# Patient Record
Sex: MALE | Race: WHITE | Employment: FULL TIME | URBAN - METROPOLITAN AREA
[De-identification: names, ages, dates, MRNs, and addresses within clinical notes are randomized per-mention and may not be internally consistent; named-entity substitution may affect disease eponyms.]

---

## 2021-04-12 LAB
AVERAGE GLUCOSE: NORMAL
HBA1C MFR BLD: 7.4 %

## 2021-11-16 LAB
ALBUMIN SERPL-MCNC: NORMAL G/DL
ALP BLD-CCNC: NORMAL U/L
ALT SERPL-CCNC: NORMAL U/L
ANION GAP SERPL CALCULATED.3IONS-SCNC: NORMAL MMOL/L
AST SERPL-CCNC: NORMAL U/L
BASOPHILS ABSOLUTE: NORMAL
BASOPHILS RELATIVE PERCENT: NORMAL
BILIRUB SERPL-MCNC: NORMAL MG/DL
BUN BLDV-MCNC: NORMAL MG/DL
CALCIUM SERPL-MCNC: NORMAL MG/DL
CHLORIDE BLD-SCNC: NORMAL MMOL/L
CHOLESTEROL, TOTAL: NORMAL
CHOLESTEROL/HDL RATIO: NORMAL
CO2: NORMAL
CREAT SERPL-MCNC: NORMAL MG/DL
CREATININE, URINE: NORMAL
EOSINOPHILS ABSOLUTE: NORMAL
EOSINOPHILS RELATIVE PERCENT: NORMAL
GFR CALCULATED: NORMAL
GLUCOSE BLD-MCNC: NORMAL MG/DL
HCT VFR BLD CALC: NORMAL %
HDLC SERPL-MCNC: NORMAL MG/DL
HEMOGLOBIN: NORMAL
LDL CHOLESTEROL CALCULATED: NORMAL
LYMPHOCYTES ABSOLUTE: NORMAL
LYMPHOCYTES RELATIVE PERCENT: NORMAL
MCH RBC QN AUTO: NORMAL PG
MCHC RBC AUTO-ENTMCNC: NORMAL G/DL
MCV RBC AUTO: NORMAL FL
MICROALBUMIN/CREAT 24H UR: NORMAL MG/G{CREAT}
MICROALBUMIN/CREAT UR-RTO: NORMAL
MONOCYTES ABSOLUTE: NORMAL
MONOCYTES RELATIVE PERCENT: NORMAL
NEUTROPHILS ABSOLUTE: NORMAL
NEUTROPHILS RELATIVE PERCENT: NORMAL
NONHDLC SERPL-MCNC: NORMAL MG/DL
PLATELET # BLD: NORMAL 10*3/UL
PMV BLD AUTO: NORMAL FL
POTASSIUM SERPL-SCNC: NORMAL MMOL/L
PROSTATE SPECIFIC ANTIGEN: NORMAL
RBC # BLD: NORMAL 10*6/UL
SODIUM BLD-SCNC: NORMAL MMOL/L
TOTAL PROTEIN: NORMAL
TRIGL SERPL-MCNC: NORMAL MG/DL
VLDLC SERPL CALC-MCNC: NORMAL MG/DL
WBC # BLD: NORMAL 10*3/UL

## 2022-02-09 ENCOUNTER — TELEPHONE (OUTPATIENT)
Dept: SURGERY | Age: 72
End: 2022-02-09

## 2022-02-09 ENCOUNTER — OFFICE VISIT (OUTPATIENT)
Dept: SURGERY | Age: 72
End: 2022-02-09
Payer: COMMERCIAL

## 2022-02-09 VITALS
HEIGHT: 73 IN | TEMPERATURE: 98.5 F | SYSTOLIC BLOOD PRESSURE: 149 MMHG | DIASTOLIC BLOOD PRESSURE: 77 MMHG | WEIGHT: 232 LBS | HEART RATE: 54 BPM | BODY MASS INDEX: 30.75 KG/M2

## 2022-02-09 DIAGNOSIS — K40.90 RIGHT INGUINAL HERNIA: Primary | ICD-10-CM

## 2022-02-09 DIAGNOSIS — R10.31 RIGHT GROIN PAIN: ICD-10-CM

## 2022-02-09 PROCEDURE — 99204 OFFICE O/P NEW MOD 45 MIN: CPT | Performed by: SURGERY

## 2022-02-09 RX ORDER — SODIUM CHLORIDE 9 MG/ML
25 INJECTION, SOLUTION INTRAVENOUS PRN
Status: CANCELLED | OUTPATIENT
Start: 2022-02-09

## 2022-02-09 RX ORDER — LISINOPRIL 20 MG/1
20 TABLET ORAL DAILY
COMMUNITY
Start: 2022-02-03 | End: 2022-03-17 | Stop reason: DRUGHIGH

## 2022-02-09 RX ORDER — SODIUM CHLORIDE 9 MG/ML
INJECTION, SOLUTION INTRAVENOUS CONTINUOUS
Status: CANCELLED | OUTPATIENT
Start: 2022-02-09

## 2022-02-09 RX ORDER — METOPROLOL TARTRATE 50 MG/1
TABLET, FILM COATED ORAL
Status: ON HOLD | COMMUNITY
Start: 2022-01-04 | End: 2022-02-26 | Stop reason: HOSPADM

## 2022-02-09 RX ORDER — TAMSULOSIN HYDROCHLORIDE 0.4 MG/1
0.4 CAPSULE ORAL DAILY
COMMUNITY
End: 2022-06-27 | Stop reason: SDUPTHER

## 2022-02-09 RX ORDER — CLOPIDOGREL BISULFATE 75 MG/1
TABLET ORAL
COMMUNITY
End: 2022-03-30 | Stop reason: SDUPTHER

## 2022-02-09 RX ORDER — NITROGLYCERIN 0.4 MG/1
TABLET SUBLINGUAL
COMMUNITY
Start: 2022-01-27

## 2022-02-09 RX ORDER — INSULIN GLARGINE 100 [IU]/ML
INJECTION, SOLUTION SUBCUTANEOUS
COMMUNITY
Start: 2022-01-22

## 2022-02-09 RX ORDER — PRAVASTATIN SODIUM 40 MG
40 TABLET ORAL DAILY
Status: ON HOLD | COMMUNITY
Start: 2022-02-03 | End: 2022-03-02 | Stop reason: HOSPADM

## 2022-02-09 RX ORDER — GLIMEPIRIDE 4 MG/1
TABLET ORAL
Status: ON HOLD | COMMUNITY
Start: 2022-02-03 | End: 2022-02-25

## 2022-02-09 RX ORDER — TADALAFIL 10 MG/1
TABLET ORAL PRN
Status: ON HOLD | COMMUNITY
Start: 2022-01-27 | End: 2022-03-02 | Stop reason: HOSPADM

## 2022-02-09 RX ORDER — ALLOPURINOL 100 MG/1
TABLET ORAL
Status: ON HOLD | COMMUNITY
End: 2022-03-02 | Stop reason: HOSPADM

## 2022-02-09 RX ORDER — SODIUM CHLORIDE 0.9 % (FLUSH) 0.9 %
10 SYRINGE (ML) INJECTION PRN
Status: CANCELLED | OUTPATIENT
Start: 2022-02-09

## 2022-02-09 RX ORDER — SODIUM CHLORIDE 0.9 % (FLUSH) 0.9 %
10 SYRINGE (ML) INJECTION EVERY 12 HOURS SCHEDULED
Status: CANCELLED | OUTPATIENT
Start: 2022-02-09

## 2022-02-09 NOTE — TELEPHONE ENCOUNTER
Per Dr. Kath Morfin, patient is scheduled for Laparoscopic robotic right inguinal hernia repair with mesh possible bilateral at [address] [place] [address] on 22. Surgery scheduled via iqueue, surgeon's calendar updated. Dr. Kath Morfin to enter orders. Follow up appointment scheduled. Patient has received COVID 19 vaccine. Cardiac clearance requested from Dr. Lei Jamison from [facility].  Medical clearance requested from Dr. Brittnee Jackson. Patient to hold plavix 5 days prior to surgery. Electronically signed by Gloria Tamayo RN on 2022 at 3:04 PM    Medical clearance received from Dr. Brittnee Jackson. Awaiting Cardiac Clearance. Patient requested to move up surgery date once clearance received. Electronically signed by Gloria Tamayo RN on 2/15/2022 at 10:11 AM            Prior Authorization Form:      DEMOGRAPHICS:                     Patient Name:  Isaac Qiu  Patient :  1950            Insurance:  Payor: Oliver Meehan / Plan: Elyse Montes / Product Type: *No Product type* /   Insurance ID Number:    Payor/Plan Subscr  Sex Relation Sub. Ins. ID Effective Group Num   1.  [address] R 1950 Male Self [id number] 22 [id number]                                   [id number]         DIAGNOSIS & PROCEDURE:                       Procedure/Operation: Laparoscopic robotic right inguinal hernia repair with mesh possible bilateral           CPT Code: 39963    Diagnosis:  Right inguinal hernia    ICD10 Code: K40.90    Location:  [address] [place] [address]    Surgeon:  Tiffanie Peralta INFORMATION:                          Date: 22    Time: TBD              Anesthesia:  General                                                       Status:  Outpatient        Special Comments:         Electronically signed by Gloria Tamayo RN on 2022 at 3:04 PM

## 2022-02-09 NOTE — PROGRESS NOTES
General Surgery History and Physical  [facility]    Patient's Name/Date of Birth: Dora Cavanaugh / 1950    Date: February 9, 2022     Surgeon: Lei Mathur M.D.    PCP: Altagracia Geren MD     Chief Complaint: right Inguinal bulge    HPI:   Dora Cavanaugh is a 66 y.o. male who presents for evaluation of right inguinal hernia, groin pain. Timing is constant, radiation to right groin, alleviated by rest and started weeks ago and severity is 7/10. States pain has been worsening, no symptoms of bowel obstruction, nausea, vomiting, fever, chills, abdominal pain. States it protrudes with activity, it is reducible. Hx of repair on left 20yrs ago. Patient Active Problem List   Diagnosis    Right inguinal hernia       History reviewed. No pertinent past medical history. History reviewed. No pertinent surgical history. Allergies   Allergen Reactions    Dapagliflozin      Other reaction(s): Myalgias (Muscle Pain)       The patient has a family history that is negative for severe cardiovascular or respiratory issues, negative for reaction to anesthesia. Time spent reviewing past medical, surgical, social and family history, vitals, nursing assessment and images. No changes from above documented history.     Social History     Socioeconomic History    Marital status: Unknown     Spouse name: Not on file    Number of children: Not on file    Years of education: Not on file    Highest education level: Not on file   Occupational History    Not on file   Tobacco Use    Smoking status: Former Smoker    Smokeless tobacco: Never Used   Substance and Sexual Activity    Alcohol use: Not on file    Drug use: Not on file    Sexual activity: Not on file   Other Topics Concern    Not on file   Social History Narrative    Not on file     Social Determinants of Health     Financial Resource Strain:     Difficulty of Paying Living Expenses: Not on file   Food Insecurity:     Worried About Running Out of Food in the Last Year: Not on file    Ran Out of Food in the Last Year: Not on file   Transportation Needs:     Lack of Transportation (Medical): Not on file    Lack of Transportation (Non-Medical): Not on file   Physical Activity:     Days of Exercise per Week: Not on file    Minutes of Exercise per Session: Not on file   Stress:     Feeling of Stress : Not on file   Social Connections:     Frequency of Communication with Friends and Family: Not on file    Frequency of Social Gatherings with Friends and Family: Not on file    Attends Sabianist Services: Not on file    Active Member of [address] [place] [address] [employer] or Organizations: Not on file    Attends Club or Organization Meetings: Not on file    Marital Status: Not on file   Intimate Partner Violence:     Fear of Current or Ex-Partner: Not on file    Emotionally Abused: Not on file    Physically Abused: Not on file    Sexually Abused: Not on file   Housing Stability:     Unable to Pay for Housing in the Last Year: Not on file    Number of [place] in the Last Year: Not on file    Unstable Housing in the Last Year: Not on file         A complete 10 system review was performed and are otherwise negative unless mentioned in the above HPI. Specific negatives are listed below but may not include all those reviewed.     General ROS: negative obtundation, AMS  ENT ROS: negative rhinorrhea, epistaxis  Allergy and Immunology ROS: negative itchy/watery eyes or nasal congestion  Hematological and Lymphatic ROS: negative spontaneous bleeding or bruising  Endocrine ROS: negative  lethargy, mood swings, palpitations or polydipsia/polyuria  Respiratory ROS: negative sputum changes, stridor, tachypnea or wheezing  Cardiovascular ROS: negative for - loss of consciousness, murmur or orthopnea  Gastrointestinal ROS: negative for - hematochezia or hematemesis  Genito-Urinary ROS: negative for -  genital discharge or hematuria  Musculoskeletal ROS: negative for - focal weakness, gangrene  Psych/Neuro ROS: negative for - visual or auditory hallucinations, suicidal ideation    Physical exam:   BP (!) 149/77 (Site: Right Upper Arm, Position: Sitting, Cuff Size: Medium Adult)   Pulse 54   Temp 98.5 °F (36.9 °C)   Ht 6' 1\" (1.854 m)   Wt 232 lb (105.2 kg)   BMI 30.61 kg/m²   General appearance:  NAD, appears stated age  Head: NCAT, PERRLA, EOMI, red conjunctiva  Neck: supple, no masses, trachea midline  Lungs: Equal chest rise bilateral, no retractions, no wheezing  Heart: Reg rate  Abdomen: soft, obese, nontender  Skin; warm and dry, no cyanosis  Gu: no cva tenderness  Extremities: atraumatic, no focal motor deficits, no open wounds  Psych: No tremor, visual hallucinations        Radiology: I reviewed relevant abdominal imaging from this admission and that available in the EMR       Assessment:  Chandra Junior is a 66 y.o. male with right inguinal poss femoral hernia, nonrecurrent  Patient Active Problem List   Diagnosis    Right inguinal hernia         Plan:  OR for laparoscopic robot assisted right inguinal hernia repair with mesh possible bilateral  Discussed the risk, benefits and alternatives of surgery including wound infections, bleeding, scar, recurrent hernia formation, mesh infection and migration, chronic groin pain, nerve injury, testicle injury, repeat procedures and the risks of general anesthetic including MI, CVA, sudden death or reactions to anesthetic medications. The patient understands the risks and alternatives and the possibility of converting to an open procedure. All questions were answered to the patient's satisfaction and they freely signed the consent.      Medical and cardiac clearance  Hold plavix 5d prior to surgery        Jose Balderas MD  9:50 AM  2/9/2022

## 2022-02-14 ENCOUNTER — APPOINTMENT (OUTPATIENT)
Dept: CT IMAGING | Age: 72
End: 2022-02-14
Payer: COMMERCIAL

## 2022-02-14 ENCOUNTER — HOSPITAL ENCOUNTER (EMERGENCY)
Age: 72
Discharge: HOME | End: 2022-02-14
Attending: EMERGENCY MEDICINE
Payer: COMMERCIAL

## 2022-02-14 VITALS
OXYGEN SATURATION: 96 % | SYSTOLIC BLOOD PRESSURE: 143 MMHG | HEART RATE: 57 BPM | RESPIRATION RATE: 18 BRPM | WEIGHT: 232 LBS | HEIGHT: 73 IN | DIASTOLIC BLOOD PRESSURE: 81 MMHG | TEMPERATURE: 97.9 F | BODY MASS INDEX: 30.75 KG/M2

## 2022-02-14 DIAGNOSIS — R10.31 RIGHT LOWER QUADRANT ABDOMINAL PAIN: Primary | ICD-10-CM

## 2022-02-14 DIAGNOSIS — K40.21 BILATERAL RECURRENT INGUINAL HERNIA WITHOUT OBSTRUCTION OR GANGRENE: ICD-10-CM

## 2022-02-14 LAB
ALBUMIN SERPL-MCNC: 4.1 G/DL (ref 3.5–5.2)
ALP BLD-CCNC: 118 U/L (ref 40–129)
ALT SERPL-CCNC: 20 U/L (ref 0–40)
ANION GAP SERPL CALCULATED.3IONS-SCNC: 10 MMOL/L (ref 7–16)
AST SERPL-CCNC: 21 U/L (ref 0–39)
BASOPHILS ABSOLUTE: 0.1 E9/L (ref 0–0.2)
BASOPHILS RELATIVE PERCENT: 0.9 % (ref 0–2)
BILIRUB SERPL-MCNC: 0.2 MG/DL (ref 0–1.2)
BUN BLDV-MCNC: 21 MG/DL (ref 6–23)
CALCIUM SERPL-MCNC: 9 MG/DL (ref 8.6–10.2)
CHLORIDE BLD-SCNC: 104 MMOL/L (ref 98–107)
CO2: 25 MMOL/L (ref 22–29)
CREAT SERPL-MCNC: 0.9 MG/DL (ref 0.7–1.2)
EOSINOPHILS ABSOLUTE: 0.28 E9/L (ref 0.05–0.5)
EOSINOPHILS RELATIVE PERCENT: 2.6 % (ref 0–6)
GFR AFRICAN AMERICAN: >60
GFR NON-AFRICAN AMERICAN: >60 ML/MIN/1.73
GLUCOSE BLD-MCNC: 192 MG/DL (ref 74–99)
HCT VFR BLD CALC: 49 % (ref 37–54)
HEMOGLOBIN: 15.8 G/DL (ref 12.5–16.5)
IMMATURE GRANULOCYTES #: 0.07 E9/L
IMMATURE GRANULOCYTES %: 0.7 % (ref 0–5)
LYMPHOCYTES ABSOLUTE: 2.39 E9/L (ref 1.5–4)
LYMPHOCYTES RELATIVE PERCENT: 22.4 % (ref 20–42)
MCH RBC QN AUTO: 28 PG (ref 26–35)
MCHC RBC AUTO-ENTMCNC: 32.2 % (ref 32–34.5)
MCV RBC AUTO: 86.9 FL (ref 80–99.9)
MONOCYTES ABSOLUTE: 1.05 E9/L (ref 0.1–0.95)
MONOCYTES RELATIVE PERCENT: 9.8 % (ref 2–12)
NEUTROPHILS ABSOLUTE: 6.79 E9/L (ref 1.8–7.3)
NEUTROPHILS RELATIVE PERCENT: 63.6 % (ref 43–80)
PDW BLD-RTO: 13.4 FL (ref 11.5–15)
PLATELET # BLD: 341 E9/L (ref 130–450)
PMV BLD AUTO: 9.9 FL (ref 7–12)
POTASSIUM REFLEX MAGNESIUM: 4.4 MMOL/L (ref 3.5–5)
RBC # BLD: 5.64 E12/L (ref 3.8–5.8)
SODIUM BLD-SCNC: 139 MMOL/L (ref 132–146)
TOTAL PROTEIN: 6.8 G/DL (ref 6.4–8.3)
WBC # BLD: 10.7 E9/L (ref 4.5–11.5)

## 2022-02-14 PROCEDURE — 6360000004 HC RX CONTRAST MEDICATION: Performed by: RADIOLOGY

## 2022-02-14 PROCEDURE — 99282 EMERGENCY DEPT VISIT SF MDM: CPT

## 2022-02-14 PROCEDURE — 74177 CT ABD & PELVIS W/CONTRAST: CPT

## 2022-02-14 PROCEDURE — 85025 COMPLETE CBC W/AUTO DIFF WBC: CPT

## 2022-02-14 PROCEDURE — 80053 COMPREHEN METABOLIC PANEL: CPT

## 2022-02-14 RX ORDER — ONDANSETRON 2 MG/ML
4 INJECTION INTRAMUSCULAR; INTRAVENOUS ONCE
Status: DISCONTINUED | OUTPATIENT
Start: 2022-02-14 | End: 2022-02-14 | Stop reason: HOSPADM

## 2022-02-14 RX ORDER — SODIUM CHLORIDE 0.9 % (FLUSH) 0.9 %
SYRINGE (ML) INJECTION
Status: DISCONTINUED
Start: 2022-02-14 | End: 2022-02-14 | Stop reason: HOSPADM

## 2022-02-14 RX ORDER — MORPHINE SULFATE 4 MG/ML
4 INJECTION, SOLUTION INTRAMUSCULAR; INTRAVENOUS ONCE
Status: DISCONTINUED | OUTPATIENT
Start: 2022-02-14 | End: 2022-02-14 | Stop reason: HOSPADM

## 2022-02-14 RX ADMIN — IOPAMIDOL 75 ML: 755 INJECTION, SOLUTION INTRAVENOUS at 10:27

## 2022-02-14 ASSESSMENT — ENCOUNTER SYMPTOMS
EYE DISCHARGE: 0
COUGH: 0
DIARRHEA: 0
SORE THROAT: 0
BACK PAIN: 0
SHORTNESS OF BREATH: 0
VOMITING: 0
SINUS PRESSURE: 0
NAUSEA: 0
ABDOMINAL PAIN: 1
WHEEZING: 0
EYE PAIN: 0

## 2022-02-14 ASSESSMENT — PAIN SCALES - GENERAL: PAINLEVEL_OUTOF10: 10

## 2022-02-14 ASSESSMENT — PAIN DESCRIPTION - LOCATION: LOCATION: ABDOMEN

## 2022-02-14 ASSESSMENT — PAIN DESCRIPTION - PAIN TYPE: TYPE: ACUTE PAIN

## 2022-02-14 ASSESSMENT — PAIN DESCRIPTION - ORIENTATION: ORIENTATION: LEFT;LOWER

## 2022-02-14 NOTE — ED NOTES
Pt declined pain and nausea medicine at this time-ambulated to the bathroom to void without assistance     Betsy Zaldivar RN  02/14/22 [id number]

## 2022-02-14 NOTE — ED PROVIDER NOTES
67-year-old male, follows with Dr. Keily Jo for a right-sided hernia that he has elective repair scheduled for in 2 weeks. States he was wiping his bottom today after a bowel movement, was turning and wiping and had a sudden pop in that area, is having intense 8 out of 10 pain, sudden onset, worsens with palpation, nothing makes it better, is constant. He believes it is related to his hernia. He denies any nausea, vomiting, constipation, chest pain, chest tightness, shortness of breath. Review of Systems   Constitutional: Negative for chills and fever. HENT: Negative for ear pain, sinus pressure and sore throat. Eyes: Negative for pain and discharge. Respiratory: Negative for cough, shortness of breath and wheezing. Cardiovascular: Negative for chest pain. Gastrointestinal: Positive for abdominal pain (Right lower quadrant region). Negative for diarrhea, nausea and vomiting. Genitourinary: Negative for dysuria and frequency. Musculoskeletal: Negative for arthralgias and back pain. Skin: Negative for rash and wound. Neurological: Negative for weakness and headaches. Hematological: Negative for adenopathy. All other systems reviewed and are negative. Physical Exam  Vitals and nursing note reviewed. Constitutional:       Appearance: He is well-developed. HENT:      Head: Normocephalic and atraumatic. Eyes:      Extraocular Movements: Extraocular movements intact. Conjunctiva/sclera: Conjunctivae normal.      Pupils: Pupils are equal, round, and reactive to light. Cardiovascular:      Rate and Rhythm: Normal rate and regular rhythm. Heart sounds: Normal heart sounds. No murmur heard. Pulmonary:      Effort: Pulmonary effort is normal. No respiratory distress. Breath sounds: Normal breath sounds. No wheezing or rales. Abdominal:      General: Bowel sounds are normal.      Palpations: Abdomen is soft. Tenderness: There is abdominal tenderness. There is no guarding or rebound. Comments: Right lower quadrant tenderness, there is no appreciable skin changes, no visible hernia   Musculoskeletal:         General: No tenderness or deformity. Cervical back: Normal range of motion and neck supple. Skin:     General: Skin is warm and dry. Neurological:      Mental Status: He is alert and oriented to person, place, and time. Cranial Nerves: No cranial nerve deficit. Coordination: Coordination normal.          Procedures     MDM     ED Course as of 02/14/22 1106   Mon Feb 14, 2022   8849 ATTENDING PROVIDER ATTESTATION:     I have personally performed and/or participated in the history, exam, medical decision making, and procedures and agree with all pertinent clinical information unless otherwise noted. I have also reviewed and agree with the past medical, family and social history unless otherwise noted. I have discussed this patient in detail with the resident, and provided the instruction and education regarding patient complaining of painful right lower inguinal hernia, it is known, he sees Dr. Dwight Vides. States it is only present if he is upright or moving. States right now laying flat it has reduced itself and is nontender but is here because it is getting worsening with the pain over the last day or so. No fevers, sweats or chills and no nausea vomiting or diarrhea. Right now he has no pain and states it is not protruding. My findings/plan: Patient laying flat in the bed in no distress. Soft slightly protuberant abdomen with no distention. No palpable reproducible hernias to the lower pelvic area or inguinal regions. The abdomen itself is nontender. No jaundice or icterus. No CVA tenderness. Heart rate regular.   Lungs are clear and equal.       [NC]   1105 Patient presented emergency department for right-sided inguinal pain and discomfort, states when he was going to the bathroom today he got up and his hernia came out, had a large amount of pain afterwards. Reduced by the time I did seen him and he was laying down in the emergency department. CT scan showed bilateral hernias with possible early congestion of the left side, no overlying skin changes and no obstructive signs. Spoke to Dr. Raya Lares, says patient may be able to get surgery if he has not been taking his Plavix, patient took his Plavix today, patient would have to hold Plavix in order to receive hernia repair surgery, he will follow-up outpatient with his surgeon, hernias are reducible, he has no obstructive signs, no overlying skin changes, instructed to involve his general surgeon. [JG]      ED Course User Index  [JG] Dianna Romberg, MD  [NC] Bigg Fitch DO      Patient presented emergency department for right-sided inguinal pain and discomfort, states when he was going to the bathroom today he got up and his hernia came out, had a large amount of pain afterwards. Reduced by the time I did seen him and he was laying down in the emergency department. CT scan showed bilateral hernias with possible early congestion of the left side, no overlying skin changes and no obstructive signs. Spoke to Dr. Raya Lares, says patient may be able to get surgery if he has not been taking his Plavix, patient took his Plavix today, patient would have to hold Plavix in order to receive hernia repair surgery, he will follow-up outpatient with his surgeon, hernias are reducible, he has no obstructive signs, no overlying skin changes, instructed to involve his general surgeon. ED Course as of 02/14/22 1107   Mon Feb 14, 2022   0986 ATTENDING PROVIDER ATTESTATION:     I have personally performed and/or participated in the history, exam, medical decision making, and procedures and agree with all pertinent clinical information unless otherwise noted. I have also reviewed and agree with the past medical, family and social history unless otherwise noted.     I have discussed this patient in detail with the resident, and provided the instruction and education regarding patient complaining of painful right lower inguinal hernia, it is known, he sees Dr. Hernandez Durand. States it is only present if he is upright or moving. States right now laying flat it has reduced itself and is nontender but is here because it is getting worsening with the pain over the last day or so. No fevers, sweats or chills and no nausea vomiting or diarrhea. Right now he has no pain and states it is not protruding. My findings/plan: Patient laying flat in the bed in no distress. Soft slightly protuberant abdomen with no distention. No palpable reproducible hernias to the lower pelvic area or inguinal regions. The abdomen itself is nontender. No jaundice or icterus. No CVA tenderness. Heart rate regular. Lungs are clear and equal.       [NC]   1105 Patient presented emergency department for right-sided inguinal pain and discomfort, states when he was going to the bathroom today he got up and his hernia came out, had a large amount of pain afterwards. Reduced by the time I did seen him and he was laying down in the emergency department. CT scan showed bilateral hernias with possible early congestion of the left side, no overlying skin changes and no obstructive signs. Spoke to Dr. July Espinoza, says patient may be able to get surgery if he has not been taking his Plavix, patient took his Plavix today, patient would have to hold Plavix in order to receive hernia repair surgery, he will follow-up outpatient with his surgeon, hernias are reducible, he has no obstructive signs, no overlying skin changes, instructed to involve his general surgeon. [JG]      ED Course User Index  [JG] Anusha Fitzgerald MD  [NC] Loida Faust, DO       --------------------------------------------- PAST HISTORY ---------------------------------------------  Past Medical History:  has no past medical history on file.     Past Surgical History:  has no past surgical history on file. Social History:  reports that he has quit smoking. He has never used smokeless tobacco.    Family History: family history is not on file. The patients home medications have been reviewed.     Allergies: Dapagliflozin    -------------------------------------------------- RESULTS -------------------------------------------------  Labs:  Results for orders placed or performed during the hospital encounter of 02/14/22   CBC Auto Differential   Result Value Ref Range    WBC 10.7 4.5 - 11.5 E9/L    RBC 5.64 3.80 - 5.80 E12/L    Hemoglobin 15.8 12.5 - 16.5 g/dL    Hematocrit 49.0 37.0 - 54.0 %    MCV 86.9 80.0 - 99.9 fL    MCH 28.0 26.0 - 35.0 pg    MCHC 32.2 32.0 - 34.5 %    RDW 13.4 11.5 - 15.0 fL    Platelets 309 915 - 922 E9/L    MPV 9.9 7.0 - 12.0 fL    Neutrophils % 63.6 43.0 - 80.0 %    Immature Granulocytes % 0.7 0.0 - 5.0 %    Lymphocytes % 22.4 20.0 - 42.0 %    Monocytes % 9.8 2.0 - 12.0 %    Eosinophils % 2.6 0.0 - 6.0 %    Basophils % 0.9 0.0 - 2.0 %    Neutrophils Absolute 6.79 1.80 - 7.30 E9/L    Immature Granulocytes # 0.07 E9/L    Lymphocytes Absolute 2.39 1.50 - 4.00 E9/L    Monocytes Absolute 1.05 (H) 0.10 - 0.95 E9/L    Eosinophils Absolute 0.28 0.05 - 0.50 E9/L    Basophils Absolute 0.10 0.00 - 0.20 E9/L   Comprehensive Metabolic Panel w/ Reflex to MG   Result Value Ref Range    Sodium 139 132 - 146 mmol/L    Potassium reflex Magnesium 4.4 3.5 - 5.0 mmol/L    Chloride 104 98 - 107 mmol/L    CO2 25 22 - 29 mmol/L    Anion Gap 10 7 - 16 mmol/L    Glucose 192 (H) 74 - 99 mg/dL    BUN 21 6 - 23 mg/dL    CREATININE 0.9 0.7 - 1.2 mg/dL    GFR Non-African American >60 >=60 mL/min/1.73    GFR African American >60     Calcium 9.0 8.6 - 10.2 mg/dL    Total Protein 6.8 6.4 - 8.3 g/dL    Albumin 4.1 3.5 - 5.2 g/dL    Total Bilirubin 0.2 0.0 - 1.2 mg/dL    Alkaline Phosphatase 118 40 - 129 U/L    ALT 20 0 - 40 U/L    AST 21 0 - 39 U/L       Radiology:  CT ABDOMEN PELVIS W IV CONTRAST Additional Contrast? None   Preliminary Result   Left inguinal hernia with some stranding seen at the origin of the hernia in   which mild vascular congestion cannot be excluded. There is no contents of   bowel with only fat present. There is also a small umbilical hernia   containing fat only. Remainder of the abdomen and pelvis reveals diverticulosis with no evidence   of diverticulitis. There is atelectatic changes and pleural thickening   identified at the left lung base.             ------------------------- NURSING NOTES AND VITALS REVIEWED ---------------------------  Date / Time Roomed:  2/14/2022  8:27 AM  ED Bed Assignment:  14/14    The nursing notes within the ED encounter and vital signs as below have been reviewed. BP (!) 184/83   Pulse 56   Temp 97.9 °F (36.6 °C) (Oral)   Resp 20   Ht 6' 1\" (1.854 m)   Wt 232 lb (105.2 kg)   SpO2 96%   BMI 30.61 kg/m²   Oxygen Saturation Interpretation: Normal      ------------------------------------------ PROGRESS NOTES ------------------------------------------  11:07 AM EST  I have spoken with the patient and discussed todays results, in addition to providing specific details for the plan of care and counseling regarding the diagnosis and prognosis. Their questions are answered at this time and they are agreeable with the plan. I discussed at length with them reasons for immediate return here for re evaluation. They will followup with their primary care physician by calling their office tomorrow. --------------------------------- ADDITIONAL PROVIDER NOTES ---------------------------------  At this time the patient is without objective evidence of an acute process requiring hospitalization or inpatient management. They have remained hemodynamically stable throughout their entire ED visit and are stable for discharge with outpatient follow-up.      The plan has been discussed in detail and they are aware of the specific conditions for emergent return, as well as the importance of follow-up. New Prescriptions    No medications on file       Diagnosis:  1. Right lower quadrant abdominal pain    2. Bilateral recurrent inguinal hernia without obstruction or gangrene        Disposition:  Patient's disposition: Discharge to home  Patient's condition is stable.            Jennifer Wood MD  Resident  02/14/22 1562

## 2022-02-24 ENCOUNTER — APPOINTMENT (OUTPATIENT)
Dept: GENERAL RADIOLOGY | Age: 72
DRG: 282 | End: 2022-02-24
Payer: COMMERCIAL

## 2022-02-24 ENCOUNTER — HOSPITAL ENCOUNTER (INPATIENT)
Age: 72
LOS: 2 days | Discharge: HOME | DRG: 282 | End: 2022-02-26
Attending: STUDENT IN AN ORGANIZED HEALTH CARE EDUCATION/TRAINING PROGRAM | Admitting: FAMILY MEDICINE
Payer: COMMERCIAL

## 2022-02-24 ENCOUNTER — APPOINTMENT (OUTPATIENT)
Dept: CT IMAGING | Age: 72
DRG: 282 | End: 2022-02-24
Payer: COMMERCIAL

## 2022-02-24 DIAGNOSIS — I48.91 ATRIAL FIBRILLATION WITH RVR (HCC): ICD-10-CM

## 2022-02-24 DIAGNOSIS — I21.3 ST ELEVATION MYOCARDIAL INFARCTION (STEMI), UNSPECIFIED ARTERY (HCC): Primary | ICD-10-CM

## 2022-02-24 LAB
ALBUMIN SERPL-MCNC: 4.2 G/DL (ref 3.5–5.2)
ALP BLD-CCNC: 113 U/L (ref 40–129)
ALT SERPL-CCNC: 16 U/L (ref 0–40)
ANION GAP SERPL CALCULATED.3IONS-SCNC: 11 MMOL/L (ref 7–16)
ANISOCYTOSIS: ABNORMAL
AST SERPL-CCNC: 12 U/L (ref 0–39)
BASOPHILS ABSOLUTE: 0.14 E9/L (ref 0–0.2)
BASOPHILS RELATIVE PERCENT: 0.9 % (ref 0–2)
BILIRUB SERPL-MCNC: 0.2 MG/DL (ref 0–1.2)
BUN BLDV-MCNC: 20 MG/DL (ref 6–23)
CALCIUM SERPL-MCNC: 8.4 MG/DL (ref 8.6–10.2)
CHLORIDE BLD-SCNC: 105 MMOL/L (ref 98–107)
CO2: 24 MMOL/L (ref 22–29)
CREAT SERPL-MCNC: 0.9 MG/DL (ref 0.7–1.2)
EOSINOPHILS ABSOLUTE: 0.14 E9/L (ref 0.05–0.5)
EOSINOPHILS RELATIVE PERCENT: 0.9 % (ref 0–6)
GFR AFRICAN AMERICAN: >60
GFR NON-AFRICAN AMERICAN: >60 ML/MIN/1.73
GLUCOSE BLD-MCNC: 151 MG/DL (ref 74–99)
HCT VFR BLD CALC: 48.7 % (ref 37–54)
HEMOGLOBIN: 16 G/DL (ref 12.5–16.5)
INR BLD: 1
LYMPHOCYTES ABSOLUTE: 2.77 E9/L (ref 1.5–4)
LYMPHOCYTES RELATIVE PERCENT: 18.3 % (ref 20–42)
MCH RBC QN AUTO: 27.5 PG (ref 26–35)
MCHC RBC AUTO-ENTMCNC: 32.9 % (ref 32–34.5)
MCV RBC AUTO: 83.8 FL (ref 80–99.9)
METAMYELOCYTES RELATIVE PERCENT: 2.6 % (ref 0–1)
MONOCYTES ABSOLUTE: 1.08 E9/L (ref 0.1–0.95)
MONOCYTES RELATIVE PERCENT: 6.9 % (ref 2–12)
MYELOCYTE PERCENT: 1.7 % (ref 0–0)
NEUTROPHILS ABSOLUTE: 11.24 E9/L (ref 1.8–7.3)
NEUTROPHILS RELATIVE PERCENT: 68.7 % (ref 43–80)
PDW BLD-RTO: 13.4 FL (ref 11.5–15)
PLATELET # BLD: 338 E9/L (ref 130–450)
PMV BLD AUTO: 10 FL (ref 7–12)
POTASSIUM SERPL-SCNC: 4 MMOL/L (ref 3.5–5)
PROTHROMBIN TIME: 11.4 SEC (ref 9.3–12.4)
RBC # BLD: 5.81 E12/L (ref 3.8–5.8)
SARS-COV-2, NAAT: NOT DETECTED
SODIUM BLD-SCNC: 140 MMOL/L (ref 132–146)
TOTAL PROTEIN: 7.2 G/DL (ref 6.4–8.3)
TROPONIN, HIGH SENSITIVITY: 19 NG/L (ref 0–11)
WBC # BLD: 15.4 E9/L (ref 4.5–11.5)

## 2022-02-24 PROCEDURE — 96376 TX/PRO/DX INJ SAME DRUG ADON: CPT

## 2022-02-24 PROCEDURE — 85025 COMPLETE CBC W/AUTO DIFF WBC: CPT

## 2022-02-24 PROCEDURE — 96361 HYDRATE IV INFUSION ADD-ON: CPT

## 2022-02-24 PROCEDURE — 2500000003 HC RX 250 WO HCPCS

## 2022-02-24 PROCEDURE — 80053 COMPREHEN METABOLIC PANEL: CPT

## 2022-02-24 PROCEDURE — 2140000000 HC CCU INTERMEDIATE R&B

## 2022-02-24 PROCEDURE — 71275 CT ANGIOGRAPHY CHEST: CPT

## 2022-02-24 PROCEDURE — 6360000004 HC RX CONTRAST MEDICATION: Performed by: RADIOLOGY

## 2022-02-24 PROCEDURE — 6360000002 HC RX W HCPCS

## 2022-02-24 PROCEDURE — 84484 ASSAY OF TROPONIN QUANT: CPT

## 2022-02-24 PROCEDURE — 96374 THER/PROPH/DIAG INJ IV PUSH: CPT

## 2022-02-24 PROCEDURE — 6360000002 HC RX W HCPCS: Performed by: STUDENT IN AN ORGANIZED HEALTH CARE EDUCATION/TRAINING PROGRAM

## 2022-02-24 PROCEDURE — 93005 ELECTROCARDIOGRAM TRACING: CPT | Performed by: EMERGENCY MEDICINE

## 2022-02-24 PROCEDURE — 83880 ASSAY OF NATRIURETIC PEPTIDE: CPT

## 2022-02-24 PROCEDURE — 74174 CTA ABD&PLVS W/CONTRAST: CPT

## 2022-02-24 PROCEDURE — 2500000003 HC RX 250 WO HCPCS: Performed by: STUDENT IN AN ORGANIZED HEALTH CARE EDUCATION/TRAINING PROGRAM

## 2022-02-24 PROCEDURE — 85610 PROTHROMBIN TIME: CPT

## 2022-02-24 PROCEDURE — 71045 X-RAY EXAM CHEST 1 VIEW: CPT

## 2022-02-24 PROCEDURE — 99285 EMERGENCY DEPT VISIT HI MDM: CPT

## 2022-02-24 PROCEDURE — 2580000003 HC RX 258: Performed by: STUDENT IN AN ORGANIZED HEALTH CARE EDUCATION/TRAINING PROGRAM

## 2022-02-24 PROCEDURE — 85378 FIBRIN DEGRADE SEMIQUANT: CPT

## 2022-02-24 PROCEDURE — 87635 SARS-COV-2 COVID-19 AMP PRB: CPT

## 2022-02-24 RX ORDER — HEPARIN SODIUM 10000 [USP'U]/ML
5000 INJECTION, SOLUTION INTRAVENOUS; SUBCUTANEOUS ONCE
Status: DISCONTINUED | OUTPATIENT
Start: 2022-02-24 | End: 2022-02-24

## 2022-02-24 RX ORDER — HEPARIN SODIUM 10000 [USP'U]/100ML
5-30 INJECTION, SOLUTION INTRAVENOUS CONTINUOUS
Status: DISCONTINUED | OUTPATIENT
Start: 2022-02-24 | End: 2022-02-25 | Stop reason: ALTCHOICE

## 2022-02-24 RX ORDER — 0.9 % SODIUM CHLORIDE 0.9 %
1000 INTRAVENOUS SOLUTION INTRAVENOUS ONCE
Status: COMPLETED | OUTPATIENT
Start: 2022-02-24 | End: 2022-02-24

## 2022-02-24 RX ORDER — METOPROLOL TARTRATE 5 MG/5ML
5 INJECTION INTRAVENOUS EVERY 5 MIN PRN
Status: DISCONTINUED | OUTPATIENT
Start: 2022-02-24 | End: 2022-02-24

## 2022-02-24 RX ORDER — HEPARIN SODIUM 1000 [USP'U]/ML
2000 INJECTION, SOLUTION INTRAVENOUS; SUBCUTANEOUS PRN
Status: DISCONTINUED | OUTPATIENT
Start: 2022-02-24 | End: 2022-02-25 | Stop reason: ALTCHOICE

## 2022-02-24 RX ORDER — METOPROLOL TARTRATE 5 MG/5ML
5 INJECTION INTRAVENOUS ONCE
Status: DISCONTINUED | OUTPATIENT
Start: 2022-02-24 | End: 2022-02-24

## 2022-02-24 RX ORDER — METOPROLOL TARTRATE 5 MG/5ML
5 INJECTION INTRAVENOUS ONCE
Status: COMPLETED | OUTPATIENT
Start: 2022-02-24 | End: 2022-02-24

## 2022-02-24 RX ORDER — METOPROLOL TARTRATE 5 MG/5ML
5 INJECTION INTRAVENOUS EVERY 5 MIN PRN
Status: DISCONTINUED | OUTPATIENT
Start: 2022-02-24 | End: 2022-02-26 | Stop reason: HOSPADM

## 2022-02-24 RX ORDER — SODIUM CHLORIDE 9 MG/ML
INJECTION, SOLUTION INTRAVENOUS CONTINUOUS
Status: DISCONTINUED | OUTPATIENT
Start: 2022-02-25 | End: 2022-02-25 | Stop reason: SDUPTHER

## 2022-02-24 RX ORDER — HEPARIN SODIUM 1000 [USP'U]/ML
4000 INJECTION, SOLUTION INTRAVENOUS; SUBCUTANEOUS PRN
Status: DISCONTINUED | OUTPATIENT
Start: 2022-02-24 | End: 2022-02-25 | Stop reason: ALTCHOICE

## 2022-02-24 RX ORDER — FENTANYL CITRATE 50 UG/ML
50 INJECTION, SOLUTION INTRAMUSCULAR; INTRAVENOUS ONCE
Status: COMPLETED | OUTPATIENT
Start: 2022-02-24 | End: 2022-02-24

## 2022-02-24 RX ORDER — HEPARIN SODIUM 1000 [USP'U]/ML
4000 INJECTION, SOLUTION INTRAVENOUS; SUBCUTANEOUS ONCE
Status: COMPLETED | OUTPATIENT
Start: 2022-02-24 | End: 2022-02-24

## 2022-02-24 RX ADMIN — SODIUM CHLORIDE 1000 ML: 9 INJECTION, SOLUTION INTRAVENOUS at 22:10

## 2022-02-24 RX ADMIN — SODIUM CHLORIDE 1000 ML: 9 INJECTION, SOLUTION INTRAVENOUS at 22:45

## 2022-02-24 RX ADMIN — METOPROLOL TARTRATE 5 MG: 5 INJECTION INTRAVENOUS at 23:43

## 2022-02-24 RX ADMIN — HEPARIN SODIUM 4000 UNITS: 1000 INJECTION INTRAVENOUS; SUBCUTANEOUS at 23:59

## 2022-02-24 RX ADMIN — IOPAMIDOL 90 ML: 755 INJECTION, SOLUTION INTRAVENOUS at 23:01

## 2022-02-24 RX ADMIN — METOPROLOL TARTRATE 5 MG: 5 INJECTION, SOLUTION INTRAVENOUS at 23:08

## 2022-02-24 RX ADMIN — FENTANYL CITRATE 50 MCG: 0.05 INJECTION, SOLUTION INTRAMUSCULAR; INTRAVENOUS at 23:15

## 2022-02-24 ASSESSMENT — PAIN SCALES - GENERAL: PAINLEVEL_OUTOF10: 10

## 2022-02-24 NOTE — Clinical Note
Discharge Plan[de-identified] Other/[facility])   Telemetry/Cardiac Monitoring Required?: Yes

## 2022-02-24 NOTE — TELEPHONE ENCOUNTER
Follow up call made to patients cardiologist Dr. Harini Redmond, in regards to requested cardiac clearance. Patient to have hearth catheterization done prior to surgery. Attempted to reach patient to discuss cancelling surgery due to need for heart cath. VM left with call back information. Lap robot right inguinal hernia repair scheduled for 2/28/21 with Dr. Kate Carrasco cancelled. Surgery cancelled via telephone with surgery scheduling. Surgeons calendar updated.  Will reschedule surgery when cleared by cardiologist.   Electronically signed by Nikki Turpin RN on 2/24/2022 at 10:39 AM

## 2022-02-25 PROBLEM — I25.10 CAD IN NATIVE ARTERY: Status: ACTIVE | Noted: 2022-02-25

## 2022-02-25 LAB
ABO/RH: NORMAL
ALBUMIN SERPL-MCNC: 3.5 G/DL (ref 3.5–5.2)
ALP BLD-CCNC: 94 U/L (ref 40–129)
ALT SERPL-CCNC: 17 U/L (ref 0–40)
ANION GAP SERPL CALCULATED.3IONS-SCNC: 13 MMOL/L (ref 7–16)
ANTIBODY SCREEN: NORMAL
APTT: 27.4 SEC (ref 24.5–35.1)
APTT: 29 SEC (ref 24.5–35.1)
APTT: 29.3 SEC (ref 24.5–35.1)
AST SERPL-CCNC: 41 U/L (ref 0–39)
BILIRUB SERPL-MCNC: 0.3 MG/DL (ref 0–1.2)
BUN BLDV-MCNC: 17 MG/DL (ref 6–23)
CALCIUM SERPL-MCNC: 7.9 MG/DL (ref 8.6–10.2)
CHLORIDE BLD-SCNC: 107 MMOL/L (ref 98–107)
CHOLESTEROL, TOTAL: 136 MG/DL (ref 0–199)
CO2: 20 MMOL/L (ref 22–29)
CREAT SERPL-MCNC: 0.8 MG/DL (ref 0.7–1.2)
D DIMER: <200 NG/ML DDU
EKG ATRIAL RATE: 178 BPM
EKG ATRIAL RATE: 65 BPM
EKG ATRIAL RATE: 71 BPM
EKG P AXIS: 21 DEGREES
EKG P AXIS: 61 DEGREES
EKG P-R INTERVAL: 178 MS
EKG P-R INTERVAL: 196 MS
EKG Q-T INTERVAL: 284 MS
EKG Q-T INTERVAL: 418 MS
EKG Q-T INTERVAL: 444 MS
EKG QRS DURATION: 100 MS
EKG QRS DURATION: 106 MS
EKG QRS DURATION: 86 MS
EKG QTC CALCULATION (BAZETT): 434 MS
EKG QTC CALCULATION (BAZETT): 460 MS
EKG QTC CALCULATION (BAZETT): 482 MS
EKG R AXIS: 111 DEGREES
EKG R AXIS: 22 DEGREES
EKG R AXIS: 35 DEGREES
EKG T AXIS: 112 DEGREES
EKG T AXIS: 131 DEGREES
EKG T AXIS: 84 DEGREES
EKG VENTRICULAR RATE: 158 BPM
EKG VENTRICULAR RATE: 65 BPM
EKG VENTRICULAR RATE: 71 BPM
GFR AFRICAN AMERICAN: >60
GFR NON-AFRICAN AMERICAN: >60 ML/MIN/1.73
GLUCOSE BLD-MCNC: 203 MG/DL (ref 74–99)
HCT VFR BLD CALC: 45.2 % (ref 37–54)
HDLC SERPL-MCNC: 37 MG/DL
HEMOGLOBIN: 14.6 G/DL (ref 12.5–16.5)
LDL CHOLESTEROL CALCULATED: 59 MG/DL (ref 0–99)
MAGNESIUM: 2.2 MG/DL (ref 1.6–2.6)
MCH RBC QN AUTO: 27.9 PG (ref 26–35)
MCHC RBC AUTO-ENTMCNC: 32.3 % (ref 32–34.5)
MCV RBC AUTO: 86.4 FL (ref 80–99.9)
PDW BLD-RTO: 13.4 FL (ref 11.5–15)
PLATELET # BLD: 278 E9/L (ref 130–450)
PMV BLD AUTO: 10.6 FL (ref 7–12)
POC ACT LR: 168 SECONDS
POC ACT LR: 292 SECONDS
POTASSIUM REFLEX MAGNESIUM: 4 MMOL/L (ref 3.5–5)
PRO-BNP: 94 PG/ML (ref 0–125)
RBC # BLD: 5.23 E12/L (ref 3.8–5.8)
SODIUM BLD-SCNC: 140 MMOL/L (ref 132–146)
TOTAL PROTEIN: 6.1 G/DL (ref 6.4–8.3)
TRIGL SERPL-MCNC: 199 MG/DL (ref 0–149)
TROPONIN, HIGH SENSITIVITY: 130 NG/L (ref 0–11)
TROPONIN, HIGH SENSITIVITY: 494 NG/L (ref 0–11)
TROPONIN, HIGH SENSITIVITY: 57 NG/L (ref 0–11)
TSH SERPL DL<=0.05 MIU/L-ACNC: 1.44 UIU/ML (ref 0.27–4.2)
VLDLC SERPL CALC-MCNC: 40 MG/DL
WBC # BLD: 13 E9/L (ref 4.5–11.5)

## 2022-02-25 PROCEDURE — 6360000002 HC RX W HCPCS: Performed by: PHYSICIAN ASSISTANT

## 2022-02-25 PROCEDURE — APPSS30 APP SPLIT SHARED TIME 16-30 MINUTES: Performed by: PHYSICIAN ASSISTANT

## 2022-02-25 PROCEDURE — 93459 L HRT ART/GRFT ANGIO: CPT

## 2022-02-25 PROCEDURE — 86900 BLOOD TYPING SEROLOGIC ABO: CPT

## 2022-02-25 PROCEDURE — 93458 L HRT ARTERY/VENTRICLE ANGIO: CPT | Performed by: INTERNAL MEDICINE

## 2022-02-25 PROCEDURE — 86901 BLOOD TYPING SEROLOGIC RH(D): CPT

## 2022-02-25 PROCEDURE — 2580000003 HC RX 258: Performed by: INTERNAL MEDICINE

## 2022-02-25 PROCEDURE — 99152 MOD SED SAME PHYS/QHP 5/>YRS: CPT | Performed by: INTERNAL MEDICINE

## 2022-02-25 PROCEDURE — 84484 ASSAY OF TROPONIN QUANT: CPT

## 2022-02-25 PROCEDURE — 6360000002 HC RX W HCPCS

## 2022-02-25 PROCEDURE — B2151ZZ FLUOROSCOPY OF LEFT HEART USING LOW OSMOLAR CONTRAST: ICD-10-PCS | Performed by: INTERNAL MEDICINE

## 2022-02-25 PROCEDURE — 93005 ELECTROCARDIOGRAM TRACING: CPT | Performed by: INTERNAL MEDICINE

## 2022-02-25 PROCEDURE — B2181ZZ FLUOROSCOPY OF LEFT INTERNAL MAMMARY BYPASS GRAFT USING LOW OSMOLAR CONTRAST: ICD-10-PCS | Performed by: INTERNAL MEDICINE

## 2022-02-25 PROCEDURE — 36415 COLL VENOUS BLD VENIPUNCTURE: CPT

## 2022-02-25 PROCEDURE — 80061 LIPID PANEL: CPT

## 2022-02-25 PROCEDURE — B3101ZZ FLUOROSCOPY OF THORACIC AORTA USING LOW OSMOLAR CONTRAST: ICD-10-PCS | Performed by: INTERNAL MEDICINE

## 2022-02-25 PROCEDURE — 2709999900 HC NON-CHARGEABLE SUPPLY

## 2022-02-25 PROCEDURE — 93010 ELECTROCARDIOGRAM REPORT: CPT | Performed by: INTERNAL MEDICINE

## 2022-02-25 PROCEDURE — C1769 GUIDE WIRE: HCPCS

## 2022-02-25 PROCEDURE — 2140000000 HC CCU INTERMEDIATE R&B

## 2022-02-25 PROCEDURE — 86850 RBC ANTIBODY SCREEN: CPT

## 2022-02-25 PROCEDURE — 85730 THROMBOPLASTIN TIME PARTIAL: CPT

## 2022-02-25 PROCEDURE — 84443 ASSAY THYROID STIM HORMONE: CPT

## 2022-02-25 PROCEDURE — 4A023N7 MEASUREMENT OF CARDIAC SAMPLING AND PRESSURE, LEFT HEART, PERCUTANEOUS APPROACH: ICD-10-PCS | Performed by: INTERNAL MEDICINE

## 2022-02-25 PROCEDURE — 93005 ELECTROCARDIOGRAM TRACING: CPT | Performed by: FAMILY MEDICINE

## 2022-02-25 PROCEDURE — 6370000000 HC RX 637 (ALT 250 FOR IP): Performed by: FAMILY MEDICINE

## 2022-02-25 PROCEDURE — 83735 ASSAY OF MAGNESIUM: CPT

## 2022-02-25 PROCEDURE — 6370000000 HC RX 637 (ALT 250 FOR IP): Performed by: INTERNAL MEDICINE

## 2022-02-25 PROCEDURE — 80053 COMPREHEN METABOLIC PANEL: CPT

## 2022-02-25 PROCEDURE — C1894 INTRO/SHEATH, NON-LASER: HCPCS

## 2022-02-25 PROCEDURE — 85027 COMPLETE CBC AUTOMATED: CPT

## 2022-02-25 PROCEDURE — 99291 CRITICAL CARE FIRST HOUR: CPT | Performed by: INTERNAL MEDICINE

## 2022-02-25 PROCEDURE — B2111ZZ FLUOROSCOPY OF MULTIPLE CORONARY ARTERIES USING LOW OSMOLAR CONTRAST: ICD-10-PCS | Performed by: INTERNAL MEDICINE

## 2022-02-25 PROCEDURE — 85347 COAGULATION TIME ACTIVATED: CPT

## 2022-02-25 RX ORDER — SODIUM CHLORIDE 0.9 % (FLUSH) 0.9 %
5-40 SYRINGE (ML) INJECTION EVERY 12 HOURS SCHEDULED
Status: DISCONTINUED | OUTPATIENT
Start: 2022-02-25 | End: 2022-02-26 | Stop reason: HOSPADM

## 2022-02-25 RX ORDER — POLYETHYLENE GLYCOL 3350 17 G/17G
17 POWDER, FOR SOLUTION ORAL DAILY PRN
Status: DISCONTINUED | OUTPATIENT
Start: 2022-02-25 | End: 2022-02-26 | Stop reason: HOSPADM

## 2022-02-25 RX ORDER — SODIUM CHLORIDE 0.9 % (FLUSH) 0.9 %
5-40 SYRINGE (ML) INJECTION PRN
Status: DISCONTINUED | OUTPATIENT
Start: 2022-02-25 | End: 2022-02-26 | Stop reason: HOSPADM

## 2022-02-25 RX ORDER — AMIODARONE HYDROCHLORIDE 200 MG/1
200 TABLET ORAL 2 TIMES DAILY
Status: DISCONTINUED | OUTPATIENT
Start: 2022-02-25 | End: 2022-02-26 | Stop reason: HOSPADM

## 2022-02-25 RX ORDER — ASPIRIN 81 MG/1
81 TABLET, CHEWABLE ORAL DAILY
Status: DISCONTINUED | OUTPATIENT
Start: 2022-02-25 | End: 2022-02-26

## 2022-02-25 RX ORDER — METOPROLOL TARTRATE 50 MG/1
50 TABLET, FILM COATED ORAL DAILY
Status: DISCONTINUED | OUTPATIENT
Start: 2022-02-25 | End: 2022-02-25 | Stop reason: SDUPTHER

## 2022-02-25 RX ORDER — ACETAMINOPHEN 650 MG/1
650 SUPPOSITORY RECTAL EVERY 6 HOURS PRN
Status: DISCONTINUED | OUTPATIENT
Start: 2022-02-25 | End: 2022-02-26 | Stop reason: HOSPADM

## 2022-02-25 RX ORDER — ONDANSETRON 2 MG/ML
4 INJECTION INTRAMUSCULAR; INTRAVENOUS EVERY 6 HOURS PRN
Status: DISCONTINUED | OUTPATIENT
Start: 2022-02-25 | End: 2022-02-25 | Stop reason: SDUPTHER

## 2022-02-25 RX ORDER — ACETAMINOPHEN 325 MG/1
650 TABLET ORAL EVERY 4 HOURS PRN
Status: DISCONTINUED | OUTPATIENT
Start: 2022-02-25 | End: 2022-02-25 | Stop reason: SDUPTHER

## 2022-02-25 RX ORDER — CLOPIDOGREL BISULFATE 75 MG/1
75 TABLET ORAL DAILY
Status: DISCONTINUED | OUTPATIENT
Start: 2022-02-25 | End: 2022-02-26 | Stop reason: HOSPADM

## 2022-02-25 RX ORDER — ACETAMINOPHEN 325 MG/1
650 TABLET ORAL EVERY 6 HOURS PRN
Status: DISCONTINUED | OUTPATIENT
Start: 2022-02-25 | End: 2022-02-26 | Stop reason: HOSPADM

## 2022-02-25 RX ORDER — LISINOPRIL 20 MG/1
20 TABLET ORAL DAILY
Status: DISCONTINUED | OUTPATIENT
Start: 2022-02-25 | End: 2022-02-26 | Stop reason: HOSPADM

## 2022-02-25 RX ORDER — SODIUM CHLORIDE 0.9 % (FLUSH) 0.9 %
5-40 SYRINGE (ML) INJECTION PRN
Status: DISCONTINUED | OUTPATIENT
Start: 2022-02-25 | End: 2022-02-25 | Stop reason: SDUPTHER

## 2022-02-25 RX ORDER — GLIMEPIRIDE 4 MG/1
4 TABLET ORAL NIGHTLY
COMMUNITY
End: 2022-10-10 | Stop reason: SDUPTHER

## 2022-02-25 RX ORDER — SODIUM CHLORIDE 9 MG/ML
INJECTION, SOLUTION INTRAVENOUS CONTINUOUS
Status: DISCONTINUED | OUTPATIENT
Start: 2022-02-25 | End: 2022-02-26

## 2022-02-25 RX ORDER — SODIUM CHLORIDE 9 MG/ML
25 INJECTION, SOLUTION INTRAVENOUS PRN
Status: DISCONTINUED | OUTPATIENT
Start: 2022-02-25 | End: 2022-02-26 | Stop reason: HOSPADM

## 2022-02-25 RX ORDER — SODIUM CHLORIDE 0.9 % (FLUSH) 0.9 %
5-40 SYRINGE (ML) INJECTION EVERY 12 HOURS SCHEDULED
Status: DISCONTINUED | OUTPATIENT
Start: 2022-02-25 | End: 2022-02-25 | Stop reason: SDUPTHER

## 2022-02-25 RX ORDER — SODIUM CHLORIDE 9 MG/ML
25 INJECTION, SOLUTION INTRAVENOUS PRN
Status: DISCONTINUED | OUTPATIENT
Start: 2022-02-25 | End: 2022-02-25 | Stop reason: SDUPTHER

## 2022-02-25 RX ORDER — ONDANSETRON 4 MG/1
4 TABLET, ORALLY DISINTEGRATING ORAL EVERY 8 HOURS PRN
Status: DISCONTINUED | OUTPATIENT
Start: 2022-02-25 | End: 2022-02-26 | Stop reason: HOSPADM

## 2022-02-25 RX ORDER — SODIUM CHLORIDE 9 MG/ML
INJECTION, SOLUTION INTRAVENOUS CONTINUOUS
Status: DISCONTINUED | OUTPATIENT
Start: 2022-02-25 | End: 2022-02-25 | Stop reason: SDUPTHER

## 2022-02-25 RX ORDER — TAMSULOSIN HYDROCHLORIDE 0.4 MG/1
0.4 CAPSULE ORAL DAILY
Status: DISCONTINUED | OUTPATIENT
Start: 2022-02-25 | End: 2022-02-26 | Stop reason: HOSPADM

## 2022-02-25 RX ORDER — PRAVASTATIN SODIUM 20 MG
40 TABLET ORAL NIGHTLY
Status: DISCONTINUED | OUTPATIENT
Start: 2022-02-25 | End: 2022-02-26 | Stop reason: HOSPADM

## 2022-02-25 RX ORDER — ONDANSETRON 2 MG/ML
4 INJECTION INTRAMUSCULAR; INTRAVENOUS EVERY 6 HOURS PRN
Status: DISCONTINUED | OUTPATIENT
Start: 2022-02-25 | End: 2022-02-26 | Stop reason: HOSPADM

## 2022-02-25 RX ORDER — METOPROLOL SUCCINATE 25 MG/1
25 TABLET, EXTENDED RELEASE ORAL DAILY
Status: DISCONTINUED | OUTPATIENT
Start: 2022-02-25 | End: 2022-02-26 | Stop reason: HOSPADM

## 2022-02-25 RX ADMIN — ASPIRIN 81 MG 81 MG: 81 TABLET ORAL at 10:45

## 2022-02-25 RX ADMIN — LISINOPRIL 20 MG: 20 TABLET ORAL at 10:46

## 2022-02-25 RX ADMIN — ENOXAPARIN SODIUM 105 MG: 150 INJECTION SUBCUTANEOUS at 22:12

## 2022-02-25 RX ADMIN — METOPROLOL SUCCINATE 25 MG: 25 TABLET, EXTENDED RELEASE ORAL at 10:46

## 2022-02-25 RX ADMIN — Medication 10 ML: at 09:00

## 2022-02-25 RX ADMIN — AMIODARONE HYDROCHLORIDE 200 MG: 200 TABLET ORAL at 05:54

## 2022-02-25 RX ADMIN — AMIODARONE HYDROCHLORIDE 200 MG: 200 TABLET ORAL at 22:11

## 2022-02-25 RX ADMIN — CLOPIDOGREL BISULFATE 75 MG: 75 TABLET ORAL at 10:46

## 2022-02-25 RX ADMIN — TAMSULOSIN HYDROCHLORIDE 0.4 MG: 0.4 CAPSULE ORAL at 22:11

## 2022-02-25 RX ADMIN — PRAVASTATIN SODIUM 40 MG: 20 TABLET ORAL at 22:11

## 2022-02-25 RX ADMIN — Medication 10 ML: at 22:12

## 2022-02-25 RX ADMIN — SODIUM CHLORIDE: 9 INJECTION, SOLUTION INTRAVENOUS at 02:12

## 2022-02-25 ASSESSMENT — PAIN SCALES - GENERAL
PAINLEVEL_OUTOF10: 0

## 2022-02-25 NOTE — CONSULTS
Inpatient Cardiology Consultation      Reason for Consult:  Acute inferior STEMI    Consulting Physician: Clay Clay MD    Requesting Physician:  Sophia Ibanez MD    Date of Consultation: 2/25/2022    HISTORY OF PRESENT ILLNESS:   65 yo  male with sudden onset CP ~ 1 hour prior to presenting to the ED. ECG showed Afib with RVR with ST elevations in III and AVR and ST depressions in I, II, AVL and V4 to V6. The cath lab was occupied with another case. Patient was supposed to be transferred to [facility] but could not be life flighted because of the weather conditions and there were no ambulances available for transfer for 4 hours. Did receive aspirin and Brilinta on route to the ED. Was given IV heparin 4000 unit bolus in the ED. Also was given IV lopressor in attempt to slow the heart rate. Was still having CP when I saw him in the ED. Was on Plavix which he stopped 6 days ago in anticipation of right inguinal hernia surgery      Past Medical History:    1. CAD with h/o MI and CABG in 2013 at [facility]. [facility] in [address] [place] [address] Road, specifics unknown. ( his cardiologist is Dr. Haily Cuevas ). 2. PAF. Reports one prior episode \" was dehydrated at the time \"  3. Insulin requiring DM  4. HTN  5. HLD  6. Obesity  7. Left inguinal hernia    Past Surgical History:    No past surgical history on file. Medications Prior to admit:  Prior to Admission medications    Medication Sig Start Date End Date Taking?  Authorizing Provider   allopurinol (ZYLOPRIM) 100 MG tablet allopurinol 100 mg tablet   take 1 tablet by mouth once daily    Historical Provider, MD   clopidogrel (PLAVIX) 75 MG tablet clopidogrel 75 mg tablet   take 1 tablet by mouth once daily as directed    Historical Provider, MD   glimepiride (AMARYL) 4 MG tablet  2/3/22   Historical Provider, MD   lisinopril (PRINIVIL;ZESTRIL) 20 MG tablet  2/3/22   Historical Provider, MD   LANTUS SOLOSTAR 100 UNIT/ML injection pen inject 60 units subcutaneously once daily 1/22/22   Historical Provider, MD   metoprolol tartrate (LOPRESSOR) 50 MG tablet take 1 tablet by mouth once daily as directed 1/4/22   Historical Provider, MD   nitroGLYCERIN (NITROSTAT) 0.4 MG SL tablet place 1 tablet under the tongue if needed 1/27/22   Historical Provider, MD   pravastatin (PRAVACHOL) 40 MG tablet  2/3/22   Historical Provider, MD   tadalafil (CIALIS) 10 MG tablet  1/27/22   Historical Provider, MD   tamsulosin (FLOMAX) 0.4 MG capsule tamsulosin 0.4 mg capsule   TAKE 1 CAPSULE 30 MINUTES AFTER EVENING MEAL    Historical Provider, MD       Current Medications:    Current Facility-Administered Medications: metoprolol (LOPRESSOR) injection 5 mg, 5 mg, IntraVENous, Q5 Min PRN  heparin (porcine) injection 4,000 Units, 4,000 Units, IntraVENous, PRN  heparin (porcine) injection 2,000 Units, 2,000 Units, IntraVENous, PRN  heparin 25,000 units in dextrose 5% 250 mL (premix) infusion, 5-30 Units/kg/hr, IntraVENous, Continuous  0.9 % sodium chloride infusion, , IntraVENous, Continuous    Allergies:  Dapagliflozin    Social History:    Social History     Socioeconomic History    Marital status:      Spouse name: Not on file    Number of children: Not on file    Years of education: Not on file    Highest education level: Not on file   Occupational History    Not on file   Tobacco Use    Smoking status: Former Smoker    Smokeless tobacco: Never Used   Substance and Sexual Activity    Alcohol use: Not on file    Drug use: Not on file    Sexual activity: Not on file   Other Topics Concern    Not on file   Social History Narrative    Not on file     Social Determinants of Health     Financial Resource Strain:     Difficulty of Paying Living Expenses: Not on file   Food Insecurity:     Worried About Running Out of Food in the Last Year: Not on file    Simran of Food in the Last Year: Not on file   Transportation Needs:     Lack of Transportation (Medical):  Not on file    Lack of Transportation (Non-Medical): Not on file   Physical Activity:     Days of Exercise per Week: Not on file    Minutes of Exercise per Session: Not on file   Stress:     Feeling of Stress : Not on file   Social Connections:     Frequency of Communication with Friends and Family: Not on file    Frequency of Social Gatherings with Friends and Family: Not on file    Attends Tenriism Services: Not on file    Active Member of [address] [place] [address] or Organizations: Not on file    Attends Club or Organization Meetings: Not on file    Marital Status: Not on file   Intimate Partner Violence:     Fear of Current or Ex-Partner: Not on file    Emotionally Abused: Not on file    Physically Abused: Not on file    Sexually Abused: Not on file   Housing Stability:     Unable to Pay for Housing in the Last Year: Not on file    Number of [place] in the Last Year: Not on file    Unstable Housing in the Last Year: Not on file       Family History:   No family history on file. REVIEW OF SYSTEMS:     · Constitutional: Denies fatigue, fevers, chills or night sweats  · Eyes: Denies visual changes or drainage  · ENT: Denies headaches or hearing loss. No mouth sores or sore throat. No epistaxis   · Cardiovascular: Denies chest pain, pressure or palpitations. No lower extremity swelling. · Respiratory: Denies ABRAHAM, cough, orthopnea or PND. No hemoptysis   · Gastrointestinal: Denies hematemesis or anorexia. No hematochezia or melena    · Genitourinary: Denies urgency, dysuria or hematuria. · Musculoskeletal: Denies gait disturbance, weakness or joint complaints  · Integumentary: Denies rash, hives or pruritis   · Neurological: Denies dizziness, headaches or seizures. No numbness or tingling  · Psychiatric: Denies anxiety or depression. · Endocrine: Denies temperature intolerance. No recent weight change. .  · Hematologic/Lymphatic: Denies abnormal bruising or bleeding.  No swollen lymph nodes    PHYSICAL EXAM:   BP (!) 123/92 Labs     02/24/22 2200   PROTIME 11.4   INR 1.0     APTT:No results for input(s): APTT in the last 72 hours. CARDIAC ENZYMES:  Recent Labs     02/24/22 2200   TROPHS 19*     FASTING LIPID PANEL:No results found for: CHOL, HDL, LDLDIRECT, LDLCALC, TRIG  LIVER PROFILE:  Recent Labs     02/24/22 2200   AST 12   ALT 16   LABALBU 4.2         ASSESSMENT:  1. Afib with RVR. Reported one prior episode of Afib  2. Acute coronary syndrome, ? Demand ischemia  3. CAD with h/o MI and CABG, specific unknown  4. Insulin requiring DM  5. HTN  5. HLD  6. Obesity   7. Left inguinal hernia    PLAN:  1. Cath +/- PCI. Risks, benefits and alternative therapies to the procedure explained. He understood and consented to proceed. Wife and son at bedside  2. Consider cardioversion ( in cath lab )  3.  Further recommendations to follow    Electronically signed by Sandra Vargas MD on 2/25/2022 at 12:09 AM

## 2022-02-25 NOTE — OP NOTE
Operative Note      Patient: Nicky Segovia  YOB: 1950  MRN: [id number]    Date of Procedure: 2/25/22           Indication:  1. Acute coronary syndrome / possible STEMI  2. AUC score 9  3. AUC indication :  2    Procedure: Left heart catheterization, coronary angiography, graft angiography, left ventriculography, aortic root angiography    Anesthesia: Versed, Fentanyl  Time sedation was administered: 00:17. I was present in the room when sedation was administered. Procedure end time: 00:48  Time spent with face to face monitoring of moderate sedation: 43 minutes    Cath performed by right femoral approach using 6F sheath. Findings:  Left main: 100% stenosis  LAD: 100 % stenosis. Patent LIMA  Circumflex: 95 %  stenosis  RCA: non dominant. 70 % stenosis. LV angio: 55%  ejection fraction     Hemodynamics:  LV: 88/64 mmHg. LVEDP 21  No gradient across AV. Ao: 89/64 ( 73 ) mmHg. IV Amiodarone 300 mg over 10 minutes administered during the procedure and the patient converted to sinus rhythm    Complication: None   Blood loss:10 cc  Contrast used: 95 cc    Post Op diagnosis:  CAD as described  Preserved EF    PLAN:  Medical therapy  ?  PCI to ostial LCx  See orders        Electronically signed by Merlene Johnson MD on 2/25/2022 at 1:36 AM

## 2022-02-25 NOTE — PROCEDURES
[address]                  [address] [facility]                            CARDIAC CATHETERIZATION    PATIENT NAME: Jose Isabel                     :        1950  MED REC NO:   [id number]                            ROOM:       [id number]  ACCOUNT NO:   [de-identified]                           ADMIT DATE: 2022  PROVIDER:     Amanda Avila MD    DATE OF PROCEDURE:  2022    PROCEDURES:  Left heart catheterization, selective coronary angiography,  graft angiography x1 (LIMA to LAD), aortic root angiography. The procedure was done through right radial approach using ultrasound  guidance. The patient received intravenous Versed and intravenous fentanyl for  sedation. INDICATION:  Acute coronary syndrome. Possible ST-elevation myocardial  infarction. PRESSURES:    Aorta:  89/64 with a mean of 73. Left ventricular systolic pressure 88, left ventricular end-diastolic  pressure 21. There was no significant gradient across the aortic valve. CORONARY ANGIOGRAPHY:    Left main:  The left main artery appeared nonexistent with separate  ostia to the LAD and the left circumflex. LCX:  The left circumflex had a 99% ostial stenosis. It was heavily  calcified in its proximal segment. There appeared to be around 60%  eccentric proximal left circumflex disease before any of the marginal  branches. The first marginal branch which took off at an acute angle  from the left circumflex had around 60-70% ostial stenosis. The second  marginal branch had an irregular middle segment with up to 90% stenosis. The third marginal branch which also took off at an acute angle from the  left circumflex had around 80% ostial narrowing. LAD:  The left anterior descending artery appeared heavily calcified and  was occluded at its origin.   RCA:  The right coronary artery is a nondominant vessel which had a  diffusely diseased segment from the proximal to the mid vessel with up  to 70% luminal narrowing. LIMA to LAD. The left internal mammary artery to the left anterior  descending artery is widely patent along its course including its distal  anastomosis. The distal anastomosis had luminal irregularities but  without any significant angiographic luminal narrowing. Multiple  diagonal branches appeared to fill upon the injection of the left  internal mammary artery which also filled the mid and proximal LAD to  the origin of the vessel and also filled retrogradely the left  circumflex. LEFT VENTRICULOGRAPHY:  The left ventricle is normal in size with  hypokinesis of the basal half of the inferior wall with preserved global  left ventricular systolic function with an estimated ejection fraction  of 50-55%. There was no mitral regurgitation. Aortic root angiography. Angiography of the aortic root does not show  any abnormalities. There was no aortic regurgitation. There was no  opacification of any grafts arising from the aortic root. The patient was bolused with 300 mg of IV amiodarone that was infused  over 10 minutes. He converted shortly thereafter to sinus rhythm. He  reported that he was chest pain free after that and for the rest of the  procedure. The right femoral arterial sheath was securely sutured to the skin at  the end of the procedure. The patient tolerated the procedure well and left the cardiac  catheterization laboratory in stable condition. CONCLUSIONS:  1. Coronary artery. A.  Left main practically nonexistent with separate ostia to the LAD and  the left circumflex. B.  LAD, occluded at its origin. C.  LCX, dominant vessel with 95% ostial narrowing and with high-grade  disease of three of its marginal branches as described above. D.  RCA, nondominant vessel with around 70-80% proximal/mid vessel  narrowing.   E.  PEARCE to LAD widely patent vessel including its distal anastomosis  with LAD showing no significant disease after the distal anastomosis  with LIMA filling the mid and proximal LAD, diagonal branches, and the  left circumflex retrogradely. 2.  Normal left ventricular size with hypokinesis of the basal half of  the inferior wall with more or less preserved global left ventricular  systolic function with an estimated ejection fraction of 50-55%. 3.  Elevated left ventricular end-diastolic pressure consistent with LV  diastolic dysfunction.         Abbie Israel MD    D: 02/25/2022 1:57:56       T: 02/25/2022 2:00:23     [email or web address]  Job#: [id number]     Doc#: [id number]    CC:

## 2022-02-25 NOTE — H&P
Hospitalist History & Physical      PCP: Layne Kim MD    Date of Service: Pt seen/examined on 2/24/2022    Chief Complaint:  had concerns including Chest Pain (Pt started having chest pain prior to coming to ED. Midsternal pain with radiation down both arms. Pt took 5 SL nitro at home. EMS gave patient 180 mg of brilinta, 324 mg Aspirin, and 100 mcg of fentanyl. ). History Of Present Illness:    Mr. Carolina Puckett, a 66y.o. year old male  who  has no past medical history on file. Patient presented to the emergency department with chest pain. This started prior to arrival.  He was transported by EMS who gave him 180 mg of Brilinta, 324 mg of aspirin and 100 mcg of fentanyl. Patient was also taken nitro at home. On arrival vital signs revealed patient to be tachycardic with a rate in the 140s as high as 150s. EKG shows STEMI with atrial fibrillation with RVR. Laboratory studies demonstrate glucose 151, troponin of 19, day BC 15.4. Chest x-ray shows cardiomegaly. Cardiology was consulted. Heparin infusion initiated. Patient to be taken emergently to the cardiac Cath Lab. Medicine consulted for admission. No past medical history on file. No past surgical history on file. Prior to Admission medications    Medication Sig Start Date End Date Taking?  Authorizing Provider   allopurinol (ZYLOPRIM) 100 MG tablet allopurinol 100 mg tablet   take 1 tablet by mouth once daily    Historical Provider, MD   clopidogrel (PLAVIX) 75 MG tablet clopidogrel 75 mg tablet   take 1 tablet by mouth once daily as directed    Historical Provider, MD   glimepiride (AMARYL) 4 MG tablet  2/3/22   Historical Provider, MD   lisinopril (PRINIVIL;ZESTRIL) 20 MG tablet  2/3/22   Historical Provider, MD   LANTUS SOLOSTAR 100 UNIT/ML injection pen inject 60 units subcutaneously once daily 1/22/22   Historical Provider, MD   metoprolol tartrate (LOPRESSOR) 50 MG tablet take 1 tablet by mouth once daily as directed 1/4/22   Historical Provider, MD   nitroGLYCERIN (NITROSTAT) 0.4 MG SL tablet place 1 tablet under the tongue if needed 1/27/22   Historical Provider, MD   pravastatin (PRAVACHOL) 40 MG tablet  2/3/22   Historical Provider, MD   tadalafil (CIALIS) 10 MG tablet  1/27/22   Historical Provider, MD   tamsulosin (FLOMAX) 0.4 MG capsule tamsulosin 0.4 mg capsule   TAKE 1 CAPSULE 30 MINUTES AFTER EVENING MEAL    Historical Provider, MD         Allergies:  Dapagliflozin    Social History:    TOBACCO:   reports that he has quit smoking. He has never used smokeless tobacco.  ETOH:   has no history on file for alcohol use. Family History:    Reviewed in detail and negative for DM, CAD, Cancer, CVA. Positive as follows\"  No family history on file. REVIEW OF SYSTEMS:   Pertinent positives as noted in the HPI. All other systems reviewed and negative. PHYSICAL EXAM:  /82   Pulse 140   Temp 97.9 °F (36.6 °C) (Oral)   Resp 16   Wt 232 lb (105.2 kg)   SpO2 98%   BMI 30.61 kg/m²   General appearance: No apparent distress, appears stated age and cooperative. HEENT: Normal cephalic, atraumatic without obvious deformity. Pupils equal, round, and reactive to light. Extra ocular muscles intact. Conjunctivae/corneas clear. Neck: Supple, with full range of motion. No jugular venous distention. Trachea midline. Respiratory: CTA  Cardiovascular: Tachycardic, irregularly irregular  Abdomen: S/NT/ND  Musculoskeletal: No clubbing, cyanosis, edema of bilateral lower extremities. Brisk capillary refill. Skin: Normal skin color. No rashes or lesions. Neurologic:  Neurovascularly intact without any focal sensory/motor deficits.  Cranial nerves: II-XII intact, grossly non-focal.  Psychiatric: Alert and oriented, thought content appropriate, normal insight    Reviewed EKG and CXR personally      CBC:   Recent Labs     02/24/22  2200   WBC 15.4*   RBC 5.81*   HGB 16.0   HCT 48.7   MCV 83.8   RDW 13.4    BMP:   Recent Labs     02/24/22 2200      K 4.0      CO2 24   BUN 20   CREATININE 0.9     LFT:  Recent Labs     02/24/22 2200   PROT 7.2   ALKPHOS 113   ALT 16   AST 12   BILITOT 0.2     CE:  No results for input(s): Wilma Medeiros in the last 72 hours. PT/INR:   Recent Labs     02/24/22 2200   INR 1.0     BNP: No results for input(s): BNP in the last 72 hours. ESR: No results found for: SEDRATE  CRP: No results found for: CRP  D Dimer: No results found for: DDIMER   Folate and B12: No results found for: [id number], No results found for: FOLATE  Lactic Acid: No results found for: LACTA  Thyroid Studies: No results found for: TSH, C9GMJCC, X6DLMEL, THYROIDAB    Oupatient labs:  Lab Results   Component Value Date    INR 1.0 02/24/2022       Urinalysis:  No results found for: NITRU, WBCUA, BACTERIA, RBCUA, BLOODU, SPECGRAV, GLUCOSEU    Imaging:  CT ABDOMEN PELVIS W IV CONTRAST Additional Contrast? None    Result Date: 2/14/2022  EXAMINATION: CT OF THE ABDOMEN AND PELVIS WITH CONTRAST 2/14/2022 10:25 am TECHNIQUE: CT of the abdomen and pelvis was performed with the administration of intravenous contrast. Multiplanar reformatted images are provided for review. Dose modulation, iterative reconstruction, and/or weight based adjustment of the mA/kV was utilized to reduce the radiation dose to as low as reasonably achievable. COMPARISON: None HISTORY: ORDERING SYSTEM PROVIDED HISTORY: rlq pain, hx hernia, r/o incarceration TECHNOLOGIST PROVIDED HISTORY: Additional Contrast?->None Reason for exam:->rlq pain, hx hernia, r/o incarceration Decision Support Exception - unselect if not a suspected or confirmed emergency medical condition->Emergency Medical Condition (MA) FINDINGS: Lower Chest: There are some atelectatic changes identified within the left lung base with pleural thickening present. Organs: The liver is homogeneous in appearance. No stones in the gallbladder.   No intrahepatic or extrahepatic biliary ductal dilatation. Pancreas is homogeneous. The spleen is unremarkable. Both adrenal glands reveal mild nodularity bilaterally and some enlargement suggesting possible hyperplasia. The left kidney reveals a renal cortical cyst as well as the right kidney. Symmetric enhancement identified of the renal parenchyma. No obstruction of the kidneys. GI/Bowel: Gastrointestinal tract reveals stool seen scattered throughout the colon. The small bowel is unremarkable in appearance. Diverticulosis with no evidence of diverticulitis. Stomach is unremarkable in appearance. Pelvis: Pelvic organs reveal incomplete distension of the bladder. Prostate is heterogeneously enlarged. Seminal vesicles are unremarkable in appearance. Peritoneum/Retroperitoneum: No abdominal retroperitoneal lymphadenopathy. No free fluid or free air. No abnormal mass or fluid collections identified. Bones/Soft Tissues: Bony structures reveal minimal degenerative changes seen within the spine. Small umbilical hernia containing fat only. Small left inguinal hernia identified containing fat only. There is some mild stranding identified at the origin of the hernia. Mild vascular congestion cannot be excluded. Left inguinal hernia with some stranding seen at the origin of the hernia in which mild vascular congestion cannot be excluded. There is no contents of bowel with only fat present. There is also a small umbilical hernia containing fat only. Remainder of the abdomen and pelvis reveals diverticulosis with no evidence of diverticulitis. There is atelectatic changes and pleural thickening identified at the left lung base. XR CHEST PORTABLE    Result Date: 2/24/2022  EXAMINATION: ONE XRAY VIEW OF THE CHEST 2/24/2022 10:15 pm COMPARISON: None.  HISTORY: ORDERING SYSTEM PROVIDED HISTORY: chest pain TECHNOLOGIST PROVIDED HISTORY: Reason for exam:->chest pain What reading provider will be dictating this exam?->CRC FINDINGS: The lungs are without acute focal process. There is no effusion or pneumothorax. Cardiomegaly. The osseous structures are without acute process. Sternotomy wires noted. No acute process. Cardiomegaly. CTA CHEST W CONTRAST    Result Date: 2/24/2022  EXAMINATION: CTA OF THE CHEST; CTA OF THE ABDOMEN AND PELVIS WITH CONTRAST 2/24/2022 10:46 pm: TECHNIQUE: CTA of the chest was performed after the administration of intravenous contrast.  Multiplanar reformatted images are provided for review. MIP images are provided for review. Dose modulation, iterative reconstruction, and/or weight based adjustment of the mA/kV was utilized to reduce the radiation dose to as low as reasonably achievable.; CTA of the abdomen and pelvis was performed with the administration of intravenous contrast. Multiplanar reformatted images are provided for review. MIP images are provided for review. Dose modulation, iterative reconstruction, and/or weight based adjustment of the mA/kV was utilized to reduce the radiation dose to as low as reasonably achievable. COMPARISON: CT abdomen and pelvis 02/14/2022 HISTORY: ORDERING SYSTEM PROVIDED HISTORY: chest pain TECHNOLOGIST PROVIDED HISTORY: Reason for exam:->chest pain Decision Support Exception - unselect if not a suspected or confirmed emergency medical condition->Emergency Medical Condition (MA) What reading provider will be dictating this exam?->CRC; ORDERING SYSTEM PROVIDED HISTORY: eval dissection TECHNOLOGIST PROVIDED HISTORY: Reason for exam:->eval dissection Decision Support Exception - unselect if not a suspected or confirmed emergency medical condition->Emergency Medical Condition (MA) What reading provider will be dictating this exam?->CRC FINDINGS: CTA CHEST: Thoracic aorta: No evidence of thoracic aortic aneurysm, intramural hematoma or dissection. No acute abnormality of the aorta. Mediastinum: Thyroid gland is mildly enlarged and heterogeneous, right lobe greater than left. No evidence of mediastinal lymphadenopathy. The heart size is normal.  Calcific coronary artery disease status post sternal splitting procedure for CABG. The heart and pericardium demonstrate no acute abnormality. Lungs/Pleura: There is stable pleural thickening in the inferior left hemithorax with adjacent peripheral airspace disease. There is associated left lung volume loss. Lungs are otherwise clear. No pneumothorax or pleural effusion. Soft Tissues/Bones: No acute bone or soft tissue abnormality. CTA ABDOMEN: Abdominal aorta/Branches: The abdominal aorta is normal with no evidence of aneurysm or dissection. There is no significant atherosclerotic disease. Abdominal aortic branch vessels are patent. Organs: There are no calcified gallstones. No pericholecystic fluid. The liver, spleen, pancreas, adrenal glands and kidneys are normal.  There is a stable 1 cm left adrenal gland nodule, likely a small adenoma. GI/Bowel: Scattered colon diverticula. No evidence of diverticulitis. No bowel obstruction. There is a stable oval-shaped thick walled lesion with central fat attenuation directly adjacent to the ascending colon (axial image 167), likely focus of fat necrosis or sequelae of epiploic appendagitis. Peritoneum/Retroperitoneum: There is no adenopathy, free air or free fluid. No mesenteric fat stranding. Bones/Soft Tissues: No acute bone finding. Thoracic and lumbar spondylosis. CTA PELVIS: Aorta/Iliacs: The iliac and femoral arteries are unremarkable. No aneurysm or dissection. No significant atherosclerotic disease. Other: Urinary bladder is unremarkable. Mild prostatomegaly. Bones/Soft Tissues: No acute bone finding. Unremarkable CTA the chest, abdomen and pelvis. No evidence of aortic dissection, intramural hematoma or aneurysm. There is no significant atherosclerotic disease. Left basilar pleuroparenchymal disease is unchanged from 02/14/2022 and likely chronic.   There is associated mild left lung volume loss. Calcific coronary artery disease status post CABG. Oval-shaped thick walled lesion with central fat attenuation adjacent to the ascending colon, unchanged and likely representing sequelae of epiploic appendagitis or fat necrosis. CTA ABDOMEN PELVIS W CONTRAST    Result Date: 2/24/2022  EXAMINATION: CTA OF THE CHEST; CTA OF THE ABDOMEN AND PELVIS WITH CONTRAST 2/24/2022 10:46 pm: TECHNIQUE: CTA of the chest was performed after the administration of intravenous contrast.  Multiplanar reformatted images are provided for review. MIP images are provided for review. Dose modulation, iterative reconstruction, and/or weight based adjustment of the mA/kV was utilized to reduce the radiation dose to as low as reasonably achievable.; CTA of the abdomen and pelvis was performed with the administration of intravenous contrast. Multiplanar reformatted images are provided for review. MIP images are provided for review. Dose modulation, iterative reconstruction, and/or weight based adjustment of the mA/kV was utilized to reduce the radiation dose to as low as reasonably achievable. COMPARISON: CT abdomen and pelvis 02/14/2022 HISTORY: ORDERING SYSTEM PROVIDED HISTORY: chest pain TECHNOLOGIST PROVIDED HISTORY: Reason for exam:->chest pain Decision Support Exception - unselect if not a suspected or confirmed emergency medical condition->Emergency Medical Condition (MA) What reading provider will be dictating this exam?->CRC; ORDERING SYSTEM PROVIDED HISTORY: eval dissection TECHNOLOGIST PROVIDED HISTORY: Reason for exam:->eval dissection Decision Support Exception - unselect if not a suspected or confirmed emergency medical condition->Emergency Medical Condition (MA) What reading provider will be dictating this exam?->CRC FINDINGS: CTA CHEST: Thoracic aorta: No evidence of thoracic aortic aneurysm, intramural hematoma or dissection. No acute abnormality of the aorta.  Mediastinum: Thyroid gland is mildly enlarged and heterogeneous, right lobe greater than left. No evidence of mediastinal lymphadenopathy. The heart size is normal.  Calcific coronary artery disease status post sternal splitting procedure for CABG. The heart and pericardium demonstrate no acute abnormality. Lungs/Pleura: There is stable pleural thickening in the inferior left hemithorax with adjacent peripheral airspace disease. There is associated left lung volume loss. Lungs are otherwise clear. No pneumothorax or pleural effusion. Soft Tissues/Bones: No acute bone or soft tissue abnormality. CTA ABDOMEN: Abdominal aorta/Branches: The abdominal aorta is normal with no evidence of aneurysm or dissection. There is no significant atherosclerotic disease. Abdominal aortic branch vessels are patent. Organs: There are no calcified gallstones. No pericholecystic fluid. The liver, spleen, pancreas, adrenal glands and kidneys are normal.  There is a stable 1 cm left adrenal gland nodule, likely a small adenoma. GI/Bowel: Scattered colon diverticula. No evidence of diverticulitis. No bowel obstruction. There is a stable oval-shaped thick walled lesion with central fat attenuation directly adjacent to the ascending colon (axial image 167), likely focus of fat necrosis or sequelae of epiploic appendagitis. Peritoneum/Retroperitoneum: There is no adenopathy, free air or free fluid. No mesenteric fat stranding. Bones/Soft Tissues: No acute bone finding. Thoracic and lumbar spondylosis. CTA PELVIS: Aorta/Iliacs: The iliac and femoral arteries are unremarkable. No aneurysm or dissection. No significant atherosclerotic disease. Other: Urinary bladder is unremarkable. Mild prostatomegaly. Bones/Soft Tissues: No acute bone finding. Unremarkable CTA the chest, abdomen and pelvis. No evidence of aortic dissection, intramural hematoma or aneurysm. There is no significant atherosclerotic disease.  Left basilar pleuroparenchymal disease is unchanged from 02/14/2022 and likely chronic. There is associated mild left lung volume loss. Calcific coronary artery disease status post CABG. Oval-shaped thick walled lesion with central fat attenuation adjacent to the ascending colon, unchanged and likely representing sequelae of epiploic appendagitis or fat necrosis. ASSESSMENT:  -STEMI  -Atrial fibrillation with RVR  -Chest pain  -Elevated troponin  -Diabetes type 2  -Hypertension  -Hyperlipidemia  -Coronary artery disease      PLAN:  -Admit to medicine  -Cardiology consulted  -Emergent cardiac catheterization  -Heparin infusion  -Cycle serial troponins  -Telemetry  -Aspirin, statin, Plavix  -Continue home medications        Diet: No diet orders on file  Code Status: No Order  Surrogate decision maker confirmed with patient:   Extended Emergency Contact Information  Primary Emergency Contact: Saul WALLS Phone: [phone]  Mobile Phone: [phone]  Relation: Spouse   needed? No    DVT Prophylaxis: []Lovenox []Heparin []PCD [] [address] []Encouraged ambulation  Disposition: []Med/Surg [] Intermediate [] ICU/CCU  Admit status: [] Observation [] Inpatient     +++++++++++++++++++++++++++++++++++++++++++++++++  Sabrina Adams, DO  +++++++++++++++++++++++++++++++++++++++++++++++++  NOTE: This report was transcribed using voice recognition software. Every effort was made to ensure accuracy; however, inadvertent computerized transcription errors may be present.

## 2022-02-25 NOTE — ED NOTES
Bed: 09  Expected date:   Expected time:   Means of arrival:   Comments:  ems     Forest Clement RN  02/24/22 1769

## 2022-02-25 NOTE — PROGRESS NOTES
Hospitalist Progress Note      PCP: Emigdio Ca MD    Date of Admission: 2/24/2022  Days in the hospital: 1    Hospital Course:   Patient is 68-year-old with history of CAD, Atrial fibrillation, diabetes mellitus, hypertension comes in the hospital with complaints of chest pain. Patient was noted to have A. fib with RVR with ST elevation in leads III and aVR. Consultation was placed to cardiology and patient underwent cardiac cath and patient was noted to have diffuse coronary artery disease. Medical management was recommended for now. Subjective  Patient seen and examined at bedside. Denies any headache, dizziness. Complaining of some chest soreness but overall is starting to feel better. Exam:    /73   Pulse 59   Temp 96.8 °F (36 °C) (Oral)   Resp 16   Wt 241 lb (109.3 kg)   SpO2 96%   BMI 31.80 kg/m²     HEENT: No pallor, no icterus. Respiratory:  CTA, good air entry. Cardiovascular: RRR, no murmur. Abdomen: Soft, non-tender, BS noted. Neurologic: awake, alert and following commands       Assessment/Plan:  Active Hospital Problems    Diagnosis Date Noted    CAD in native artery [I25.10] 02/25/2022    STEMI (ST elevation myocardial infarction) ([facility] [address] [I21.3] 02/24/2022     · Proximal atrial fibrillation  · Chest pain  · Hypertension  · Diabetes mellitus     Plan:  · S/p cardiac cath, continue with medical management for now. Can consider for PCI.   · Converted to sinus rhythm, continue with amiodarone  · Continue current antihypertensive  · Continue statin      Labs:   Recent Labs     02/24/22 2200 02/25/22  0530   WBC 15.4* 13.0*   HGB 16.0 14.6   HCT 48.7 45.2    278     Recent Labs     02/24/22 2200 02/25/22  0530    140   K 4.0 4.0    107   CO2 24 20*   BUN 20 17   CREATININE 0.9 0.8   CALCIUM 8.4* 7.9*     Recent Labs     02/24/22 2200 02/25/22  0530   AST 12 41*   ALT 16 17   BILITOT 0.2 0.3   ALKPHOS 113 94     Recent Labs     02/24/22 2200 INR 1.0     No results for input(s): Jordan Crocker in the last 72 hours. Medications:  Reviewed    Infusion Medications    sodium chloride      sodium chloride 100 mL/hr at 02/25/22 0212     Scheduled Medications    clopidogrel  75 mg Oral Daily    lisinopril  20 mg Oral Daily    pravastatin  40 mg Oral Nightly    tamsulosin  0.4 mg Oral Daily    aspirin  81 mg Oral Daily    sodium chloride flush  5-40 mL IntraVENous 2 times per day    metoprolol succinate  25 mg Oral Daily    [Held by provider] enoxaparin  1 mg/kg SubCUTAneous Q12H    amiodarone  200 mg Oral BID     PRN Meds: ondansetron **OR** ondansetron, acetaminophen **OR** acetaminophen, polyethylene glycol, sodium chloride flush, sodium chloride, metoprolol      Intake/Output Summary (Last 24 hours) at 2/25/2022 0911  Last data filed at 2/25/2022 0525  Gross per 24 hour   Intake 640 ml   Output 600 ml   Net 40 ml     · Body mass index is 31.8 kg/m². · Diet  · Diet NPO Exceptions are: Sips of Water with Meds    · Code Status  · Full Code     Electronically signed by Salina Boyle MD on 2/25/2022 at [address]   Please contact me through perfect serve    NOTE: This report was transcribed using voice recognition software. Every effort was made to ensure accuracy; however, inadvertent computerized transcription errors may be present.

## 2022-02-25 NOTE — ED PROVIDER NOTES
Department of Emergency Medicine   ED  Provider Note  Admit Date/RoomTime: 2/24/2022  9:55 PM  ED Room: 2080/1828-X          History of Present Illness:  2/25/22, Time: 12:17 PM EST  Chief Complaint   Patient presents with    Chest Pain     Pt started having chest pain prior to coming to ED. Midsternal pain with radiation down both arms. Pt took 5 SL nitro at home. EMS gave patient 180 mg of brilinta, 324 mg Aspirin, and 100 mcg of fentanyl. Chandra Junior is a 66 y.o. male presenting to the ED for chest pain. The patient states that an hour prior to arrival he developed chest pain. It was slow in onset and he cannot characterize it. He states that just feels like \"pain \". He did try taking nitroglycerin 5 times without improvement of his symptoms. Nothing seems to worsen it. Pain has been constant. He states that it radiates down both of his upper extremities. No numbness, tingling or weakness. No history of trauma. The patient was given 180 of Brilinta 324 milligrams aspirin and 100 mcg of fentanyl prior to arrival by EMS. Patient does endorse history of coronary artery disease and is status post CABG from 2013. He is on Plavix however has been holding this medication for the past 5 days as he is scheduled for hernia repair. He does feel short of breath and had associated diaphoresis. He has had a cough that is productive of white sputum. Denies any fevers. No abdominal pain, nausea or vomiting. No leg swelling. Of note, the patient states he had atrial fibrillation once in the past when he was dehydrated but is not on long-term anticoagulation.     Review of Systems:   Constitutional symptoms: Denies fever  Eyes: Denies eye pain  Ears, Nose, Mouth, Throat: Denies ear pain  Cardiovascular: Positive for chest pain  Respiratory: Positive for shortness of breath  Gastrointestinal: Denies blood per rectum  Genitourinary: Denies hematuria  Skin: Denies rashes  Neurological: Denies headache  Musculoskeletal: Positive for bilateral arm pain    --------------------------------------------- PAST HISTORY ---------------------------------------------  Past Medical History:  has a past medical history of Paroxysmal atrial fibrillation (HCC) and S/P CABG x 3. Past Surgical History:  has a past surgical history that includes Cardiac catheterization (02/25/2022). Social History:  reports that he has quit smoking. He has never used smokeless tobacco.    Family History: family history is not on file. . Unless otherwise noted, family history is non contributory    The patients home medications have been reviewed. Allergies: Dapagliflozin    I have reviewed the past medical history, past surgical history, social history, and family history    ---------------------------------------------------PHYSICAL EXAM--------------------------------------    General: Patient appears significantly uncomfortable. He is writhing around in bed. Conversational  Head: Normocephalic and atraumatic. Eyes: Sclera non-icteric and no conjunctival injection  ENT: Nasal and oral membranes appear significantly dry  Neck: Trachea midline. No JVD  Respiratory: Lungs clear to auscultation bilaterally. Patient speaking in full sentences. Patient is on 5 L nasal cannula  Cardiovascular: Tachycardic, irregularly irregular. No pedal edema. 2+ radial pulses. Patient does have midline sternotomy scar noted  Gastrointestinal:  Abdomen is soft and nondistended. Bowel sounds present. There is no tenderness. There is no guarding or rebound. Musculoskeletal: No deformity. No bony tenderness of the extremities. Symmetric movement.  strength symmetric. Able to flex and extend at the elbows and wrist.  Skin: Skin warm and dry. No rashes. Neurologic: No gross motor deficits. No aphasia.   Sensation intact to light touch of the upper extremities  Psychiatric: Normal affect.    -------------------------------------------------- RESULTS -------------------------------------------------  I have personally reviewed all laboratory and imaging results for this patient. Results are listed below.      LABS: (Lab results interpreted by me)  Results for orders placed or performed during the hospital encounter of 02/24/22   COVID-19, Rapid    Specimen: Nasopharyngeal Swab   Result Value Ref Range    SARS-CoV-2, NAAT Not Detected Not Detected   CBC with Auto Differential   Result Value Ref Range    WBC 15.4 (H) 4.5 - 11.5 E9/L    RBC 5.81 (H) 3.80 - 5.80 E12/L    Hemoglobin 16.0 12.5 - 16.5 g/dL    Hematocrit 48.7 37.0 - 54.0 %    MCV 83.8 80.0 - 99.9 fL    MCH 27.5 26.0 - 35.0 pg    MCHC 32.9 32.0 - 34.5 %    RDW 13.4 11.5 - 15.0 fL    Platelets 036 802 - 626 E9/L    MPV 10.0 7.0 - 12.0 fL    Neutrophils % 68.7 43.0 - 80.0 %    Lymphocytes % 18.3 (L) 20.0 - 42.0 %    Monocytes % 6.9 2.0 - 12.0 %    Eosinophils % 0.9 0.0 - 6.0 %    Basophils % 0.9 0.0 - 2.0 %    Neutrophils Absolute 11.24 (H) 1.80 - 7.30 E9/L    Lymphocytes Absolute 2.77 1.50 - 4.00 E9/L    Monocytes Absolute 1.08 (H) 0.10 - 0.95 E9/L    Eosinophils Absolute 0.14 0.05 - 0.50 E9/L    Basophils Absolute 0.14 0.00 - 0.20 E9/L    Metamyelocytes Relative 2.6 (H) 0.0 - 1.0 %    Myelocyte Percent 1.7 0 - 0 %    Anisocytosis 1+    Comprehensive Metabolic Panel   Result Value Ref Range    Sodium 140 132 - 146 mmol/L    Potassium 4.0 3.5 - 5.0 mmol/L    Chloride 105 98 - 107 mmol/L    CO2 24 22 - 29 mmol/L    Anion Gap 11 7 - 16 mmol/L    Glucose 151 (H) 74 - 99 mg/dL    BUN 20 6 - 23 mg/dL    CREATININE 0.9 0.7 - 1.2 mg/dL    GFR Non-African American >60 >=60 mL/min/1.73    GFR African American >60     Calcium 8.4 (L) 8.6 - 10.2 mg/dL    Total Protein 7.2 6.4 - 8.3 g/dL    Albumin 4.2 3.5 - 5.2 g/dL    Total Bilirubin 0.2 0.0 - 1.2 mg/dL    Alkaline Phosphatase 113 40 - 129 U/L    ALT 16 0 - 40 U/L    AST 12 0 - 39 U/L   Troponin Result Value Ref Range    Troponin, High Sensitivity 19 (H) 0 - 11 ng/L   Protime-INR   Result Value Ref Range    Protime 11.4 9.3 - 12.4 sec    INR 1.0    Brain Natriuretic Peptide   Result Value Ref Range    Pro-BNP 94 0 - 125 pg/mL   D-Dimer, Quantitative   Result Value Ref Range    D-Dimer, Quant <200 ng/mL DDU   Troponin   Result Value Ref Range    Troponin, High Sensitivity 57 (H) 0 - 11 ng/L   APTT   Result Value Ref Range    aPTT 29.0 24.5 - 35.1 sec   APTT   Result Value Ref Range    aPTT 29.3 24.5 - 35.1 sec   Troponin   Result Value Ref Range    Troponin, High Sensitivity 130 (H) 0 - 11 ng/L   Troponin   Result Value Ref Range    Troponin, High Sensitivity 494 (H) 0 - 11 ng/L   CBC   Result Value Ref Range    WBC 13.0 (H) 4.5 - 11.5 E9/L    RBC 5.23 3.80 - 5.80 E12/L    Hemoglobin 14.6 12.5 - 16.5 g/dL    Hematocrit 45.2 37.0 - 54.0 %    MCV 86.4 80.0 - 99.9 fL    MCH 27.9 26.0 - 35.0 pg    MCHC 32.3 32.0 - 34.5 %    RDW 13.4 11.5 - 15.0 fL    Platelets 361 002 - 456 E9/L    MPV 10.6 7.0 - 12.0 fL   Lipid panel - fasting   Result Value Ref Range    Cholesterol, Total 136 0 - 199 mg/dL    Triglycerides 199 (H) 0 - 149 mg/dL    HDL 37 >40 mg/dL    LDL Calculated 59 0 - 99 mg/dL    VLDL Cholesterol Calculated 40 mg/dL   Comprehensive Metabolic Panel w/ Reflex to MG   Result Value Ref Range    Sodium 140 132 - 146 mmol/L    Potassium reflex Magnesium 4.0 3.5 - 5.0 mmol/L    Chloride 107 98 - 107 mmol/L    CO2 20 (L) 22 - 29 mmol/L    Anion Gap 13 7 - 16 mmol/L    Glucose 203 (H) 74 - 99 mg/dL    BUN 17 6 - 23 mg/dL    CREATININE 0.8 0.7 - 1.2 mg/dL    GFR Non-African American >60 >=60 mL/min/1.73    GFR African American >60     Calcium 7.9 (L) 8.6 - 10.2 mg/dL    Total Protein 6.1 (L) 6.4 - 8.3 g/dL    Albumin 3.5 3.5 - 5.2 g/dL    Total Bilirubin 0.3 0.0 - 1.2 mg/dL    Alkaline Phosphatase 94 40 - 129 U/L    ALT 17 0 - 40 U/L    AST 41 (H) 0 - 39 U/L   Magnesium   Result Value Ref Range    Magnesium 2.2 1.6 - 2.6 mg/dL   POC ACT-Low Range   Result Value Ref Range    POC ACT  Not established seconds   POC ACT-Low Range   Result Value Ref Range    POC ACT  Not established seconds   EKG 12 Lead   Result Value Ref Range    Ventricular Rate 158 BPM    Atrial Rate 178 BPM    QRS Duration 86 ms    Q-T Interval 284 ms    QTc Calculation (Bazett) 460 ms    R Axis 111 degrees    T Axis 131 degrees   EKG 12 lead   Result Value Ref Range    Ventricular Rate 65 BPM    Atrial Rate 65 BPM    P-R Interval 178 ms    QRS Duration 106 ms    Q-T Interval 418 ms    QTc Calculation (Bazett) 434 ms    P Axis 61 degrees    R Axis 22 degrees    T Axis 112 degrees   EKG 12 lead   Result Value Ref Range    Ventricular Rate 71 BPM    Atrial Rate 71 BPM    P-R Interval 196 ms    QRS Duration 100 ms    Q-T Interval 444 ms    QTc Calculation (Bazett) 482 ms    P Axis 21 degrees    R Axis 35 degrees    T Axis 84 degrees   TYPE AND SCREEN   Result Value Ref Range    ABO/Rh B POS     Antibody Screen NEG    ,     RADIOLOGY:  Interpreted by Radiologist unless otherwise specified  CTA CHEST W CONTRAST   Final Result   Unremarkable CTA the chest, abdomen and pelvis. No evidence of aortic   dissection, intramural hematoma or aneurysm. There is no significant   atherosclerotic disease. Left basilar pleuroparenchymal disease is unchanged from 02/14/2022 and   likely chronic. There is associated mild left lung volume loss. Calcific coronary artery disease status post CABG. Oval-shaped thick walled lesion with central fat attenuation adjacent to the   ascending colon, unchanged and likely representing sequelae of epiploic   appendagitis or fat necrosis. CTA ABDOMEN PELVIS W CONTRAST   Final Result   Unremarkable CTA the chest, abdomen and pelvis. No evidence of aortic   dissection, intramural hematoma or aneurysm. There is no significant   atherosclerotic disease.       Left basilar pleuroparenchymal disease is unchanged from 02/14/2022 and   likely chronic. There is associated mild left lung volume loss. Calcific coronary artery disease status post CABG. Oval-shaped thick walled lesion with central fat attenuation adjacent to the   ascending colon, unchanged and likely representing sequelae of epiploic   appendagitis or fat necrosis. XR CHEST PORTABLE   Final Result   No acute process. Cardiomegaly. ------------------------- NURSING NOTES AND VITALS REVIEWED ---------------------------   The nursing notes within the ED encounter and vital signs as below have been reviewed by myself  BP (!) 119/59   Pulse 61   Temp 96.6 °F (35.9 °C) (Temporal)   Resp 16   Wt 241 lb (109.3 kg)   SpO2 95%   BMI 31.80 kg/m²     Oxygen Saturation Interpretation: Abnormal    The patients available past medical records and past encounters were reviewed.       ------------------------------ ED COURSE/MEDICAL DECISION MAKING----------------------  Medications   metoprolol (LOPRESSOR) injection 5 mg (5 mg IntraVENous Given 2/24/22 2343)   clopidogrel (PLAVIX) tablet 75 mg (75 mg Oral Given 2/25/22 1046)   lisinopril (PRINIVIL;ZESTRIL) tablet 20 mg (20 mg Oral Given 2/25/22 1046)   pravastatin (PRAVACHOL) tablet 40 mg (has no administration in time range)   tamsulosin (FLOMAX) capsule 0.4 mg (has no administration in time range)   ondansetron (ZOFRAN-ODT) disintegrating tablet 4 mg (has no administration in time range)     Or   ondansetron (ZOFRAN) injection 4 mg (has no administration in time range)   acetaminophen (TYLENOL) tablet 650 mg (has no administration in time range)     Or   acetaminophen (TYLENOL) suppository 650 mg (has no administration in time range)   polyethylene glycol (GLYCOLAX) packet 17 g (has no administration in time range)   aspirin chewable tablet 81 mg (81 mg Oral Given 2/25/22 1045)   sodium chloride flush 0.9 % injection 5-40 mL (10 mLs IntraVENous Given 2/25/22 0900)   sodium chloride flush 0.9 % injection 5-40 mL (has no administration in time range)   0.9 % sodium chloride infusion (has no administration in time range)   0.9 % sodium chloride infusion ( IntraVENous New Bag 2/25/22 0212)   metoprolol succinate (TOPROL XL) extended release tablet 25 mg (25 mg Oral Given 2/25/22 1046)   enoxaparin (LOVENOX) injection 105 mg ( SubCUTAneous Automatically Held 2/28/22 2100)   amiodarone (CORDARONE) tablet 200 mg ( Oral Canceled Entry 2/25/22 0900)   0.9 % sodium chloride bolus (0 mLs IntraVENous Stopped 2/24/22 2227)   0.9 % sodium chloride bolus (0 mLs IntraVENous Stopped 2/24/22 2333)   iopamidol (ISOVUE-370) 76 % injection 90 mL (90 mLs IntraVENous Given 2/24/22 2301)   metoprolol (LOPRESSOR) injection 5 mg (5 mg IntraVENous Given 2/24/22 2308)   fentaNYL (SUBLIMAZE) injection 50 mcg (50 mcg IntraVENous Given 2/24/22 2315)   heparin (porcine) injection 4,000 Units (4,000 Units IntraVENous Given 2/24/22 2359)       Medical Decision Making: This is a 66 y.o. male presenting to the ED for chest pain. On initial presentation, the patient's vital signs are interpreted as normotensive, tachycardic, afebrile and mildly hypoxic. Based on history and physical exam, we have a broad differential.  I am concerned for ACS, arrhythmia, less likely pneumonia. With the patient's radiation of his pain to his arms I also considered dissection as he does appear significantly uncomfortable. EKG obtained. A 12-lead EKG was performed and interpreted by myself. The rate is 168 with irregularly irregular and normal axis. Patient has ST elevations of aVR in lead III, V1 as well as aVF. ST depressions in lead I, aVL, V3, V4, V5 and V6. The QRS interval is 86, and QTC interval is 460. This is atrial fibrillation with rapid ventricular response and ST elevation. STEMI. There may be a component of rate dependent ischemia. Patient is significantly dry and started on 2 L of normal saline.   Rate control considered but hydration will first be initiated. Patient is on home metoprolol. This cannot be administered during acute MI however. We will hold off. CTA will be emergently obtained so the patient can be started on anticoagulation for possible STEMI. Cardiology and heart cath lab immediately contacted. However, there was a blood bank alert and emergency. Therefore, the Cath Lab was already and used and recently we did not hear back from cardiology. Because the patient receives his care at outside facility of [place] [address] we did attempt to contact them. Initially, Sailaja Rios in [facility] responded. This was an inappropriate call. We want the patient to stay in the [facility] area as he requires immediate care. Subsequently, we were able to contact [facility] and I spoke with cardiology, Dr. Edward Tabor who reviewed the EKG. Although the patient is in atrial fibrillation and there may be a component of demand ischemia he feels the patient is acutely having a STEMI and that he requires emergent catheterization in the lab. They have accepted the admission. He does recommend administering IV metoprolol to rate control the patient. 5 mg q. 5-minute dosing will be initiated. He also recommends heparin after CTA. Laboratory studies show electrolytes within normal limits. Troponin is 19. LFTs within normal limits. Leukocytosis but no anemia. Covid negative. Chest x-ray shows no acute process megaly. CTA shows no evidence of dissection, intramural hematoma or aneurysm. Arteriosclerotic disease. Pleural-parenchymal disease unchanged. Calcified coronary artery disease status post CABG. Oval thick-walled lesion in the central part attenuation of the ascending colon unchanged from prior. This is interpreted by radiology. Patient continues to have significant pain and is given fentanyl. He is given 4000 units heparin.     Subsequently, the patient was unable to be transferred as the weather is poor with pale and sweet. Helicopter cannot fly. Ground transportation will be 4 hours. Patient requires intervention prior to this. Our cardiologist, Dr. Melissa Tillman was again contacted. He will evaluate the patient as the case is just finished that was previously in the catheterization lab. The patient's heart rate is minimally improved in the 130s. Blood pressure stable. Cardiology feels he has evidence of STEMI persistently and will take him to the catheterization lab. The reason for delay of care as documented above. Patient's primary care physician is unassigned. Sound contacted. Dr. Raj Burgos agreeable. Upon my evaluation, this patient had a high probability of imminent or life-threatening deterioration due to atrial fibrillation with rapid ventricular response, STEMI, hypoxia, which required my direct attention, intervention, and personal management. I have personally provided 80 minutes of critical care time excluding time spent on separately billable procedures. Time includes review of laboratory data, radiology results, discussion with consultants, and monitoring for potential decompensation. Interventions were performed as documented above. This patient's ED course included: a personal history and physicial examination, re-evaluation prior to disposition, multiple bedside re-evaluations, IV medications, cardiac monitoring, continuous pulse oximetry and complex medical decision making and emergency management    This patient has been closely monitored during their ED course. Consultations:  Cardiology, medicine    The cardiac monitor revealed atrial fibrillation with rvr with a heart rate in the 160s as interpreted by me. The cardiac monitor was ordered secondary to the patient's chest pain and to monitor the patient for dysrhythmia. CPT 65744    Oxygen Saturation Interpretation: 95 % on 5L nasal cannula.   Abnormal    Counseling:   I have spoken with the patient,

## 2022-02-25 NOTE — CARE COORDINATION
2/25/22 Transition of care: patient is post heart cath with findings of CAD and medical management. He is alert. He resides with his wife. His pcp is Dr Glen Beck and his pharmacy is [facility] in [place]. His plan is to return home at discharge.  Electronically signed by Mina Villa RN CM on 2/25/2022 at 2:23 PM

## 2022-02-25 NOTE — ED NOTES
Radiology Procedure Waiver   Name: Korina Britton  : 1950  MRN: [id number]    Date:  22    Time: 10:39 PM EST    Benefits of immediately proceeding with Radiology exam(s) without pre-testing outweigh the risks or are not indicated as specified below and therefore the following is/are being waived:    [] Pregnancy test   [] Patients LMP on-time and regular.   [] Patient had Tubal Ligation or has other Contraception Device. [] Patient  is Menopausal or Premenarcheal.    [] Patient had Full or Partial Hysterectomy. [] Protocol for Iodine allergy    [] MRI Questionnaire     [x] BUN/Creatinine   [] Patient age w/no hx of renal dysfunction. [] Patient on Dialysis. [] Recent Normal Labs.   Electronically signed by Dania Cedillo MD on 22 at 10:39 PM EST             Dania Cedillo MD  [id number]

## 2022-02-25 NOTE — PROGRESS NOTES
Inpatient Cardiology Progress note     PATIENT IS BEING FOLLOWED FOR: Acute coronary syndrome, s/p heart cath 2/25/2022    Kim Campuzano located in  room [id number] is a 66 y.o. male      SUBJECTIVE: Complains of \" residual soreness\" midsternal area. Denies SOB, palpitation, lightheadedness. OBJECTIVE: No apparent distress     ROS:  Consist: Denies fevers, chills or night sweats  Heart: Denies chest pain, palpitations, lightheadedness, dizziness or syncope  Lungs: Denies SOB, cough, wheezing, orthopnea or PND  GI: Denies abdominal pain, vomiting or diarrhea    PHYSICAL EXAM:   /73   Pulse 59   Temp 96.8 °F (36 °C) (Oral)   Resp 16   Wt 241 lb (109.3 kg)   SpO2 96%   BMI 31.80 kg/m²    B/P Range last 24 hours: Systolic (96QRD), RNM:674 , Min:100 , QNY:098    Diastolic (15ZSK), MYQ:79, Min:52, Max:92    CONST: Well developed, well nourished who appears stated age. Awake, alert and cooperative. No apparent distress  HEENT:   Head- Normocephalic, atraumatic   Eyes- Conjunctivae pink, anicteric  Throat- Oral mucosa pink and moist  Neck-  No stridor, trachea midline, no jugular venous distention. No carotid bruit  CHEST: Chest symmetrical and non-tender to palpation. No accessory muscle use or intercostal retractions  RESPIRATORY:  Lung sounds - clear throughout fields   CARDIOVASCULAR:     Heart Inspection- shows no noted pulsations  Heart Ausculation- Regular rate and rhythm, no murmur. No s3, s4 or rub   PV: No lower extremity edema. No varicosities. Pedal pulses palpable, no clubbing or cyanosis   ABDOMEN: Soft, non-tender to light palpation. Bowel sounds present. MS: Good muscle strength and tone. No atrophy or abnormal movements. : Deferred  SKIN: Warm and dry no statis dermatitis or ulcers   NEURO / PSYCH: Oriented to person, place and time. Speech clear and appropriate. Follows all commands.  Pleasant affect       Intake/Output Summary (Last 24 hours) at 2/25/2022 5024  Last data filed at 02/25/2022    CREATININE 0.9 02/24/2022    CREATININE 0.9 02/14/2022     Lab Results   Component Value Date    PROBNP 94 02/24/2022     Stress test 11/12/2021 at [facility]  IMPRESSION:   1. Moderate-sized territory of mild-to-moderate severity ischemia involving the basal through beyond the mid inferolateral wall, a large component of which demonstrate significant normalization at rest consistent with a region where there is no prior infarction. There is, however, a small territory of prior incomplete infarction involving the more basal inferolateral wall which remains abnormal at rest.   2. Otherwise grossly normal perfusion preserved throughout the   remainder of the LV myocardium without evidence of additional   territory of ischemia or infarction. 3. Mild LV dilation. 4. Gated images demonstrate mild inferolateral hypokinesis in the   territory of prior basal incomplete infarction, with an LV EF   estimated at 57%. I personally reviewed the images/study and I agree with the findings   as stated. This study was interpreted at [facility]. Heart cath on 2/25/2022 by Dr. Nader Carl:     Left main:  The left main artery appeared nonexistent with separate  ostia to the LAD and the left circumflex.     LCX:  The left circumflex had a 99% ostial stenosis. It was heavily  calcified in its proximal segment. There appeared to be around 60%  eccentric proximal left circumflex disease before any of the marginal  branches. The first marginal branch which took off at an acute angle  from the left circumflex had around 60-70% ostial stenosis. The second  marginal branch had an irregular middle segment with up to 90% stenosis. The third marginal branch which also took off at an acute angle from the  left circumflex had around 80% ostial narrowing.   LAD:  The left anterior descending artery appeared heavily calcified and  was occluded at its origin. RCA:  The right coronary artery is a nondominant vessel which had a  diffusely diseased segment from the proximal to the mid vessel with up  to 70% luminal narrowing. LIMA to LAD. The left internal mammary artery to the left anterior  descending artery is widely patent along its course including its distal  anastomosis. The distal anastomosis had luminal irregularities but  without any significant angiographic luminal narrowing. Multiple  diagonal branches appeared to fill upon the injection of the left  internal mammary artery which also filled the mid and proximal LAD to  the origin of the vessel and also filled retrogradely the left  circumflex.     LEFT VENTRICULOGRAPHY:  The left ventricle is normal in size with  hypokinesis of the basal half of the inferior wall with preserved global  left ventricular systolic function with an estimated ejection fraction  of 50-55%. There was no mitral regurgitation.     Aortic root angiography. Angiography of the aortic root does not show  any abnormalities. There was no aortic regurgitation. There was no  opacification of any grafts arising from the aortic root.     The patient was bolused with 300 mg of IV amiodarone that was infused  over 10 minutes. He converted shortly thereafter to sinus rhythm. He  reported that he was chest pain free after that and for the rest of the  procedure.     The right femoral arterial sheath was securely sutured to the skin at  the end of the procedure.     The patient tolerated the procedure well and left the cardiac  catheterization laboratory in stable condition.     CONCLUSIONS:  1. Coronary artery. A.  Left main practically nonexistent with separate ostia to the LAD and  the left circumflex. B.  LAD, occluded at its origin. C.  LCX, dominant vessel with 95% ostial narrowing and with high-grade  disease of three of its marginal branches as described above.   D. RCA, nondominant vessel with around 70-80% proximal/mid vessel  narrowing. E.  PEARCE to LAD widely patent vessel including its distal anastomosis  with LAD showing no significant disease after the distal anastomosis  with LIMA filling the mid and proximal LAD, diagonal branches, and the left circumflex retrogradely.     2.  Normal left ventricular size with hypokinesis of the basal half of  the inferior wall with more or less preserved global left ventricular  systolic function with an estimated ejection fraction of 50-55%. 3.  Elevated left ventricular end-diastolic pressure consistent with LV  diastolic dysfunction. Telemetry: Sinus rhythm  12 lead EKG pending today        ASSESSMENT:     CAD, ACS, S/p diagnostic Heart Cath 2/25/2022, ? PCI to LCx  H/o CABG x3  in 2013 at [address] [place] [address] in  [facility] [employer] (per patient, details unknown except PEARCE to  LAD widely patent 2/ 25/22)  Angina with effort, about once a week which responds to 1  NTG sublingual  Preserved left ventricular systolic function   Paroxysmal atrial fibrillation, RVR on presentation to ED with  reversion to sinus rhythm on  amiodarone gtt. (h/o A. fib 3  years ago with severe chest pain in context of dehydration  and electrolyte dysbalance)  Prolonged QT  HTN  HLD  DM insulin requiring   Obesity  Left inguinal hernia    PLAN:  Continue telemetry  Continue: ASA, Plavix, BB, ACE inhibitor, statin, Lovenox . ECG  Evaluated for possibility of  interventional heart cath with Dr. Myron Choudhary as outpatient      Assessment and plan discussed with Dr. Miles Strickland MD    Assessment and Plan to follow as per Dr. Miles Strickland MD    Electronically signed by ODETTE Roman on 2/25/2022 at 7:36 AM    [facility] Cardiology progress note  Curry Reynoso     I have personally participated in a face-to-face and personally obtained history and performed physical exam on the date of service. I reviewed chart, vitals, labs and radiologic studies.  I also participated in medical decision making with ODETTE Fischer on the date of service All of the assessments and recommendations are from me and I agree with all of the pertinent clinical information, assessment and treatment plan. I have reviewed and edited the note above based on my findings during my history, exam, and decision making. Please see my additional contributions to the history, physical exam, assessment, and recommendations below. Patient is seen in follow-up for acute coronary syndrome    Subjective:     Mr. Blair Shoulders feels better today  Sitting up in bed no apparent distress    Review of systems:  Reviewed, as above    Medication side effects: none    Scheduled Meds: Reviewed, as above  Continuous Infusions: Reviewed, as above. PRN Meds: Reviewed, as above. No intake/output data recorded. No intake/output data recorded. Objective:      Physical Exam:   BP (!) 148/68   Pulse 69   Temp 97.9 °F (36.6 °C) (Temporal)   Resp 15   Ht 6' 1\" (1.854 m)   Wt 236 lb (107 kg)   SpO2 98%   BMI 31.14 kg/m²   CONSTITUTIONAL:  awake, alert, cooperative, no apparent distress, and appears stated age  HEAD:  normocepalic, without obvious abnormality, atraumatic  NECK:  Supple, symmetrical, trachea midline, no adenopathy, thyroid symmetric, not enlarged and no tenderness, skin normal  LUNGS:  No increased work of breathing, good air exchange, clear to auscultation bilaterally, no crackles or wheezing  CARDIOVASCULAR:  Normal apical impulse, regular rate and rhythm, normal S1 and S2, no S3 or S4, and no murmur noted, no edema, no JVD, no carotid bruit. ABDOMEN:  Soft, nontender, no masses, no hepatomegaly, no splenomegaly, BS+  MUSCULOSKELETAL:  No clubbing no cyanosis. there is no redness, warmth, or swelling of the joints  full range of motion noted  NEUROLOGIC:  Alert, awake,oriented x3  SKIN:  no bruising or bleeding, normal skin color, texture, turgor and no redness, warmth, or swelling      Cardiographics  I personally reviewed the telemetry monitor strips with the following interpretation:  Echocardiogram: Reviewed, as above. Imaging  Reviewed, as above. Reviewed, as above. Lab Review   Lab Results   Component Value Date     03/02/2022    K 4.1 03/02/2022     03/02/2022    CO2 25 03/02/2022    BUN 14 03/02/2022    CREATININE 0.9 03/02/2022    GLUCOSE 96 03/02/2022    CALCIUM 7.9 03/02/2022     Lab Results   Component Value Date    WBC 10.5 03/02/2022    HGB 13.9 03/02/2022    HCT 43.3 03/02/2022    MCV 85.6 03/02/2022     03/02/2022     Reviewed, as above. I have personally reviewed the laboratory, cardiac diagnostic and radiographic testing as outlined above:    Assessment:       Patient Active Problem List    Diagnosis Date Noted    Chest pain 02/28/2022    NSTEMI (non-ST elevated myocardial infarction) ([address] 02/28/2022    CAD in native artery 02/25/2022    Atrial fibrillation with rapid ventricular response ([address]     Acute coronary syndrome ([address]     Hx of CABG     STEMI (ST elevation myocardial infarction) ([facility] 02/24/2022    Right inguinal hernia 02/09/2022       Recommendations:     Outpatient PCI of left circumflex artery  Continue current treatment      Electronically signed by Zulma Worley MD on 3/7/2022 at 7:34 PM  NOTE: This report was transcribed using voice recognition software.  Every effort was made to ensure accuracy; however, inadvertent computerized transcription errors may be present

## 2022-02-26 VITALS
DIASTOLIC BLOOD PRESSURE: 68 MMHG | TEMPERATURE: 97.9 F | WEIGHT: 236 LBS | HEART RATE: 69 BPM | SYSTOLIC BLOOD PRESSURE: 148 MMHG | RESPIRATION RATE: 15 BRPM | OXYGEN SATURATION: 98 % | HEIGHT: 73 IN | BODY MASS INDEX: 31.28 KG/M2

## 2022-02-26 LAB
APTT: 33 SEC (ref 24.5–35.1)
APTT: 35.6 SEC (ref 24.5–35.1)

## 2022-02-26 PROCEDURE — 6370000000 HC RX 637 (ALT 250 FOR IP): Performed by: INTERNAL MEDICINE

## 2022-02-26 PROCEDURE — 2580000003 HC RX 258: Performed by: INTERNAL MEDICINE

## 2022-02-26 PROCEDURE — 99222 1ST HOSP IP/OBS MODERATE 55: CPT | Performed by: INTERNAL MEDICINE

## 2022-02-26 PROCEDURE — 6370000000 HC RX 637 (ALT 250 FOR IP): Performed by: FAMILY MEDICINE

## 2022-02-26 PROCEDURE — 85730 THROMBOPLASTIN TIME PARTIAL: CPT

## 2022-02-26 PROCEDURE — 36415 COLL VENOUS BLD VENIPUNCTURE: CPT

## 2022-02-26 RX ORDER — AMIODARONE HYDROCHLORIDE 200 MG/1
200 TABLET ORAL 2 TIMES DAILY
Qty: 60 TABLET | Refills: 0 | Status: SHIPPED | OUTPATIENT
Start: 2022-02-26 | End: 2022-03-30 | Stop reason: SDUPTHER

## 2022-02-26 RX ORDER — ISOSORBIDE MONONITRATE 30 MG/1
60 TABLET, EXTENDED RELEASE ORAL DAILY
Status: DISCONTINUED | OUTPATIENT
Start: 2022-02-26 | End: 2022-02-26 | Stop reason: HOSPADM

## 2022-02-26 RX ORDER — ISOSORBIDE MONONITRATE 60 MG/1
60 TABLET, EXTENDED RELEASE ORAL DAILY
Qty: 30 TABLET | Refills: 0 | Status: SHIPPED | OUTPATIENT
Start: 2022-02-27 | End: 2022-03-30 | Stop reason: SDUPTHER

## 2022-02-26 RX ORDER — METOPROLOL SUCCINATE 25 MG/1
25 TABLET, EXTENDED RELEASE ORAL DAILY
Qty: 30 TABLET | Refills: 3 | Status: SHIPPED | OUTPATIENT
Start: 2022-02-27 | End: 2022-09-29

## 2022-02-26 RX ADMIN — ISOSORBIDE MONONITRATE 60 MG: 30 TABLET, EXTENDED RELEASE ORAL at 10:30

## 2022-02-26 RX ADMIN — AMIODARONE HYDROCHLORIDE 200 MG: 200 TABLET ORAL at 09:30

## 2022-02-26 RX ADMIN — CLOPIDOGREL BISULFATE 75 MG: 75 TABLET ORAL at 10:30

## 2022-02-26 RX ADMIN — METOPROLOL SUCCINATE 25 MG: 25 TABLET, EXTENDED RELEASE ORAL at 10:45

## 2022-02-26 RX ADMIN — Medication 10 ML: at 10:35

## 2022-02-26 RX ADMIN — APIXABAN 5 MG: 5 TABLET, FILM COATED ORAL at 09:00

## 2022-02-26 RX ADMIN — LISINOPRIL 20 MG: 20 TABLET ORAL at 10:30

## 2022-02-26 ASSESSMENT — PAIN SCALES - GENERAL
PAINLEVEL_OUTOF10: 0

## 2022-02-26 NOTE — DISCHARGE SUMMARY
Hospitalist Discharge Summary    Patient ID: Lilibeth Phillips   Patient : 1950  Patient's PCP: Rasheed Davis MD    Admit Date: 2022   Admitting Physician: Sincere Campos DO    Discharge Date:  2022   Discharge Physician: Ayan Chiang MD   Discharge Condition: Stable  Discharge Disposition: [facility] course in brief:    Mr. Lilibeth Phillips, a 66y.o. year old male with hx of CAD s/p CABG x3, DM and HTN who presented to the emergency department with chest pain. This started prior to arrival.  He was transported by EMS who gave him 180 mg of Brilinta, 324 mg of aspirin and 100 mcg of fentanyl. Patient was also taken nitro at home. On arrival vital signs revealed patient to be tachycardic with a rate in the 140s as high as 150s. EKG shows STEMI with atrial fibrillation with RVR. Laboratory studies demonstrate glucose 151, troponin of 19, day BC 15.4. Chest x-ray shows cardiomegaly. Cardiology was consulted. Heparin infusion initiated. Patient was taken to the cath lab, found multiple coronary occlusions, no PCIs placed, cardiology started him on Amiodarone and Eliquis, he converted to sinus rhythm, per cardiology, ok to be discharged today home and to have staged PCI of his calcified ostial left circ by Dr. Annie Antoine as outpatient. [facility] 2022  Left main: 100% stenosis  LAD: 100 % stenosis. Patent LIMA  Circumflex: 95 %  stenosis  RCA: non dominant. 70 % stenosis.    LV angio: 55%  ejection fraction     Consults:   IP CONSULT TO CARDIOLOGY    Discharge Diagnoses:        Discharge Instructions / Follow up:    Future Appointments   Date Time Provider Alejandro Day   2022  4:00 PM [facility] CVL 01 SEYZ CATH [facility]       Continued appropriate risk factor modification of blood pressure, diabetes and serum lipids will remain essential to reducing risk of future atherosclerotic development    Activity: activity as tolerated    Significant labs:  CBC:   Recent Labs 02/24/22 2200 02/25/22  0530   WBC 15.4* 13.0*   RBC 5.81* 5.23   HGB 16.0 14.6   HCT 48.7 45.2   MCV 83.8 86.4   RDW 13.4 13.4    278     BMP:   Recent Labs     02/24/22 2200 02/25/22  0530 02/25/22  1129    140  --    K 4.0 4.0  --     107  --    CO2 24 20*  --    BUN 20 17  --    CREATININE 0.9 0.8  --    MG  --   --  2.2     LFT:  Recent Labs     02/24/22 2200 02/25/22  0530   PROT 7.2 6.1*   ALKPHOS 113 94   ALT 16 17   AST 12 41*   BILITOT 0.2 0.3     PT/INR:   Recent Labs     02/24/22 2200 02/25/22  0530 02/25/22  1722 02/25/22  2334 02/26/22  1139   INR 1.0  --   --   --   --    APTT  --    < > 27.4 33.0 35.6*    < > = values in this interval not displayed. BNP: No results for input(s): BNP in the last 72 hours. Hgb A1C: No results found for: LABA1C  Folate and B12: No results found for: [id number], No results found for: FOLATE  Thyroid Studies:   Lab Results   Component Value Date    TSH 1.440 02/25/2022       Urinalysis:  No results found for: NITRU, WBCUA, BACTERIA, RBCUA, BLOODU, SPECGRAV, GLUCOSEU    Imaging:  CT ABDOMEN PELVIS W IV CONTRAST Additional Contrast? None    Result Date: 2/14/2022  EXAMINATION: CT OF THE ABDOMEN AND PELVIS WITH CONTRAST 2/14/2022 10:25 am TECHNIQUE: CT of the abdomen and pelvis was performed with the administration of intravenous contrast. Multiplanar reformatted images are provided for review. Dose modulation, iterative reconstruction, and/or weight based adjustment of the mA/kV was utilized to reduce the radiation dose to as low as reasonably achievable.  COMPARISON: None HISTORY: ORDERING SYSTEM PROVIDED HISTORY: rlq pain, hx hernia, r/o incarceration TECHNOLOGIST PROVIDED HISTORY: Additional Contrast?->None Reason for exam:->rlq pain, hx hernia, r/o incarceration Decision Support Exception - unselect if not a suspected or confirmed emergency medical condition->Emergency Medical Condition (MA) FINDINGS: Lower Chest: There are some atelectatic changes identified within the left lung base with pleural thickening present. Organs: The liver is homogeneous in appearance. No stones in the gallbladder. No intrahepatic or extrahepatic biliary ductal dilatation. Pancreas is homogeneous. The spleen is unremarkable. Both adrenal glands reveal mild nodularity bilaterally and some enlargement suggesting possible hyperplasia. The left kidney reveals a renal cortical cyst as well as the right kidney. Symmetric enhancement identified of the renal parenchyma. No obstruction of the kidneys. GI/Bowel: Gastrointestinal tract reveals stool seen scattered throughout the colon. The small bowel is unremarkable in appearance. Diverticulosis with no evidence of diverticulitis. Stomach is unremarkable in appearance. Pelvis: Pelvic organs reveal incomplete distension of the bladder. Prostate is heterogeneously enlarged. Seminal vesicles are unremarkable in appearance. Peritoneum/Retroperitoneum: No abdominal retroperitoneal lymphadenopathy. No free fluid or free air. No abnormal mass or fluid collections identified. Bones/Soft Tissues: Bony structures reveal minimal degenerative changes seen within the spine. Small umbilical hernia containing fat only. Small left inguinal hernia identified containing fat only. There is some mild stranding identified at the origin of the hernia. Mild vascular congestion cannot be excluded. Left inguinal hernia with some stranding seen at the origin of the hernia in which mild vascular congestion cannot be excluded. There is no contents of bowel with only fat present. There is also a small umbilical hernia containing fat only. Remainder of the abdomen and pelvis reveals diverticulosis with no evidence of diverticulitis. There is atelectatic changes and pleural thickening identified at the left lung base.      XR CHEST PORTABLE    Result Date: 2/24/2022  EXAMINATION: ONE XRAY VIEW OF THE CHEST 2/24/2022 10:15 pm COMPARISON: None. HISTORY: ORDERING SYSTEM PROVIDED HISTORY: chest pain TECHNOLOGIST PROVIDED HISTORY: Reason for exam:->chest pain What reading provider will be dictating this exam?->CRC FINDINGS: The lungs are without acute focal process. There is no effusion or pneumothorax. Cardiomegaly. The osseous structures are without acute process. Sternotomy wires noted. No acute process. Cardiomegaly. CTA CHEST W CONTRAST    Result Date: 2/24/2022  EXAMINATION: CTA OF THE CHEST; CTA OF THE ABDOMEN AND PELVIS WITH CONTRAST 2/24/2022 10:46 pm: TECHNIQUE: CTA of the chest was performed after the administration of intravenous contrast.  Multiplanar reformatted images are provided for review. MIP images are provided for review. Dose modulation, iterative reconstruction, and/or weight based adjustment of the mA/kV was utilized to reduce the radiation dose to as low as reasonably achievable.; CTA of the abdomen and pelvis was performed with the administration of intravenous contrast. Multiplanar reformatted images are provided for review. MIP images are provided for review. Dose modulation, iterative reconstruction, and/or weight based adjustment of the mA/kV was utilized to reduce the radiation dose to as low as reasonably achievable.  COMPARISON: CT abdomen and pelvis 02/14/2022 HISTORY: ORDERING SYSTEM PROVIDED HISTORY: chest pain TECHNOLOGIST PROVIDED HISTORY: Reason for exam:->chest pain Decision Support Exception - unselect if not a suspected or confirmed emergency medical condition->Emergency Medical Condition (MA) What reading provider will be dictating this exam?->CRC; ORDERING SYSTEM PROVIDED HISTORY: eval dissection TECHNOLOGIST PROVIDED HISTORY: Reason for exam:->eval dissection Decision Support Exception - unselect if not a suspected or confirmed emergency medical condition->Emergency Medical Condition (MA) What reading provider will be dictating this exam?->CRC FINDINGS: CTA CHEST: Thoracic aorta: No evidence of thoracic aortic aneurysm, intramural hematoma or dissection. No acute abnormality of the aorta. Mediastinum: Thyroid gland is mildly enlarged and heterogeneous, right lobe greater than left. No evidence of mediastinal lymphadenopathy. The heart size is normal.  Calcific coronary artery disease status post sternal splitting procedure for CABG. The heart and pericardium demonstrate no acute abnormality. Lungs/Pleura: There is stable pleural thickening in the inferior left hemithorax with adjacent peripheral airspace disease. There is associated left lung volume loss. Lungs are otherwise clear. No pneumothorax or pleural effusion. Soft Tissues/Bones: No acute bone or soft tissue abnormality. CTA ABDOMEN: Abdominal aorta/Branches: The abdominal aorta is normal with no evidence of aneurysm or dissection. There is no significant atherosclerotic disease. Abdominal aortic branch vessels are patent. Organs: There are no calcified gallstones. No pericholecystic fluid. The liver, spleen, pancreas, adrenal glands and kidneys are normal.  There is a stable 1 cm left adrenal gland nodule, likely a small adenoma. GI/Bowel: Scattered colon diverticula. No evidence of diverticulitis. No bowel obstruction. There is a stable oval-shaped thick walled lesion with central fat attenuation directly adjacent to the ascending colon (axial image 167), likely focus of fat necrosis or sequelae of epiploic appendagitis. Peritoneum/Retroperitoneum: There is no adenopathy, free air or free fluid. No mesenteric fat stranding. Bones/Soft Tissues: No acute bone finding. Thoracic and lumbar spondylosis. CTA PELVIS: Aorta/Iliacs: The iliac and femoral arteries are unremarkable. No aneurysm or dissection. No significant atherosclerotic disease. Other: Urinary bladder is unremarkable. Mild prostatomegaly. Bones/Soft Tissues: No acute bone finding. Unremarkable CTA the chest, abdomen and pelvis.   No evidence of aortic dissection, intramural hematoma or aneurysm. There is no significant atherosclerotic disease. Left basilar pleuroparenchymal disease is unchanged from 02/14/2022 and likely chronic. There is associated mild left lung volume loss. Calcific coronary artery disease status post CABG. Oval-shaped thick walled lesion with central fat attenuation adjacent to the ascending colon, unchanged and likely representing sequelae of epiploic appendagitis or fat necrosis. CTA ABDOMEN PELVIS W CONTRAST    Result Date: 2/24/2022  EXAMINATION: CTA OF THE CHEST; CTA OF THE ABDOMEN AND PELVIS WITH CONTRAST 2/24/2022 10:46 pm: TECHNIQUE: CTA of the chest was performed after the administration of intravenous contrast.  Multiplanar reformatted images are provided for review. MIP images are provided for review. Dose modulation, iterative reconstruction, and/or weight based adjustment of the mA/kV was utilized to reduce the radiation dose to as low as reasonably achievable.; CTA of the abdomen and pelvis was performed with the administration of intravenous contrast. Multiplanar reformatted images are provided for review. MIP images are provided for review. Dose modulation, iterative reconstruction, and/or weight based adjustment of the mA/kV was utilized to reduce the radiation dose to as low as reasonably achievable.  COMPARISON: CT abdomen and pelvis 02/14/2022 HISTORY: ORDERING SYSTEM PROVIDED HISTORY: chest pain TECHNOLOGIST PROVIDED HISTORY: Reason for exam:->chest pain Decision Support Exception - unselect if not a suspected or confirmed emergency medical condition->Emergency Medical Condition (MA) What reading provider will be dictating this exam?->CRC; ORDERING SYSTEM PROVIDED HISTORY: eval dissection TECHNOLOGIST PROVIDED HISTORY: Reason for exam:->eval dissection Decision Support Exception - unselect if not a suspected or confirmed emergency medical condition->Emergency Medical Condition (MA) What reading provider will be dictating this exam?->CRC FINDINGS: CTA CHEST: Thoracic aorta: No evidence of thoracic aortic aneurysm, intramural hematoma or dissection. No acute abnormality of the aorta. Mediastinum: Thyroid gland is mildly enlarged and heterogeneous, right lobe greater than left. No evidence of mediastinal lymphadenopathy. The heart size is normal.  Calcific coronary artery disease status post sternal splitting procedure for CABG. The heart and pericardium demonstrate no acute abnormality. Lungs/Pleura: There is stable pleural thickening in the inferior left hemithorax with adjacent peripheral airspace disease. There is associated left lung volume loss. Lungs are otherwise clear. No pneumothorax or pleural effusion. Soft Tissues/Bones: No acute bone or soft tissue abnormality. CTA ABDOMEN: Abdominal aorta/Branches: The abdominal aorta is normal with no evidence of aneurysm or dissection. There is no significant atherosclerotic disease. Abdominal aortic branch vessels are patent. Organs: There are no calcified gallstones. No pericholecystic fluid. The liver, spleen, pancreas, adrenal glands and kidneys are normal.  There is a stable 1 cm left adrenal gland nodule, likely a small adenoma. GI/Bowel: Scattered colon diverticula. No evidence of diverticulitis. No bowel obstruction. There is a stable oval-shaped thick walled lesion with central fat attenuation directly adjacent to the ascending colon (axial image 167), likely focus of fat necrosis or sequelae of epiploic appendagitis. Peritoneum/Retroperitoneum: There is no adenopathy, free air or free fluid. No mesenteric fat stranding. Bones/Soft Tissues: No acute bone finding. Thoracic and lumbar spondylosis. CTA PELVIS: Aorta/Iliacs: The iliac and femoral arteries are unremarkable. No aneurysm or dissection. No significant atherosclerotic disease. Other: Urinary bladder is unremarkable. Mild prostatomegaly. Bones/Soft Tissues: No acute bone finding.      Unremarkable CTA the chest, abdomen and pelvis. No evidence of aortic dissection, intramural hematoma or aneurysm. There is no significant atherosclerotic disease. Left basilar pleuroparenchymal disease is unchanged from 02/14/2022 and likely chronic. There is associated mild left lung volume loss. Calcific coronary artery disease status post CABG. Oval-shaped thick walled lesion with central fat attenuation adjacent to the ascending colon, unchanged and likely representing sequelae of epiploic appendagitis or fat necrosis.        Discharge Medications:      Medication List      START taking these medications    amiodarone 200 MG tablet  Commonly known as: CORDARONE  Take 1 tablet by mouth 2 times daily     apixaban 5 MG Tabs tablet  Commonly known as: ELIQUIS  Take 1 tablet by mouth 2 times daily     isosorbide mononitrate 60 MG extended release tablet  Commonly known as: IMDUR  Take 1 tablet by mouth daily  Start taking on: February 27, 2022     metoprolol succinate 25 MG extended release tablet  Commonly known as: TOPROL XL  Take 1 tablet by mouth daily  Start taking on: February 27, 2022        CONTINUE taking these medications    allopurinol 100 MG tablet  Commonly known as: ZYLOPRIM     clopidogrel 75 MG tablet  Commonly known as: PLAVIX     glimepiride 4 MG tablet  Commonly known as: AMARYL     Lantus SoloStar 100 UNIT/ML injection pen  Generic drug: insulin glargine     lisinopril 20 MG tablet  Commonly known as: PRINIVIL;ZESTRIL     nitroGLYCERIN 0.4 MG SL tablet  Commonly known as: NITROSTAT     pravastatin 40 MG tablet  Commonly known as: PRAVACHOL     tadalafil 10 MG tablet  Commonly known as: CIALIS     tamsulosin 0.4 MG capsule  Commonly known as: FLOMAX        STOP taking these medications    metoprolol tartrate 50 MG tablet  Commonly known as: LOPRESSOR           Where to Get Your Medications      These medications were sent to [address] “” [address] [facility] [address]    Phone: [phone]   · amiodarone 200 MG tablet  · apixaban 5 MG Tabs tablet  · isosorbide mononitrate 60 MG extended release tablet  · metoprolol succinate 25 MG extended release tablet         Time Spent on discharge is more than 45 minutes in the examination, evaluation, counseling and review of medications and discharge plan.    +++++++++++++++++++++++++++++++++++++++++++++++++  Torey Crawford MD  [address]  +++++++++++++++++++++++++++++++++++++++++++++++++  NOTE: This report was transcribed using voice recognition software. Every effort was made to ensure accuracy; however, inadvertent computerized transcription errors may be present.

## 2022-02-26 NOTE — DISCHARGE INSTR - OTHER ORDERS
FEMORAL DISCHARGE INSTRUCTIONS      Discharge Instructions:    Please follow instructions closely for prevention of complications. Special Instructions:  Splint catheterization site with activity today. Splint catheterization site with:             ~Changing Positions             ~Coughing             ~Sneezing             ~Laughing    Call your physician if you notice:  ~increased pain at the catheterization site.  ~increase swelling at the catheterization site. ~redness or drainage at the catheterization site. ~numbness, tingling or cramping in the catheterization leg at rest or with activity. *Remove Dressing on ***   After {Time; am/pm:97533}       ~Soak dressing with water in shower and gently peel off. Clean site with soap and water, dry, and apply band aide for 24 hours. Remove band aide after another 24 hours. *Drink 3-4 extra glasses of water today. *No tub bathing or swimming for 3 days. *No stairs for 24 hours. Minimal walking today. *No driving, working, or sexual activity for 48 hours. *Do not lift anything heavier than 10 pounds for 3 days. *Continue low fat diet. **If you have any questions or problems after discharge, please notify your doctor. Call 9-1-1 if you have active bleeding from your catheterization site. ***DO  N Marlon Crawford. Lay down and apply pressure to site until help arrives.

## 2022-02-26 NOTE — DISCHARGE INSTR - DIET
Good nutrition is important when healing from an illness, injury, or surgery. Follow any nutrition recommendations given to you during your hospital stay. If you were given an oral nutrition supplement while in the hospital, continue to take this supplement at home. You can take it with meals, in-between meals, and/or before bedtime. These supplements can be purchased at most local grocery stores, pharmacies, and chain super-stores. If you have any questions about your diet or nutrition, call the hospital and ask for the dietitian. Heart-Healthy Diet: Care Instructions  Your Care Instructions     A heart-healthy diet has lots of vegetables, fruits, nuts, beans, and whole grains, and is low in salt. It limits foods that are high in saturated fat, such as meats, cheeses, and fried foods. It may be hard to change your diet, but even small changes can lower your risk of heart attack and heart disease. Follow-up care is a key part of your treatment and safety. Be sure to make and go to all appointments, and call your doctor if you are having problems. It's also a good idea to know your test results and keep a list of the medicines you take. How can you care for yourself at home? Watch your portions  Use food labels to learn what the recommended servings are for the foods you eat. Eat only the number of calories you need to stay at a healthy weight. If you need to lose weight, eat fewer calories than your body burns (through exercise and other physical activity). Eat more fruits and vegetables  Eat a variety of fruit and vegetables every day. Dark green, deep orange, red, or yellow fruits and vegetables are especially good for you. Examples include spinach, carrots, peaches, and berries. Keep carrots, celery, and other veggies handy for snacks. Buy fruit that is in season and store it where you can see it so that you will be tempted to eat it.   Cook dishes that have a lot of veggies in them, such as stir-fries and soups. Limit saturated fat  Read food labels, and try to avoid saturated fats. They increase your risk of heart disease. Use olive or canola oil when you cook. Bake, broil, grill, or steam foods instead of frying them. Choose lean meats instead of high-fat meats such as hot dogs and sausages. Cut off all visible fat when you prepare meat. Eat fish, skinless poultry, and meat alternatives such as soy products instead of high-fat meats. Soy products, such as tofu, may be especially good for your heart. Choose low-fat or fat-free milk and dairy products. Eat foods high in fiber  Eat a variety of grain products every day. Include whole-grain foods that have lots of fiber and nutrients. Examples of whole-grain foods include oats, whole wheat bread, and brown rice. Buy whole-grain breads and cereals, instead of white bread or pastries. Limit salt and sodium  Limit how much salt and sodium you eat to help lower your blood pressure. Taste food before you salt it. Add only a little salt when you think you need it. With time, your taste buds will adjust to less salt. Eat fewer snack items, fast foods, and other high-salt, processed foods. Check food labels for the amount of sodium in packaged foods. Choose low-sodium versions of canned goods (such as soups, vegetables, and beans). Limit sugar  Limit drinks and foods with added sugar. These include candy, desserts, and soda pop. Limit alcohol  Limit alcohol to no more than 2 drinks a day for men and 1 drink a day for women. Too much alcohol can cause health problems. When should you call for help? Watch closely for changes in your health, and be sure to contact your doctor if:    You would like help planning heart-healthy meals. Where can you learn more? Go to https://clarita[facility]. org and sign in to your [employer] account. Enter V137 in the [facility] box to learn more about \"Heart-Healthy Diet: Care Instructions. \" If you do not have an account, please click on the \"Sign Up Now\" link. Current as of: September 8, 2021               Content Version: 13.1  © 2006-2021 [employer]. Care instructions adapted under license by [facility] ([facility]). If you have questions about a medical condition or this instruction, always ask your healthcare professional. [address] any warranty or liability for your use of this information.

## 2022-02-26 NOTE — PROGRESS NOTES
Reason for follow up: PAF and severe ostial left circumflex stenosis. Subjective: Patient denies chest discomfort, dyspnea or palpitations. Objective:    No distress. No events overnight. Scheduled Meds:   apixaban  5 mg Oral BID    clopidogrel  75 mg Oral Daily    lisinopril  20 mg Oral Daily    pravastatin  40 mg Oral Nightly    tamsulosin  0.4 mg Oral Daily    sodium chloride flush  5-40 mL IntraVENous 2 times per day    metoprolol succinate  25 mg Oral Daily    amiodarone  200 mg Oral BID       Continuous Infusions:   sodium chloride             Intake/Output Summary (Last 24 hours) at 2/26/2022 0857  Last data filed at 2/26/2022 0756  Gross per 24 hour   Intake 360 ml   Output 975 ml   Net -615 ml       Patient Vitals for the past 96 hrs (Last 3 readings):   Weight   02/26/22 0608 236 lb (107 kg)   02/25/22 0530 241 lb (109.3 kg)   02/24/22 2157 232 lb (105.2 kg)          PE:   BP (!) 145/63   Pulse 65   Temp 97.7 °F (36.5 °C) (Temporal)   Resp 16   Wt 236 lb (107 kg)   SpO2 95%   BMI 31.14 kg/m²   CONST: Middle-age male laying in bed in no acute distress. HEENT: Head- normocephalic, atraumatic. Neck:  no jugular venous distention. No carotid bruit noted. LUNGS: Fair air entry with no wheezing or crackles. CARDIOVASCULAR: RRR, 1 over 6 systolic murmur best heard at the second left intercostal space, no s3, s4 or rub noted. PV: No lower extremity edema. Pedal pulses palpable, no edema or hematoma over right groin. ABDOMEN: Soft, non-tender to light palpation. Bowel sounds present. No palpable masses no hepatosplenomegaly or splenomegaly; no abdominal bruit / pulsation  SKIN: Warm and dry. Aster Rued NEURO / PSYCH: Oriented to person, place and time. Speech clear and appropriate. Follows all commands. Pleasant affect.        Monitor: Sinus rhythm at 60 bpm.      Lab Review       Recent Labs     02/24/22  2200 02/25/22  0530   WBC 15.4* 13.0*   HGB 16.0 14.6 HCT 48.7 45.2    278       Recent Labs     02/24/22  2200 02/25/22  0530    140   K 4.0 4.0    107   CO2 24 20*   BUN 20 17   CREATININE 0.9 0.8       Recent Labs     02/25/22  0530   AST 41*   ALT 17   ALKPHOS 94         Recent Labs     02/24/22  2200   INR 1.0       Recent Labs     02/24/22  2325   PROBNP 94             Assessment:  -Non-STEMI in the setting of proximal atrial fibrillation with rapid ventricular response and severe ostial left circumflex stenosis. Stable with no angina. -PAF: Currently in sinus rhythm on amiodarone. -CAD: With history of CABG. -Hypertension: Controlled. -Hyperlipidemia. -Diabetes. -Left inguinal hernia. -Obesity. Plan:  -May discharge home on his current cardiac medications.  -Start Imdur 60 mg p.o. daily.  -Patient to have staged PCI of his calcified ostial left circumflex artery by Dr. Sean Del Real as outpatient. Electronically signed by Hiren Mittal MD on 2/26/2022 at 8:57 AM  [facility].

## 2022-02-28 ENCOUNTER — APPOINTMENT (OUTPATIENT)
Dept: GENERAL RADIOLOGY | Age: 72
DRG: 247 | End: 2022-02-28
Payer: COMMERCIAL

## 2022-02-28 ENCOUNTER — HOSPITAL ENCOUNTER (INPATIENT)
Age: 72
LOS: 2 days | Discharge: HOME | DRG: 247 | End: 2022-03-02
Attending: EMERGENCY MEDICINE | Admitting: FAMILY MEDICINE
Payer: COMMERCIAL

## 2022-02-28 ENCOUNTER — TELEPHONE (OUTPATIENT)
Dept: CARDIOLOGY CLINIC | Age: 72
End: 2022-02-28

## 2022-02-28 DIAGNOSIS — I21.4 NSTEMI (NON-ST ELEVATED MYOCARDIAL INFARCTION) (HCC): Primary | ICD-10-CM

## 2022-02-28 DIAGNOSIS — R77.8 ELEVATED TROPONIN: ICD-10-CM

## 2022-02-28 DIAGNOSIS — R07.9 CHEST PAIN, UNSPECIFIED TYPE: ICD-10-CM

## 2022-02-28 LAB
ALBUMIN SERPL-MCNC: 3.5 G/DL (ref 3.5–5.2)
ALP BLD-CCNC: 105 U/L (ref 40–129)
ALT SERPL-CCNC: 22 U/L (ref 0–40)
ANION GAP SERPL CALCULATED.3IONS-SCNC: 10 MMOL/L (ref 7–16)
APTT: 33.9 SEC (ref 24.5–35.1)
AST SERPL-CCNC: 23 U/L (ref 0–39)
BASOPHILS ABSOLUTE: 0.07 E9/L (ref 0–0.2)
BASOPHILS RELATIVE PERCENT: 0.6 % (ref 0–2)
BILIRUB SERPL-MCNC: 0.2 MG/DL (ref 0–1.2)
BUN BLDV-MCNC: 22 MG/DL (ref 6–23)
CALCIUM SERPL-MCNC: 8.3 MG/DL (ref 8.6–10.2)
CHLORIDE BLD-SCNC: 103 MMOL/L (ref 98–107)
CO2: 25 MMOL/L (ref 22–29)
CREAT SERPL-MCNC: 1.2 MG/DL (ref 0.7–1.2)
EOSINOPHILS ABSOLUTE: 0.41 E9/L (ref 0.05–0.5)
EOSINOPHILS RELATIVE PERCENT: 3.4 % (ref 0–6)
GFR AFRICAN AMERICAN: >60
GFR NON-AFRICAN AMERICAN: 59 ML/MIN/1.73
GLUCOSE BLD-MCNC: 192 MG/DL (ref 74–99)
HCT VFR BLD CALC: 46.8 % (ref 37–54)
HEMOGLOBIN: 15.2 G/DL (ref 12.5–16.5)
IMMATURE GRANULOCYTES #: 0.04 E9/L
IMMATURE GRANULOCYTES %: 0.3 % (ref 0–5)
LACTIC ACID: 1.4 MMOL/L (ref 0.5–2.2)
LYMPHOCYTES ABSOLUTE: 3 E9/L (ref 1.5–4)
LYMPHOCYTES RELATIVE PERCENT: 25.2 % (ref 20–42)
MCH RBC QN AUTO: 27.8 PG (ref 26–35)
MCHC RBC AUTO-ENTMCNC: 32.5 % (ref 32–34.5)
MCV RBC AUTO: 85.6 FL (ref 80–99.9)
MONOCYTES ABSOLUTE: 1.11 E9/L (ref 0.1–0.95)
MONOCYTES RELATIVE PERCENT: 9.3 % (ref 2–12)
NEUTROPHILS ABSOLUTE: 7.29 E9/L (ref 1.8–7.3)
NEUTROPHILS RELATIVE PERCENT: 61.2 % (ref 43–80)
PDW BLD-RTO: 13.3 FL (ref 11.5–15)
PLATELET # BLD: 309 E9/L (ref 130–450)
PMV BLD AUTO: 10.3 FL (ref 7–12)
POTASSIUM REFLEX MAGNESIUM: 4.1 MMOL/L (ref 3.5–5)
RBC # BLD: 5.47 E12/L (ref 3.8–5.8)
REASON FOR REJECTION: NORMAL
REJECTED TEST: NORMAL
SODIUM BLD-SCNC: 138 MMOL/L (ref 132–146)
TOTAL PROTEIN: 6.7 G/DL (ref 6.4–8.3)
TROPONIN, HIGH SENSITIVITY: 508 NG/L (ref 0–11)
WBC # BLD: 11.9 E9/L (ref 4.5–11.5)

## 2022-02-28 PROCEDURE — 93005 ELECTROCARDIOGRAM TRACING: CPT | Performed by: EMERGENCY MEDICINE

## 2022-02-28 PROCEDURE — 2140000000 HC CCU INTERMEDIATE R&B

## 2022-02-28 PROCEDURE — 83605 ASSAY OF LACTIC ACID: CPT

## 2022-02-28 PROCEDURE — 6360000002 HC RX W HCPCS: Performed by: EMERGENCY MEDICINE

## 2022-02-28 PROCEDURE — 71045 X-RAY EXAM CHEST 1 VIEW: CPT

## 2022-02-28 PROCEDURE — 84484 ASSAY OF TROPONIN QUANT: CPT

## 2022-02-28 PROCEDURE — 85730 THROMBOPLASTIN TIME PARTIAL: CPT

## 2022-02-28 PROCEDURE — 80053 COMPREHEN METABOLIC PANEL: CPT

## 2022-02-28 PROCEDURE — 99283 EMERGENCY DEPT VISIT LOW MDM: CPT

## 2022-02-28 PROCEDURE — 85025 COMPLETE CBC W/AUTO DIFF WBC: CPT

## 2022-02-28 PROCEDURE — 36415 COLL VENOUS BLD VENIPUNCTURE: CPT

## 2022-02-28 PROCEDURE — G0378 HOSPITAL OBSERVATION PER HR: HCPCS

## 2022-02-28 PROCEDURE — 96374 THER/PROPH/DIAG INJ IV PUSH: CPT

## 2022-02-28 RX ORDER — HEPARIN SODIUM 10000 [USP'U]/100ML
5-30 INJECTION, SOLUTION INTRAVENOUS CONTINUOUS
Status: DISCONTINUED | OUTPATIENT
Start: 2022-02-28 | End: 2022-03-01

## 2022-02-28 RX ORDER — HEPARIN SODIUM 1000 [USP'U]/ML
4000 INJECTION, SOLUTION INTRAVENOUS; SUBCUTANEOUS ONCE
Status: COMPLETED | OUTPATIENT
Start: 2022-02-28 | End: 2022-02-28

## 2022-02-28 RX ORDER — HEPARIN SODIUM 1000 [USP'U]/ML
4000 INJECTION, SOLUTION INTRAVENOUS; SUBCUTANEOUS PRN
Status: DISCONTINUED | OUTPATIENT
Start: 2022-02-28 | End: 2022-03-02 | Stop reason: HOSPADM

## 2022-02-28 RX ORDER — HEPARIN SODIUM 1000 [USP'U]/ML
2000 INJECTION, SOLUTION INTRAVENOUS; SUBCUTANEOUS PRN
Status: DISCONTINUED | OUTPATIENT
Start: 2022-02-28 | End: 2022-03-02 | Stop reason: HOSPADM

## 2022-02-28 RX ADMIN — HEPARIN SODIUM 9 UNITS/KG/HR: 10000 INJECTION, SOLUTION INTRAVENOUS at 20:48

## 2022-02-28 RX ADMIN — HEPARIN SODIUM 4000 UNITS: 1000 INJECTION INTRAVENOUS; SUBCUTANEOUS at 20:48

## 2022-02-28 ASSESSMENT — ENCOUNTER SYMPTOMS
WHEEZING: 0
SORE THROAT: 0
COUGH: 0
EYE PAIN: 0
SINUS PAIN: 0
VOMITING: 0
EYE REDNESS: 0
NAUSEA: 0
ABDOMINAL PAIN: 0
SHORTNESS OF BREATH: 0
DIARRHEA: 0
BACK PAIN: 0

## 2022-02-28 NOTE — Clinical Note
Discharge Plan[de-identified] Other/[facility])   Telemetry/Cardiac Monitoring Required?: Yes

## 2022-03-01 LAB
ABO/RH: NORMAL
ANION GAP SERPL CALCULATED.3IONS-SCNC: 11 MMOL/L (ref 7–16)
ANTIBODY SCREEN: NORMAL
APTT: 176.4 SEC (ref 24.5–35.1)
APTT: 45.4 SEC (ref 24.5–35.1)
APTT: 48.5 SEC (ref 24.5–35.1)
BASOPHILS ABSOLUTE: 0.07 E9/L (ref 0–0.2)
BASOPHILS RELATIVE PERCENT: 0.6 % (ref 0–2)
BUN BLDV-MCNC: 17 MG/DL (ref 6–23)
CALCIUM SERPL-MCNC: 8.3 MG/DL (ref 8.6–10.2)
CHLORIDE BLD-SCNC: 105 MMOL/L (ref 98–107)
CO2: 26 MMOL/L (ref 22–29)
CREAT SERPL-MCNC: 1 MG/DL (ref 0.7–1.2)
EKG ATRIAL RATE: 59 BPM
EKG P AXIS: 15 DEGREES
EKG P-R INTERVAL: 164 MS
EKG Q-T INTERVAL: 446 MS
EKG QRS DURATION: 92 MS
EKG QTC CALCULATION (BAZETT): 441 MS
EKG R AXIS: 20 DEGREES
EKG T AXIS: 89 DEGREES
EKG VENTRICULAR RATE: 59 BPM
EOSINOPHILS ABSOLUTE: 0.48 E9/L (ref 0.05–0.5)
EOSINOPHILS RELATIVE PERCENT: 4.1 % (ref 0–6)
GFR AFRICAN AMERICAN: >60
GFR NON-AFRICAN AMERICAN: >60 ML/MIN/1.73
GLUCOSE BLD-MCNC: 129 MG/DL (ref 74–99)
HBA1C MFR BLD: 7.4 % (ref 4–5.6)
HCT VFR BLD CALC: 46.4 % (ref 37–54)
HEMOGLOBIN: 15.1 G/DL (ref 12.5–16.5)
IMMATURE GRANULOCYTES #: 0.07 E9/L
IMMATURE GRANULOCYTES %: 0.6 % (ref 0–5)
LYMPHOCYTES ABSOLUTE: 2.99 E9/L (ref 1.5–4)
LYMPHOCYTES RELATIVE PERCENT: 25.6 % (ref 20–42)
MCH RBC QN AUTO: 27.9 PG (ref 26–35)
MCHC RBC AUTO-ENTMCNC: 32.5 % (ref 32–34.5)
MCV RBC AUTO: 85.8 FL (ref 80–99.9)
METER GLUCOSE: 116 MG/DL (ref 74–99)
METER GLUCOSE: 122 MG/DL (ref 74–99)
METER GLUCOSE: 123 MG/DL (ref 74–99)
METER GLUCOSE: 157 MG/DL (ref 74–99)
MONOCYTES ABSOLUTE: 1.27 E9/L (ref 0.1–0.95)
MONOCYTES RELATIVE PERCENT: 10.9 % (ref 2–12)
NEUTROPHILS ABSOLUTE: 6.79 E9/L (ref 1.8–7.3)
NEUTROPHILS RELATIVE PERCENT: 58.2 % (ref 43–80)
PDW BLD-RTO: 13.3 FL (ref 11.5–15)
PLATELET # BLD: 295 E9/L (ref 130–450)
PMV BLD AUTO: 10 FL (ref 7–12)
POC ACT LR: 211 SECONDS
POC ACT LR: >400 SECONDS
POC ACT LR: >400 SECONDS
POTASSIUM REFLEX MAGNESIUM: 4.1 MMOL/L (ref 3.5–5)
RBC # BLD: 5.41 E12/L (ref 3.8–5.8)
SODIUM BLD-SCNC: 142 MMOL/L (ref 132–146)
TROPONIN, HIGH SENSITIVITY: 348 NG/L (ref 0–11)
TROPONIN, HIGH SENSITIVITY: 358 NG/L (ref 0–11)
TROPONIN, HIGH SENSITIVITY: 373 NG/L (ref 0–11)
TROPONIN, HIGH SENSITIVITY: 457 NG/L (ref 0–11)
WBC # BLD: 11.7 E9/L (ref 4.5–11.5)

## 2022-03-01 PROCEDURE — 86850 RBC ANTIBODY SCREEN: CPT

## 2022-03-01 PROCEDURE — C1725 CATH, TRANSLUMIN NON-LASER: HCPCS

## 2022-03-01 PROCEDURE — 80048 BASIC METABOLIC PNL TOTAL CA: CPT

## 2022-03-01 PROCEDURE — 4A023N7 MEASUREMENT OF CARDIAC SAMPLING AND PRESSURE, LEFT HEART, PERCUTANEOUS APPROACH: ICD-10-PCS | Performed by: INTERNAL MEDICINE

## 2022-03-01 PROCEDURE — 85025 COMPLETE CBC W/AUTO DIFF WBC: CPT

## 2022-03-01 PROCEDURE — 85730 THROMBOPLASTIN TIME PARTIAL: CPT

## 2022-03-01 PROCEDURE — 027035Z DILATION OF CORONARY ARTERY, ONE ARTERY WITH TWO DRUG-ELUTING INTRALUMINAL DEVICES, PERCUTANEOUS APPROACH: ICD-10-PCS | Performed by: INTERNAL MEDICINE

## 2022-03-01 PROCEDURE — 2500000003 HC RX 250 WO HCPCS

## 2022-03-01 PROCEDURE — 92978 ENDOLUMINL IVUS OCT C 1ST: CPT

## 2022-03-01 PROCEDURE — 6370000000 HC RX 637 (ALT 250 FOR IP): Performed by: INTERNAL MEDICINE

## 2022-03-01 PROCEDURE — 6370000000 HC RX 637 (ALT 250 FOR IP): Performed by: FAMILY MEDICINE

## 2022-03-01 PROCEDURE — C1769 GUIDE WIRE: HCPCS

## 2022-03-01 PROCEDURE — C1874 STENT, COATED/COV W/DEL SYS: HCPCS

## 2022-03-01 PROCEDURE — C1894 INTRO/SHEATH, NON-LASER: HCPCS

## 2022-03-01 PROCEDURE — 2580000003 HC RX 258: Performed by: SURGERY

## 2022-03-01 PROCEDURE — 86900 BLOOD TYPING SEROLOGIC ABO: CPT

## 2022-03-01 PROCEDURE — B245ZZ3 ULTRASONOGRAPHY OF LEFT HEART, INTRAVASCULAR: ICD-10-PCS | Performed by: INTERNAL MEDICINE

## 2022-03-01 PROCEDURE — 83036 HEMOGLOBIN GLYCOSYLATED A1C: CPT

## 2022-03-01 PROCEDURE — 84484 ASSAY OF TROPONIN QUANT: CPT

## 2022-03-01 PROCEDURE — 6360000002 HC RX W HCPCS: Performed by: FAMILY MEDICINE

## 2022-03-01 PROCEDURE — 92978 ENDOLUMINL IVUS OCT C 1ST: CPT | Performed by: INTERNAL MEDICINE

## 2022-03-01 PROCEDURE — 6360000002 HC RX W HCPCS: Performed by: EMERGENCY MEDICINE

## 2022-03-01 PROCEDURE — S5553 INSULIN LONG ACTING 5 U: HCPCS | Performed by: FAMILY MEDICINE

## 2022-03-01 PROCEDURE — 92928 PRQ TCAT PLMT NTRAC ST 1 LES: CPT | Performed by: INTERNAL MEDICINE

## 2022-03-01 PROCEDURE — C1887 CATHETER, GUIDING: HCPCS

## 2022-03-01 PROCEDURE — 82962 GLUCOSE BLOOD TEST: CPT

## 2022-03-01 PROCEDURE — 2580000003 HC RX 258: Performed by: INTERNAL MEDICINE

## 2022-03-01 PROCEDURE — 86901 BLOOD TYPING SEROLOGIC RH(D): CPT

## 2022-03-01 PROCEDURE — C1753 CATH, INTRAVAS ULTRASOUND: HCPCS

## 2022-03-01 PROCEDURE — 6370000000 HC RX 637 (ALT 250 FOR IP)

## 2022-03-01 PROCEDURE — 93005 ELECTROCARDIOGRAM TRACING: CPT | Performed by: INTERNAL MEDICINE

## 2022-03-01 PROCEDURE — C9600 PERC DRUG-EL COR STENT SING: HCPCS

## 2022-03-01 PROCEDURE — 2709999900 HC NON-CHARGEABLE SUPPLY

## 2022-03-01 PROCEDURE — 99223 1ST HOSP IP/OBS HIGH 75: CPT | Performed by: INTERNAL MEDICINE

## 2022-03-01 PROCEDURE — 36415 COLL VENOUS BLD VENIPUNCTURE: CPT

## 2022-03-01 PROCEDURE — 6360000002 HC RX W HCPCS

## 2022-03-01 PROCEDURE — 85347 COAGULATION TIME ACTIVATED: CPT

## 2022-03-01 PROCEDURE — 2140000000 HC CCU INTERMEDIATE R&B

## 2022-03-01 PROCEDURE — APPSS60 APP SPLIT SHARED TIME 46-60 MINUTES: Performed by: PHYSICIAN ASSISTANT

## 2022-03-01 RX ORDER — ROSUVASTATIN CALCIUM 20 MG/1
20 TABLET, COATED ORAL DAILY
Status: DISCONTINUED | OUTPATIENT
Start: 2022-03-01 | End: 2022-03-02 | Stop reason: HOSPADM

## 2022-03-01 RX ORDER — LANOLIN ALCOHOL/MO/W.PET/CERES
3 CREAM (GRAM) TOPICAL NIGHTLY PRN
Status: DISCONTINUED | OUTPATIENT
Start: 2022-03-01 | End: 2022-03-02 | Stop reason: HOSPADM

## 2022-03-01 RX ORDER — NICOTINE POLACRILEX 4 MG
15 LOZENGE BUCCAL PRN
Status: DISCONTINUED | OUTPATIENT
Start: 2022-03-01 | End: 2022-03-02 | Stop reason: HOSPADM

## 2022-03-01 RX ORDER — ACETAMINOPHEN 325 MG/1
650 TABLET ORAL EVERY 6 HOURS PRN
Status: DISCONTINUED | OUTPATIENT
Start: 2022-03-01 | End: 2022-03-02 | Stop reason: HOSPADM

## 2022-03-01 RX ORDER — SODIUM CHLORIDE 9 MG/ML
25 INJECTION, SOLUTION INTRAVENOUS PRN
Status: DISCONTINUED | OUTPATIENT
Start: 2022-03-01 | End: 2022-03-02 | Stop reason: HOSPADM

## 2022-03-01 RX ORDER — DEXTROSE MONOHYDRATE 50 MG/ML
100 INJECTION, SOLUTION INTRAVENOUS PRN
Status: DISCONTINUED | OUTPATIENT
Start: 2022-03-01 | End: 2022-03-02 | Stop reason: HOSPADM

## 2022-03-01 RX ORDER — SODIUM CHLORIDE 0.9 % (FLUSH) 0.9 %
5-40 SYRINGE (ML) INJECTION EVERY 12 HOURS SCHEDULED
Status: DISCONTINUED | OUTPATIENT
Start: 2022-03-01 | End: 2022-03-02 | Stop reason: HOSPADM

## 2022-03-01 RX ORDER — METOPROLOL SUCCINATE 25 MG/1
25 TABLET, EXTENDED RELEASE ORAL DAILY
Status: DISCONTINUED | OUTPATIENT
Start: 2022-03-01 | End: 2022-03-02 | Stop reason: HOSPADM

## 2022-03-01 RX ORDER — ONDANSETRON 2 MG/ML
4 INJECTION INTRAMUSCULAR; INTRAVENOUS EVERY 6 HOURS PRN
Status: DISCONTINUED | OUTPATIENT
Start: 2022-03-01 | End: 2022-03-02 | Stop reason: HOSPADM

## 2022-03-01 RX ORDER — SODIUM CHLORIDE 9 MG/ML
INJECTION, SOLUTION INTRAVENOUS CONTINUOUS
Status: DISCONTINUED | OUTPATIENT
Start: 2022-03-01 | End: 2022-03-01 | Stop reason: SDUPTHER

## 2022-03-01 RX ORDER — CLOPIDOGREL BISULFATE 75 MG/1
75 TABLET ORAL DAILY
Status: DISCONTINUED | OUTPATIENT
Start: 2022-03-01 | End: 2022-03-02 | Stop reason: HOSPADM

## 2022-03-01 RX ORDER — LISINOPRIL 20 MG/1
20 TABLET ORAL DAILY
Status: DISCONTINUED | OUTPATIENT
Start: 2022-03-01 | End: 2022-03-02 | Stop reason: HOSPADM

## 2022-03-01 RX ORDER — ALLOPURINOL 100 MG/1
100 TABLET ORAL DAILY
Status: DISCONTINUED | OUTPATIENT
Start: 2022-03-01 | End: 2022-03-01

## 2022-03-01 RX ORDER — SODIUM CHLORIDE 9 MG/ML
25 INJECTION, SOLUTION INTRAVENOUS PRN
Status: DISCONTINUED | OUTPATIENT
Start: 2022-03-01 | End: 2022-03-01 | Stop reason: SDUPTHER

## 2022-03-01 RX ORDER — ACETAMINOPHEN 650 MG/1
650 SUPPOSITORY RECTAL EVERY 6 HOURS PRN
Status: DISCONTINUED | OUTPATIENT
Start: 2022-03-01 | End: 2022-03-02 | Stop reason: HOSPADM

## 2022-03-01 RX ORDER — PRAVASTATIN SODIUM 20 MG
40 TABLET ORAL DAILY
Status: DISCONTINUED | OUTPATIENT
Start: 2022-03-01 | End: 2022-03-01

## 2022-03-01 RX ORDER — SODIUM CHLORIDE 9 MG/ML
INJECTION, SOLUTION INTRAVENOUS CONTINUOUS
Status: DISCONTINUED | OUTPATIENT
Start: 2022-03-01 | End: 2022-03-01

## 2022-03-01 RX ORDER — SODIUM CHLORIDE 0.9 % (FLUSH) 0.9 %
10 SYRINGE (ML) INJECTION EVERY 12 HOURS SCHEDULED
Status: DISCONTINUED | OUTPATIENT
Start: 2022-03-01 | End: 2022-03-01 | Stop reason: SDUPTHER

## 2022-03-01 RX ORDER — ALLOPURINOL 100 MG/1
100 TABLET ORAL 2 TIMES DAILY
Status: DISCONTINUED | OUTPATIENT
Start: 2022-03-01 | End: 2022-03-02

## 2022-03-01 RX ORDER — SODIUM CHLORIDE 0.9 % (FLUSH) 0.9 %
5-40 SYRINGE (ML) INJECTION PRN
Status: DISCONTINUED | OUTPATIENT
Start: 2022-03-01 | End: 2022-03-02 | Stop reason: HOSPADM

## 2022-03-01 RX ORDER — POLYETHYLENE GLYCOL 3350 17 G/17G
17 POWDER, FOR SOLUTION ORAL DAILY PRN
Status: DISCONTINUED | OUTPATIENT
Start: 2022-03-01 | End: 2022-03-02 | Stop reason: HOSPADM

## 2022-03-01 RX ORDER — ATROPINE SULFATE 0.4 MG/ML
0.5 AMPUL (ML) INJECTION
Status: ACTIVE | OUTPATIENT
Start: 2022-03-01 | End: 2022-03-01

## 2022-03-01 RX ORDER — DEXTROSE MONOHYDRATE 25 G/50ML
12.5 INJECTION, SOLUTION INTRAVENOUS PRN
Status: DISCONTINUED | OUTPATIENT
Start: 2022-03-01 | End: 2022-03-02 | Stop reason: HOSPADM

## 2022-03-01 RX ORDER — SODIUM CHLORIDE 0.9 % (FLUSH) 0.9 %
10 SYRINGE (ML) INJECTION PRN
Status: DISCONTINUED | OUTPATIENT
Start: 2022-03-01 | End: 2022-03-02 | Stop reason: HOSPADM

## 2022-03-01 RX ORDER — AMIODARONE HYDROCHLORIDE 200 MG/1
200 TABLET ORAL 2 TIMES DAILY
Status: DISCONTINUED | OUTPATIENT
Start: 2022-03-01 | End: 2022-03-02 | Stop reason: HOSPADM

## 2022-03-01 RX ORDER — INSULIN GLARGINE-YFGN 100 [IU]/ML
0.25 INJECTION, SOLUTION SUBCUTANEOUS NIGHTLY
Status: DISCONTINUED | OUTPATIENT
Start: 2022-03-01 | End: 2022-03-02 | Stop reason: HOSPADM

## 2022-03-01 RX ORDER — PROMETHAZINE HYDROCHLORIDE 25 MG/1
12.5 TABLET ORAL EVERY 6 HOURS PRN
Status: DISCONTINUED | OUTPATIENT
Start: 2022-03-01 | End: 2022-03-02 | Stop reason: HOSPADM

## 2022-03-01 RX ORDER — SODIUM CHLORIDE 0.9 % (FLUSH) 0.9 %
10 SYRINGE (ML) INJECTION PRN
Status: DISCONTINUED | OUTPATIENT
Start: 2022-03-01 | End: 2022-03-01 | Stop reason: SDUPTHER

## 2022-03-01 RX ORDER — ACETAMINOPHEN 325 MG/1
650 TABLET ORAL EVERY 4 HOURS PRN
Status: DISCONTINUED | OUTPATIENT
Start: 2022-03-01 | End: 2022-03-01 | Stop reason: SDUPTHER

## 2022-03-01 RX ORDER — ASPIRIN 81 MG/1
324 TABLET, CHEWABLE ORAL ONCE
Status: COMPLETED | OUTPATIENT
Start: 2022-03-01 | End: 2022-03-01

## 2022-03-01 RX ORDER — ISOSORBIDE MONONITRATE 30 MG/1
60 TABLET, EXTENDED RELEASE ORAL DAILY
Status: DISCONTINUED | OUTPATIENT
Start: 2022-03-01 | End: 2022-03-02 | Stop reason: HOSPADM

## 2022-03-01 RX ORDER — SODIUM CHLORIDE 0.9 % (FLUSH) 0.9 %
10 SYRINGE (ML) INJECTION EVERY 12 HOURS SCHEDULED
Status: DISCONTINUED | OUTPATIENT
Start: 2022-03-01 | End: 2022-03-02 | Stop reason: HOSPADM

## 2022-03-01 RX ORDER — 0.9 % SODIUM CHLORIDE 0.9 %
500 INTRAVENOUS SOLUTION INTRAVENOUS PRN
Status: DISCONTINUED | OUTPATIENT
Start: 2022-03-01 | End: 2022-03-02 | Stop reason: HOSPADM

## 2022-03-01 RX ORDER — HYDROCODONE BITARTRATE AND ACETAMINOPHEN 5; 325 MG/1; MG/1
1 TABLET ORAL EVERY 6 HOURS PRN
Status: DISCONTINUED | OUTPATIENT
Start: 2022-03-01 | End: 2022-03-02 | Stop reason: HOSPADM

## 2022-03-01 RX ORDER — TAMSULOSIN HYDROCHLORIDE 0.4 MG/1
0.4 CAPSULE ORAL DAILY
Status: DISCONTINUED | OUTPATIENT
Start: 2022-03-01 | End: 2022-03-02 | Stop reason: HOSPADM

## 2022-03-01 RX ADMIN — INSULIN LISPRO 2 UNITS: 100 INJECTION, SOLUTION INTRAVENOUS; SUBCUTANEOUS at 17:07

## 2022-03-01 RX ADMIN — HEPARIN SODIUM 2000 UNITS: 1000 INJECTION INTRAVENOUS; SUBCUTANEOUS at 08:41

## 2022-03-01 RX ADMIN — SODIUM CHLORIDE, PRESERVATIVE FREE 10 ML: 5 INJECTION INTRAVENOUS at 08:39

## 2022-03-01 RX ADMIN — ALLOPURINOL 100 MG: 100 TABLET ORAL at 08:38

## 2022-03-01 RX ADMIN — ACETAMINOPHEN 650 MG: 325 TABLET ORAL at 21:11

## 2022-03-01 RX ADMIN — Medication 10 ML: at 21:12

## 2022-03-01 RX ADMIN — CLOPIDOGREL BISULFATE 75 MG: 75 TABLET ORAL at 08:37

## 2022-03-01 RX ADMIN — ALLOPURINOL 100 MG: 100 TABLET ORAL at 21:17

## 2022-03-01 RX ADMIN — HEPARIN SODIUM 2000 UNITS: 1000 INJECTION INTRAVENOUS; SUBCUTANEOUS at 02:06

## 2022-03-01 RX ADMIN — ISOSORBIDE MONONITRATE 60 MG: 30 TABLET, EXTENDED RELEASE ORAL at 08:36

## 2022-03-01 RX ADMIN — TAMSULOSIN HYDROCHLORIDE 0.4 MG: 0.4 CAPSULE ORAL at 08:37

## 2022-03-01 RX ADMIN — SODIUM CHLORIDE, PRESERVATIVE FREE 10 ML: 5 INJECTION INTRAVENOUS at 21:12

## 2022-03-01 RX ADMIN — ACETAMINOPHEN 650 MG: 325 TABLET ORAL at 13:35

## 2022-03-01 RX ADMIN — SODIUM CHLORIDE: 9 INJECTION, SOLUTION INTRAVENOUS at 06:19

## 2022-03-01 RX ADMIN — APIXABAN 5 MG: 5 TABLET, FILM COATED ORAL at 21:11

## 2022-03-01 RX ADMIN — AMIODARONE HYDROCHLORIDE 200 MG: 200 TABLET ORAL at 08:37

## 2022-03-01 RX ADMIN — ASPIRIN 81 MG 324 MG: 81 TABLET ORAL at 07:24

## 2022-03-01 RX ADMIN — ROSUVASTATIN CALCIUM 20 MG: 20 TABLET, FILM COATED ORAL at 08:36

## 2022-03-01 RX ADMIN — LISINOPRIL 20 MG: 20 TABLET ORAL at 08:36

## 2022-03-01 RX ADMIN — AMIODARONE HYDROCHLORIDE 200 MG: 200 TABLET ORAL at 21:12

## 2022-03-01 RX ADMIN — INSULIN GLARGINE-YFGN 27 UNITS: 100 INJECTION, SOLUTION SUBCUTANEOUS at 21:13

## 2022-03-01 RX ADMIN — METOPROLOL SUCCINATE 25 MG: 25 TABLET, EXTENDED RELEASE ORAL at 15:56

## 2022-03-01 ASSESSMENT — PAIN DESCRIPTION - PROGRESSION
CLINICAL_PROGRESSION: GRADUALLY IMPROVING

## 2022-03-01 ASSESSMENT — PAIN SCALES - GENERAL
PAINLEVEL_OUTOF10: 2
PAINLEVEL_OUTOF10: 0
PAINLEVEL_OUTOF10: 6
PAINLEVEL_OUTOF10: 6
PAINLEVEL_OUTOF10: 2
PAINLEVEL_OUTOF10: 2
PAINLEVEL_OUTOF10: 0
PAINLEVEL_OUTOF10: 0
PAINLEVEL_OUTOF10: 2

## 2022-03-01 ASSESSMENT — PAIN DESCRIPTION - DESCRIPTORS
DESCRIPTORS: ACHING;DISCOMFORT;CONSTANT;DULL
DESCRIPTORS: ACHING;DISCOMFORT;DULL
DESCRIPTORS: ACHING;DULL;DISCOMFORT
DESCRIPTORS: THROBBING;ACHING;DISCOMFORT
DESCRIPTORS: ACHING;DULL;DISCOMFORT

## 2022-03-01 ASSESSMENT — PAIN DESCRIPTION - ONSET
ONSET: ON-GOING

## 2022-03-01 ASSESSMENT — PAIN DESCRIPTION - PAIN TYPE
TYPE: ACUTE PAIN

## 2022-03-01 ASSESSMENT — PAIN DESCRIPTION - FREQUENCY
FREQUENCY: CONTINUOUS

## 2022-03-01 ASSESSMENT — PAIN SCALES - WONG BAKER: WONGBAKER_NUMERICALRESPONSE: 0

## 2022-03-01 ASSESSMENT — PAIN DESCRIPTION - LOCATION
LOCATION: CHEST
LOCATION: TOE (COMMENT WHICH ONE)
LOCATION: CHEST

## 2022-03-01 ASSESSMENT — PAIN - FUNCTIONAL ASSESSMENT
PAIN_FUNCTIONAL_ASSESSMENT: PREVENTS OR INTERFERES SOME ACTIVE ACTIVITIES AND ADLS

## 2022-03-01 NOTE — PROCEDURES
CARDIAC CATHETERIZATION REPORT    : Zechariah Taylor MD     Date of procedure: 03/01/22     Indication:  1. Unstable angina - known obstructive CAD    Procedures:  Percutaneous transluminal coronary angioplasty and stenting and IVUS     Sedation/analgesia:  Midazolam IV and Fentanyl IV     Time sedation was administered: 1103. I was present in the room when sedation was administered. Procedure end time: 1230  Time spent with face to face monitoring of moderate sedation: 87 minutes    Brief history:  78-year-old  male who was seen initially by Dr. Nola Samano in the setting of acute MI just a few days ago. He is known to have had CABG with the only apparent patent graft is LIMA to LAD. Additionally he has type 2 diabetes, hypertension. During his most recent admission he had PAF with RVR and was cardioverted and has been on apixaban and amiodarone. Diagnostic angiogram February 25 revealed critical ostial stenosis of the dominant left circumflex with normal flow. There was also moderately severe disease in the mid circumflex after the takeoff of a large bifurcating obtuse marginal.  The LIMA was widely patent and supplied excellent runoff to the entire LAD distribution and retrograde to the circumflex distribution through the stenotic lesion. The left main was functionally absent. PCI was deferred at that time and was planned in the outpatient setting. The patient returned to the hospital with chest pain and thus he was brought to the Cath Lab for PCI. Description of procedure: The patient presented to the Cath Lab in a(n) urgent fashion. The patient was prepped and draped in a sterile manner. Timeout, airway and ASA assessment were completed. Local anesthesia with 1% lidocaine was administered and sedation/analgesia were administered as above. Access was obtained using the modified Seldinger technique and a micropuncture needle in the right radial artery.  A 6F Slender sheath was inserted over a wire. Coronary anatomy:  Summarized above. Full diagnostic angiogram on 2/25/2022 (Dr. Luis Enrique Palm)      Percutaneous coronary intervention:  Anticoagulation: Unfractionated heparin with ACT>250 seconds  Aspirin administered Yes  P2Y12 inhibitor: clopidogrel 600 mg PO loading dose was administered before the case. IV eptifibatide was not administered    Target lesions:   1. Ostial circumflex, baseline 95% stenosis, DINORA flow 2. Post PCI 0% stenosis and DINORA flow 3  2. Mid circumflex 2 segmental lesions each with baseline 70% stenosis and DINORA-3 flow at baseline. Post PCI 0% stenosis and DINORA-3 flow. Guiding catheter 6F EBU 3.75    · There was significant difficulty in wiring the left circumflex. There was a small ramus intermedius branch originating very proximally as well as the LAD (functionally absent left main as noted above). I initially wired the LAD then was able to wire the RI with a  50. Eventually I was able to wire the circumflex proper with a [employer]. I ballooned the ostial circumflex with a 3.0 NC balloon at moderate pressure and I used the same balloon to dilate the mid circumflex is well. IVUS was then performed. There was non-circumferential calcification in the mid circumflex. Since the 3.0 NC balloon appeared to expand adequately I decided to proceed with stenting. On IVUS there was significant disease throughout this vessel and thus I elected to cover the entire length of the treated segment. A 3.5 x 38 Resolute Benito YASMINE was placed first extending into the distal circumflex (past the LPL) and overlapped proximally with a 4.0 x 38 Resolute Adkins YASMINE that extended all the way to the ostium. The stents were postdilated with 4.0 NC and 4.5 NC at high pressure from distal to proximal.  There was still underexpansion of the mid circumflex past the OM despite very high pressure balloon angioplasty.   I elected not to treat this any further as the MLA by IVUS was 10 mm² and angiographic appearance was very adequate. Post PCI IVUS also ascertained adequate ostial coverage. Both the obtuse marginal and the LPL were jailed by the stented segment with severe ostial stenosis focally but both had DINORA-3 flow. The patient had no chest discomfort or EKG changes at the end of the procedure so I treated those 2 vessels conservatively. Hemodynamics: Ao: 116/76 with a mean of 90      Hemostasis:  Transradial band was applied for patent arterial hemostasis. Complications: None  Estimated blood loss: Less than 50 mL  Air kerma: 2928 mGy  Contrast used: 150 mL    Conclusions:  1. Successful IVUS guided PCI of the dominant left circumflex from distal to ostial with 2 overlapping YASMINE      Recommendations:  1. Observe overnight. Anticipate discharge in the morning if no intervening events  2. Discontinue IV heparin. Restart apixaban tonight. 3. On discharge clopidogrel 75 mg daily plus apixaban 5 mg p.o. twice daily for at least 12 months  4. High intensity statin  5. Beta-blocker  6. Cardiac rehab  7. TTE as outpatient  8.  After discharge follow-up with Dr. Derrell Gary in 4 to 7 weeks      No Mills MD, [facility] [place]Structural Heart Disease  Office: [phone]  Coordinator: Elder Singleton

## 2022-03-01 NOTE — H&P
Hospitalist History & Physical      PCP: Marquez Gomez MD    Date of Service: Pt seen/examined on 2/28/2022     Chief Complaint:  had concerns including Chest Pain (blockage, schedule for cath). History Of Present Illness:    Mr. Maryanne Jimenez, a 66y.o. year old male  who  has a past medical history of Paroxysmal atrial fibrillation (HCC) and S/P CABG x 3. Patient presented to the emergency department with chest pain. Of note patient was admitted on February 24 for STEMI. Cardiology was consulted at that time he went to the Cath Lab. Apparently stents were not placed at that time. Patient denies shortness of breath, lightheadedness, dizziness, diaphoresis, fever, chills. Vital signs within normal limits and stable. EKG shows ST elevations in 2, 3, aVF. Reciprocal depressions in aVL, V5. Cardiology was consulted who reviewed the EKG they do not consider this a STEMI. Troponin was 508. Chest x-ray unremarkable. Heparin infusion was initiated. Patient be made n.p.o. and will go to the Cath Lab tomorrow. Past Medical History:   Diagnosis Date    Paroxysmal atrial fibrillation (HCC)     S/P CABG x 3     In 2013 at [address] [place] [address] in  [facility] [employer], per patient       Past Surgical History:   Procedure Laterality Date    CARDIAC CATHETERIZATION  02/25/2022    Dr. Olivia Bolton       Prior to Admission medications    Medication Sig Start Date End Date Taking?  Authorizing Provider   metoprolol succinate (TOPROL XL) 25 MG extended release tablet Take 1 tablet by mouth daily 2/27/22   Eduardo Navarro MD   apixaban (ELIQUIS) 5 MG TABS tablet Take 1 tablet by mouth 2 times daily 2/26/22   Eduardo Navarro MD   amiodarone (CORDARONE) 200 MG tablet Take 1 tablet by mouth 2 times daily 2/26/22 3/28/22  Eduardo Navarro MD   isosorbide mononitrate (IMDUR) 60 MG extended release tablet Take 1 tablet by mouth daily 2/27/22   Eduardo Navarro MD   glimepiride (AMARYL) 4 MG tablet Take 4 mg by mouth at bedtime Historical Provider, MD   allopurinol (ZYLOPRIM) 100 MG tablet allopurinol 100 mg tablet   take 1 tablet by mouth once daily    Historical Provider, MD   clopidogrel (PLAVIX) 75 MG tablet clopidogrel 75 mg tablet   take 1 tablet by mouth once daily as directed    Historical Provider, MD   lisinopril (PRINIVIL;ZESTRIL) 20 MG tablet 20 mg daily  2/3/22   Historical Provider, MD   LANLOCO SOLOSTAR 100 UNIT/ML injection pen inject 60 units subcutaneously once daily 1/22/22   Historical Provider, MD   nitroGLYCERIN (NITROSTAT) 0.4 MG SL tablet place 1 tablet under the tongue if needed 1/27/22   Historical Provider, MD   pravastatin (PRAVACHOL) 40 MG tablet 40 mg daily  2/3/22   Historical Provider, MD   tadalafil (CIALIS) 10 MG tablet as needed  1/27/22   Historical Provider, MD   tamsulosin (FLOMAX) 0.4 MG capsule 0.4 mg daily     Historical Provider, MD         Allergies:  Dapagliflozin    Social History:    TOBACCO:   reports that he has quit smoking. He has never used smokeless tobacco.  ETOH:   has no history on file for alcohol use. Family History:    Reviewed in detail and negative for DM, CAD, Cancer, CVA. Positive as follows\"  No family history on file. REVIEW OF SYSTEMS:   Pertinent positives as noted in the HPI. All other systems reviewed and negative. PHYSICAL EXAM:  BP (!) 144/75   Pulse 59   Resp 20   Ht 6' 1\" (1.854 m)   Wt 236 lb (107 kg)   SpO2 93%   BMI 31.14 kg/m²   General appearance: No apparent distress, appears stated age and cooperative. HEENT: Normal cephalic, atraumatic without obvious deformity. Pupils equal, round, and reactive to light. Extra ocular muscles intact. Conjunctivae/corneas clear. Neck: Supple, with full range of motion. No jugular venous distention. Trachea midline. Respiratory: CTA  Cardiovascular: RRR  Abdomen: S/NT/ND  Musculoskeletal: No clubbing, cyanosis, edema of bilateral lower extremities. Brisk capillary refill. Skin: Normal skin color.   No rashes or lesions. Neurologic:  Neurovascularly intact without any focal sensory/motor deficits. Cranial nerves: II-XII intact, grossly non-focal.  Psychiatric: Alert and oriented, thought content appropriate, normal insight    Reviewed EKG and CXR personally      CBC:   Recent Labs     02/28/22 2007   WBC 11.9*   RBC 5.47   HGB 15.2   HCT 46.8   MCV 85.6   RDW 13.3        BMP:   Recent Labs     02/28/22  2145      K 4.1      CO2 25   BUN 22   CREATININE 1.2     LFT:  Recent Labs     02/28/22 2145   PROT 6.7   ALKPHOS 105   ALT 22   AST 23   BILITOT 0.2     CE:  No results for input(s): Skippy Gault in the last 72 hours. PT/INR:   Recent Labs     02/25/22  2334 02/26/22  1139 02/28/22 2007   APTT 33.0 35.6* 33.9     BNP: No results for input(s): BNP in the last 72 hours. ESR: No results found for: SEDRATE  CRP: No results found for: CRP  D Dimer:   Lab Results   Component Value Date    DDIMER <200 02/24/2022      Folate and B12: No results found for: [id number], No results found for: FOLATE  Lactic Acid:   Lab Results   Component Value Date    LACTA 1.4 02/28/2022     Thyroid Studies:   Lab Results   Component Value Date    TSH 1.440 02/25/2022       Oupatient labs:  Lab Results   Component Value Date    CHOL 136 02/25/2022    TRIG 199 (H) 02/25/2022    HDL 37 02/25/2022    LDLCALC 59 02/25/2022    TSH 1.440 02/25/2022    INR 1.0 02/24/2022       Urinalysis:  No results found for: NITRU, WBCUA, BACTERIA, RBCUA, BLOODU, SPECGRAV, GLUCOSEU    Imaging:  CT ABDOMEN PELVIS W IV CONTRAST Additional Contrast? None    Result Date: 2/14/2022  EXAMINATION: CT OF THE ABDOMEN AND PELVIS WITH CONTRAST 2/14/2022 10:25 am TECHNIQUE: CT of the abdomen and pelvis was performed with the administration of intravenous contrast. Multiplanar reformatted images are provided for review.  Dose modulation, iterative reconstruction, and/or weight based adjustment of the mA/kV was utilized to reduce the radiation dose to as low as reasonably achievable. COMPARISON: None HISTORY: ORDERING SYSTEM PROVIDED HISTORY: rlq pain, hx hernia, r/o incarceration TECHNOLOGIST PROVIDED HISTORY: Additional Contrast?->None Reason for exam:->rlq pain, hx hernia, r/o incarceration Decision Support Exception - unselect if not a suspected or confirmed emergency medical condition->Emergency Medical Condition (MA) FINDINGS: Lower Chest: There are some atelectatic changes identified within the left lung base with pleural thickening present. Organs: The liver is homogeneous in appearance. No stones in the gallbladder. No intrahepatic or extrahepatic biliary ductal dilatation. Pancreas is homogeneous. The spleen is unremarkable. Both adrenal glands reveal mild nodularity bilaterally and some enlargement suggesting possible hyperplasia. The left kidney reveals a renal cortical cyst as well as the right kidney. Symmetric enhancement identified of the renal parenchyma. No obstruction of the kidneys. GI/Bowel: Gastrointestinal tract reveals stool seen scattered throughout the colon. The small bowel is unremarkable in appearance. Diverticulosis with no evidence of diverticulitis. Stomach is unremarkable in appearance. Pelvis: Pelvic organs reveal incomplete distension of the bladder. Prostate is heterogeneously enlarged. Seminal vesicles are unremarkable in appearance. Peritoneum/Retroperitoneum: No abdominal retroperitoneal lymphadenopathy. No free fluid or free air. No abnormal mass or fluid collections identified. Bones/Soft Tissues: Bony structures reveal minimal degenerative changes seen within the spine. Small umbilical hernia containing fat only. Small left inguinal hernia identified containing fat only. There is some mild stranding identified at the origin of the hernia. Mild vascular congestion cannot be excluded.      Left inguinal hernia with some stranding seen at the origin of the hernia in which mild vascular congestion cannot be excluded. There is no contents of bowel with only fat present. There is also a small umbilical hernia containing fat only. Remainder of the abdomen and pelvis reveals diverticulosis with no evidence of diverticulitis. There is atelectatic changes and pleural thickening identified at the left lung base. XR CHEST PORTABLE    Result Date: 2/28/2022  EXAMINATION: ONE XRAY VIEW OF THE CHEST 2/28/2022 8:45 pm COMPARISON: 02/24/2022 HISTORY: ORDERING SYSTEM PROVIDED HISTORY: chest pain TECHNOLOGIST PROVIDED HISTORY: Reason for exam:->chest pain What reading provider will be dictating this exam?->CRC FINDINGS: The lungs are without acute focal process. Blunting left costophrenic angle. Pneumothorax. Cardiomegaly. The osseous structures are without acute process. Sternotomy wires noted. No acute process. Cardiomegaly. Blunting left costophrenic angle. XR CHEST PORTABLE    Result Date: 2/24/2022  EXAMINATION: ONE XRAY VIEW OF THE CHEST 2/24/2022 10:15 pm COMPARISON: None. HISTORY: ORDERING SYSTEM PROVIDED HISTORY: chest pain TECHNOLOGIST PROVIDED HISTORY: Reason for exam:->chest pain What reading provider will be dictating this exam?->CRC FINDINGS: The lungs are without acute focal process. There is no effusion or pneumothorax. Cardiomegaly. The osseous structures are without acute process. Sternotomy wires noted. No acute process. Cardiomegaly. CTA CHEST W CONTRAST    Result Date: 2/24/2022  EXAMINATION: CTA OF THE CHEST; CTA OF THE ABDOMEN AND PELVIS WITH CONTRAST 2/24/2022 10:46 pm: TECHNIQUE: CTA of the chest was performed after the administration of intravenous contrast.  Multiplanar reformatted images are provided for review. MIP images are provided for review.  Dose modulation, iterative reconstruction, and/or weight based adjustment of the mA/kV was utilized to reduce the radiation dose to as low as reasonably achievable.; CTA of the abdomen and pelvis was performed with the administration of intravenous contrast. Multiplanar reformatted images are provided for review. MIP images are provided for review. Dose modulation, iterative reconstruction, and/or weight based adjustment of the mA/kV was utilized to reduce the radiation dose to as low as reasonably achievable. COMPARISON: CT abdomen and pelvis 02/14/2022 HISTORY: ORDERING SYSTEM PROVIDED HISTORY: chest pain TECHNOLOGIST PROVIDED HISTORY: Reason for exam:->chest pain Decision Support Exception - unselect if not a suspected or confirmed emergency medical condition->Emergency Medical Condition (MA) What reading provider will be dictating this exam?->CRC; ORDERING SYSTEM PROVIDED HISTORY: eval dissection TECHNOLOGIST PROVIDED HISTORY: Reason for exam:->eval dissection Decision Support Exception - unselect if not a suspected or confirmed emergency medical condition->Emergency Medical Condition (MA) What reading provider will be dictating this exam?->CRC FINDINGS: CTA CHEST: Thoracic aorta: No evidence of thoracic aortic aneurysm, intramural hematoma or dissection. No acute abnormality of the aorta. Mediastinum: Thyroid gland is mildly enlarged and heterogeneous, right lobe greater than left. No evidence of mediastinal lymphadenopathy. The heart size is normal.  Calcific coronary artery disease status post sternal splitting procedure for CABG. The heart and pericardium demonstrate no acute abnormality. Lungs/Pleura: There is stable pleural thickening in the inferior left hemithorax with adjacent peripheral airspace disease. There is associated left lung volume loss. Lungs are otherwise clear. No pneumothorax or pleural effusion. Soft Tissues/Bones: No acute bone or soft tissue abnormality. CTA ABDOMEN: Abdominal aorta/Branches: The abdominal aorta is normal with no evidence of aneurysm or dissection. There is no significant atherosclerotic disease. Abdominal aortic branch vessels are patent. Organs:  There are no calcified gallstones. No pericholecystic fluid. The liver, spleen, pancreas, adrenal glands and kidneys are normal.  There is a stable 1 cm left adrenal gland nodule, likely a small adenoma. GI/Bowel: Scattered colon diverticula. No evidence of diverticulitis. No bowel obstruction. There is a stable oval-shaped thick walled lesion with central fat attenuation directly adjacent to the ascending colon (axial image 167), likely focus of fat necrosis or sequelae of epiploic appendagitis. Peritoneum/Retroperitoneum: There is no adenopathy, free air or free fluid. No mesenteric fat stranding. Bones/Soft Tissues: No acute bone finding. Thoracic and lumbar spondylosis. CTA PELVIS: Aorta/Iliacs: The iliac and femoral arteries are unremarkable. No aneurysm or dissection. No significant atherosclerotic disease. Other: Urinary bladder is unremarkable. Mild prostatomegaly. Bones/Soft Tissues: No acute bone finding. Unremarkable CTA the chest, abdomen and pelvis. No evidence of aortic dissection, intramural hematoma or aneurysm. There is no significant atherosclerotic disease. Left basilar pleuroparenchymal disease is unchanged from 02/14/2022 and likely chronic. There is associated mild left lung volume loss. Calcific coronary artery disease status post CABG. Oval-shaped thick walled lesion with central fat attenuation adjacent to the ascending colon, unchanged and likely representing sequelae of epiploic appendagitis or fat necrosis. CTA ABDOMEN PELVIS W CONTRAST    Result Date: 2/24/2022  EXAMINATION: CTA OF THE CHEST; CTA OF THE ABDOMEN AND PELVIS WITH CONTRAST 2/24/2022 10:46 pm: TECHNIQUE: CTA of the chest was performed after the administration of intravenous contrast.  Multiplanar reformatted images are provided for review. MIP images are provided for review.  Dose modulation, iterative reconstruction, and/or weight based adjustment of the mA/kV was utilized to reduce the radiation dose to as low as reasonably achievable.; CTA of the abdomen and pelvis was performed with the administration of intravenous contrast. Multiplanar reformatted images are provided for review. MIP images are provided for review. Dose modulation, iterative reconstruction, and/or weight based adjustment of the mA/kV was utilized to reduce the radiation dose to as low as reasonably achievable. COMPARISON: CT abdomen and pelvis 02/14/2022 HISTORY: ORDERING SYSTEM PROVIDED HISTORY: chest pain TECHNOLOGIST PROVIDED HISTORY: Reason for exam:->chest pain Decision Support Exception - unselect if not a suspected or confirmed emergency medical condition->Emergency Medical Condition (MA) What reading provider will be dictating this exam?->CRC; ORDERING SYSTEM PROVIDED HISTORY: eval dissection TECHNOLOGIST PROVIDED HISTORY: Reason for exam:->eval dissection Decision Support Exception - unselect if not a suspected or confirmed emergency medical condition->Emergency Medical Condition (MA) What reading provider will be dictating this exam?->CRC FINDINGS: CTA CHEST: Thoracic aorta: No evidence of thoracic aortic aneurysm, intramural hematoma or dissection. No acute abnormality of the aorta. Mediastinum: Thyroid gland is mildly enlarged and heterogeneous, right lobe greater than left. No evidence of mediastinal lymphadenopathy. The heart size is normal.  Calcific coronary artery disease status post sternal splitting procedure for CABG. The heart and pericardium demonstrate no acute abnormality. Lungs/Pleura: There is stable pleural thickening in the inferior left hemithorax with adjacent peripheral airspace disease. There is associated left lung volume loss. Lungs are otherwise clear. No pneumothorax or pleural effusion. Soft Tissues/Bones: No acute bone or soft tissue abnormality. CTA ABDOMEN: Abdominal aorta/Branches: The abdominal aorta is normal with no evidence of aneurysm or dissection. There is no significant atherosclerotic disease.  Abdominal aortic branch vessels are patent. Organs: There are no calcified gallstones. No pericholecystic fluid. The liver, spleen, pancreas, adrenal glands and kidneys are normal.  There is a stable 1 cm left adrenal gland nodule, likely a small adenoma. GI/Bowel: Scattered colon diverticula. No evidence of diverticulitis. No bowel obstruction. There is a stable oval-shaped thick walled lesion with central fat attenuation directly adjacent to the ascending colon (axial image 167), likely focus of fat necrosis or sequelae of epiploic appendagitis. Peritoneum/Retroperitoneum: There is no adenopathy, free air or free fluid. No mesenteric fat stranding. Bones/Soft Tissues: No acute bone finding. Thoracic and lumbar spondylosis. CTA PELVIS: Aorta/Iliacs: The iliac and femoral arteries are unremarkable. No aneurysm or dissection. No significant atherosclerotic disease. Other: Urinary bladder is unremarkable. Mild prostatomegaly. Bones/Soft Tissues: No acute bone finding. Unremarkable CTA the chest, abdomen and pelvis. No evidence of aortic dissection, intramural hematoma or aneurysm. There is no significant atherosclerotic disease. Left basilar pleuroparenchymal disease is unchanged from 02/14/2022 and likely chronic. There is associated mild left lung volume loss. Calcific coronary artery disease status post CABG. Oval-shaped thick walled lesion with central fat attenuation adjacent to the ascending colon, unchanged and likely representing sequelae of epiploic appendagitis or fat necrosis.        ASSESSMENT:  -NSTEMI  -Elevated troponin  -Coronary disease  -Proximal atrial fibrillation  -Diabetes type 2  -Hypertension  -Hyperlipidemia      PLAN:  -Admit to medicine  -Consult cardiology  -N.p.o. after midnight  -Heparin infusion  -Cycle serial troponins  -Telemetry  -Low-dose correction insulin  -Continue home medications        Diet: Diet NPO  Code Status: Prior  Surrogate decision maker confirmed with patient: Extended Emergency Contact Information  Primary Emergency Contact: Saul WALLS Phone: [phone]  Mobile Phone: [phone]  Relation: Spouse   needed? No    DVT Prophylaxis: []Lovenox []Heparin []PCD [] [address] []Encouraged ambulation  Disposition: []Med/Surg [] Intermediate [] ICU/CCU  Admit status: [] Observation [] Inpatient     +++++++++++++++++++++++++++++++++++++++++++++++++  Chetna Pelevi, DO  +++++++++++++++++++++++++++++++++++++++++++++++++  NOTE: This report was transcribed using voice recognition software. Every effort was made to ensure accuracy; however, inadvertent computerized transcription errors may be present.

## 2022-03-01 NOTE — PLAN OF CARE
I was called about this patient for concern regarding STEMI. He presented to the emergency room with a few minutes of transient chest discomfort. I reviewed his twelve-lead EKG on presentation which showed no signs of acute injury. There was subtle ST elevation in lead II with pathologic Q waves. Diagnostic criteria for STEMI were certainly not met. The patient was chest pain-free and hemodynamically stable at the time of my conversation with the emergency room physician. The patient is known to have CAD status post CABG with LIMA to LAD, type 2 diabetes, hypertension. He presented last week with acute MI in the setting of PAF with RVR. A diagnostic angiogram (2/25/22 - Dr. Nat Murray) revealed critical dominant ostial circumflex stenosis. Additionally there was segmental disease of an OM 2 and a widely patent LIMA to LAD with excellent runoff to the entire LAD distribution and competitively to the circumflex distribution . .. PCI was to be staged as outpatient. Recommend:  · Aspirin  · IV heparin  · Continue clopidogrel  · N.p.o. after midnight tentatively for PCI of the circumflex tomorrow  · Beta-blocker  · Trend troponins  · If acute chest pain overnight please get twelve-lead EKG and call me stat.       Klever Cisneros MD, [facility] [place]  Interventional Cardiology & Structural Heart Disease

## 2022-03-01 NOTE — ED PROVIDER NOTES
Chief Complaint   Patient presents with    Chest Pain     blockage, schedule for cath       66 gentleman with past medical history of paroxysmal atrial fibrillation and NSTEMI on 2/23 where he had a cath demonstrated severe occlusion presenting today after an acute episode of chest pain. It happened about an hour prior to arrival, lasted for a brief. Time, nothing made it better or worse, not associated with lightheadedness, dizziness, diaphoresis. The patient was stationary when it occurred. He states that the pain is a 3 out of 10, sharp, midsternal, nonradiating. No numbness, weakness, tingling. He has been taking his Eliquis and Plavix since he left his last cath. Review of Systems   Constitutional: Negative for chills and fever. HENT: Negative for ear pain, sinus pain and sore throat. Eyes: Negative for pain and redness. Respiratory: Negative for cough, shortness of breath and wheezing. Cardiovascular: Positive for chest pain. Gastrointestinal: Negative for abdominal pain, diarrhea, nausea and vomiting. Genitourinary: Negative for dysuria and flank pain. Musculoskeletal: Negative for back pain and neck pain. Skin: Negative for rash. Neurological: Negative for seizures and headaches. Hematological: Negative for adenopathy. All other systems reviewed and are negative. Physical Exam  Vitals and nursing note reviewed. Constitutional:       General: He is not in acute distress. Appearance: He is well-developed. HENT:      Head: Normocephalic and atraumatic. Right Ear: External ear normal.      Left Ear: External ear normal.      Mouth/Throat:      Mouth: Mucous membranes are moist.      Pharynx: Oropharynx is clear. Eyes:      Pupils: Pupils are equal, round, and reactive to light. Cardiovascular:      Rate and Rhythm: Normal rate and regular rhythm. Heart sounds: Normal heart sounds. No murmur heard.       Pulmonary:      Effort: Pulmonary effort is normal. No respiratory distress. Breath sounds: Normal breath sounds. No wheezing. Abdominal:      General: Bowel sounds are normal.      Palpations: Abdomen is soft. Tenderness: There is no abdominal tenderness. There is no guarding or rebound. Musculoskeletal:         General: No tenderness. Cervical back: Normal range of motion and neck supple. Skin:     General: Skin is warm and dry. Neurological:      General: No focal deficit present. Mental Status: He is alert and oriented to person, place, and time. Procedures     EKG: This EKG is signed and interpreted by me. Rate: 59  Rhythm: Sinus  Interpretation: ST elevations in 2, 3, aVF, not a significant prior EKG studies but are still present. Reciprocal depressions in aVL, V5. Comparison: changes compared to previous EKG      MDM  Number of Diagnoses or Management Options  Diagnosis management comments: 71-year-old gentleman with past medical history of paroxysmal atrial fibrillation NSTEMI on 2/23 where he had a cath that demonstrated severe occlusion presenting today for an episode of chest pain. He is hemodynamically stable on arrival to emergency department. Initial EKG was concerning for elevations in 2, 3, aVF. Spoke with Dr. Zion Knight, he was not concerned with the elevations with comparing them to prior EKG studies but he would like the patient started on low-dose heparin bolus and infusion and kept in the hospital for further monitoring. He will be n.p.o. past midnight for potential Cath Lab tomorrow. Troponin was 508, slight leukocytosis. Lactic acid within normal limits. Chest x-ray without acute process. Dr. Nestor Chavarria admitting the patient for further management. ED Course as of 02/28/22 2312 Mon Feb 28, 2022 2012 Spoke with Dr. Zion Knight, he was reassured by comparing EKGs.   He states this is non-STEMI but would like the patient still on heparin and admitted for observation with serial troponins and EKGs and will see the patient tomorrow. [MM]   2539 Dr. Vinayak Mendes admit the patient for further management. [MM]      ED Course User Index  [MM] Bakari Elena DO      ED Course as of 02/28/22 2312 Mon Feb 28, 2022 2012 Spoke with Dr. Steve Leslie, he was reassured by comparing EKGs. He states this is non-STEMI but would like the patient still on heparin and admitted for observation with serial troponins and EKGs and will see the patient tomorrow. [MM]   1151 Dr. Vinayak Mendes admit the patient for further management. [MM]      ED Course User Index  [MM] Bakari ElenaDO       --------------------------------------------- PAST HISTORY ---------------------------------------------  Past Medical History:  has a past medical history of Paroxysmal atrial fibrillation ([facility] [address] and S/P CABG x 3. Past Surgical History:  has a past surgical history that includes Cardiac catheterization (02/25/2022). Social History:  reports that he has quit smoking. He has never used smokeless tobacco.    Family History: family history is not on file. The patients home medications have been reviewed.     Allergies: Dapagliflozin    -------------------------------------------------- RESULTS -------------------------------------------------    LABS:  Results for orders placed or performed during the hospital encounter of 02/28/22   CBC with Auto Differential   Result Value Ref Range    WBC 11.9 (H) 4.5 - 11.5 E9/L    RBC 5.47 3.80 - 5.80 E12/L    Hemoglobin 15.2 12.5 - 16.5 g/dL    Hematocrit 46.8 37.0 - 54.0 %    MCV 85.6 80.0 - 99.9 fL    MCH 27.8 26.0 - 35.0 pg    MCHC 32.5 32.0 - 34.5 %    RDW 13.3 11.5 - 15.0 fL    Platelets 773 886 - 501 E9/L    MPV 10.3 7.0 - 12.0 fL    Neutrophils % 61.2 43.0 - 80.0 %    Immature Granulocytes % 0.3 0.0 - 5.0 %    Lymphocytes % 25.2 20.0 - 42.0 %    Monocytes % 9.3 2.0 - 12.0 %    Eosinophils % 3.4 0.0 - 6.0 %    Basophils % 0.6 0.0 - 2.0 %    Neutrophils Absolute 7.29 1.80 - 7.30 E9/L    Immature Granulocytes # 0.04 E9/L    Lymphocytes Absolute 3.00 1.50 - 4.00 E9/L    Monocytes Absolute 1.11 (H) 0.10 - 0.95 E9/L    Eosinophils Absolute 0.41 0.05 - 0.50 E9/L    Basophils Absolute 0.07 0.00 - 0.20 E9/L   Lactic Acid   Result Value Ref Range    Lactic Acid 1.4 0.5 - 2.2 mmol/L   APTT   Result Value Ref Range    aPTT 33.9 24.5 - 35.1 sec   SPECIMEN REJECTION   Result Value Ref Range    Rejected Test cmpx trp5 bnpep     Reason for Rejection see below    Comprehensive Metabolic Panel w/ Reflex to MG   Result Value Ref Range    Sodium 138 132 - 146 mmol/L    Potassium reflex Magnesium 4.1 3.5 - 5.0 mmol/L    Chloride 103 98 - 107 mmol/L    CO2 25 22 - 29 mmol/L    Anion Gap 10 7 - 16 mmol/L    Glucose 192 (H) 74 - 99 mg/dL    BUN 22 6 - 23 mg/dL    CREATININE 1.2 0.7 - 1.2 mg/dL    GFR Non-African American 59 >=60 mL/min/1.73    GFR African American >60     Calcium 8.3 (L) 8.6 - 10.2 mg/dL    Total Protein 6.7 6.4 - 8.3 g/dL    Albumin 3.5 3.5 - 5.2 g/dL    Total Bilirubin 0.2 0.0 - 1.2 mg/dL    Alkaline Phosphatase 105 40 - 129 U/L    ALT 22 0 - 40 U/L    AST 23 0 - 39 U/L   Troponin   Result Value Ref Range    Troponin, High Sensitivity 508 (H) 0 - 11 ng/L       RADIOLOGY:  XR CHEST PORTABLE   Final Result   No acute process. Cardiomegaly. Blunting left costophrenic angle.               ------------------------- NURSING NOTES AND VITALS REVIEWED ---------------------------  Date / Time Roomed:  2/28/2022  7:40 PM  ED Bed Assignment:  11/11    The nursing notes within the ED encounter and vital signs as below have been reviewed.      Patient Vitals for the past 24 hrs:   BP Pulse Resp SpO2 Height Weight   02/28/22 2132 (!) 144/75 59 20 93 % -- --   02/28/22 2101 (!) 139/54 58 21 93 % -- --   02/28/22 2053 (!) 143/54 59 18 94 % -- --   02/28/22 2015 (!) 169/70 59 22 94 % -- --   02/28/22 1939 (!) 155/89 65 20 95 % 6' 1\" (1.854 m) 236 lb (107 kg)   02/28/22 1930 -- 64 16 -- -- --       Oxygen Saturation Interpretation: Normal    ------------------------------------------ PROGRESS NOTES ------------------------------------------  Re-evaluation(s):  Time: 8640  Patients symptoms show no change  Repeat physical examination is not changed    Counseling:  I have spoken with the patient and discussed todays results, in addition to providing specific details for the plan of care and counseling regarding the diagnosis and prognosis. Their questions are answered at this time and they are agreeable with the plan of admission.    --------------------------------- ADDITIONAL PROVIDER NOTES ---------------------------------  Consultations:  Time: 2210. Spoke with Dr. Immanuel Villaseñor. Discussed case. They will admit the patient. This patient's ED course included: a personal history and physicial examination, re-evaluation prior to disposition, multiple bedside re-evaluations, IV medications, cardiac monitoring, continuous pulse oximetry and complex medical decision making and emergency management    This patient has remained hemodynamically stable during their ED course. Diagnosis:  1. NSTEMI (non-ST elevated myocardial infarction) (HCC)    2. Elevated troponin    3. Chest pain, unspecified type        Disposition:  Patient's disposition: Admit to telemetry  Patient's condition is stable. Christy Brown DO  Resident  02/28/22 3735    ATTENDING PROVIDER ATTESTATION:     Anna Abbasi presented to the emergency department for evaluation of Chest Pain (blockage, schedule for cath)   and was initially evaluated by the Medical Resident. See Original ED Note for H&P and ED course above. I have reviewed and discussed the case, including pertinent history (medical, surgical, family and social) and exam findings with the Medical Resident assigned to Anna Abbasi. I have personally performed and/or participated in the history, exam, medical decision making, and procedures and agree with all pertinent clinical information.         I have reviewed my findings and recommendations with the assigned Medical Resident, Genia Ladd and members of family present at the time of disposition. My findings/plan: The primary encounter diagnosis was NSTEMI (non-ST elevated myocardial infarction) ([facility] [address] Diagnoses of Elevated troponin and Chest pain, unspecified type were also pertinent to this visit.   Discharge Medication List as of 3/2/2022 12:13 PM      START taking these medications    Details   rosuvastatin (CRESTOR) 20 MG tablet Take 1 tablet by mouth daily, Disp-30 tablet, R-0Normal      colchicine (COLCRYS) 0.6 MG tablet Take 1 tablet by mouth daily for 7 days, Disp-7 tablet, R-0Normal             Critical Care Time: 33 minutes     MD Juanito Alvarez MD  03/08/22 9812

## 2022-03-01 NOTE — ED NOTES
Pt to ED today with family after experiencing some chest pain that did not radiate. Pt states that he had a cath on Thursday but did not have stents placed at that time. Pt states that he is to schedule to have a second cath to place stents. Reports that he had a prior MI at 20years of age and a long history of hypertension beginning when he was 11. Pt was discharged on Saturday. He reports only having one episode of chest pain today, pt is on heparin at this time. Reports no discomfort currently. Pt educated to use call light before getting up or attempting to ambulate. Patient alert and oriented x 4.            Anjali Jj RN  02/28/22 2138

## 2022-03-01 NOTE — PATIENT CARE CONFERENCE
P Quality Flow/Interdisciplinary Rounds Progress Note        Quality Flow Rounds held on March 1, 2022    Disciplines Attending:  Bedside Nurse, ,  and Nursing Unit Leadership    Chandra Junior was admitted on 2/28/2022  7:40 PM    Anticipated Discharge Date:  Expected Discharge Date: 03/03/22    Disposition:    Horace Score:  Horace Scale Score: 20    Readmission Risk              Risk of Unplanned Readmission:  16           Discussed patient goal for the day, patient clinical progression, and barriers to discharge.   The following Goal(s) of the Day/Commitment(s) have been identified:  NPO for Heart Cath       Deepak Scott RN  March 1, 2022

## 2022-03-01 NOTE — CONSULTS
Inpatient Cardiology Consultation      Reason for Consult:  Known CAD     Consulting Physician: Dr Adam Ortiz    Requesting Physician:  Dr Shine Most     Date of Consultation: 3/1/2022    HISTORY OF PRESENT ILLNESS:   Mr Lino Moran is a 67 yo male who was seen in initial consultation with Dr Ge Lopez, 2/25/2022. Past medical history includes CAD with h/o MI and CABG 2013 (LIMA-LAD) with LCx disease on cardiac cath 2/25/2022, PAF on anticoagulation with Eliquis,  T2DM, HTN, HLD, obesity. Hospitalized 2/25/2022 with A. fib RVR/NSTEMI. Troponin  19--130--494. He will underwent cardiac catheterization that showed multivessel disease 2/25/2022. He was recommended medical therapy and a possible staged PCI to his ostial LCx. Presented to [facility] 2/28/2022 with chest pain  VS: 98.9-64-16-90 5% room air-155/89. Labs: WBC 11.9, H&H 15.2/46.8, platelets 766. K+ 4.1, BUN/SCR 22/1.2, glucose 192. Troponin 508--457    CXR: Cardiomegaly    Interventional cardiology was called in the emergency department for possible STEMI. Twelve-lead EKG reviewed by Dr. Ana Paula Kellogg with no signs of acute MI with subtle ST elevation in lead II. He was started on IV heparin with plans for PCI of the LCx today. Please note: past medical records were reviewed per electronic medical record (EMR) - see detailed reports under Past Medical/ Surgical History. Past Medical History:    1. CAD with h/o MI and reported CABG in 2013 at [facility]. [facility] in [address], specifics unknown. ( his cardiologist is Dr. Sally Lantigua ). 2. Cardiac cath 2/25/2022 Dejan Officer) :Left main: 100% stenosis LAD: 100 % stenosis. Patent LIMA Circumflex: 95 %  stenosis RCA: non dominant. 70 % stenosis. LV angio: 55%  ejection fraction Medical therapy. Possible stagged PCI to ostial LCx   3. PAF; on [address] with Eliquis   4. Insulin requiring T2DM  5. HTN  6. HLD  7. Obesity  8. Left inguinal hernia  9.  Obesity         Medications Prior to admit:  Prior to Admission medications Medication Sig Start Date End Date Taking?  Authorizing Provider   metoprolol succinate (TOPROL XL) 25 MG extended release tablet Take 1 tablet by mouth daily 2/27/22  Yes Jorge Escobar MD   apixaban (ELIQUIS) 5 MG TABS tablet Take 1 tablet by mouth 2 times daily 2/26/22  Yes Jorge Escobar MD   amiodarone (CORDARONE) 200 MG tablet Take 1 tablet by mouth 2 times daily 2/26/22 3/28/22 Yes Jorge Escobar MD   isosorbide mononitrate (IMDUR) 60 MG extended release tablet Take 1 tablet by mouth daily 2/27/22  Yes Jorge Escobar MD   glimepiride (AMARYL) 4 MG tablet Take 4 mg by mouth at bedtime   Yes Historical Provider, MD   allopurinol (ZYLOPRIM) 100 MG tablet allopurinol 100 mg tablet   take 1 tablet by mouth once daily   Yes Historical Provider, MD   clopidogrel (PLAVIX) 75 MG tablet clopidogrel 75 mg tablet   take 1 tablet by mouth once daily as directed   Yes Historical Provider, MD   lisinopril (PRINIVIL;ZESTRIL) 20 MG tablet 20 mg daily  2/3/22  Yes Historical Provider, MD   LANTUS SOLOSTAR 100 UNIT/ML injection pen inject 60 units subcutaneously once daily 1/22/22  Yes Historical Provider, MD   nitroGLYCERIN (NITROSTAT) 0.4 MG SL tablet place 1 tablet under the tongue if needed 1/27/22  Yes Historical Provider, MD   pravastatin (PRAVACHOL) 40 MG tablet 40 mg daily  2/3/22  Yes Historical Provider, MD   tadalafil (CIALIS) 10 MG tablet as needed  1/27/22  Yes Historical Provider, MD   tamsulosin (FLOMAX) 0.4 MG capsule 0.4 mg daily    Yes Historical Provider, MD       Current Medications:    Current Facility-Administered Medications: amiodarone (CORDARONE) tablet 200 mg, 200 mg, Oral, BID  clopidogrel (PLAVIX) tablet 75 mg, 75 mg, Oral, Daily  isosorbide mononitrate (IMDUR) extended release tablet 60 mg, 60 mg, Oral, Daily  lisinopril (PRINIVIL;ZESTRIL) tablet 20 mg, 20 mg, Oral, Daily  metoprolol succinate (TOPROL XL) extended release tablet 25 mg, 25 mg, Oral, Daily  tamsulosin (FLOMAX) capsule 0.4 mg, 0.4 mg, Oral, Daily  promethazine (PHENERGAN) tablet 12.5 mg, 12.5 mg, Oral, Q6H PRN **OR** ondansetron (ZOFRAN) injection 4 mg, 4 mg, IntraVENous, Q6H PRN  polyethylene glycol (GLYCOLAX) packet 17 g, 17 g, Oral, Daily PRN  acetaminophen (TYLENOL) tablet 650 mg, 650 mg, Oral, Q6H PRN **OR** acetaminophen (TYLENOL) suppository 650 mg, 650 mg, Rectal, Q6H PRN  glucose (GLUTOSE) 40 % oral gel 15 g, 15 g, Oral, PRN  dextrose 50 % IV solution, 12.5 g, IntraVENous, PRN  glucagon (rDNA) injection 1 mg, 1 mg, IntraMUSCular, PRN  dextrose 5 % solution, 100 mL/hr, IntraVENous, PRN  insulin glargine-yfgn (SEMGLEE-YFGN) injection vial 27 Units, 0.25 Units/kg, SubCUTAneous, Nightly  insulin lispro (HUMALOG) injection vial 0-12 Units, 0-12 Units, SubCUTAneous, TID WC  insulin lispro (HUMALOG) injection vial 0-6 Units, 0-6 Units, SubCUTAneous, Nightly  0.9 % sodium chloride infusion, , IntraVENous, Continuous  0.9 % sodium chloride infusion, 25 mL, IntraVENous, PRN  ceFAZolin (ANCEF) 2000 mg in sterile water 20 mL IV syringe, 2,000 mg, IntraVENous, On Call to OR  sodium chloride flush 0.9 % injection 10 mL, 10 mL, IntraVENous, 2 times per day  sodium chloride flush 0.9 % injection 10 mL, 10 mL, IntraVENous, PRN  allopurinol (ZYLOPRIM) tablet 100 mg, 100 mg, Oral, Daily  rosuvastatin (CRESTOR) tablet 20 mg, 20 mg, Oral, Daily  heparin (porcine) injection 4,000 Units, 4,000 Units, IntraVENous, PRN  heparin (porcine) injection 2,000 Units, 2,000 Units, IntraVENous, PRN  heparin 25,000 units in dextrose 5% 250 mL (premix) infusion, 5-30 Units/kg/hr, IntraVENous, Continuous    Allergies:  Dapagliflozin    Family History: This information was not obtained at this time as it is found noncontributory secondary to the patients advanced age. REVIEW OF SYSTEMS:     · Constitutional: Denies fevers, chills, night sweats, and fatigue  · HEENT: Denies headaches, nose bleeds, and blurred vision,oral pain, abscess or lesion.   · Musculoskeletal: Denies falls, pain to BLE with ambulation and edema to BLE. · Neurological: Denies dizziness and lightheadedness, numbness and tingling  · Cardiovascular: Denies chest pain, palpitations, and feelings of heart racing. · Respiratory: Denies orthopnea and PND, cough, ABRAHAM  · Gastrointestinal: Denies heartburn, nausea/vomiting, diarrhea and constipation, black/bloody, and tarry stools. · Genitourinary: Denies dysuria and hematuria  · Hematologic: Denies excessive bruising or bleeding  · Lymphatic: Denies lumps and bumps to neck, axilla, breast, and groin      PHYSICAL EXAM:   BP (!) 167/78   Pulse 54   Temp 97.2 °F (36.2 °C) (Temporal)   Resp 18   Ht 6' 1\" (1.854 m)   Wt 230 lb 12.8 oz (104.7 kg)   SpO2 93%   BMI 30.45 kg/m²   CONST:  Well developed, obese male who appears his stated age. Awake, alert, cooperative, no apparent distress  HEENT:   Head- Normocephalic, atraumatic   Eyes- Conjunctivae pink, anicteric  Throat- Oral mucosa pink and moist  Neck-  No stridor, trachea midline, no jugular venous distention. CHEST: Chest symmetrical and non-tender to palpation. RESPIRATORY: Lung sounds clear throughout fields bilaterally. No wheeze or rhonchi noted. CARDIOVASCULAR:     No carotid bruit noted bilaterally   Heart Ausculation- Regular rate and rhythm, no murmur. No s3, s4 or rub   PV: No lower extremity edema. No varicosities. ABDOMEN: Soft, non-tender to light palpation. Bowel sounds present. MS: Good muscle strength and tone. No atrophy or abnormal movements. : Deferred   SKIN: Warm and dry no statis dermatitis or ulcers   NEURO / PSYCH: Oriented to person, place and time. Speech clear and appropriate. Follows all commands.  Pleasant affect     DATA:    ECG: As above  Tele strips: Not currently on telemetry  Diagnostic:      Intake/Output Summary (Last 24 hours) at 3/1/2022 0851  Last data filed at 3/1/2022 0623  Gross per 24 hour   Intake 99.5 ml   Output 250 ml   Net -150.5 ml       Labs:   CBC: Recent Labs     02/28/22 2007 03/01/22 0622   WBC 11.9* 11.7*   HGB 15.2 15.1   HCT 46.8 46.4    295     BMP:   Recent Labs     02/28/22 2145 03/01/22 0622    142   K 4.1 4.1   CO2 25 26   BUN 22 17   CREATININE 1.2 1.0   LABGLOM 59 >60   CALCIUM 8.3* 8.3*     HgA1c:   Lab Results   Component Value Date    LABA1C 7.4 (H) 03/01/2022     APTT:  Recent Labs     03/01/22 0134 03/01/22 0622   APTT 45.4* 48.5*     FASTING LIPID PANEL:  Lab Results   Component Value Date    CHOL 136 02/25/2022    HDL 37 02/25/2022    LDLCALC 59 02/25/2022    TRIG 199 02/25/2022     LIVER PROFILE:  Recent Labs     02/28/22 2145   AST 23   ALT 22   LABALBU 3.5       Assessment:   1. NSTEMI.  troponin 508-- 457   2. Coronary artery disease with history of CABG (LIMA-LAD) 2013 with recent diagnostic angiogram 2/25/2022 with critical ostial LCx disease --plan was for staged PCI as outpatient. 3. Hypertension  4. Hyperlipidemia  5. Insulin requiring type 2 diabetes mellitus  6. Obesity    Plan:   · Continue IV heparin  · Continue aspirin/Plavix  · PCI of LCx today per Dr. Linette Yepez  · Further recommendations pending results of cardiac cath    Electronically signed by Brandie Hoawrd, 49Jacqueline Gibson on 3/1/2022 at 8:51 AM     PHYSICIAN ADDENDUM:  I independently reviewed the above documentation and made amendments as needed. I formulated the assessment and plan with the JONNY with my contribution being >50%.  Plan discussed with the patient/family/care team.      Shane Huber MD, [facility] [place]  Interventional Cardiology/Structural Heart Disease  Office: [phone]  Coordinator: Abdoulaye Albarran

## 2022-03-01 NOTE — PROGRESS NOTES
Hospitalist Progress Note      SYNOPSIS: Patient admitted on 2022 for Chest pain emergency department with chest pain. Of note patient was admitted on  for STEMI. Cardiology was consulted at that time he went to the Cath Lab. Apparently stents were not placed at that time. Patient denies shortness of breath, lightheadedness, dizziness, diaphoresis, fever, chills. Vital signs within normal limits and stable. EKG shows ST elevations in 2, 3, aVF. Reciprocal depressions in aVL, V5. Cardiology was consulted who reviewed the EKG they do not consider this a STEMI. Troponin was 508. Chest x-ray unremarkable. Heparin infusion was initiated. Patient be made n.p.o. and will go to the Cath Lab 3/1. SUBJECTIVE:  Stable overnight. No other overnight issues reported. Patient seen and examined  Records reviewed. For heart cath today        Temp (24hrs), Av.1 °F (36.7 °C), Min:97.2 °F (36.2 °C), Max:98.9 °F (37.2 °C)    DIET: Diet NPO Exceptions are: Sips of Water with Meds  CODE: Full Code    Intake/Output Summary (Last 24 hours) at 3/1/2022 1012  Last data filed at 3/1/2022 9629  Gross per 24 hour   Intake 99.5 ml   Output 250 ml   Net -150.5 ml       Review of Systems  All bolded are positive; please see HPI  General:  Fever, chills, diaphoresis, fatigue, malaise, night sweats, weight loss  Psychological:  Anxiety, disorientation, hallucinations. ENT:  Epistaxis, headaches, vertigo, visual changes. Cardiovascular:  Chest pain, irregular heartbeats, palpitations, paroxysmal nocturnal dyspnea. Respiratory:  Shortness of breath, coughing, sputum production, hemoptysis, wheezing, orthopnea.   Gastrointestinal:  Nausea, vomiting, diarrhea, heartburn, constipation, abdominal pain, hematemesis, hematochezia, melena, acholic stools  Genito-Urinary:  Dysuria, urgency, frequency, hematuria  Musculoskeletal:  Joint pain, joint stiffness, joint swelling, muscle pain  Neurology:  Headache, focal neurological deficits, weakness, numbness, paresthesia  Derm:  Rashes, ulcers, excoriations, bruising  Extremities:  Decreased ROM, peripheral edema, mottling      OBJECTIVE:    BP (!) 167/78   Pulse 54   Temp 97.2 °F (36.2 °C) (Temporal)   Resp 18   Ht 6' 1\" (1.854 m)   Wt 230 lb 12.8 oz (104.7 kg)   SpO2 93%   BMI 30.45 kg/m²     General appearance:  awake, alert, and oriented to person, place, time, and purpose; appears stated age and cooperative; no apparent distress no labored breathing  HEENT:  Conjunctivae/corneas clear. Neck: Supple. No jugular venous distention. Respiratory: symmetrical; clear to auscultation bilaterally; no wheezes; no rhonchi; no rales  Cardiovascular: rhythm regular; rate controlled; no murmurs  Abdomen: Soft, nontender, nondistended  Extremities:  peripheral pulses present; no peripheral edema; no ulcers  Musculoskeletal: No clubbing, cyanosis, no bilateral lower extremity edema. Brisk capillary refill. Skin:  No rashes  on visible skin  Neurologic: awake, alert and following commands     ASSESSMENT and PLAN:  · NSTEMI- aspirin, IV heparin, plavix. NPO for cath lab today for PCI of circumflex. BB.   · Coronary artery disease- status post CABG with LIMA to LAD. He presented last week with acute MI in the setting of PAF with RVR. A diagnostic angiogram (2/25/22 - Dr. Reji Zayas) revealed critical dominant ostial circumflex stenosis. Additionally there was segmental disease of an OM 2 and a widely patent LIMA to LAD with excellent runoff to the entire LAD distribution and competitively to the circumflex distribution. PCI was to be staged as outpatient.   · Paroxysmal atrial fibrillation  · Diabetes type 2- Hemoglobin A1c 7.4  · Hypertension  · Hyperlipidemia         Medications:  REVIEWED DAILY    Infusion Medications    dextrose      sodium chloride 125 mL/hr at 03/01/22 0619    sodium chloride      heparin (PORCINE) Infusion 11 Units/kg/hr (03/01/22 0206)     Scheduled Medications    amiodarone  200 mg Oral BID    clopidogrel  75 mg Oral Daily    isosorbide mononitrate  60 mg Oral Daily    lisinopril  20 mg Oral Daily    metoprolol succinate  25 mg Oral Daily    tamsulosin  0.4 mg Oral Daily    insulin glargine  0.25 Units/kg SubCUTAneous Nightly    insulin lispro  0-12 Units SubCUTAneous TID WC    insulin lispro  0-6 Units SubCUTAneous Nightly    ceFAZolin (ANCEF) IVPB  2,000 mg IntraVENous On Call to OR    sodium chloride flush  10 mL IntraVENous 2 times per day    allopurinol  100 mg Oral Daily    rosuvastatin  20 mg Oral Daily     PRN Meds: promethazine **OR** ondansetron, polyethylene glycol, acetaminophen **OR** acetaminophen, glucose, dextrose, glucagon (rDNA), dextrose, sodium chloride, sodium chloride flush, heparin (porcine), heparin (porcine)    Labs:     Recent Labs     02/28/22 2007 03/01/22 0622   WBC 11.9* 11.7*   HGB 15.2 15.1   HCT 46.8 46.4    295       Recent Labs     02/28/22 2145 03/01/22 0622    142   K 4.1 4.1    105   CO2 25 26   BUN 22 17   CREATININE 1.2 1.0   CALCIUM 8.3* 8.3*       Recent Labs     02/28/22 2145   PROT 6.7   ALKPHOS 105   ALT 22   AST 23   BILITOT 0.2       No results for input(s): INR in the last 72 hours. No results for input(s): Tori Peek in the last 72 hours. Chronic labs:    Lab Results   Component Value Date    CHOL 136 02/25/2022    TRIG 199 (H) 02/25/2022    HDL 37 02/25/2022    LDLCALC 59 02/25/2022    TSH 1.440 02/25/2022    INR 1.0 02/24/2022    LABA1C 7.4 (H) 03/01/2022       Radiology: REVIEWED DAILY    +++++++++++++++++++++++++++++++++++++++++++++++++  DO Douglas Garcia Physician - [facility], [place]  +++++++++++++++++++++++++++++++++++++++++++++++++  NOTE: This report was transcribed using voice recognition software.  Every effort was made to ensure accuracy; however, inadvertent computerized transcription errors may be present.

## 2022-03-01 NOTE — PLAN OF CARE
Problem: Pain:  Goal: Pain level will decrease  Description: Pain level will decrease  Outcome: Met This Shift  Goal: Control of acute pain  Description: Control of acute pain  Outcome: Met This Shift     Problem: Falls - Risk of:  Goal: Will remain free from falls  Description: Will remain free from falls  Outcome: Met This Shift

## 2022-03-02 VITALS
HEART RATE: 59 BPM | WEIGHT: 230.6 LBS | DIASTOLIC BLOOD PRESSURE: 60 MMHG | OXYGEN SATURATION: 94 % | HEIGHT: 73 IN | SYSTOLIC BLOOD PRESSURE: 115 MMHG | BODY MASS INDEX: 30.56 KG/M2 | RESPIRATION RATE: 19 BRPM | TEMPERATURE: 98.3 F

## 2022-03-02 LAB
ANION GAP SERPL CALCULATED.3IONS-SCNC: 10 MMOL/L (ref 7–16)
BUN BLDV-MCNC: 14 MG/DL (ref 6–23)
CALCIUM SERPL-MCNC: 7.9 MG/DL (ref 8.6–10.2)
CHLORIDE BLD-SCNC: 107 MMOL/L (ref 98–107)
CO2: 25 MMOL/L (ref 22–29)
CREAT SERPL-MCNC: 0.9 MG/DL (ref 0.7–1.2)
EKG ATRIAL RATE: 54 BPM
EKG P AXIS: 34 DEGREES
EKG P-R INTERVAL: 186 MS
EKG Q-T INTERVAL: 486 MS
EKG QRS DURATION: 98 MS
EKG QTC CALCULATION (BAZETT): 460 MS
EKG R AXIS: 30 DEGREES
EKG T AXIS: 100 DEGREES
EKG VENTRICULAR RATE: 54 BPM
GFR AFRICAN AMERICAN: >60
GFR NON-AFRICAN AMERICAN: >60 ML/MIN/1.73
GLUCOSE BLD-MCNC: 96 MG/DL (ref 74–99)
HCT VFR BLD CALC: 43.3 % (ref 37–54)
HEMOGLOBIN: 13.9 G/DL (ref 12.5–16.5)
MCH RBC QN AUTO: 27.5 PG (ref 26–35)
MCHC RBC AUTO-ENTMCNC: 32.1 % (ref 32–34.5)
MCV RBC AUTO: 85.6 FL (ref 80–99.9)
METER GLUCOSE: 112 MG/DL (ref 74–99)
METER GLUCOSE: 135 MG/DL (ref 74–99)
PDW BLD-RTO: 13.4 FL (ref 11.5–15)
PLATELET # BLD: 282 E9/L (ref 130–450)
PMV BLD AUTO: 10.2 FL (ref 7–12)
POTASSIUM REFLEX MAGNESIUM: 4.1 MMOL/L (ref 3.5–5)
RBC # BLD: 5.06 E12/L (ref 3.8–5.8)
SODIUM BLD-SCNC: 142 MMOL/L (ref 132–146)
TROPONIN, HIGH SENSITIVITY: 390 NG/L (ref 0–11)
WBC # BLD: 10.5 E9/L (ref 4.5–11.5)

## 2022-03-02 PROCEDURE — 99231 SBSQ HOSP IP/OBS SF/LOW 25: CPT | Performed by: INTERNAL MEDICINE

## 2022-03-02 PROCEDURE — 82962 GLUCOSE BLOOD TEST: CPT

## 2022-03-02 PROCEDURE — 84484 ASSAY OF TROPONIN QUANT: CPT

## 2022-03-02 PROCEDURE — 6370000000 HC RX 637 (ALT 250 FOR IP): Performed by: INTERNAL MEDICINE

## 2022-03-02 PROCEDURE — 2580000003 HC RX 258: Performed by: SURGERY

## 2022-03-02 PROCEDURE — 2580000003 HC RX 258: Performed by: INTERNAL MEDICINE

## 2022-03-02 PROCEDURE — APPSS30 APP SPLIT SHARED TIME 16-30 MINUTES: Performed by: PHYSICIAN ASSISTANT

## 2022-03-02 PROCEDURE — 6370000000 HC RX 637 (ALT 250 FOR IP): Performed by: FAMILY MEDICINE

## 2022-03-02 PROCEDURE — 36415 COLL VENOUS BLD VENIPUNCTURE: CPT

## 2022-03-02 PROCEDURE — 85027 COMPLETE CBC AUTOMATED: CPT

## 2022-03-02 PROCEDURE — 80048 BASIC METABOLIC PNL TOTAL CA: CPT

## 2022-03-02 RX ORDER — ALLOPURINOL 100 MG/1
100 TABLET ORAL DAILY
Qty: 30 TABLET | Refills: 0 | Status: SHIPPED | OUTPATIENT
Start: 2022-03-09 | End: 2022-06-27 | Stop reason: SDUPTHER

## 2022-03-02 RX ORDER — COLCHICINE 0.6 MG/1
0.6 TABLET ORAL DAILY
Status: DISCONTINUED | OUTPATIENT
Start: 2022-03-02 | End: 2022-03-02 | Stop reason: HOSPADM

## 2022-03-02 RX ORDER — ROSUVASTATIN CALCIUM 20 MG/1
20 TABLET, COATED ORAL DAILY
Qty: 30 TABLET | Refills: 0 | Status: SHIPPED | OUTPATIENT
Start: 2022-03-02 | End: 2022-03-30 | Stop reason: SDUPTHER

## 2022-03-02 RX ORDER — COLCHICINE 0.6 MG/1
0.6 TABLET ORAL DAILY
Qty: 7 TABLET | Refills: 0 | Status: SHIPPED | OUTPATIENT
Start: 2022-03-02 | End: 2022-03-02 | Stop reason: HOSPADM

## 2022-03-02 RX ADMIN — SODIUM CHLORIDE, PRESERVATIVE FREE 10 ML: 5 INJECTION INTRAVENOUS at 08:18

## 2022-03-02 RX ADMIN — ALLOPURINOL 100 MG: 100 TABLET ORAL at 08:18

## 2022-03-02 RX ADMIN — ISOSORBIDE MONONITRATE 60 MG: 30 TABLET, EXTENDED RELEASE ORAL at 08:18

## 2022-03-02 RX ADMIN — TAMSULOSIN HYDROCHLORIDE 0.4 MG: 0.4 CAPSULE ORAL at 08:18

## 2022-03-02 RX ADMIN — ROSUVASTATIN CALCIUM 20 MG: 20 TABLET, FILM COATED ORAL at 08:18

## 2022-03-02 RX ADMIN — AMIODARONE HYDROCHLORIDE 200 MG: 200 TABLET ORAL at 08:18

## 2022-03-02 RX ADMIN — Medication 10 ML: at 08:34

## 2022-03-02 RX ADMIN — CLOPIDOGREL BISULFATE 75 MG: 75 TABLET ORAL at 08:18

## 2022-03-02 RX ADMIN — METOPROLOL SUCCINATE 25 MG: 25 TABLET, EXTENDED RELEASE ORAL at 08:18

## 2022-03-02 RX ADMIN — APIXABAN 5 MG: 5 TABLET, FILM COATED ORAL at 08:18

## 2022-03-02 RX ADMIN — LISINOPRIL 20 MG: 20 TABLET ORAL at 08:18

## 2022-03-02 NOTE — CARE COORDINATION
SOCIAL WORK/CASEMANAGEMENT TRANSITION OF CARE [id number] [place]  [facility] [address], [place], [phone]): met with pt in the room who is going home today with wife if ok with cardio. Pt is independent and still working. No dme or hhc pta. Pt is not a . Wife is not working and never has. No needs anticipated.  MARY BETH Godinez  3/2/2022

## 2022-03-02 NOTE — DISCHARGE SUMMARY
Discharge Summary    Admit date: 2/28/2022    Discharge date and time: 3/2/2022 12:48 PM     Admitting Physician: Joy cMfarland DO     Consultants: cardiology    Admission Diagnoses:  NSTEMI    Discharge Diagnoses AND Hospital Course:  1. NSTEMI-  S/p PCI 2/1. On discharge clopidogrel 75 mg daily plus apixaban 5 mg p.o. twice daily for at least 12 months. TTE as outpatient  1. Coronary artery disease- status post CABG with LIMA to LAD. He presented last week with acute MI in the setting of PAF with RVR.  A diagnostic angiogram (2/25/22 - Dr. Johnson Carter ostial circumflex stenosis.  Additionally there was segmental disease of an OM 2 and a widely patent LIMA to LAD with excellent runoff to the entire LAD distribution and competitively to the circumflex distribution.  PCI 3/1 Successful IVUS guided PCI of the dominant left circumflex from distal to ostial with 2 overlapping YASMINE  · Paroxysmal atrial fibrillation  · Acute gout flare- cancelled colchicine, ordered prednisone taper  · Diabetes type 2- Hemoglobin A1c 7.4  · Hypertension  · Hyperlipidemia      Discharge Exam:  Vitals:    03/02/22 1119   BP: 115/60   Pulse: 59   Resp: 19   Temp: 98.3 °F (36.8 °C)   SpO2: 94%       General appearance:  awake, alert, and oriented to person, place, time, and purpose; appears stated age and cooperative; no apparent distress no labored breathing  HEENT:  Conjunctivae/corneas clear. Neck: Supple. No jugular venous distention. Respiratory: symmetrical; clear to auscultation bilaterally; no wheezes; no rhonchi; no rales  Cardiovascular: rhythm regular; rate controlled; no murmurs  Abdomen: Soft, nontender, nondistended  Extremities:  peripheral pulses present; no peripheral edema; no ulcers  Musculoskeletal: No clubbing, cyanosis, no bilateral lower extremity edema. Brisk capillary refill.    Skin:  No rashes  on visible skin  Neurologic: awake, alert and following commands     Disposition: home  The patient's condition is fair. At this time the patient is without objective evidence of an acute process requiring continuing hospitalization or inpatient management. They are stable for discharge with outpatient follow-up. I have spoken with the patient and discussed the results of the current hospitalization, in addition to providing specific details for the plan of care and counseling regarding the diagnosis and prognosis. The plan has been discussed in detail and they are aware of the specific conditions for emergent return, as well as the importance of follow-up. Their questions are answered at this time and they are agreeable with the plan for discharge to home     Patient Instructions: Follow up with PCP within 7 days. Follow up with cardiology as directed and outpatient TTE. Follow up with PCP for acute gout flare. UPDATE-- called pharmacy and changed cochicine to prednisone taper due to amiodarone  No future appointments. Discharge Medications:     Medication List      START taking these medications    rosuvastatin 20 MG tablet  Commonly known as: CRESTOR  Take 1 tablet by mouth daily        CHANGE how you take these medications    allopurinol 100 MG tablet  Commonly known as: ZYLOPRIM  Take 1 tablet by mouth daily  Start taking on: March 9, 2022  What changed:   · See the new instructions. · These instructions start on March 9, 2022. If you are unsure what to do until then, ask your doctor or other care provider.         CONTINUE taking these medications    amiodarone 200 MG tablet  Commonly known as: CORDARONE  Take 1 tablet by mouth 2 times daily     apixaban 5 MG Tabs tablet  Commonly known as: ELIQUIS  Take 1 tablet by mouth 2 times daily     clopidogrel 75 MG tablet  Commonly known as: PLAVIX     glimepiride 4 MG tablet  Commonly known as: AMARYL     isosorbide mononitrate 60 MG extended release tablet  Commonly known as: IMDUR  Take 1 tablet by mouth daily     Lantus SoloStar 100 UNIT/ML injection pen  Generic drug: insulin glargine     lisinopril 20 MG tablet  Commonly known as: PRINIVIL;ZESTRIL     metoprolol succinate 25 MG extended release tablet  Commonly known as: TOPROL XL  Take 1 tablet by mouth daily     nitroGLYCERIN 0.4 MG SL tablet  Commonly known as: NITROSTAT     tamsulosin 0.4 MG capsule  Commonly known as: FLOMAX        STOP taking these medications    pravastatin 40 MG tablet  Commonly known as: PRAVACHOL     tadalafil 10 MG tablet  Commonly known as: CIALIS           Where to Get Your Medications      These medications were sent to [address] “” [place], [place] - [address] [phone] [place] [phone]  [address] [phone]    Phone: [phone]   · allopurinol 100 MG tablet  · rosuvastatin 20 MG tablet         Activity: activity as tolerated    Diet: cardiac diet    Wound Care: as directed    Follow-up:     · This patient is instructed to follow-up with his primary care physician. · Patient is instructed to follow-up with the consults listed above as directed by them. · They are instructed to resume home medications and take new medications as indicated in the list above. · If the patient has a recurrence of symptoms, they are instructed to go to the ED. Preparing for this patient's discharge, including paperwork, orders, instructions, and meeting with patient did require > 30 minutes.     Imani Yun DO   2:14 PM  3/2/2022

## 2022-03-02 NOTE — CONSULTS
Met with patient and discussed that their physician has ordered a referral to our outpatient Phase II Cardiac Rehabilitation program. Reviewed the benefits of cardiac rehabilitation based on their diagnosis and personal risk factors. Patient demonstrates mild interest in Cardiac Rehabilitation at this time. Cardiac Rehabilitation brochure provided to patient/family. The Cardiac Rehabilitation Program has been provided the patient's referral information and pertinent patient details and history. The patient [facility] call [facility] at [phone] for additional information or questions. Contact information for [address] [place], [place] [address]on [place] and other choices close to the patient's residence have been provided in the discharge instructions so that the patient may call and schedule an appointment when cleared by their physician.  Thank you for the referral.

## 2022-03-02 NOTE — PATIENT CARE CONFERENCE
[facility] Quality Flow/Interdisciplinary Rounds Progress Note        Quality Flow Rounds held on March 2, 2022    Disciplines Attending:  Bedside Nurse, ,  and Nursing Unit Leadership    Elias Sims was admitted on 2/28/2022  7:40 PM    Anticipated Discharge Date:  Expected Discharge Date: 03/02/22    Disposition:    Horace Score:  Horace Scale Score: 19    Readmission Risk              Risk of Unplanned Readmission:  17           Discussed patient goal for the day, patient clinical progression, and barriers to discharge.   The following Goal(s) of the Day/Commitment(s) have been identified:  Labs - Report Results, Discharge planning if OK with Cardiology       Sharon Tran RN  March 2, 2022

## 2022-03-02 NOTE — PROGRESS NOTES
Cardiology Progress Note:    Narrative:  79-year-old  male who was seen initially by Dr. Cynthia Roberts in the setting of acute MI on 2/25/22. He is known to have had CABG with the only apparent patent graft is LIMA to LAD. Diagnostic angiogram at that time revealed critical ostial stenosis of the dominant left circumflex with normal flow. There was also moderately severe disease in the mid circumflex after the takeoff of a large bifurcating obtuse marginal.  The LIMA was widely patent and supplied excellent runoff to the entire LAD distribution and retrograde to the circumflex distribution through the stenotic lesion. The left main was functionally absent. PCI was deferred at that time and was planned in the outpatient setting. The patient returned to the hospital with chest pain on 3/1/22and thus he was taken to the Cath Lab for PCI. Objective:  - resting comfortably in bed; on room air  - denies complaints; states he hasn't felt this good in a long time  - ambulating without difficulty other than gout  - labs and vitals stable  - NSR on telemetry  - no JVD, lungs clear, no murmur, no LE edema. - right radial access site intact, soft, non tender without ecchymosis. Radial pulse intact    Percutaneous coronary intervention:  Anticoagulation: Unfractionated heparin with ACT>250 seconds  Aspirin administered Yes  P2Y12 inhibitor: clopidogrel 600 mg PO loading dose was administered before the case. IV eptifibatide was not administered     Target lesions:   1. Ostial circumflex, baseline 95% stenosis, DINORA flow 2. Post PCI 0% stenosis and DINORA flow 3  2. Mid circumflex 2 segmental lesions each with baseline 70% stenosis and DINORA-3 flow at baseline. Post PCI 0% stenosis and DINORA-3 flow.       Assessment:  1.  Successful IVUS guided PCI of the dominant left circumflex from distal to ostial with 2 overlapping YASMINE  2. NSTEMI in setting of AFib RVR 2/25/22 - currently maintaining NSR  3. CAD s/p CABG (LIMA-LAD) in 2013   4. HTN  5. HLD  6. T2DM  7. Obesity         Recommendations:  · Continue current cardiac medications including aspirin and clopidogrel for at least 12 months  · Echocardiogram as outpatient  · Follow up with Dr. Favio Gardner    Discussed with Dr. Celine Mccoy PA-C     PHYSICIAN ADDENDUM:  I independently reviewed the above documentation and made amendments as needed. I formulated the assessment and plan with the JONNY with my contribution being >50%.  Plan discussed with the patient/family/care team.      Giovanny Peralta MD, [facility] [place]  Interventional Cardiology/Structural Heart Disease  Office: [phone]  Coordinator: Jordan Cabral

## 2022-03-03 ENCOUNTER — TELEPHONE (OUTPATIENT)
Dept: ADMINISTRATIVE | Age: 72
End: 2022-03-03

## 2022-03-17 ENCOUNTER — OFFICE VISIT (OUTPATIENT)
Dept: CARDIOLOGY CLINIC | Age: 72
End: 2022-03-17
Payer: COMMERCIAL

## 2022-03-17 VITALS
BODY MASS INDEX: 30.35 KG/M2 | HEIGHT: 73 IN | DIASTOLIC BLOOD PRESSURE: 50 MMHG | HEART RATE: 52 BPM | SYSTOLIC BLOOD PRESSURE: 96 MMHG | WEIGHT: 229 LBS

## 2022-03-17 DIAGNOSIS — K40.90 RIGHT INGUINAL HERNIA: ICD-10-CM

## 2022-03-17 DIAGNOSIS — Z86.79 H/O: HTN (HYPERTENSION): ICD-10-CM

## 2022-03-17 DIAGNOSIS — E78.5 DYSLIPIDEMIA: ICD-10-CM

## 2022-03-17 DIAGNOSIS — Z79.899 ON AMIODARONE THERAPY: ICD-10-CM

## 2022-03-17 DIAGNOSIS — N52.9 ERECTILE DYSFUNCTION, UNSPECIFIED ERECTILE DYSFUNCTION TYPE: ICD-10-CM

## 2022-03-17 DIAGNOSIS — E11.9 INSULIN-REQUIRING OR DEPENDENT TYPE II DIABETES MELLITUS (HCC): ICD-10-CM

## 2022-03-17 DIAGNOSIS — E66.09 NON MORBID OBESITY DUE TO EXCESS CALORIES: ICD-10-CM

## 2022-03-17 DIAGNOSIS — I25.2 HISTORY OF NON-ST ELEVATION MYOCARDIAL INFARCTION (NSTEMI): ICD-10-CM

## 2022-03-17 DIAGNOSIS — Z98.61 HISTORY OF PTCA: ICD-10-CM

## 2022-03-17 DIAGNOSIS — I25.810 CORONARY ARTERY DISEASE INVOLVING CORONARY BYPASS GRAFT OF NATIVE HEART WITHOUT ANGINA PECTORIS: ICD-10-CM

## 2022-03-17 DIAGNOSIS — Z95.1 HX OF CABG: ICD-10-CM

## 2022-03-17 DIAGNOSIS — I48.0 PAF (PAROXYSMAL ATRIAL FIBRILLATION) (HCC): ICD-10-CM

## 2022-03-17 DIAGNOSIS — Z79.4 INSULIN-REQUIRING OR DEPENDENT TYPE II DIABETES MELLITUS (HCC): ICD-10-CM

## 2022-03-17 DIAGNOSIS — I25.10 CORONARY ARTERY DISEASE INVOLVING NATIVE CORONARY ARTERY OF NATIVE HEART WITHOUT ANGINA PECTORIS: Primary | ICD-10-CM

## 2022-03-17 DIAGNOSIS — I25.2 OLD INFERIOR WALL MYOCARDIAL INFARCTION: ICD-10-CM

## 2022-03-17 DIAGNOSIS — I95.89 OTHER SPECIFIED HYPOTENSION: ICD-10-CM

## 2022-03-17 DIAGNOSIS — Z79.01 ANTICOAGULATED BY ANTICOAGULATION TREATMENT: ICD-10-CM

## 2022-03-17 PROCEDURE — 99215 OFFICE O/P EST HI 40 MIN: CPT | Performed by: INTERNAL MEDICINE

## 2022-03-17 PROCEDURE — 3051F HG A1C>EQUAL 7.0%<8.0%: CPT | Performed by: INTERNAL MEDICINE

## 2022-03-17 PROCEDURE — 93000 ELECTROCARDIOGRAM COMPLETE: CPT | Performed by: INTERNAL MEDICINE

## 2022-03-17 RX ORDER — LISINOPRIL 10 MG/1
10 TABLET ORAL DAILY
Qty: 90 TABLET | Refills: 3 | Status: SHIPPED | COMMUNITY
Start: 2022-03-17

## 2022-03-18 NOTE — PROGRESS NOTES
OFFICE VISIT        PRIMARY CARE PHYSICIAN:    Frank Escalante MD       ALLERGIES / SENSITIVITIES:    Allergies   Allergen Reactions    Dapagliflozin      Other reaction(s): Myalgias (Muscle Pain)          REVIEWED MEDICATIONS:      Current Outpatient Medications:     lisinopril (PRINIVIL;ZESTRIL) 10 MG tablet, 1 tablet daily, Disp: 90 tablet, Rfl: 3    rosuvastatin (CRESTOR) 20 MG tablet, Take 1 tablet by mouth daily, Disp: 30 tablet, Rfl: 0    allopurinol (ZYLOPRIM) 100 MG tablet, Take 1 tablet by mouth daily, Disp: 30 tablet, Rfl: 0    metoprolol succinate (TOPROL XL) 25 MG extended release tablet, Take 1 tablet by mouth daily, Disp: 30 tablet, Rfl: 3    apixaban (ELIQUIS) 5 MG TABS tablet, Take 1 tablet by mouth 2 times daily, Disp: 60 tablet, Rfl: 0    amiodarone (CORDARONE) 200 MG tablet, Take 1 tablet by mouth 2 times daily (Patient taking differently: Take 200 mg by mouth daily ), Disp: 60 tablet, Rfl: 0    isosorbide mononitrate (IMDUR) 60 MG extended release tablet, Take 1 tablet by mouth daily, Disp: 30 tablet, Rfl: 0    glimepiride (AMARYL) 4 MG tablet, Take 4 mg by mouth at bedtime, Disp: , Rfl:     clopidogrel (PLAVIX) 75 MG tablet, clopidogrel 75 mg tablet  take 1 tablet by mouth once daily as directed, Disp: , Rfl:     LANTUS SOLOSTAR 100 UNIT/ML injection pen, inject 60 units subcutaneously once daily, Disp: , Rfl:     nitroGLYCERIN (NITROSTAT) 0.4 MG SL tablet, place 1 tablet under the tongue if needed, Disp: , Rfl:     tamsulosin (FLOMAX) 0.4 MG capsule, 0.4 mg daily , Disp: , Rfl:       S: REASON FOR VISIT:    Coronary artery disease. Mr. Amarilis Broussard is a 77-year-old male with known coronary artery disease with history of myocardial infarction and history of coronary artery bypass graft surgery in 2013 at [address] [place] [address] in [facility] [email or web address] [facility].   He used to follow with his cardiologist, Dr. Ahmet Urbina, routinely prior to his recent hospitalizations at [facility].  He also reported an episode of paroxysmal atrial fibrillation in the past stating that that occurred while he was dehydrated. He has insulin requiring diabetes mellitus, hypertension, hyperlipidemia and is obese. He also suffers from an inguinal hernia. He presented to [facility] on 2/25/2022 with chest pain that occurred one hour prior to the presentation to the Emergency Room. EKG showed atrial fibrillation with rapid ventricular response with ST elevation in leads 3 and AVR and ST depressions in 1, 2, AVL and V4 to V6. The cath lab was occupied at that time with another case. He was supposed to be transferred to [facility] in [facility] [address], but could not be life flighted because of the weather conditions and there were no ambulances available reportedly for transfer for 4 hours. Instead of being transferred to another hospital in the near vicinity, he was kept in our ED and was given IV Lopressor. He was also bolused with heparin. He did receive aspirin and Brilinta on route to the ED at that time. As the cath lab was available and the patient was seen by me, he was still complaining of chest pain. Of note, prior to his presentation, he was on Plavix, which was stopped 6 days prior to his presentation in anticipation of inguinal hernia surgery. The patient was taken to the cath lab. He was started on IV amiodarone and converted to sinus rhythm. With conversion to sinus rhythm, his chest pain resolved and his ST elevations resolved. Cardiac catheterization was performed and showed high grade disease in the left circumflex with 70% disease of a nondominant right coronary artery and occluded LAD with patent LIMA to LAD and with an LV angiogram showing an ejection fraction of around 50 to 55%. There was DINORA 3 flow in the left circumflex and accordingly, no PCI was performed.   He was admitted to the floor and apparently remained stable over the next day or so and was discharged home on the 26th of February to be readmitted on 2/28/2022 with chest pain. He underwent PCI/stenting to the left circumflex by Dr. Meaghan Lua. He was discharged home on 3/2/2022. Since discharge from the hospital, he has been stable without any chest pain. He also denies any significant exertional dyspnea. He denies orthopnea, PND's or lower extremity swelling. He has had no recurrent palpitations. He denies dizziness, presyncope or syncope. REVIEW OF SYSTEMS:    CONSTITUTIONAL:  Denies fevers, chills, night sweats or fatigue. HEENT:  Denies any unusual headaches. Denies recent changes in hearing or vision. Denies dysphagia, hoarseness, hemoptysis, hematemesis or epistaxis. ENDOCRINE:  Denies polyphagia, polydipsia or polyuria. Denies heat or cold intolerance. MUSCULOSKELETAL:  Denies any significant arthralgias or myalgias. SKIN:  Denies rashes, ulcers or itching. HEME/LYMPH:  Denies any palpable lymph nodes, bleeding or easy bruisability. HEART:  As above. LUNGS:  Denies cough or sputum production. GI: Denies abdominal pain, nausea, vomiting, diarrhea, constipation, rectal bleeding or tarry stools. :  Denies hematuria or dysuria. PSYCHIATRIC:  Denies mood changes, anxiety or depression. NEUROLOGIC:  Denies memory loss, motor weakness, numbness, tingling or tremors. PAST MEDICAL/SURGICAL HISTORY:    1. Coronary artery disease with history of myocardial infarction and CABG in 2013 at [address] [place] [address] in [facility] [employer]. 2.  Admitted with acute coronary syndrome/myocardial infarction, 2/24/2022, with atrial fibrillation with rapid ventricular response. 3.  Cardiac catheterization, 2/25/2022: Left main, practically nonexistent with separate ostia to the LAD and the left circumflex. LAD occluded at its origin. LCX, dominant vessel with 95% ostial narrowing and with high grade disease of 3 of its marginal branches.   RCA, nondominant vessel with around 70-80% proximal/mid vessel narrowing. PEARCE to LAD, widely patent vessel including its distal anastomosis with the LAD showing no significant disease after the distal anastomosis with the LIMA filling the mid and proximal LAD, the diagonal branches and the left circumflex retrogradely. Normal left ventricular size with hypokinesis of the basal half of the inferior wall with more or less preserved global left ventricular systolic function with an estimated ejection fraction of 55%. Elevated left ventricular end diastolic pressure consistent with LV diastolic dysfunction. Patient converted to sinus rhythm in the cath lab with IV amiodarone and was started on po amiodarone during his hospital stay. 4.  Paroxysmal atrial fibrillation. Patient presented with atrial fibrillation with rapid ventricular response on 2/24/2022 and converted to sinus rhythm with IV amiodarone. 5.  Anticoagulated on Eliquis. 6.  Successful IVUS guided PCI of the dominant left circumflex from distal to ostial with 2 overlapping drug-eluting coronary stents (Dr. Leo Amador: Interventional Cardiology), 3/1/2022.    7. Insulin requiring diabetes mellitus. 8.  Hypertension. 9.  Hyperlipidemia. 10. Obesity. 11.  Erectile dysfunction. 12.  Right inguinal hernia. FAMILY HISTORY:  Noncontributory. SOCIAL HISTORY:  Patient does not smoke. He denied any significant alcohol use. He denied any illicit drug use. O:  COMPLETE PHYSICAL EXAM:      BP (!) 96/50   Pulse 52   Ht 6' 1\" (1.854 m)   Wt 229 lb (103.9 kg)   BMI 30.21 kg/m²      General:  Well-developed, well-nourished male in no acute distress. HEENT: Normocephalic and atraumatic head. No JVD. No carotid bruits. Carotid upstrokes normal bilaterally. No thyromegaly. Sclerae not icteric. No xanthelasmas. Mucous membranes moist.  Chest:  Symmetrical and nontender. No deformities. Old sternotomy scar well-healed. Lungs:  Clear to auscultation bilaterally.   Heart:  Normal S1 and S2. No S3 or S4. No murmurs or rubs. Abdomen: Soft, nontender without organomegaly or masses. No bruits. Normal bowel sounds. Extremities: Trace edema. Pulses palpable. Skin:  Normal turgor. No rashes or ulcers noted. Neurologic: Oriented x3. No motor or sensory deficits detected. REVIEW OF DIAGNOSTIC TESTS:    1. EKG done today showed sinus bradycardia with a heart rate of 52 bpm with possible old inferior wall myocardial infarction with borderline ST elevations in the inferior leads. ASSESSMENT / DIAGNOSIS:   1. Coronary artery disease:  Clinically stable. 2.  Borderline low blood pressure:  History of hypertension. 3.  Paroxysmal atrial fibrillation, in sinus rhythm/sinus bradycardia on amiodarone therapy. 4.  Anticoagulated on Eliquis. 5.  Insulin requiring diabetes mellitus. 6.  Hyperlipidemia, on statin therapy. 7.  Obesity. 8.  Erectile dysfunction. 9.  Right inguinal hernia. TREATMENT PLAN:  1. Reassure. 2.  Decrease Lisinopril to 10 mg daily. 3.  Rest of cardiac medications same. 4.  Cardiac rehab referral.   5.  Will defer on right inguinal hernia repair for now:  Patient cannot stop antiplatelet therapy. 6.  Follow up with Cardiology in 3 months or on a prn basis. Swapnil Guardado.  [place] [id number]  [phone]

## 2022-03-23 VITALS
WEIGHT: 233 LBS | SYSTOLIC BLOOD PRESSURE: 108 MMHG | HEART RATE: 52 BPM | HEIGHT: 72 IN | BODY MASS INDEX: 31.56 KG/M2 | OXYGEN SATURATION: 94 % | DIASTOLIC BLOOD PRESSURE: 68 MMHG | TEMPERATURE: 97.4 F

## 2022-03-25 ENCOUNTER — HOSPITAL ENCOUNTER (OUTPATIENT)
Dept: CARDIAC REHAB | Age: 72
Setting detail: THERAPIES SERIES
Discharge: HOME | End: 2022-03-25
Payer: COMMERCIAL

## 2022-03-25 VITALS
WEIGHT: 225 LBS | DIASTOLIC BLOOD PRESSURE: 60 MMHG | HEART RATE: 48 BPM | SYSTOLIC BLOOD PRESSURE: 132 MMHG | HEIGHT: 73 IN | BODY MASS INDEX: 29.82 KG/M2 | OXYGEN SATURATION: 96 %

## 2022-03-25 ASSESSMENT — PATIENT HEALTH QUESTIONNAIRE - PHQ9
SUM OF ALL RESPONSES TO PHQ QUESTIONS 1-9: 0
8. MOVING OR SPEAKING SO SLOWLY THAT OTHER PEOPLE COULD HAVE NOTICED. OR THE OPPOSITE, BEING SO FIGETY OR RESTLESS THAT YOU HAVE BEEN MOVING AROUND A LOT MORE THAN USUAL: 0
SUM OF ALL RESPONSES TO PHQ9 QUESTIONS 1 & 2: 0
5. POOR APPETITE OR OVEREATING: 0
9. THOUGHTS THAT YOU WOULD BE BETTER OFF DEAD, OR OF HURTING YOURSELF: 0
7. TROUBLE CONCENTRATING ON THINGS, SUCH AS READING THE NEWSPAPER OR WATCHING TELEVISION: 0
10. IF YOU CHECKED OFF ANY PROBLEMS, HOW DIFFICULT HAVE THESE PROBLEMS MADE IT FOR YOU TO DO YOUR WORK, TAKE CARE OF THINGS AT HOME, OR GET ALONG WITH OTHER PEOPLE: 0
1. LITTLE INTEREST OR PLEASURE IN DOING THINGS: 0
3. TROUBLE FALLING OR STAYING ASLEEP: 0
2. FEELING DOWN, DEPRESSED OR HOPELESS: 0
SUM OF ALL RESPONSES TO PHQ QUESTIONS 1-9: 0
SUM OF ALL RESPONSES TO PHQ QUESTIONS 1-9: 0
4. FEELING TIRED OR HAVING LITTLE ENERGY: 0
6. FEELING BAD ABOUT YOURSELF - OR THAT YOU ARE A FAILURE OR HAVE LET YOURSELF OR YOUR FAMILY DOWN: 0
SUM OF ALL RESPONSES TO PHQ QUESTIONS 1-9: 0

## 2022-03-25 ASSESSMENT — EXERCISE STRESS TEST
PEAK_BP: 140/68
PEAK_HR: 65

## 2022-03-25 ASSESSMENT — EJECTION FRACTION: EF_VALUE: 55

## 2022-03-28 ENCOUNTER — HOSPITAL ENCOUNTER (OUTPATIENT)
Dept: CARDIAC REHAB | Age: 72
Setting detail: THERAPIES SERIES
Discharge: HOME | End: 2022-03-28
Payer: COMMERCIAL

## 2022-03-28 ENCOUNTER — TELEPHONE (OUTPATIENT)
Dept: CARDIOLOGY CLINIC | Age: 72
End: 2022-03-28

## 2022-03-28 PROCEDURE — 93798 PHYS/QHP OP CAR RHAB W/ECG: CPT

## 2022-03-28 NOTE — TELEPHONE ENCOUNTER
Patient stopped into the office to inquire about adjustments for his Lisinopril. He states the medication was reduced to 10 mg daily during 3/17/22 office visit due to low heart rate. He took the reduced medication for 1 week and was still having a low heart rate of 46 and palpitations. He did not take the Lisinopril for 2 days, Saturday & Sunday. He stated his palpitations almost disappeared completely & his heart rate was in the 60's. Please advise.

## 2022-03-28 NOTE — TELEPHONE ENCOUNTER
He is obviously confused about his medications  His lisinopril was decreased at the time of his office visit because of low BP and not low heart rate  He was also on Toprol XL    Have him come to the office with all his medications bottles for review and for BP and HR check

## 2022-03-29 NOTE — TELEPHONE ENCOUNTER
Patient stated he believes his body was just getting adjusted to the medications. His blood pressure and heart rate are fine & he is having no palpitations. He does not feel he needs to come into the office at this time. Table restraints applied according to hospital policy.

## 2022-03-30 ENCOUNTER — HOSPITAL ENCOUNTER (OUTPATIENT)
Dept: CARDIAC REHAB | Age: 72
Setting detail: THERAPIES SERIES
Discharge: HOME | End: 2022-03-30
Payer: COMMERCIAL

## 2022-03-30 PROCEDURE — 93798 PHYS/QHP OP CAR RHAB W/ECG: CPT

## 2022-03-30 RX ORDER — ISOSORBIDE MONONITRATE 60 MG/1
60 TABLET, EXTENDED RELEASE ORAL DAILY
Qty: 90 TABLET | Refills: 3 | Status: SHIPPED | OUTPATIENT
Start: 2022-03-30

## 2022-03-30 RX ORDER — ROSUVASTATIN CALCIUM 20 MG/1
20 TABLET, COATED ORAL DAILY
Qty: 90 TABLET | Refills: 3 | Status: SHIPPED | OUTPATIENT
Start: 2022-03-30

## 2022-03-30 RX ORDER — CLOPIDOGREL BISULFATE 75 MG/1
TABLET ORAL
Qty: 90 TABLET | Refills: 3 | Status: SHIPPED | OUTPATIENT
Start: 2022-03-30

## 2022-03-30 RX ORDER — AMIODARONE HYDROCHLORIDE 200 MG/1
200 TABLET ORAL 2 TIMES DAILY
Qty: 180 TABLET | Refills: 3 | Status: SHIPPED
Start: 2022-03-30 | End: 2022-05-19 | Stop reason: DRUGHIGH

## 2022-04-01 ENCOUNTER — HOSPITAL ENCOUNTER (OUTPATIENT)
Dept: CARDIAC REHAB | Age: 72
Setting detail: THERAPIES SERIES
Discharge: HOME | End: 2022-04-01
Payer: COMMERCIAL

## 2022-04-01 PROCEDURE — 93798 PHYS/QHP OP CAR RHAB W/ECG: CPT

## 2022-04-04 ENCOUNTER — HOSPITAL ENCOUNTER (OUTPATIENT)
Dept: CARDIAC REHAB | Age: 72
Setting detail: THERAPIES SERIES
Discharge: HOME | End: 2022-04-04
Payer: COMMERCIAL

## 2022-04-04 PROCEDURE — 93798 PHYS/QHP OP CAR RHAB W/ECG: CPT

## 2022-04-05 ENCOUNTER — TELEPHONE (OUTPATIENT)
Dept: CARDIAC REHAB | Age: 72
End: 2022-04-05

## 2022-04-05 NOTE — TELEPHONE ENCOUNTER
4/5/2022 11:21 am Nutrition: Left voice message on this date regarding appointment for 4/6/2022 at 12 noon. Will remain available.  Electronically signed by Nidia Lovelace RD, LD on 4/5/22 at 11:22 AM EDT

## 2022-04-06 ENCOUNTER — HOSPITAL ENCOUNTER (OUTPATIENT)
Dept: CARDIAC REHAB | Age: 72
Setting detail: THERAPIES SERIES
Discharge: HOME | End: 2022-04-06
Payer: COMMERCIAL

## 2022-04-06 VITALS — HEIGHT: 73 IN | BODY MASS INDEX: 29.55 KG/M2 | WEIGHT: 223 LBS

## 2022-04-06 PROCEDURE — 93798 PHYS/QHP OP CAR RHAB W/ECG: CPT

## 2022-04-06 PROCEDURE — 93797 PHYS/QHP OP CAR RHAB WO ECG: CPT

## 2022-04-06 NOTE — PROGRESS NOTES
Cardiac Rehabilitation Nutrition Assessment      NAME: Cuong Rene : 1950 AGE: 66 y.o. GENDER: male    CARDIAC REHAB ADMITTING DIAGNOSIS: NSTEMI     PROBLEM LIST:    Patient Active Problem List   Diagnosis    Right inguinal hernia    [facility]) [facility] [address] [facility]    CAD in native artery    Atrial fibrillation with rapid ventricular response ([facility] [facility]    Acute coronary syndrome ([facility] [address] [facility]    Hx of CABG    Chest pain    NSTEMI (non-ST elevated myocardial infarction) ([facility] [address]     Additional Pertinent Past Medical/Surgical History: Hypertension, Hyperlipidemia, Type 2 diabetes mellitus       LABS:    HgbA1c 7.4 ( 3/1/22) (high)     Lab Results   Component Value Date    CHOL 136 2022    HDL 37 2022    LDLCALC 59 2022    TRIG 199 2022    LABVLDL 40 2022       MEDICATIONS/SUPPLEMENTS    Prior to Admission medications    Medication Sig Start Date End Date Taking?  Authorizing Provider   clopidogrel (PLAVIX) 75 MG tablet clopidogrel 75 mg tablet   take 1 tablet by mouth once daily as directed 3/30/22   Talita Herr MD   isosorbide mononitrate (IMDUR) 60 MG extended release tablet Take 1 tablet by mouth daily 3/30/22   Talita Herr MD   apixaban (ELIQUIS) 5 MG TABS tablet Take 1 tablet by mouth 2 times daily 3/30/22   Talita Herr MD   amiodarone (CORDARONE) 200 MG tablet Take 1 tablet by mouth 2 times daily 3/30/22 4/29/22  Talita Herr MD   rosuvastatin (CRESTOR) 20 MG tablet Take 1 tablet by mouth daily 3/30/22   Talita Herr MD   lisinopril (PRINIVIL;ZESTRIL) 10 MG tablet 1 tablet daily 3/17/22   Talita Herr MD   allopurinol (ZYLOPRIM) 100 MG tablet Take 1 tablet by mouth daily 3/9/22 4/8/22  Vaishnavi Carmona DO   metoprolol succinate (TOPROL XL) 25 MG extended release tablet Take 1 tablet by mouth daily 22   Sheryl Ray MD   glimepiride (AMARYL) 4 MG tablet Take 4 mg by mouth at bedtime    Historical Provider, MD   LANTUS SOLOSTAR 100 UNIT/ML injection pen inject 60 units subcutaneously once daily 1/22/22   Historical Provider, MD   nitroGLYCERIN (NITROSTAT) 0.4 MG SL tablet place 1 tablet under the tongue if needed 1/27/22   Historical Provider, MD   tamsulosin (FLOMAX) 0.4 MG capsule 0.4 mg daily     Historical Provider, MD     Pertinent Lipid Lowering Medications: Crestor per order- Low cholesterol diet indicated. ANTHROPOMETRICS:    Ht Readings from Last 1 Encounters:   04/06/22 6' 1\" (1.854 m)      Wt Readings from Last 10 Encounters:   04/06/22 223 lb (101.2 kg)   03/25/22 225 lb (102.1 kg)   10/26/21 233 lb (105.7 kg)   03/17/22 229 lb (103.9 kg)   03/02/22 230 lb 9.6 oz (104.6 kg)   02/26/22 236 lb (107 kg)   02/14/22 232 lb (105.2 kg)   02/09/22 232 lb (105.2 kg)                 IBW:184 lbs +/- 10%       %IBW: 121%                BMI:29.5 ( Overweight)     Waist: 45 1/2 inches ( Waist circumference for male is 40 inches). Reported Wt Hx:Patient reports weight prior to cardiac event 236 pounds. Usual weight 229 pounds recently; Patient has lost 7 pounds indicating 2.9% weight loss over 1 month. Weight loss beneficial.        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;        Rate Your Plate QETGV:76     (Score 58-72: Making many healthy choices; 41-57: Some choices need improving 24-40: many choices need improving)         24 HOUR DIET RECALL    Breakfast5 am, at home, 2 soft boiled eggs, dry rye toast with butter and 2 cups decaf coffee ( plain)        Lunch: 1 pm, at home, 1 container of sardines and 1 hard boiled egg, water ( skips lunch occasionally)        Dinner: 6:00 pm, at home,  3-4 ounces  Cod filet, 1 baked plain sweet potato,  1 serving  tossed sald with olive oil and lemon juice and water to drink      Snacks: 8:30 pm ?, at home few lemon snap cookies     Bed time:9 pm,  At home,  1 small can  ginger ale at bedtime (regular? Diet?)     Beverages: not drinking pop - drinks 2 cups decaf coffee;  16 ounces water , drinks  Regular ice tea with no sugar most days. Lizbeth Mcnally     Environmental/Social:No alcohol; has adequate funds for food; working at [employer]; -half-way planned soon. NUTRITION INTERVENTION:    Nutrition 30 / 60 minute one-on-one education & goal setting with Lizbeth Mcnally     Reviewed with Lizbeth Mcnally relevant labs compared to ideals. Reviewed weight history and patient's verbalized weight goal as well as any real or perceived barriers to obtaining the goal. Collaborated with patient to set a specific short and long term weight goal.     Reviewed Rate Your Plate and conducted a verbal diet recall. Assessed for environmental, financial, psychosocial, physical and comorbidities that may impact the food and eating patterns / behaviors of [address] with patient to set specific nutrient goals as well as specific food / behavior changes that will help patient meet the overall goal of following a heart healthy eating pattern (using guidelines as set forth by the American Heart Association and modeled after healthful eating patterns as recognized by the USDA Dietary Guidelines such as DASH, Mediterranean or plant-based). Briefly reviewed with Lizbeth Mcnally the nutrition information: My Plate, Dietary Resolutions, food log, MNT Heart Healthy Consistent Carbohydrate intake  and encouraged Lizbeth Mcnally to read thoroughly, ask questions as needed, and use for future reference for heart healthy nutrition information. Lizbeth Mcnally  is not scheduled to participate in Cardiac Rehab group nutrition classes. PATIENT GOALS:     Weight Goals:Patient will eat at least 1-2 pounds every 2 weeks. Short Term Weight Goal:220  lbs    Long Term Weight Goal:200  lbs         Nutrition Goals:Patient will try to follow my plate dietary guidelines.      Daily Recommendations:Patient will try to eat 3 meals and 1 to 2 snacks per day. Calories: 2300 calories/day minus calories for weight loss    (using [facility] [place]. GGBR(1873)  with AF 1.3)    Estimated Total Protein needs 67- 84 grams/day ( based on 0.8 to one gram/kg IBW)    Estimated Total Fluids needs: 2500- 3000 ml/day ( based on 25 to 30 ml/kg actual body weight)      Saturated Fat: no more than 20  g/day    Trans Fat: 0 g/day    Sodium: no more than 1500  mg/day    Fruit: 3 cups / day    Vegetables: 2  cups/day         Other:    - read and compare food labels    -continue to limit portions    -increase water consumption. Keeping a food diary was recommended. Patient is a phase II participant and ITP done. Questions addressed. Follow-up plans discussed Contact phone number provided. Rosemary Morgan verbalized understanding. Autumn Resendiz MA, RDN, LD  Contact phone [phone].

## 2022-04-08 ENCOUNTER — HOSPITAL ENCOUNTER (OUTPATIENT)
Dept: CARDIAC REHAB | Age: 72
Setting detail: THERAPIES SERIES
Discharge: HOME | End: 2022-04-08
Payer: COMMERCIAL

## 2022-04-08 PROCEDURE — 93798 PHYS/QHP OP CAR RHAB W/ECG: CPT

## 2022-04-11 ENCOUNTER — HOSPITAL ENCOUNTER (OUTPATIENT)
Dept: CARDIAC REHAB | Age: 72
Setting detail: THERAPIES SERIES
Discharge: HOME | End: 2022-04-11
Payer: COMMERCIAL

## 2022-04-11 PROCEDURE — 93798 PHYS/QHP OP CAR RHAB W/ECG: CPT

## 2022-04-13 ENCOUNTER — HOSPITAL ENCOUNTER (OUTPATIENT)
Dept: CARDIAC REHAB | Age: 72
Setting detail: THERAPIES SERIES
Discharge: HOME | End: 2022-04-13
Payer: COMMERCIAL

## 2022-04-13 PROCEDURE — 93798 PHYS/QHP OP CAR RHAB W/ECG: CPT

## 2022-04-15 ENCOUNTER — HOSPITAL ENCOUNTER (OUTPATIENT)
Dept: CARDIAC REHAB | Age: 72
Setting detail: THERAPIES SERIES
Discharge: HOME | End: 2022-04-15
Payer: COMMERCIAL

## 2022-04-15 PROCEDURE — 93798 PHYS/QHP OP CAR RHAB W/ECG: CPT

## 2022-04-18 ENCOUNTER — HOSPITAL ENCOUNTER (OUTPATIENT)
Dept: CARDIAC REHAB | Age: 72
Setting detail: THERAPIES SERIES
Discharge: HOME | End: 2022-04-18
Payer: COMMERCIAL

## 2022-04-18 PROCEDURE — 93798 PHYS/QHP OP CAR RHAB W/ECG: CPT

## 2022-04-20 ENCOUNTER — HOSPITAL ENCOUNTER (OUTPATIENT)
Dept: CARDIAC REHAB | Age: 72
Setting detail: THERAPIES SERIES
Discharge: HOME | End: 2022-04-20
Payer: COMMERCIAL

## 2022-04-20 PROCEDURE — 93798 PHYS/QHP OP CAR RHAB W/ECG: CPT

## 2022-04-22 ENCOUNTER — HOSPITAL ENCOUNTER (OUTPATIENT)
Dept: CARDIAC REHAB | Age: 72
Setting detail: THERAPIES SERIES
Discharge: HOME | End: 2022-04-22
Payer: COMMERCIAL

## 2022-04-22 PROCEDURE — 93798 PHYS/QHP OP CAR RHAB W/ECG: CPT

## 2022-04-25 ENCOUNTER — HOSPITAL ENCOUNTER (OUTPATIENT)
Dept: CARDIAC REHAB | Age: 72
Setting detail: THERAPIES SERIES
Discharge: HOME | End: 2022-04-25
Payer: COMMERCIAL

## 2022-04-25 PROCEDURE — 93798 PHYS/QHP OP CAR RHAB W/ECG: CPT

## 2022-04-25 ASSESSMENT — PATIENT HEALTH QUESTIONNAIRE - PHQ9
SUM OF ALL RESPONSES TO PHQ QUESTIONS 1-9: 0
SUM OF ALL RESPONSES TO PHQ QUESTIONS 1-9: 0
6. FEELING BAD ABOUT YOURSELF - OR THAT YOU ARE A FAILURE OR HAVE LET YOURSELF OR YOUR FAMILY DOWN: 0
5. POOR APPETITE OR OVEREATING: 0
1. LITTLE INTEREST OR PLEASURE IN DOING THINGS: 0
2. FEELING DOWN, DEPRESSED OR HOPELESS: 0
7. TROUBLE CONCENTRATING ON THINGS, SUCH AS READING THE NEWSPAPER OR WATCHING TELEVISION: 0
9. THOUGHTS THAT YOU WOULD BE BETTER OFF DEAD, OR OF HURTING YOURSELF: 0
SUM OF ALL RESPONSES TO PHQ QUESTIONS 1-9: 0
3. TROUBLE FALLING OR STAYING ASLEEP: 0
SUM OF ALL RESPONSES TO PHQ9 QUESTIONS 1 & 2: 0
8. MOVING OR SPEAKING SO SLOWLY THAT OTHER PEOPLE COULD HAVE NOTICED. OR THE OPPOSITE, BEING SO FIGETY OR RESTLESS THAT YOU HAVE BEEN MOVING AROUND A LOT MORE THAN USUAL: 0
SUM OF ALL RESPONSES TO PHQ QUESTIONS 1-9: 0
4. FEELING TIRED OR HAVING LITTLE ENERGY: 0
10. IF YOU CHECKED OFF ANY PROBLEMS, HOW DIFFICULT HAVE THESE PROBLEMS MADE IT FOR YOU TO DO YOUR WORK, TAKE CARE OF THINGS AT HOME, OR GET ALONG WITH OTHER PEOPLE: 0

## 2022-04-27 ENCOUNTER — HOSPITAL ENCOUNTER (OUTPATIENT)
Dept: CARDIAC REHAB | Age: 72
Setting detail: THERAPIES SERIES
Discharge: HOME | End: 2022-04-27
Payer: COMMERCIAL

## 2022-04-27 PROCEDURE — 93798 PHYS/QHP OP CAR RHAB W/ECG: CPT

## 2022-04-29 ENCOUNTER — HOSPITAL ENCOUNTER (OUTPATIENT)
Dept: CARDIAC REHAB | Age: 72
Setting detail: THERAPIES SERIES
Discharge: HOME | End: 2022-04-29
Payer: COMMERCIAL

## 2022-04-29 PROCEDURE — 93798 PHYS/QHP OP CAR RHAB W/ECG: CPT

## 2022-05-02 ENCOUNTER — HOSPITAL ENCOUNTER (OUTPATIENT)
Dept: CARDIAC REHAB | Age: 72
Setting detail: THERAPIES SERIES
Discharge: HOME | End: 2022-05-02
Payer: COMMERCIAL

## 2022-05-02 PROCEDURE — 93798 PHYS/QHP OP CAR RHAB W/ECG: CPT

## 2022-05-03 ENCOUNTER — TELEPHONE (OUTPATIENT)
Dept: CARDIAC REHAB | Age: 72
End: 2022-05-03

## 2022-05-03 NOTE — TELEPHONE ENCOUNTER
5/3/2022 1351 Nutrition: Spoke with wife, [place], on this date by phone confirming appointment for 5/4/2022 at 12:00 noon. Will remain available.  Electronically signed by Khushbu Godoy RD, LD on 5/3/22 at 1:51 PM EDT

## 2022-05-04 ENCOUNTER — HOSPITAL ENCOUNTER (OUTPATIENT)
Dept: CARDIAC REHAB | Age: 72
Setting detail: THERAPIES SERIES
Discharge: HOME | End: 2022-05-04
Payer: COMMERCIAL

## 2022-05-04 VITALS — BODY MASS INDEX: 30.26 KG/M2 | WEIGHT: 228.3 LBS | HEIGHT: 73 IN

## 2022-05-04 PROCEDURE — 93798 PHYS/QHP OP CAR RHAB W/ECG: CPT

## 2022-05-04 NOTE — PROGRESS NOTES
Cardiac Rehabilitation Nutrition Follow Up      NAME: Rhoda Causey : 1950 AGE: 66 y.o. GENDER: male    CARDIAC REHAB ADMITTING DIAGNOSIS: NSTEMI      PROBLEM LIST:    Patient Active Problem List   Diagnosis    Right inguinal hernia    STEM[facility]) [facility]    CAD in native artery    Atrial fibrillation with rapid ventricular response (HCC)    Acute coronary syndrome ([facility]    Hx of CABG    Chest pain    NSTEMI (non-ST elevated myocardial infarction) ([facility]      Additional Pertinent past medical/surgical history: Type 2 diabetes mellitus. LABS:    Hemoglobin A1c 7.4 ( 3/1/22) (high)     Lab Results   Component Value Date    CHOL 136 2022    HDL 37 2022    LDLCALC 59 2022    TRIG 199 2022    LABVLDL 40 2022     MEDICATIONS/SUPPLEMENTS:      Prior to Admission medications    Medication Sig Start Date End Date Taking?  Authorizing Provider   clopidogrel (PLAVIX) 75 MG tablet clopidogrel 75 mg tablet   take 1 tablet by mouth once daily as directed 3/30/22   Xu Wolfe MD   isosorbide mononitrate (IMDUR) 60 MG extended release tablet Take 1 tablet by mouth daily 3/30/22   Xu Wolfe MD   apixaban (ELIQUIS) 5 MG TABS tablet Take 1 tablet by mouth 2 times daily 3/30/22   Xu Wolfe MD   amiodarone (CORDARONE) 200 MG tablet Take 1 tablet by mouth 2 times daily 3/30/22 4/29/22  Xu Wolfe MD   rosuvastatin (CRESTOR) 20 MG tablet Take 1 tablet by mouth daily 3/30/22   Xu Wolfe MD   lisinopril (PRINIVIL;ZESTRIL) 10 MG tablet 1 tablet daily 3/17/22   Xu Wolfe MD   allopurinol (ZYLOPRIM) 100 MG tablet Take 1 tablet by mouth daily 3/9/22 4/8/22  Danica Carmona DO   metoprolol succinate (TOPROL XL) 25 MG extended release tablet Take 1 tablet by mouth daily 22   Betzaida Neal MD   glimepiride (AMARYL) 4 MG tablet Take 4 mg by mouth at bedtime    Historical Provider, MD   LANTUS SOLOSTAR 100 UNIT/ML injection pen inject 60 units subcutaneously once daily 1/22/22   Historical Provider, MD   nitroGLYCERIN (NITROSTAT) 0.4 MG SL tablet place 1 tablet under the tongue if needed 1/27/22   Historical Provider, MD   tamsulosin (FLOMAX) 0.4 MG capsule 0.4 mg daily     Historical Provider, MD     Patient reports he is still taking medications including Lipitor but unable to take Plavix due to nose bleeds. On Eliquis per order. Glucose this am 152. ANTHROPOMETRICS:    Ht Readings from Last 1 Encounters:   05/04/22 6' 1\" (1.854 m)      Wt Readings from Last 10 Encounters:   05/04/22 228 lb 4.8 oz (103.6 kg)   04/06/22 223 lb (101.2 kg)   03/25/22 225 lb (102.1 kg)   10/26/21 233 lb (105.7 kg)   03/17/22 229 lb (103.9 kg)   03/02/22 230 lb 9.6 oz (104.6 kg)   02/26/22 236 lb (107 kg)   02/14/22 232 lb (105.2 kg)   02/09/22 232 lb (105.2 kg)                 IBW:184 # +/- 10%       %IBW: 123%              BMI: 30.2 ( Obesity Class I)     Waist Circumference: deferred. Reported Wt Hx:Patient weight increased by 5 pounds over one month indicating 2.2% weight gain. Weight gain most likely muscle. Patient reports increased muscle tone. Goal not met but goal on going. Reported Diet Hx:Patient is eating 2 meals and occasionally eating 3 meals. Patient is having 1 to 2 snacks per day. Goals in progress and goals on going. Patient reports he is limiting his portions. Goal met and goal on gong for discharge. Patient is increasing water consumption. Currently drinking 48 ounces or more water. Goal in progress and goal on going. Rate Your Plate Score: 62 ( on file)     (Score 58-72: Making many healthy choices; 41-57: Some choices need improving 24-40: many choices need improving)         24 HOUR DIET RECALL    Breakfast: has 1 glass water with medications when first arises.   (alternate time?, at home: 1 cup granola, with raspberries and 1 tall plain decaf coffee or 1 [employer] sesame seed  full bagel with regular  cream cheese x1 per week) Lunch:time?, work,   1 bowl [employer] regular tomato soup and no beverage     at work had no sugar vitamin water ( has one bottle daily     Dinner: time?, at home, 3 ounces tuna steak grilled with salad ( lettuce, dried cranberries, cucumbes, onions, tomatoes with dry blue cheese dressing)  Drank water      Snacks: 1 small granola bar with red cranberries and nuts      Beverages: ( total - 48 ounces water or more per day and 2 cups decaf coffee)        Konnie Severs plans to retire in one month and travel to [facility] for 6 weeks with wife. Environmental/Social:had 1 glass white low sugar  Wine infrequently  ; first alcoholic drink  in 6 months; continues to have adequate funds for groceries. Patient wife is purchasing additional beans and rice in case of food shortage. NUTRITION INTERVENTION:    Nutrition 30 / 60 minute one-on-one education & goal setting with Konnie Severs    No new labs. Reviewed weight history and patient's verbalized weight goal as well as any real or perceived barriers to obtaining the goal. Collaborated with patient to set a specific short and long term weight goal.    Conducted a verbal diet recall. Assessed for environmental, financial, psychosocial, physical and comorbidities that may impact the food and eating patterns / behaviors of [address] with patient to set specific nutrient goals as well as specific food / behavior changes that will help patient meet the overall goal of following a heart healthy eating pattern (using guidelines as set forth by the American Heart Association and modeled after healthful eating patterns as recognized by the USDA Dietary Guidelines such as DASH, Mediterranean or plant-based).       Briefly reviewed with Konnie Severs the nutrition information: serving size and menu planning  and encouraged Konnie Severs to read thoroughly, ask questions as needed, and use for future reference for heart healthy nutrition information. Epifanio Goodwin  is not scheduled to participate in Cardiac Rehab group nutrition class. PATIENT GOALS:     Weight Goals:Patient will eat at least 1-2 pounds every 2 weeks.      Short Term Weight Goal:220  lbs( update: no change)    Long Term Weight Goal:200  lbs( update: no change)         Nutrition Goals:Patient will try to follow my plate dietary guidelines.      Daily Recommendations:Patient will try to eat 3 meals and 1 to 2 snacks per day.      Calories: 2400  calories/day minus calories for weight loss     (using [address]( [address]12) with AF 1.3)     Estimated Total Protein needs 67- 84 grams/day ( based on 0.8 to one gram/kg IBW)     Estimated Total Fluids needs: 2500- 3100 ml/day ( based on 25 to 30 ml/kg actual body weight)       Saturated Fat: no more than 20  g/day     Trans Fat: 0 g/day     Sodium: no more than 1500  mg/day     Fruit: 3 cups / day     Vegetables: 2  cups/day      Other:    - read and compare food labels    -Continue to have evening snack and snack before exercise to maintain glucose levels.     -Complete menu planning sheet.    -Complete food log. Questions addressed. Follow-up plans discussed. Contact information provided. Epifanio Goodwin verbalized understanding. Autumn Pool MA, RDN, LD  Contact Phone [phone].

## 2022-05-06 ENCOUNTER — HOSPITAL ENCOUNTER (OUTPATIENT)
Dept: CARDIAC REHAB | Age: 72
Setting detail: THERAPIES SERIES
Discharge: HOME | End: 2022-05-06
Payer: COMMERCIAL

## 2022-05-06 PROCEDURE — 93798 PHYS/QHP OP CAR RHAB W/ECG: CPT

## 2022-05-09 ENCOUNTER — HOSPITAL ENCOUNTER (OUTPATIENT)
Dept: CARDIAC REHAB | Age: 72
Setting detail: THERAPIES SERIES
Discharge: HOME | End: 2022-05-09
Payer: COMMERCIAL

## 2022-05-09 PROCEDURE — 93798 PHYS/QHP OP CAR RHAB W/ECG: CPT

## 2022-05-11 ENCOUNTER — HOSPITAL ENCOUNTER (OUTPATIENT)
Dept: CARDIAC REHAB | Age: 72
Setting detail: THERAPIES SERIES
Discharge: HOME | End: 2022-05-11
Payer: COMMERCIAL

## 2022-05-11 PROCEDURE — 93798 PHYS/QHP OP CAR RHAB W/ECG: CPT

## 2022-05-13 ENCOUNTER — HOSPITAL ENCOUNTER (OUTPATIENT)
Dept: CARDIAC REHAB | Age: 72
Setting detail: THERAPIES SERIES
Discharge: HOME | End: 2022-05-13
Payer: COMMERCIAL

## 2022-05-13 PROCEDURE — 93798 PHYS/QHP OP CAR RHAB W/ECG: CPT

## 2022-05-16 ENCOUNTER — HOSPITAL ENCOUNTER (OUTPATIENT)
Dept: CARDIAC REHAB | Age: 72
Setting detail: THERAPIES SERIES
Discharge: HOME | End: 2022-05-16
Payer: COMMERCIAL

## 2022-05-16 PROCEDURE — 93798 PHYS/QHP OP CAR RHAB W/ECG: CPT

## 2022-05-18 ENCOUNTER — HOSPITAL ENCOUNTER (OUTPATIENT)
Dept: CARDIAC REHAB | Age: 72
Setting detail: THERAPIES SERIES
Discharge: HOME | End: 2022-05-18
Payer: COMMERCIAL

## 2022-05-18 PROCEDURE — 93798 PHYS/QHP OP CAR RHAB W/ECG: CPT

## 2022-05-19 ENCOUNTER — HOSPITAL ENCOUNTER (OUTPATIENT)
Age: 72
Discharge: HOME | End: 2022-05-19
Payer: COMMERCIAL

## 2022-05-19 ENCOUNTER — OFFICE VISIT (OUTPATIENT)
Dept: CARDIOLOGY CLINIC | Age: 72
End: 2022-05-19
Payer: COMMERCIAL

## 2022-05-19 VITALS
HEART RATE: 46 BPM | HEIGHT: 73 IN | SYSTOLIC BLOOD PRESSURE: 120 MMHG | DIASTOLIC BLOOD PRESSURE: 60 MMHG | WEIGHT: 231 LBS | BODY MASS INDEX: 30.62 KG/M2 | RESPIRATION RATE: 16 BRPM

## 2022-05-19 DIAGNOSIS — I25.810 CORONARY ARTERY DISEASE INVOLVING CORONARY BYPASS GRAFT OF NATIVE HEART WITHOUT ANGINA PECTORIS: ICD-10-CM

## 2022-05-19 DIAGNOSIS — I25.10 CORONARY ARTERY DISEASE INVOLVING NATIVE CORONARY ARTERY OF NATIVE HEART WITHOUT ANGINA PECTORIS: Primary | ICD-10-CM

## 2022-05-19 DIAGNOSIS — Z79.899 ON AMIODARONE THERAPY: ICD-10-CM

## 2022-05-19 DIAGNOSIS — K40.90 RIGHT INGUINAL HERNIA: ICD-10-CM

## 2022-05-19 DIAGNOSIS — I48.0 PAF (PAROXYSMAL ATRIAL FIBRILLATION) (HCC): ICD-10-CM

## 2022-05-19 DIAGNOSIS — I25.2 OLD INFERIOR WALL MYOCARDIAL INFARCTION: ICD-10-CM

## 2022-05-19 DIAGNOSIS — Z79.01 ANTICOAGULATED BY ANTICOAGULATION TREATMENT: ICD-10-CM

## 2022-05-19 DIAGNOSIS — E66.09 NON MORBID OBESITY DUE TO EXCESS CALORIES: ICD-10-CM

## 2022-05-19 DIAGNOSIS — Z79.4 INSULIN-REQUIRING OR DEPENDENT TYPE II DIABETES MELLITUS (HCC): ICD-10-CM

## 2022-05-19 DIAGNOSIS — Z95.1 HX OF CABG: ICD-10-CM

## 2022-05-19 DIAGNOSIS — N52.9 ERECTILE DYSFUNCTION, UNSPECIFIED ERECTILE DYSFUNCTION TYPE: ICD-10-CM

## 2022-05-19 DIAGNOSIS — R00.1 SINUS BRADYCARDIA: ICD-10-CM

## 2022-05-19 DIAGNOSIS — Z86.79 H/O: HTN (HYPERTENSION): ICD-10-CM

## 2022-05-19 DIAGNOSIS — I25.2 HISTORY OF NON-ST ELEVATION MYOCARDIAL INFARCTION (NSTEMI): ICD-10-CM

## 2022-05-19 DIAGNOSIS — Z98.61 HISTORY OF PTCA: ICD-10-CM

## 2022-05-19 DIAGNOSIS — E78.5 DYSLIPIDEMIA: ICD-10-CM

## 2022-05-19 DIAGNOSIS — E11.9 INSULIN-REQUIRING OR DEPENDENT TYPE II DIABETES MELLITUS (HCC): ICD-10-CM

## 2022-05-19 LAB
T3 FREE: 3 PG/ML (ref 2–4.4)
T4 FREE: 1.23 NG/DL (ref 0.93–1.7)
TSH SERPL DL<=0.05 MIU/L-ACNC: 1.56 UIU/ML (ref 0.27–4.2)

## 2022-05-19 PROCEDURE — 99214 OFFICE O/P EST MOD 30 MIN: CPT | Performed by: INTERNAL MEDICINE

## 2022-05-19 PROCEDURE — 93000 ELECTROCARDIOGRAM COMPLETE: CPT | Performed by: INTERNAL MEDICINE

## 2022-05-19 PROCEDURE — 84443 ASSAY THYROID STIM HORMONE: CPT

## 2022-05-19 PROCEDURE — 3051F HG A1C>EQUAL 7.0%<8.0%: CPT | Performed by: INTERNAL MEDICINE

## 2022-05-19 PROCEDURE — 36415 COLL VENOUS BLD VENIPUNCTURE: CPT

## 2022-05-19 PROCEDURE — 84481 FREE ASSAY (FT-3): CPT

## 2022-05-19 PROCEDURE — 84439 ASSAY OF FREE THYROXINE: CPT

## 2022-05-19 RX ORDER — ASPIRIN 81 MG/1
81 TABLET ORAL DAILY
COMMUNITY

## 2022-05-19 RX ORDER — AMIODARONE HYDROCHLORIDE 200 MG/1
200 TABLET ORAL DAILY
Qty: 90 TABLET | Refills: 3 | Status: SHIPPED | OUTPATIENT
Start: 2022-05-19 | End: 2022-06-18

## 2022-05-19 NOTE — PROGRESS NOTES
OFFICE VISIT        PRIMARY CARE PHYSICIAN:    Dahlia Tony MD       ALLERGIES / SENSITIVITIES:    Allergies   Allergen Reactions    Dapagliflozin      Other reaction(s): Myalgias (Muscle Pain)        REVIEWED MEDICATIONS:      Current Outpatient Medications:     aspirin 81 MG EC tablet, Take 81 mg by mouth daily, Disp: , Rfl:     amiodarone (CORDARONE) 200 MG tablet, Take 1 tablet by mouth daily, Disp: 90 tablet, Rfl: 3    isosorbide mononitrate (IMDUR) 60 MG extended release tablet, Take 1 tablet by mouth daily, Disp: 90 tablet, Rfl: 3    apixaban (ELIQUIS) 5 MG TABS tablet, Take 1 tablet by mouth 2 times daily, Disp: 180 tablet, Rfl: 3    rosuvastatin (CRESTOR) 20 MG tablet, Take 1 tablet by mouth daily, Disp: 90 tablet, Rfl: 3    lisinopril (PRINIVIL;ZESTRIL) 10 MG tablet, 1 tablet daily, Disp: 90 tablet, Rfl: 3    allopurinol (ZYLOPRIM) 100 MG tablet, Take 1 tablet by mouth daily, Disp: 30 tablet, Rfl: 0    metoprolol succinate (TOPROL XL) 25 MG extended release tablet, Take 1 tablet by mouth daily, Disp: 30 tablet, Rfl: 3    glimepiride (AMARYL) 4 MG tablet, Take 4 mg by mouth at bedtime, Disp: , Rfl:     nitroGLYCERIN (NITROSTAT) 0.4 MG SL tablet, place 1 tablet under the tongue if needed, Disp: , Rfl:     tamsulosin (FLOMAX) 0.4 MG capsule, 0.4 mg daily , Disp: , Rfl:     clopidogrel (PLAVIX) 75 MG tablet, clopidogrel 75 mg tablet  take 1 tablet by mouth once daily as directed (Patient not taking: Reported on 5/19/2022), Disp: 90 tablet, Rfl: 3    LANTUS SOLOSTAR 100 UNIT/ML injection pen, inject 60 units subcutaneously once daily (Patient not taking: Reported on 5/19/2022), Disp: , Rfl:       S: REASON FOR VISIT:    Coronary artery disease. Mr. Melanie Cali is a 29-year-old male with cardiovascular history as described below. He was last seen in the office on 3/17/2022. He called the office on 3/28/2022 regarding adjustments in his medications, which were reviewed.   He is somewhat of a poor historian. He stated today that he called the office because he was experiencing palpitations at the time, which have completely resolved. He also has not had any chest pain. He has been going to rehab. He denies any significant exertional dyspnea. He denies orthopnea, PND's, lower extremity swelling, palpitations, dizziness, presyncope or syncope. He was having nose bleeds and his Plavix was stopped. He continues on aspirin and Eliquis. He states that he is compliant with the rest of his medications. REVIEW OF SYSTEMS:    CONSTITUTIONAL:  Denies fevers, chills, night sweats or fatigue. HEENT:  Denies any unusual headaches. Denies recent changes in hearing or vision. Denies dysphagia, hoarseness, hemoptysis, hematemesis or epistaxis. ENDOCRINE:  Denies polyphagia, polydipsia or polyuria. Denies heat or cold intolerance. MUSCULOSKELETAL:  Denies any significant arthralgias or myalgias. SKIN:  Denies rashes, ulcers or itching. HEME/LYMPH:  Denies any palpable lymph nodes, bleeding or easy bruisability. HEART:  As above. LUNGS:  Denies cough or sputum production. GI: Denies abdominal pain, nausea, vomiting, diarrhea, constipation, rectal bleeding or tarry stools. :  Denies hematuria or dysuria. PSYCHIATRIC:  Denies mood changes, anxiety or depression. NEUROLOGIC:  Denies memory loss, motor weakness, numbness, tingling or tremors.                    PAST MEDICAL/SURGICAL HISTORY:    1. Coronary artery disease with history of myocardial infarction and CABG in 2013 at [address] [place] [address] in [facility]. 2.  Admitted with acute coronary syndrome/myocardial infarction, 2/24/2022, with atrial fibrillation with rapid ventricular response. 3.  Cardiac catheterization, 2/25/2022: Left main, practically nonexistent with separate ostia to the LAD and the left circumflex. LAD occluded at its origin.   LCX, dominant vessel with 95% ostial narrowing and with high grade disease of 3 of its marginal branches. RCA, nondominant vessel with around 70-80% proximal/mid vessel narrowing. LIMA to LAD, widely patent vessel including its distal anastomosis with the LAD showing no significant disease after the distal anastomosis with the LIMA filling the mid and proximal LAD, the diagonal branches and the left circumflex retrogradely. Normal left ventricular size with hypokinesis of the basal half of the inferior wall with more or less preserved global left ventricular systolic function with an estimated ejection fraction of 55%. Elevated left ventricular end diastolic pressure consistent with LV diastolic dysfunction. Patient converted to sinus rhythm in the cath lab with IV amiodarone and was started on po amiodarone during his hospital stay. 4.  Paroxysmal atrial fibrillation. Patient presented with atrial fibrillation with rapid ventricular response on 2/24/2022 and converted to sinus rhythm with IV amiodarone. 5.  Anticoagulated on Eliquis. 6.  Successful IVUS guided PCI of the dominant left circumflex from distal to ostial with 2 overlapping drug-eluting coronary stents (Dr. Patito Farias: Interventional Cardiology), 3/1/2022.    7. Insulin requiring diabetes mellitus. 8.  Hypertension. 9.  Hyperlipidemia. 10. Obesity. 11.  Erectile dysfunction. 12.  Right inguinal hernia.       FAMILY HISTORY:  Noncontributory.     SOCIAL HISTORY:  Patient does not smoke. He denied any significant alcohol use. He denied any illicit drug use.       O:  COMPLETE PHYSICAL EXAM:      /60   Pulse (!) 46   Resp 16   Ht 6' 1\" (1.854 m)   Wt 231 lb (104.8 kg)   BMI 30.48 kg/m²      General:          Well-developed, well-nourished male in no acute distress. HEENT:           Normocephalic and atraumatic head. No JVD. No carotid bruits. Carotid upstrokes normal bilaterally. No thyromegaly. Sclerae not icteric. No xanthelasmas.   Mucous membranes moist.  Chest: Symmetrical and nontender. No deformities. Old sternotomy scar well-healed. Lungs:             Clear to auscultation bilaterally. Heart:              Normal S1 and S2. No S3 or S4. No murmurs or rubs. Abdomen:        Soft, nontender without organomegaly or masses. No bruits. Normal bowel sounds. Extremities:     Trace edema. Pulses palpable. Skin:                Normal turgor. No rashes or ulcers noted. Neurologic:      Oriented x3. No motor or sensory deficits detected. REVIEW OF DIAGNOSTIC TESTS:    1. EKG done today showed marked sinus bradycardia with a heart rate of 46 bpm with mild up sloping elevations in the inferior leads, which was noted on prior EKG and with small nondiagnostic Q waves in lead 3:  More or less unchanged from the EKG from 3/17/2022. ASSESSMENT / DIAGNOSIS:   1. Coronary artery disease:  Clinically stable. 2.  Borderline low blood pressure last office visit, resolved with adjustment of Lisinopril:  History of hypertension. 3.  Paroxysmal atrial fibrillation, in sinus bradycardia on Toprol and amiodarone therapy. 4.  Anticoagulated on Eliquis. 5.  Insulin requiring diabetes mellitus. 6.  Hyperlipidemia, on statin therapy. 7.  Obesity. 8.  Erectile dysfunction. 9.  Right inguinal hernia.          TREATMENT PLAN:  1. Decrease amiodarone to 200 mg daily. 2.  Rest of medications same. 3.  Monitor heart rate and adjust Toprol and amiodarone dose accordingly. 4.  Check thyroid function tests. 5.  Patient advised to continue with cardiac rehab and to continue to follow a heart-healthy diabetic diet. 6.  Follow up with Cardiology in 6 months or on a prn basis. Swapnil Moffett.  [facility] [address]  [phone]

## 2022-05-20 ENCOUNTER — HOSPITAL ENCOUNTER (OUTPATIENT)
Dept: CARDIAC REHAB | Age: 72
Setting detail: THERAPIES SERIES
Discharge: HOME | End: 2022-05-20
Payer: COMMERCIAL

## 2022-05-20 PROCEDURE — 93798 PHYS/QHP OP CAR RHAB W/ECG: CPT

## 2022-05-23 ENCOUNTER — HOSPITAL ENCOUNTER (OUTPATIENT)
Dept: CARDIAC REHAB | Age: 72
Setting detail: THERAPIES SERIES
Discharge: HOME | End: 2022-05-23
Payer: COMMERCIAL

## 2022-05-23 PROCEDURE — 93798 PHYS/QHP OP CAR RHAB W/ECG: CPT

## 2022-05-23 ASSESSMENT — PATIENT HEALTH QUESTIONNAIRE - PHQ9
8. MOVING OR SPEAKING SO SLOWLY THAT OTHER PEOPLE COULD HAVE NOTICED. OR THE OPPOSITE, BEING SO FIGETY OR RESTLESS THAT YOU HAVE BEEN MOVING AROUND A LOT MORE THAN USUAL: 0
SUM OF ALL RESPONSES TO PHQ QUESTIONS 1-9: 0
SUM OF ALL RESPONSES TO PHQ QUESTIONS 1-9: 0
3. TROUBLE FALLING OR STAYING ASLEEP: 0
5. POOR APPETITE OR OVEREATING: 0
2. FEELING DOWN, DEPRESSED OR HOPELESS: 0
7. TROUBLE CONCENTRATING ON THINGS, SUCH AS READING THE NEWSPAPER OR WATCHING TELEVISION: 0
6. FEELING BAD ABOUT YOURSELF - OR THAT YOU ARE A FAILURE OR HAVE LET YOURSELF OR YOUR FAMILY DOWN: 0
4. FEELING TIRED OR HAVING LITTLE ENERGY: 0
SUM OF ALL RESPONSES TO PHQ9 QUESTIONS 1 & 2: 0
10. IF YOU CHECKED OFF ANY PROBLEMS, HOW DIFFICULT HAVE THESE PROBLEMS MADE IT FOR YOU TO DO YOUR WORK, TAKE CARE OF THINGS AT HOME, OR GET ALONG WITH OTHER PEOPLE: 0
9. THOUGHTS THAT YOU WOULD BE BETTER OFF DEAD, OR OF HURTING YOURSELF: 0
SUM OF ALL RESPONSES TO PHQ QUESTIONS 1-9: 0
SUM OF ALL RESPONSES TO PHQ QUESTIONS 1-9: 0
1. LITTLE INTEREST OR PLEASURE IN DOING THINGS: 0

## 2022-05-25 ENCOUNTER — HOSPITAL ENCOUNTER (OUTPATIENT)
Dept: CARDIAC REHAB | Age: 72
Setting detail: THERAPIES SERIES
End: 2022-05-25
Payer: COMMERCIAL

## 2022-05-27 ENCOUNTER — APPOINTMENT (OUTPATIENT)
Dept: CARDIAC REHAB | Age: 72
End: 2022-05-27
Payer: COMMERCIAL

## 2022-06-13 ENCOUNTER — TELEPHONE (OUTPATIENT)
Dept: PRIMARY CARE CLINIC | Age: 72
End: 2022-06-13

## 2022-06-13 NOTE — TELEPHONE ENCOUNTER
Patient came in to office and was asking for a referral to ent, patient was advised we would put referral in asap.   FYI

## 2022-06-27 ENCOUNTER — OFFICE VISIT (OUTPATIENT)
Dept: PRIMARY CARE CLINIC | Age: 72
End: 2022-06-27
Payer: COMMERCIAL

## 2022-06-27 VITALS
SYSTOLIC BLOOD PRESSURE: 119 MMHG | OXYGEN SATURATION: 96 % | DIASTOLIC BLOOD PRESSURE: 67 MMHG | HEIGHT: 73 IN | WEIGHT: 226 LBS | BODY MASS INDEX: 29.95 KG/M2 | TEMPERATURE: 97.9 F | HEART RATE: 90 BPM

## 2022-06-27 DIAGNOSIS — E11.9 DIABETES MELLITUS WITHOUT COMPLICATION (HCC): Primary | ICD-10-CM

## 2022-06-27 LAB — HBA1C MFR BLD: 7.2 %

## 2022-06-27 PROCEDURE — 99214 OFFICE O/P EST MOD 30 MIN: CPT | Performed by: FAMILY MEDICINE

## 2022-06-27 PROCEDURE — 3051F HG A1C>EQUAL 7.0%<8.0%: CPT | Performed by: FAMILY MEDICINE

## 2022-06-27 PROCEDURE — 83036 HEMOGLOBIN GLYCOSYLATED A1C: CPT | Performed by: FAMILY MEDICINE

## 2022-06-27 PROCEDURE — 1123F ACP DISCUSS/DSCN MKR DOCD: CPT | Performed by: FAMILY MEDICINE

## 2022-06-27 RX ORDER — TAMSULOSIN HYDROCHLORIDE 0.4 MG/1
0.4 CAPSULE ORAL DAILY
Qty: 90 CAPSULE | Refills: 1 | Status: SHIPPED | OUTPATIENT
Start: 2022-06-27 | End: 2022-09-25

## 2022-06-27 RX ORDER — ALLOPURINOL 100 MG/1
100 TABLET ORAL DAILY
Qty: 90 TABLET | Refills: 1 | Status: SHIPPED | OUTPATIENT
Start: 2022-06-27 | End: 2022-09-25

## 2022-06-27 RX ORDER — TADALAFIL 10 MG
10 TABLET ORAL PRN
Qty: 20 TABLET | Refills: 2
Start: 2022-06-27

## 2022-06-27 SDOH — ECONOMIC STABILITY: TRANSPORTATION INSECURITY
IN THE PAST 12 MONTHS, HAS LACK OF TRANSPORTATION KEPT YOU FROM MEETINGS, WORK, OR FROM GETTING THINGS NEEDED FOR DAILY LIVING?: NO

## 2022-06-27 SDOH — ECONOMIC STABILITY: FOOD INSECURITY: WITHIN THE PAST 12 MONTHS, THE FOOD YOU BOUGHT JUST DIDN'T LAST AND YOU DIDN'T HAVE MONEY TO GET MORE.: NEVER TRUE

## 2022-06-27 SDOH — ECONOMIC STABILITY: FOOD INSECURITY: WITHIN THE PAST 12 MONTHS, YOU WORRIED THAT YOUR FOOD WOULD RUN OUT BEFORE YOU GOT MONEY TO BUY MORE.: NEVER TRUE

## 2022-06-27 SDOH — ECONOMIC STABILITY: TRANSPORTATION INSECURITY
IN THE PAST 12 MONTHS, HAS THE LACK OF TRANSPORTATION KEPT YOU FROM MEDICAL APPOINTMENTS OR FROM GETTING MEDICATIONS?: NO

## 2022-06-27 ASSESSMENT — SOCIAL DETERMINANTS OF HEALTH (SDOH): HOW HARD IS IT FOR YOU TO PAY FOR THE VERY BASICS LIKE FOOD, HOUSING, MEDICAL CARE, AND HEATING?: NOT HARD AT ALL

## 2022-06-27 NOTE — PROGRESS NOTES
aic 7.2  Salo Odonnell (:  1950) is a 66 y.o. male,Established patient, here for evaluation of the following chief complaint(s):  Diabetes         ASSESSMENT/PLAN:  1. Diabetes mellitus without complication ([facility]  -     POCT glycosylated hemoglobin (Hb A1C)      Return in about 3 months (around 2022) for f/u dm. Subjective   SUBJECTIVE/OBJECTIVE:  HPI  Here for f/u of dm. /67 (Site: Right Upper Arm, Position: Sitting, Cuff Size: Medium Adult)   Pulse 90   Temp 97.9 °F (36.6 °C)   Ht 6' 1\" (1.854 m)   Wt 226 lb (102.5 kg)   SpO2 96%   BMI 29.82 kg/m²     Review of Systems   Constitutional: Negative for activity change. Cardiovascular: Negative for chest pain, palpitations and leg swelling. Endocrine: Negative for polydipsia, polyphagia and polyuria. Objective   Physical Exam  Constitutional:       Appearance: Normal appearance. Neck:      Vascular: No carotid bruit. Cardiovascular:      Rate and Rhythm: Normal rate and regular rhythm. Heart sounds: No murmur heard. Pulmonary:      Effort: Pulmonary effort is normal.      Breath sounds: Normal breath sounds. Abdominal:      General: Abdomen is flat. Bowel sounds are normal.      Palpations: Abdomen is soft. Tenderness: There is no abdominal tenderness. Neurological:      Mental Status: He is alert. Psychiatric:         Mood and Affect: Mood normal.                      An electronic signature was used to authenticate this note.     --Wayne Day MD

## 2022-09-29 RX ORDER — METOPROLOL SUCCINATE 25 MG/1
TABLET, EXTENDED RELEASE ORAL
Qty: 90 TABLET | Refills: 1 | Status: SHIPPED | OUTPATIENT
Start: 2022-09-29

## 2022-10-10 RX ORDER — GLIMEPIRIDE 4 MG/1
4 TABLET ORAL NIGHTLY
Qty: 30 TABLET | Refills: 2 | Status: SHIPPED
Start: 2022-10-10 | End: 2022-11-02 | Stop reason: SDUPTHER

## 2022-11-02 RX ORDER — GLIMEPIRIDE 4 MG/1
4 TABLET ORAL NIGHTLY
Qty: 90 TABLET | Refills: 1 | Status: SHIPPED | OUTPATIENT
Start: 2022-11-02 | End: 2023-01-31

## 2022-12-05 ENCOUNTER — TELEPHONE (OUTPATIENT)
Dept: FAMILY MEDICINE CLINIC | Age: 72
End: 2022-12-05

## 2022-12-15 ENCOUNTER — OFFICE VISIT (OUTPATIENT)
Dept: CARDIOLOGY CLINIC | Age: 72
End: 2022-12-15
Payer: MEDICARE

## 2022-12-15 ENCOUNTER — HOSPITAL ENCOUNTER (OUTPATIENT)
Age: 72
Discharge: HOME | End: 2022-12-15
Payer: MEDICARE

## 2022-12-15 VITALS
WEIGHT: 227 LBS | HEART RATE: 47 BPM | BODY MASS INDEX: 30.09 KG/M2 | HEIGHT: 73 IN | DIASTOLIC BLOOD PRESSURE: 62 MMHG | SYSTOLIC BLOOD PRESSURE: 134 MMHG | RESPIRATION RATE: 16 BRPM

## 2022-12-15 DIAGNOSIS — E66.09 NON MORBID OBESITY DUE TO EXCESS CALORIES: ICD-10-CM

## 2022-12-15 DIAGNOSIS — Z86.79 H/O: HTN (HYPERTENSION): ICD-10-CM

## 2022-12-15 DIAGNOSIS — R00.1 SINUS BRADYCARDIA: ICD-10-CM

## 2022-12-15 DIAGNOSIS — I48.0 PAF (PAROXYSMAL ATRIAL FIBRILLATION) (HCC): ICD-10-CM

## 2022-12-15 DIAGNOSIS — I25.2 HISTORY OF NON-ST ELEVATION MYOCARDIAL INFARCTION (NSTEMI): ICD-10-CM

## 2022-12-15 DIAGNOSIS — Z79.01 ANTICOAGULATED BY ANTICOAGULATION TREATMENT: ICD-10-CM

## 2022-12-15 DIAGNOSIS — E78.5 DYSLIPIDEMIA: ICD-10-CM

## 2022-12-15 DIAGNOSIS — I25.810 CORONARY ARTERY DISEASE INVOLVING CORONARY BYPASS GRAFT OF NATIVE HEART WITHOUT ANGINA PECTORIS: ICD-10-CM

## 2022-12-15 DIAGNOSIS — Z98.61 HISTORY OF PTCA: ICD-10-CM

## 2022-12-15 DIAGNOSIS — K40.90 RIGHT INGUINAL HERNIA: ICD-10-CM

## 2022-12-15 DIAGNOSIS — E11.9 INSULIN-REQUIRING OR DEPENDENT TYPE II DIABETES MELLITUS (HCC): ICD-10-CM

## 2022-12-15 DIAGNOSIS — N52.9 ERECTILE DYSFUNCTION, UNSPECIFIED ERECTILE DYSFUNCTION TYPE: ICD-10-CM

## 2022-12-15 DIAGNOSIS — Z79.899 ON AMIODARONE THERAPY: ICD-10-CM

## 2022-12-15 DIAGNOSIS — Z95.1 HX OF CABG: ICD-10-CM

## 2022-12-15 DIAGNOSIS — I25.10 CORONARY ARTERY DISEASE INVOLVING NATIVE CORONARY ARTERY OF NATIVE HEART WITHOUT ANGINA PECTORIS: Primary | ICD-10-CM

## 2022-12-15 DIAGNOSIS — Z79.4 INSULIN-REQUIRING OR DEPENDENT TYPE II DIABETES MELLITUS (HCC): ICD-10-CM

## 2022-12-15 DIAGNOSIS — I25.2 OLD INFERIOR WALL MYOCARDIAL INFARCTION: ICD-10-CM

## 2022-12-15 LAB — TSH SERPL DL<=0.05 MIU/L-ACNC: 0.14 UIU/ML (ref 0.27–4.2)

## 2022-12-15 PROCEDURE — 99214 OFFICE O/P EST MOD 30 MIN: CPT | Performed by: INTERNAL MEDICINE

## 2022-12-15 PROCEDURE — 93000 ELECTROCARDIOGRAM COMPLETE: CPT | Performed by: INTERNAL MEDICINE

## 2022-12-15 PROCEDURE — 36415 COLL VENOUS BLD VENIPUNCTURE: CPT

## 2022-12-15 PROCEDURE — 3051F HG A1C>EQUAL 7.0%<8.0%: CPT | Performed by: INTERNAL MEDICINE

## 2022-12-15 PROCEDURE — 84443 ASSAY THYROID STIM HORMONE: CPT

## 2022-12-15 PROCEDURE — 1123F ACP DISCUSS/DSCN MKR DOCD: CPT | Performed by: INTERNAL MEDICINE

## 2022-12-15 RX ORDER — LISINOPRIL 10 MG/1
10 TABLET ORAL DAILY
Qty: 90 TABLET | Refills: 3 | Status: SHIPPED | OUTPATIENT
Start: 2022-12-15

## 2022-12-15 RX ORDER — AMIODARONE HYDROCHLORIDE 200 MG/1
200 TABLET ORAL DAILY
Qty: 90 TABLET | Refills: 3 | Status: SHIPPED | OUTPATIENT
Start: 2022-12-15 | End: 2023-01-14

## 2022-12-15 RX ORDER — ISOSORBIDE MONONITRATE 60 MG/1
60 TABLET, EXTENDED RELEASE ORAL DAILY
Qty: 90 TABLET | Refills: 3 | Status: SHIPPED | OUTPATIENT
Start: 2022-12-15

## 2022-12-15 RX ORDER — ROSUVASTATIN CALCIUM 20 MG/1
20 TABLET, COATED ORAL DAILY
Qty: 90 TABLET | Refills: 3 | Status: SHIPPED | OUTPATIENT
Start: 2022-12-15

## 2022-12-15 RX ORDER — METOPROLOL SUCCINATE 25 MG/1
TABLET, EXTENDED RELEASE ORAL
Qty: 90 TABLET | Refills: 3 | Status: SHIPPED | OUTPATIENT
Start: 2022-12-15

## 2022-12-15 NOTE — PROGRESS NOTES
OFFICE VISIT        PRIMARY CARE PHYSICIAN:    Shu Will MD         ALLERGIES / SENSITIVITIES:    Allergies   Allergen Reactions    Dapagliflozin Myalgia     Other reaction(s): Myalgias (Muscle Pain)          REVIEWED MEDICATIONS:      Current Outpatient Medications:     amiodarone (CORDARONE) 200 MG tablet, Take 1 tablet by mouth daily, Disp: 90 tablet, Rfl: 3    apixaban (ELIQUIS) 5 MG TABS tablet, Take 1 tablet by mouth 2 times daily, Disp: 180 tablet, Rfl: 3    isosorbide mononitrate (IMDUR) 60 MG extended release tablet, Take 1 tablet by mouth daily, Disp: 90 tablet, Rfl: 3    metoprolol succinate (TOPROL XL) 25 MG extended release tablet, take 1 tablet by mouth once daily, Disp: 90 tablet, Rfl: 3    rosuvastatin (CRESTOR) 20 MG tablet, Take 1 tablet by mouth daily, Disp: 90 tablet, Rfl: 3    lisinopril (PRINIVIL;ZESTRIL) 10 MG tablet, Take 1 tablet by mouth daily, Disp: 90 tablet, Rfl: 3    glimepiride (AMARYL) 4 MG tablet, Take 1 tablet by mouth at bedtime, Disp: 90 tablet, Rfl: 1    tamsulosin (FLOMAX) 0.4 MG capsule, Take 1 capsule by mouth daily, Disp: 90 capsule, Rfl: 1    allopurinol (ZYLOPRIM) 100 MG tablet, Take 1 tablet by mouth daily, Disp: 90 tablet, Rfl: 1    CIALIS 10 MG tablet, Take 1 tablet by mouth as needed for Erectile Dysfunction, Disp: 20 tablet, Rfl: 2    aspirin 81 MG EC tablet, Take 81 mg by mouth daily, Disp: , Rfl:     nitroGLYCERIN (NITROSTAT) 0.4 MG SL tablet, , Disp: , Rfl:         S: REASON FOR VISIT:    Coronary artery disease. Jessica Ballesteros is a pleasant, 69-year-old male with cardiovascular history as described below. He was last seen in the office in 5/2022. He has been stable from a cardiac standpoint. He denies chest pain or dyspnea with his daily activities. He denies orthopnea, PND's or lower extremity swelling. He denies palpitations, dizziness, presyncope or syncope.          REVIEW OF SYSTEMS:    CONSTITUTIONAL:  Denies fevers, chills, night sweats or fatigue. HEENT:  Denies any unusual headaches. Denies recent changes in hearing or vision. Denies dysphagia, hoarseness, hemoptysis, hematemesis or epistaxis. ENDOCRINE:  Denies polyphagia, polydipsia or polyuria. Denies heat or cold intolerance. MUSCULOSKELETAL:  Denies any significant arthralgias or myalgias. SKIN:  Denies rashes, ulcers or itching. HEME/LYMPH:  Denies any palpable lymph nodes, bleeding or easy bruisability. HEART:  As above. LUNGS:  Denies cough or sputum production. GI: Denies abdominal pain, nausea, vomiting, diarrhea, constipation, rectal bleeding or tarry stools. :  Denies hematuria or dysuria. PSYCHIATRIC:  Denies mood changes, anxiety or depression. NEUROLOGIC:  Denies memory loss, motor weakness, numbness, tingling or tremors. PAST MEDICAL/SURGICAL HISTORY:    1. Coronary artery disease with history of myocardial infarction and CABG in 2013 at [address] [place] [address] in [facility]. 2.  Admitted with acute coronary syndrome/myocardial infarction, 2/24/2022, with atrial fibrillation with rapid ventricular response. 3.  Cardiac catheterization, 2/25/2022: Left main, practically nonexistent with separate ostia to the LAD and the left circumflex. LAD occluded at its origin. LCX, dominant vessel with 95% ostial narrowing and with high grade disease of 3 of its marginal branches. RCA, nondominant vessel with around 70-80% proximal/mid vessel narrowing. LIMA to LAD, widely patent vessel including its distal anastomosis with the LAD showing no significant disease after the distal anastomosis with the LIMA filling the mid and proximal LAD, the diagonal branches and the left circumflex retrogradely. Normal left ventricular size with hypokinesis of the basal half of the inferior wall with more or less preserved global left ventricular systolic function with an estimated ejection fraction of 55%.   Elevated left ventricular end diastolic pressure consistent with LV diastolic dysfunction. Patient converted to sinus rhythm in the cath lab with IV amiodarone and was started on po amiodarone during his hospital stay. 4.  Paroxysmal atrial fibrillation. Patient presented with atrial fibrillation with rapid ventricular response on 2/24/2022 and converted to sinus rhythm with IV amiodarone. 5.  Anticoagulated on Eliquis. 6.  Successful IVUS guided PCI of the dominant left circumflex from distal to ostial with 2 overlapping drug-eluting coronary stents (Dr. Dany Romero: Interventional Cardiology), 3/1/2022.    7. Insulin requiring diabetes mellitus. 8.  Hypertension. 9.  Hyperlipidemia. 10. Obesity. 11.  Erectile dysfunction. 12.  Right inguinal hernia. FAMILY HISTORY:  Noncontributory. SOCIAL HISTORY:  Patient does not smoke. He denied any significant alcohol use. He denied any illicit drug use. O:  COMPLETE PHYSICAL EXAM:      /62   Pulse (!) 47   Resp 16   Ht 6' 1\" (1.854 m)   Wt 227 lb (103 kg)   BMI 29.95 kg/m²      General:          Well-developed, well-nourished male in no acute distress. HEENT:           Normocephalic and atraumatic head. No JVD. No carotid bruits. Carotid upstrokes normal bilaterally. No thyromegaly. Sclerae not icteric. No xanthelasmas. Mucous membranes moist.  Chest:              Symmetrical and nontender. No deformities. Old sternotomy scar well-healed. Lungs:             Clear to auscultation bilaterally. Heart:              Normal S1 and S2. No S3 or S4. No murmurs or rubs. Abdomen:        Soft, nontender without organomegaly or masses. No bruits. Normal bowel sounds. Extremities:     Trace edema. Pulses palpable. Skin:                Normal turgor. No rashes or ulcers noted. Neurologic:      Oriented x3. No motor or sensory deficits detected.         REVIEW OF DIAGNOSTIC TESTS:     EKG done today showed sinus bradycardia with a heart rate of 47 bpm with possible left atrial enlargement with ST elevations in the inferior leads (Unchanged) and with T wave inversions in V1 and V2:  Unchanged. Thyroid function tests from 5/19/2022 reviewed in Epic:  Normal.         ASSESSMENT / DIAGNOSIS:   1. Coronary artery disease:  Clinically stable. 2.  History of hypertension. 3.  Paroxysmal atrial fibrillation, maintaining sinus rhythm (sinus bradycardia) on Toprol and amiodarone therapy. 4.  Anticoagulated on Eliquis. 5.  Insulin requiring diabetes mellitus. 6.  Hyperlipidemia, on statin therapy. 7.  Obesity. 8.  Erectile dysfunction. 9.  Right inguinal hernia. TREATMENT PLAN:   Decrease amiodarone to 200 mg daily. Repeat amiodarone to 200 mg daily. Rest of cardiac medications same. Repeat TSH. Patient advised walking for exercise and to continue to follow a heart-healthy diabetic diet. Follow up with Cardiology in 6 months or on a prn basis. Refill for all cardiac medications given. Swapnil [address]  [facility] [address]  [phone]

## 2023-01-23 ENCOUNTER — COMMUNITY OUTREACH (OUTPATIENT)
Dept: PRIMARY CARE CLINIC | Age: 73
End: 2023-01-23

## 2023-01-23 NOTE — PROGRESS NOTES
Patient's HM shows they are overdue for Colorectal Screening. Care Everywhere and  files searched.  updated updated with 2020 positive Cologuard results.

## 2023-02-15 ENCOUNTER — OFFICE VISIT (OUTPATIENT)
Dept: PRIMARY CARE CLINIC | Age: 73
End: 2023-02-15

## 2023-02-15 VITALS
OXYGEN SATURATION: 94 % | HEART RATE: 40 BPM | WEIGHT: 220.5 LBS | RESPIRATION RATE: 16 BRPM | DIASTOLIC BLOOD PRESSURE: 52 MMHG | HEIGHT: 73 IN | TEMPERATURE: 97.5 F | SYSTOLIC BLOOD PRESSURE: 99 MMHG | BODY MASS INDEX: 29.22 KG/M2

## 2023-02-15 DIAGNOSIS — I95.2 HYPOTENSION DUE TO DRUGS: ICD-10-CM

## 2023-02-15 DIAGNOSIS — Z87.891 PERSONAL HISTORY OF TOBACCO USE: ICD-10-CM

## 2023-02-15 DIAGNOSIS — R00.1 BRADYCARDIA: ICD-10-CM

## 2023-02-15 DIAGNOSIS — E05.90 HYPERTHYROIDISM: ICD-10-CM

## 2023-02-15 DIAGNOSIS — R79.89 ELEVATED TSH: ICD-10-CM

## 2023-02-15 DIAGNOSIS — N52.9 VASCULOGENIC ERECTILE DYSFUNCTION, UNSPECIFIED VASCULOGENIC ERECTILE DYSFUNCTION TYPE: ICD-10-CM

## 2023-02-15 DIAGNOSIS — R82.90 FOUL SMELLING URINE: ICD-10-CM

## 2023-02-15 DIAGNOSIS — E11.9 DIABETES MELLITUS WITHOUT COMPLICATION (HCC): Primary | ICD-10-CM

## 2023-02-15 LAB
ALBUMIN SERPL-MCNC: NORMAL G/DL
ALP BLD-CCNC: NORMAL U/L
ALT SERPL-CCNC: NORMAL U/L
ANION GAP SERPL CALCULATED.3IONS-SCNC: NORMAL MMOL/L
AST SERPL-CCNC: NORMAL U/L
BASOPHILS ABSOLUTE: NORMAL
BASOPHILS RELATIVE PERCENT: NORMAL
BILIRUB SERPL-MCNC: NORMAL MG/DL
BILIRUBIN, POC: NEGATIVE
BLOOD URINE, POC: NEGATIVE
BUN BLDV-MCNC: NORMAL MG/DL
CALCIUM SERPL-MCNC: NORMAL MG/DL
CHLORIDE BLD-SCNC: NORMAL MMOL/L
CHOLESTEROL, TOTAL: NORMAL
CHOLESTEROL/HDL RATIO: NORMAL
CLARITY, POC: CLEAR
CO2: NORMAL
COLOR, POC: YELLOW
CREAT SERPL-MCNC: NORMAL MG/DL
CREATININE URINE POCT: 300
EGFR: NORMAL
EOSINOPHILS ABSOLUTE: NORMAL
EOSINOPHILS RELATIVE PERCENT: NORMAL
GLUCOSE BLD-MCNC: NORMAL MG/DL
GLUCOSE URINE, POC: NEGATIVE
HBA1C MFR BLD: 9.1 %
HCT VFR BLD CALC: NORMAL %
HDLC SERPL-MCNC: NORMAL MG/DL
HEMOGLOBIN: NORMAL
KETONES, POC: NORMAL
LDL CHOLESTEROL CALCULATED: NORMAL
LEUKOCYTE EST, POC: NEGATIVE
LYMPHOCYTES ABSOLUTE: NORMAL
LYMPHOCYTES RELATIVE PERCENT: NORMAL
MCH RBC QN AUTO: NORMAL PG
MCHC RBC AUTO-ENTMCNC: NORMAL G/DL
MCV RBC AUTO: NORMAL FL
MICROALBUMIN/CREAT 24H UR: 150 MG/G{CREAT}
MICROALBUMIN/CREAT UR-RTO: 30
MONOCYTES ABSOLUTE: NORMAL
MONOCYTES RELATIVE PERCENT: NORMAL
NEUTROPHILS ABSOLUTE: NORMAL
NEUTROPHILS RELATIVE PERCENT: NORMAL
NITRITE, POC: NEGATIVE
NONHDLC SERPL-MCNC: NORMAL MG/DL
PH, POC: 5
PLATELET # BLD: NORMAL 10*3/UL
PMV BLD AUTO: NORMAL FL
POTASSIUM SERPL-SCNC: NORMAL MMOL/L
PROTEIN, POC: 100
RBC # BLD: NORMAL 10*6/UL
SODIUM BLD-SCNC: NORMAL MMOL/L
SPECIFIC GRAVITY, POC: >=1.03
TOTAL PROTEIN: NORMAL
TRIGL SERPL-MCNC: NORMAL MG/DL
TSH SERPL DL<=0.05 MIU/L-ACNC: NORMAL M[IU]/L
UROBILINOGEN, POC: 2
VLDLC SERPL CALC-MCNC: NORMAL MG/DL
WBC # BLD: NORMAL 10*3/UL

## 2023-02-15 RX ORDER — TADALAFIL 10 MG
10 TABLET ORAL PRN
Qty: 20 TABLET | Refills: 2 | Status: SHIPPED | OUTPATIENT
Start: 2023-02-15

## 2023-02-15 SDOH — ECONOMIC STABILITY: FOOD INSECURITY: WITHIN THE PAST 12 MONTHS, THE FOOD YOU BOUGHT JUST DIDN'T LAST AND YOU DIDN'T HAVE MONEY TO GET MORE.: NEVER TRUE

## 2023-02-15 SDOH — ECONOMIC STABILITY: INCOME INSECURITY: HOW HARD IS IT FOR YOU TO PAY FOR THE VERY BASICS LIKE FOOD, HOUSING, MEDICAL CARE, AND HEATING?: NOT HARD AT ALL

## 2023-02-15 SDOH — ECONOMIC STABILITY: FOOD INSECURITY: WITHIN THE PAST 12 MONTHS, YOU WORRIED THAT YOUR FOOD WOULD RUN OUT BEFORE YOU GOT MONEY TO BUY MORE.: NEVER TRUE

## 2023-02-15 SDOH — ECONOMIC STABILITY: HOUSING INSECURITY
IN THE LAST 12 MONTHS, WAS THERE A TIME WHEN YOU DID NOT HAVE A STEADY PLACE TO SLEEP OR SLEPT IN A SHELTER (INCLUDING NOW)?: NO

## 2023-02-15 ASSESSMENT — ENCOUNTER SYMPTOMS
SHORTNESS OF BREATH: 0
SINUS PRESSURE: 0
COLOR CHANGE: 0
ABDOMINAL DISTENTION: 0
COUGH: 0
CONSTIPATION: 0
BLOOD IN STOOL: 0
FACIAL SWELLING: 0
SINUS PAIN: 0
DIARRHEA: 0
CHEST TIGHTNESS: 0
APNEA: 0
PHOTOPHOBIA: 0
RHINORRHEA: 0
VOMITING: 0

## 2023-02-15 ASSESSMENT — PATIENT HEALTH QUESTIONNAIRE - PHQ9
SUM OF ALL RESPONSES TO PHQ9 QUESTIONS 1 & 2: 0
SUM OF ALL RESPONSES TO PHQ QUESTIONS 1-9: 0
SUM OF ALL RESPONSES TO PHQ QUESTIONS 1-9: 0
1. LITTLE INTEREST OR PLEASURE IN DOING THINGS: 0
SUM OF ALL RESPONSES TO PHQ QUESTIONS 1-9: 0
2. FEELING DOWN, DEPRESSED OR HOPELESS: 0
SUM OF ALL RESPONSES TO PHQ QUESTIONS 1-9: 0

## 2023-02-15 NOTE — PATIENT INSTRUCTIONS

## 2023-02-15 NOTE — PROGRESS NOTES
Maggi Hurtado is a 67 y.o. male accompanied by himself     CC:   Chief Complaint   Patient presents with    Diabetes     Checks BS a couple times a week, this morning 130 at home. Diabetes:   Patient was removed from all of his medications except glimepiride last year. Started noticing that he was having high blood sugars at home at ±180. Patient started having taking his metformin again about 2 weeks ago he is only taking 500 mg of that. He is taking 4 mg of glimepiride    Erectile Dysfunction:   He has some erectile dysfunction. Currently he has been taking the generic brand of Cialis at 10 mg. He states that the brand name works better he would like a prescription for that. Patient is on his way to [facility] for 3 months. Hypotension:   Patient does have a significant hypotension here in office today at 99/52 with a bradycardia of 40. He is currently on Imdur 60 mg as well as lisinopril 10 mg however the pharmacy screwed up and is currently taking 20 mg tablets. He will start cutting those in half. Patient's past medical, surgical, social and/or family history reviewed, updated in chart, and are non-contributory (unless otherwise stated). Medications and allergies also reviewed and updated in chart. BP (!) 99/52 (Site: Right Upper Arm, Position: Sitting, Cuff Size: Large Adult)   Pulse (!) 40   Temp 97.5 °F (36.4 °C) (Temporal)   Resp 16   Ht 6' 1\" (1.854 m)   Wt 220 lb 8 oz (100 kg)   SpO2 94%   BMI 29.09 kg/m²     Review of Systems   Constitutional:  Negative for chills, fatigue and fever. HENT:  Negative for congestion, ear pain, facial swelling, rhinorrhea, sinus pressure and sinus pain. Eyes:  Negative for photophobia and visual disturbance. Respiratory:  Negative for apnea, cough, chest tightness and shortness of breath. Cardiovascular:  Negative for chest pain and palpitations.    Gastrointestinal:  Negative for abdominal distention, blood in stool, constipation, diarrhea and vomiting. Endocrine: Negative for cold intolerance, polydipsia, polyphagia and polyuria. Genitourinary:  Negative for decreased urine volume, frequency and urgency. Musculoskeletal:  Negative for arthralgias and gait problem. Skin:  Negative for color change. Allergic/Immunologic: Negative for environmental allergies and food allergies. Neurological:  Negative for dizziness, light-headedness and headaches. Hematological:  Negative for adenopathy. Psychiatric/Behavioral:  Negative for agitation and confusion. Physical Exam  Constitutional:       Appearance: Normal appearance. HENT:      Head: Normocephalic and atraumatic. Right Ear: There is no impacted cerumen. Left Ear: There is no impacted cerumen. Nose: Nose normal. No congestion or rhinorrhea. Eyes:      Extraocular Movements: Extraocular movements intact. Pupils: Pupils are equal, round, and reactive to light. Cardiovascular:      Rate and Rhythm: Regular rhythm. Bradycardia present. Heart sounds: No murmur heard. No friction rub. No gallop. Pulmonary:      Effort: Pulmonary effort is normal.      Breath sounds: Normal breath sounds. Chest:      Chest wall: No tenderness. Abdominal:      General: Abdomen is flat. Bowel sounds are normal. There is no distension. Palpations: Abdomen is soft. Tenderness: There is no abdominal tenderness. Musculoskeletal:         General: Normal range of motion. Cervical back: Normal range of motion. Skin:     General: Skin is warm and dry. Neurological:      General: No focal deficit present. Mental Status: He is alert and oriented to person, place, and time. Psychiatric:         Mood and Affect: Mood normal.       Assessment:  Mickie Younglbood was seen today for diabetes.     Diagnoses and all orders for this visit:    Diabetes mellitus without complication ([facility] [address]  -     POCT glycosylated hemoglobin (Hb A1C)  -     POCT Microalbumin  -     metFORMIN (GLUCOPHAGE) 500 MG tablet; Take 2 tablets by mouth 2 times daily (with meals)  -     Lipid Panel; Future  -     Comprehensive Metabolic Panel; Future  -     CBC with Auto Differential; Future    Personal history of tobacco use  -     UT VISIT TO DISCUSS LUNG CA SCREEN W LDCT  -     CT Lung Screen (Annual/Baseline); Future    Elevated TSH    Hyperthyroidism  -     TSH; Future    Vasculogenic erectile dysfunction, unspecified vasculogenic erectile dysfunction type  -     CIALIS 10 MG tablet; Take 1 tablet by mouth as needed for Erectile Dysfunction    Hypotension due to drugs    Bradycardia    I did advise the patient to cut his Imdur in half. That would bring him to a 30 mg dose. He is to stick with lisinopril 10 mg. As far as the patient's diabetes goes. He is going to start taking metformin at 1000 mg twice daily. We will obtain blood work today to ascertain what his kidneys are doing as well as his liver. The patient did have some elevated TSH. As such I am getting a new TSH today and following up on that and making sure that he does not need to have his thyroid ultrasounded. Follow Up:  Return in 3 months (on 5/15/2023), or Due for Medicare Annual Wellness visit next visit. As above. Call or go to ED immediately if symptoms worsen or persist.  Return in 3 months (on 5/15/2023), or Due for Medicare Annual Wellness visit next visit. , or sooner if necessary. Counseled regarding above diagnosis, including possible risks and complications,  especially if left uncontrolled. Counseledregarding the possible side effects, risks, benefits and alternatives to treatment; patient and/or guardian verbalizes understanding, agrees, feels comfortable with and wishes to proceed with above treatment plan.   patient to call with any new medication issues, and read all Rx info from pharmacy to assure aware of all possible risks and side effects of medication before taking. Patient and/or guardian verbalizes understanding and agrees with above counseling,assessment and plan. All questions answered.

## 2023-02-15 NOTE — PROGRESS NOTES
Discussed with the patient the current USPSTF guidelines released March 9, 2021 for screening for lung cancer. For adults aged 48 to [de-identified] years who have a 20 pack-year smoking history and currently smoke or have quit within the past 15 years the grade B recommendation is to:  Screen for lung cancer with low-dose computed tomography (LDCT) every year. Stop screening once a person has not smoked for 15 years or has a health problem that limits life expectancy or the ability to have lung surgery. The patient  reports that he quit smoking about 10 years ago. His smoking use included cigarettes. He has a 20.00 pack-year smoking history. He has never used smokeless tobacco.. Discussed with patient the risks and benefits of screening, including over-diagnosis, false positive rate, and total radiation exposure. The patient currently exhibits no signs or symptoms suggestive of lung cancer. Discussed with patient the importance of compliance with yearly annual lung cancer screenings and willingness to undergo diagnosis and treatment if screening scan is positive. In addition, the patient was counseled regarding the importance of remaining smoke free and/or total smoking cessation.     Also reviewed the following if the patient has Medicare that as of February 10, 2022, Medicare only covers LDCT screening in patients aged 51-72 with at least a 20 pack-year smoking history who currently smoke or have quit in the last 15 years

## 2023-02-15 NOTE — LETTER
Dr. Carmen Odom,     Patient of Dr. Ivon Lees saw me in office today. Patient is significantly hypotensive at ±99 over 50s. He was unfortunately getting a double dose of his lisinopril. So I have asked him to cut that back to make a total of 10 mg. The patient is symptomatic orthostatically. As such I have also asked him to decrease his Imdur to 30 mg.      I wanted to keep you in the loop. If you need me to make any other changes or wish me to revert some of the changes made today please let me know.       Thank you,      Paramjit Mejia MD  9:53 AM  02/15/23

## 2023-02-17 DIAGNOSIS — E11.9 DIABETES MELLITUS WITHOUT COMPLICATION (HCC): ICD-10-CM

## 2023-02-17 DIAGNOSIS — E05.90 HYPERTHYROIDISM: ICD-10-CM

## 2023-02-18 LAB — URINE CULTURE, ROUTINE: NORMAL

## 2023-03-10 RX ORDER — TAMSULOSIN HYDROCHLORIDE 0.4 MG/1
CAPSULE ORAL
Qty: 90 CAPSULE | Refills: 0 | Status: SHIPPED
Start: 2023-03-10 | End: 2023-03-21 | Stop reason: SDUPTHER

## 2023-03-14 ENCOUNTER — TELEPHONE (OUTPATIENT)
Dept: CASE MANAGEMENT | Age: 73
End: 2023-03-14

## 2023-03-14 NOTE — TELEPHONE ENCOUNTER
I called the patient and left a message reminding him of his CT lung screening at [facility] on 3/15/2023 at 6:00 pm with an 5:30 pm arrival.  If unable to keep this appt call the office at [phone] to get rescheduled.           Electronically signed by Gurpreet Green on 3/14/23 at 3:58 PM EDT

## 2023-03-15 ENCOUNTER — HOSPITAL ENCOUNTER (OUTPATIENT)
Dept: CT IMAGING | Age: 73
Discharge: HOME | End: 2023-03-15
Payer: MEDICARE

## 2023-03-15 DIAGNOSIS — Z87.891 PERSONAL HISTORY OF TOBACCO USE: ICD-10-CM

## 2023-03-15 PROCEDURE — 71271 CT THORAX LUNG CANCER SCR C-: CPT

## 2023-03-16 ENCOUNTER — TELEPHONE (OUTPATIENT)
Dept: CASE MANAGEMENT | Age: 73
End: 2023-03-16

## 2023-03-16 NOTE — TELEPHONE ENCOUNTER
No call, encounter opened to process CT Lung Screening. CT Lung Screen: 3/15/2023      Impression   1. There is no appreciable pulmonary infiltrate or pulmonary mass   2. No suspicious pulmonary nodule is noted   3. Chronic pleural thickening and pleuroparenchymal scarring seen within the   left lung base. This finding is stable. 4. Significant calcified plaque within the coronary arteries. LUNG RADS:   Lung-RADS 2 - Benign ()       Management:  12 month screening LDCT       RECOMMENDATIONS:   If you would like to register your patient with the [facility], please contact the Nurse Navigator at   [phone]. Pack years: 20    Social History     Tobacco Use  Smoking Status: Former Smoker    Start Date:    Quit Date: 2013   Types: Cigarettes   Packs/Day: 1   Years: 20   Pack Years: 21   Smokeless Tobacco: Never         Results letter sent to patient via my chart or mailed.      [facility]

## 2023-03-21 ENCOUNTER — OFFICE VISIT (OUTPATIENT)
Dept: FAMILY MEDICINE CLINIC | Age: 73
End: 2023-03-21
Payer: MEDICARE

## 2023-03-21 VITALS
HEIGHT: 73 IN | RESPIRATION RATE: 16 BRPM | TEMPERATURE: 98.5 F | DIASTOLIC BLOOD PRESSURE: 64 MMHG | SYSTOLIC BLOOD PRESSURE: 120 MMHG | WEIGHT: 223 LBS | BODY MASS INDEX: 29.55 KG/M2 | OXYGEN SATURATION: 95 % | HEART RATE: 48 BPM

## 2023-03-21 DIAGNOSIS — E11.9 DIABETES MELLITUS WITHOUT COMPLICATION (HCC): Primary | ICD-10-CM

## 2023-03-21 PROCEDURE — 1123F ACP DISCUSS/DSCN MKR DOCD: CPT | Performed by: FAMILY MEDICINE

## 2023-03-21 PROCEDURE — 3046F HEMOGLOBIN A1C LEVEL >9.0%: CPT | Performed by: FAMILY MEDICINE

## 2023-03-21 PROCEDURE — 99214 OFFICE O/P EST MOD 30 MIN: CPT | Performed by: FAMILY MEDICINE

## 2023-03-21 RX ORDER — TAMSULOSIN HYDROCHLORIDE 0.4 MG/1
0.4 CAPSULE ORAL DAILY
Qty: 90 CAPSULE | Refills: 0 | Status: SHIPPED | OUTPATIENT
Start: 2023-03-21

## 2023-03-21 RX ORDER — NITROGLYCERIN 0.4 MG/1
0.4 TABLET SUBLINGUAL EVERY 5 MIN PRN
Qty: 25 TABLET | Refills: 1 | Status: SHIPPED | OUTPATIENT
Start: 2023-03-21

## 2023-03-21 RX ORDER — INSULIN GLARGINE 100 [IU]/ML
15 INJECTION, SOLUTION SUBCUTANEOUS NIGHTLY
Qty: 5 ADJUSTABLE DOSE PRE-FILLED PEN SYRINGE | Refills: 3 | Status: SHIPPED | OUTPATIENT
Start: 2023-03-21

## 2023-03-21 ASSESSMENT — ENCOUNTER SYMPTOMS
SINUS PRESSURE: 0
SHORTNESS OF BREATH: 0
SINUS PAIN: 0
VOMITING: 0
PHOTOPHOBIA: 0
DIARRHEA: 0
COUGH: 0
ABDOMINAL DISTENTION: 0
RHINORRHEA: 0
COLOR CHANGE: 0
CONSTIPATION: 0
FACIAL SWELLING: 0
APNEA: 0
CHEST TIGHTNESS: 0
BLOOD IN STOOL: 0

## 2023-03-21 NOTE — PROGRESS NOTES
feels comfortable with and wishes to proceed with above treatment plan. patient to call with any new medication issues, and read all Rx info from pharmacy to assure aware of all possible risks and side effects of medication before taking. Patient and/or guardian verbalizes understanding and agrees with above counseling,assessment and plan. All questions answered.

## 2023-03-21 NOTE — PATIENT INSTRUCTIONS
Current Dose 15 units:     **Titrate - Fasting blood sugar : for 3 day average that your Fasting blood sugar is > 200. Increase your insulin dose by 1 unit. **if your FBS is greater than 300 you increase by 2 units. **If you FBS is <100 decrease by one unit. If over your 3 day avg you BS is less than 120 decrease by 1 unit.

## 2023-03-22 RX ORDER — PEN NEEDLE, DIABETIC 32GX 5/32"
NEEDLE, DISPOSABLE MISCELLANEOUS
Qty: 100 EACH | Refills: 5 | Status: SHIPPED | OUTPATIENT
Start: 2023-03-22

## 2023-03-29 DIAGNOSIS — E11.9 DIABETES MELLITUS WITHOUT COMPLICATION (HCC): ICD-10-CM

## 2023-05-17 ENCOUNTER — OFFICE VISIT (OUTPATIENT)
Dept: FAMILY MEDICINE CLINIC | Age: 73
End: 2023-05-17
Payer: MEDICARE

## 2023-05-17 VITALS
BODY MASS INDEX: 29.37 KG/M2 | HEART RATE: 47 BPM | HEIGHT: 73 IN | SYSTOLIC BLOOD PRESSURE: 138 MMHG | TEMPERATURE: 98.6 F | RESPIRATION RATE: 16 BRPM | OXYGEN SATURATION: 95 % | DIASTOLIC BLOOD PRESSURE: 60 MMHG | WEIGHT: 221.6 LBS

## 2023-05-17 DIAGNOSIS — Z09 FOLLOW-UP EXAM, 3-6 MONTHS SINCE PREVIOUS EXAM: ICD-10-CM

## 2023-05-17 DIAGNOSIS — I48.11 LONGSTANDING PERSISTENT ATRIAL FIBRILLATION (HCC): ICD-10-CM

## 2023-05-17 DIAGNOSIS — E11.9 DIABETES MELLITUS WITHOUT COMPLICATION (HCC): Primary | ICD-10-CM

## 2023-05-17 LAB — HBA1C MFR BLD: 7.7 %

## 2023-05-17 PROCEDURE — 99214 OFFICE O/P EST MOD 30 MIN: CPT | Performed by: FAMILY MEDICINE

## 2023-05-17 PROCEDURE — 1123F ACP DISCUSS/DSCN MKR DOCD: CPT | Performed by: FAMILY MEDICINE

## 2023-05-17 PROCEDURE — 83036 HEMOGLOBIN GLYCOSYLATED A1C: CPT | Performed by: FAMILY MEDICINE

## 2023-05-17 PROCEDURE — 3051F HG A1C>EQUAL 7.0%<8.0%: CPT | Performed by: FAMILY MEDICINE

## 2023-05-17 RX ORDER — FLUTICASONE PROPIONATE 50 MCG
SPRAY, SUSPENSION (ML) NASAL
COMMUNITY
Start: 2023-04-17

## 2023-05-17 RX ORDER — TAMSULOSIN HYDROCHLORIDE 0.4 MG/1
0.4 CAPSULE ORAL DAILY
Qty: 90 CAPSULE | Refills: 1 | Status: SHIPPED | OUTPATIENT
Start: 2023-05-17

## 2023-05-17 ASSESSMENT — ENCOUNTER SYMPTOMS
PHOTOPHOBIA: 0
APNEA: 0
ABDOMINAL DISTENTION: 0
RHINORRHEA: 0
FACIAL SWELLING: 0
DIARRHEA: 0
COLOR CHANGE: 0
CONSTIPATION: 0
CHEST TIGHTNESS: 0
COUGH: 0
SHORTNESS OF BREATH: 0
SINUS PAIN: 0
SINUS PRESSURE: 0
BLOOD IN STOOL: 0
VOMITING: 0

## 2023-05-17 NOTE — PROGRESS NOTES
Georgina Michaels is a 67 y.o. male accompanied by himself  CC:   Chief Complaint   Patient presents with    Diabetes     Follow up,        Diabetes mellitus: Follow-up  Doing well  Hemoglobin A1C   Date Value Ref Range Status   05/17/2023 7.7 % Final   Borderline control. Patient titrating is on Lantus up is currently at 18 units  On both statin as well as ACE inhibitor. Patient states that he is feeling much better. Atrial fibrillation:  Well-controlled on Eliquis  Does follow with cardiology-Dr. Ja Mauricio      Patient's past medical, surgical, social and/or family history reviewed, updated in chart, and are non-contributory (unless otherwise stated). Medications and allergies also reviewed and updated in chart. /60 (Site: Left Upper Arm, Position: Sitting, Cuff Size: Large Adult)   Pulse (!) 47   Temp 98.6 °F (37 °C) (Oral)   Resp 16   Ht 6' 1\" (1.854 m)   Wt 221 lb 9.6 oz (100.5 kg)   SpO2 95%   BMI 29.24 kg/m²     Review of Systems   Constitutional:  Negative for chills, fatigue and fever. HENT:  Negative for congestion, ear pain, facial swelling, rhinorrhea, sinus pressure and sinus pain. Eyes:  Negative for photophobia and visual disturbance. Respiratory:  Negative for apnea, cough, chest tightness and shortness of breath. Cardiovascular:  Negative for chest pain and palpitations. Gastrointestinal:  Negative for abdominal distention, blood in stool, constipation, diarrhea and vomiting. Endocrine: Negative for cold intolerance, polydipsia, polyphagia and polyuria. Genitourinary:  Negative for decreased urine volume, frequency and urgency. Musculoskeletal:  Negative for arthralgias and gait problem. Skin:  Negative for color change. Allergic/Immunologic: Negative for environmental allergies and food allergies. Neurological:  Negative for dizziness, light-headedness and headaches. Hematological:  Negative for adenopathy.    Psychiatric/Behavioral:  Negative

## 2023-07-27 ENCOUNTER — OFFICE VISIT (OUTPATIENT)
Dept: CARDIOLOGY CLINIC | Age: 73
End: 2023-07-27
Payer: MEDICARE

## 2023-07-27 VITALS
HEIGHT: 73 IN | SYSTOLIC BLOOD PRESSURE: 128 MMHG | BODY MASS INDEX: 29.82 KG/M2 | DIASTOLIC BLOOD PRESSURE: 54 MMHG | WEIGHT: 225 LBS | HEART RATE: 47 BPM | RESPIRATION RATE: 16 BRPM

## 2023-07-27 DIAGNOSIS — N52.9 ERECTILE DYSFUNCTION, UNSPECIFIED ERECTILE DYSFUNCTION TYPE: ICD-10-CM

## 2023-07-27 DIAGNOSIS — E78.5 DYSLIPIDEMIA: ICD-10-CM

## 2023-07-27 DIAGNOSIS — Z79.01 ANTICOAGULATED BY ANTICOAGULATION TREATMENT: ICD-10-CM

## 2023-07-27 DIAGNOSIS — R00.1 SINUS BRADYCARDIA: ICD-10-CM

## 2023-07-27 DIAGNOSIS — I25.2 HISTORY OF NON-ST ELEVATION MYOCARDIAL INFARCTION (NSTEMI): ICD-10-CM

## 2023-07-27 DIAGNOSIS — R79.89 LOW TSH LEVEL: ICD-10-CM

## 2023-07-27 DIAGNOSIS — Z98.61 HISTORY OF PTCA: ICD-10-CM

## 2023-07-27 DIAGNOSIS — I48.0 PAF (PAROXYSMAL ATRIAL FIBRILLATION) (HCC): ICD-10-CM

## 2023-07-27 DIAGNOSIS — Z79.899 ON AMIODARONE THERAPY: ICD-10-CM

## 2023-07-27 DIAGNOSIS — Z86.79 H/O: HTN (HYPERTENSION): ICD-10-CM

## 2023-07-27 DIAGNOSIS — Z79.4 INSULIN-REQUIRING OR DEPENDENT TYPE II DIABETES MELLITUS (HCC): ICD-10-CM

## 2023-07-27 DIAGNOSIS — I25.10 CORONARY ARTERY DISEASE INVOLVING NATIVE CORONARY ARTERY OF NATIVE HEART WITHOUT ANGINA PECTORIS: Primary | ICD-10-CM

## 2023-07-27 DIAGNOSIS — I25.2 OLD INFERIOR WALL MYOCARDIAL INFARCTION: ICD-10-CM

## 2023-07-27 DIAGNOSIS — Z95.1 HX OF CABG: ICD-10-CM

## 2023-07-27 DIAGNOSIS — E11.9 INSULIN-REQUIRING OR DEPENDENT TYPE II DIABETES MELLITUS (HCC): ICD-10-CM

## 2023-07-27 DIAGNOSIS — K40.90 RIGHT INGUINAL HERNIA: ICD-10-CM

## 2023-07-27 PROCEDURE — 93000 ELECTROCARDIOGRAM COMPLETE: CPT | Performed by: INTERNAL MEDICINE

## 2023-07-27 PROCEDURE — 3051F HG A1C>EQUAL 7.0%<8.0%: CPT | Performed by: INTERNAL MEDICINE

## 2023-07-27 PROCEDURE — 36415 COLL VENOUS BLD VENIPUNCTURE: CPT | Performed by: INTERNAL MEDICINE

## 2023-07-27 PROCEDURE — 99213 OFFICE O/P EST LOW 20 MIN: CPT | Performed by: INTERNAL MEDICINE

## 2023-07-27 PROCEDURE — 1123F ACP DISCUSS/DSCN MKR DOCD: CPT | Performed by: INTERNAL MEDICINE

## 2023-07-27 RX ORDER — METOPROLOL SUCCINATE 25 MG/1
TABLET, EXTENDED RELEASE ORAL
Qty: 90 TABLET | Refills: 3 | Status: SHIPPED | OUTPATIENT
Start: 2023-07-27

## 2023-07-27 NOTE — PROGRESS NOTES
OFFICE VISIT        PRIMARY CARE PHYSICIAN:    Arabella Aly MD         ALLERGIES / SENSITIVITIES:    Allergies   Allergen Reactions    Dapagliflozin Myalgia     Other reaction(s): Myalgias (Muscle Pain)          REVIEWED MEDICATIONS:      Current Outpatient Medications:     metoprolol succinate (TOPROL XL) 25 MG extended release tablet, take 1 tablet by mouth once daily, Disp: 90 tablet, Rfl: 3    tamsulosin (FLOMAX) 0.4 MG capsule, Take 1 capsule by mouth daily, Disp: 90 capsule, Rfl: 1    insulin glargine (LANTUS SOLOSTAR) 100 UNIT/ML injection pen, Inject 20 Units into the skin nightly, Disp: 18 mL, Rfl: 0    BD PEN NEEDLE ANNMARIE 2ND GEN 32G X 4 MM MISC, use 1 PEN NEEDLE to inject MEDICATION subcutaneously once daily, Disp: 100 each, Rfl: 5    nitroGLYCERIN (NITROSTAT) 0.4 MG SL tablet, Place 1 tablet under the tongue every 5 minutes as needed for Chest pain up to max of 3 total doses. If no relief after 1 dose, call 911., Disp: 25 tablet, Rfl: 1    CIALIS 10 MG tablet, Take 1 tablet by mouth as needed for Erectile Dysfunction, Disp: 20 tablet, Rfl: 2    apixaban (ELIQUIS) 5 MG TABS tablet, Take 1 tablet by mouth 2 times daily, Disp: 180 tablet, Rfl: 3    isosorbide mononitrate (IMDUR) 60 MG extended release tablet, Take 1 tablet by mouth daily, Disp: 90 tablet, Rfl: 3    rosuvastatin (CRESTOR) 20 MG tablet, Take 1 tablet by mouth daily, Disp: 90 tablet, Rfl: 3    lisinopril (PRINIVIL;ZESTRIL) 10 MG tablet, Take 1 tablet by mouth daily, Disp: 90 tablet, Rfl: 3    aspirin 81 MG EC tablet, Take 1 tablet by mouth daily, Disp: , Rfl:       S: REASON FOR VISIT:    Coronary artery disease. Kashmir Alatorre is a pleasant, 65-year-old male with cardiovascular history as described below. He was last seen by me in the office in 12/2022. He stated that around a couple months ago, when he overexerted himself, he had some chest discomfort, which responded to sublingual Nitroglycerin. He has had no chest discomfort then.   He denies any

## 2023-07-28 LAB
T4 TOTAL: 9.9 UG/DL (ref 4.5–11.7)
TSH SERPL DL<=0.05 MIU/L-ACNC: 1.36 UIU/ML (ref 0.27–4.2)

## 2023-08-21 RX ORDER — METOPROLOL SUCCINATE 25 MG/1
TABLET, EXTENDED RELEASE ORAL
Qty: 90 TABLET | Refills: 3 | OUTPATIENT
Start: 2023-08-21

## 2023-09-06 ENCOUNTER — OFFICE VISIT (OUTPATIENT)
Dept: FAMILY MEDICINE CLINIC | Age: 73
End: 2023-09-06
Payer: MEDICARE

## 2023-09-06 VITALS
HEIGHT: 73 IN | SYSTOLIC BLOOD PRESSURE: 120 MMHG | WEIGHT: 221.7 LBS | BODY MASS INDEX: 29.38 KG/M2 | RESPIRATION RATE: 16 BRPM | TEMPERATURE: 98.4 F | OXYGEN SATURATION: 94 % | HEART RATE: 54 BPM | DIASTOLIC BLOOD PRESSURE: 68 MMHG

## 2023-09-06 DIAGNOSIS — E11.9 DIABETES MELLITUS WITHOUT COMPLICATION (HCC): Primary | ICD-10-CM

## 2023-09-06 DIAGNOSIS — Z09 FOLLOW-UP EXAM, 3-6 MONTHS SINCE PREVIOUS EXAM: ICD-10-CM

## 2023-09-06 LAB — HBA1C MFR BLD: 9.1 %

## 2023-09-06 PROCEDURE — 83036 HEMOGLOBIN GLYCOSYLATED A1C: CPT | Performed by: FAMILY MEDICINE

## 2023-09-06 PROCEDURE — 3046F HEMOGLOBIN A1C LEVEL >9.0%: CPT | Performed by: FAMILY MEDICINE

## 2023-09-06 PROCEDURE — 99214 OFFICE O/P EST MOD 30 MIN: CPT | Performed by: FAMILY MEDICINE

## 2023-09-06 PROCEDURE — 1123F ACP DISCUSS/DSCN MKR DOCD: CPT | Performed by: FAMILY MEDICINE

## 2023-09-06 RX ORDER — TAMSULOSIN HYDROCHLORIDE 0.4 MG/1
0.8 CAPSULE ORAL DAILY
Qty: 180 CAPSULE | Refills: 1 | Status: SHIPPED | OUTPATIENT
Start: 2023-09-06 | End: 2024-03-04

## 2023-09-06 RX ORDER — PEN NEEDLE, DIABETIC 32GX 5/32"
NEEDLE, DISPOSABLE MISCELLANEOUS
Qty: 100 EACH | Refills: 5 | Status: SHIPPED | OUTPATIENT
Start: 2023-09-06

## 2023-09-06 RX ORDER — INSULIN GLARGINE 100 [IU]/ML
20 INJECTION, SOLUTION SUBCUTANEOUS NIGHTLY
Qty: 18 ML | Refills: 2 | Status: SHIPPED | OUTPATIENT
Start: 2023-09-06 | End: 2024-06-02

## 2023-09-06 ASSESSMENT — ENCOUNTER SYMPTOMS
BLOOD IN STOOL: 0
VOMITING: 0
FACIAL SWELLING: 0
CONSTIPATION: 0
SHORTNESS OF BREATH: 0
SINUS PAIN: 0
COUGH: 0
CHEST TIGHTNESS: 0
ABDOMINAL DISTENTION: 0
DIARRHEA: 0
RHINORRHEA: 0
SINUS PRESSURE: 0
APNEA: 0
PHOTOPHOBIA: 0
COLOR CHANGE: 0

## 2023-11-08 DIAGNOSIS — E11.9 DIABETES MELLITUS WITHOUT COMPLICATION (HCC): ICD-10-CM

## 2023-11-08 RX ORDER — PEN NEEDLE, DIABETIC 32GX 5/32"
NEEDLE, DISPOSABLE MISCELLANEOUS
Qty: 100 EACH | Refills: 5 | Status: SHIPPED | OUTPATIENT
Start: 2023-11-08

## 2023-11-08 RX ORDER — INSULIN GLARGINE 100 [IU]/ML
40 INJECTION, SOLUTION SUBCUTANEOUS NIGHTLY
Qty: 36 ML | Refills: 1 | Status: SHIPPED | OUTPATIENT
Start: 2023-11-08 | End: 2024-05-06

## 2023-11-08 NOTE — TELEPHONE ENCOUNTER
Refill. Has been increasing his dose by 1 unit q [facility] He was at 15 units, is now at 40 units and still BS varies 135 and above. So will still be increasing his dose. DR. HILLIARD PLEASE ADD WHAT UNITS YOU WANT TO THIS RX. AND NUMBER OF PENS.

## 2023-12-18 ENCOUNTER — OFFICE VISIT (OUTPATIENT)
Dept: FAMILY MEDICINE CLINIC | Age: 73
End: 2023-12-18
Payer: MEDICARE

## 2023-12-18 VITALS
OXYGEN SATURATION: 96 % | HEIGHT: 73 IN | TEMPERATURE: 98.1 F | BODY MASS INDEX: 29.69 KG/M2 | HEART RATE: 62 BPM | SYSTOLIC BLOOD PRESSURE: 130 MMHG | DIASTOLIC BLOOD PRESSURE: 70 MMHG | WEIGHT: 224 LBS

## 2023-12-18 DIAGNOSIS — N52.9 VASCULOGENIC ERECTILE DYSFUNCTION, UNSPECIFIED VASCULOGENIC ERECTILE DYSFUNCTION TYPE: ICD-10-CM

## 2023-12-18 DIAGNOSIS — E11.9 DIABETES MELLITUS WITHOUT COMPLICATION (HCC): ICD-10-CM

## 2023-12-18 DIAGNOSIS — I48.11 LONGSTANDING PERSISTENT ATRIAL FIBRILLATION (HCC): Primary | ICD-10-CM

## 2023-12-18 LAB — HBA1C MFR BLD: 9.6 %

## 2023-12-18 PROCEDURE — 99214 OFFICE O/P EST MOD 30 MIN: CPT | Performed by: FAMILY MEDICINE

## 2023-12-18 PROCEDURE — 3046F HEMOGLOBIN A1C LEVEL >9.0%: CPT | Performed by: FAMILY MEDICINE

## 2023-12-18 PROCEDURE — 1123F ACP DISCUSS/DSCN MKR DOCD: CPT | Performed by: FAMILY MEDICINE

## 2023-12-18 PROCEDURE — 83036 HEMOGLOBIN GLYCOSYLATED A1C: CPT | Performed by: FAMILY MEDICINE

## 2023-12-18 RX ORDER — TAMSULOSIN HYDROCHLORIDE 0.4 MG/1
0.8 CAPSULE ORAL DAILY
Qty: 180 CAPSULE | Refills: 1 | Status: SHIPPED | OUTPATIENT
Start: 2023-12-18 | End: 2024-06-15

## 2023-12-18 RX ORDER — TADALAFIL 10 MG
10 TABLET ORAL PRN
Qty: 20 TABLET | Refills: 2 | Status: SHIPPED | OUTPATIENT
Start: 2023-12-18

## 2023-12-18 RX ORDER — INSULIN GLARGINE 100 [IU]/ML
60 INJECTION, SOLUTION SUBCUTANEOUS NIGHTLY
Qty: 45 ML | Refills: 1 | Status: SHIPPED | OUTPATIENT
Start: 2023-12-18 | End: 2024-06-15

## 2023-12-18 NOTE — PATIENT INSTRUCTIONS
Return in 4 months for [facility]    [facility] [address] Phone [phone]

## 2023-12-26 RX ORDER — ISOSORBIDE MONONITRATE 60 MG/1
60 TABLET, EXTENDED RELEASE ORAL DAILY
Qty: 90 TABLET | Refills: 1 | Status: SHIPPED | OUTPATIENT
Start: 2023-12-26

## 2023-12-26 RX ORDER — ROSUVASTATIN CALCIUM 20 MG/1
20 TABLET, COATED ORAL DAILY
Qty: 90 TABLET | Refills: 1 | Status: SHIPPED | OUTPATIENT
Start: 2023-12-26

## 2024-01-19 ENCOUNTER — OFFICE VISIT (OUTPATIENT)
Dept: CARDIOLOGY CLINIC | Age: 74
End: 2024-01-19
Payer: MEDICARE

## 2024-01-19 VITALS
HEIGHT: 73 IN | WEIGHT: 224 LBS | RESPIRATION RATE: 16 BRPM | SYSTOLIC BLOOD PRESSURE: 100 MMHG | BODY MASS INDEX: 29.69 KG/M2 | HEART RATE: 64 BPM | DIASTOLIC BLOOD PRESSURE: 50 MMHG

## 2024-01-19 DIAGNOSIS — E11.9 INSULIN-REQUIRING OR DEPENDENT TYPE II DIABETES MELLITUS (HCC): ICD-10-CM

## 2024-01-19 DIAGNOSIS — Z79.01 ANTICOAGULATED BY ANTICOAGULATION TREATMENT: ICD-10-CM

## 2024-01-19 DIAGNOSIS — I25.2 HISTORY OF NON-ST ELEVATION MYOCARDIAL INFARCTION (NSTEMI): ICD-10-CM

## 2024-01-19 DIAGNOSIS — Z95.1 HX OF CABG: ICD-10-CM

## 2024-01-19 DIAGNOSIS — Z98.61 HISTORY OF PTCA: ICD-10-CM

## 2024-01-19 DIAGNOSIS — K40.90 RIGHT INGUINAL HERNIA: ICD-10-CM

## 2024-01-19 DIAGNOSIS — E78.5 DYSLIPIDEMIA: ICD-10-CM

## 2024-01-19 DIAGNOSIS — Z86.79 H/O: HTN (HYPERTENSION): ICD-10-CM

## 2024-01-19 DIAGNOSIS — Z79.4 INSULIN-REQUIRING OR DEPENDENT TYPE II DIABETES MELLITUS (HCC): ICD-10-CM

## 2024-01-19 DIAGNOSIS — I25.10 CORONARY ARTERY DISEASE INVOLVING NATIVE CORONARY ARTERY OF NATIVE HEART WITHOUT ANGINA PECTORIS: Primary | ICD-10-CM

## 2024-01-19 DIAGNOSIS — N52.9 ERECTILE DYSFUNCTION, UNSPECIFIED ERECTILE DYSFUNCTION TYPE: ICD-10-CM

## 2024-01-19 DIAGNOSIS — I25.2 OLD INFERIOR WALL MYOCARDIAL INFARCTION: ICD-10-CM

## 2024-01-19 DIAGNOSIS — I48.0 PAF (PAROXYSMAL ATRIAL FIBRILLATION) (HCC): ICD-10-CM

## 2024-01-19 PROCEDURE — 1123F ACP DISCUSS/DSCN MKR DOCD: CPT | Performed by: INTERNAL MEDICINE

## 2024-01-19 PROCEDURE — 93000 ELECTROCARDIOGRAM COMPLETE: CPT | Performed by: INTERNAL MEDICINE

## 2024-01-19 PROCEDURE — 99213 OFFICE O/P EST LOW 20 MIN: CPT | Performed by: INTERNAL MEDICINE

## 2024-01-22 NOTE — PROGRESS NOTES
occluded at its origin.  LCX, dominant vessel with 95% ostial narrowing and with high grade disease of 3 of its marginal branches.  RCA, nondominant vessel with around 70-80% proximal/mid vessel narrowing. LIMA to LAD, widely patent vessel including its distal anastomosis with the LAD showing no significant disease after the distal anastomosis with the LIMA filling the mid and proximal LAD, the diagonal branches and the left circumflex retrogradely.  Normal left ventricular size with hypokinesis of the basal half of the inferior wall with more or less preserved global left ventricular systolic function with an estimated ejection fraction of 55%.  Elevated left ventricular end diastolic pressure consistent with LV diastolic dysfunction.                Patient converted to sinus rhythm in the cath lab with IV amiodarone and was started on po amiodarone during his hospital stay.    4.  Paroxysmal atrial fibrillation.  Patient presented with atrial fibrillation with rapid ventricular response on 2/24/2022 and converted to sinus rhythm with IV amiodarone.    5.  Anticoagulated on Eliquis.    6.  Successful IVUS guided PCI of the dominant left circumflex from distal to ostial with 2 overlapping drug-eluting coronary stents (Dr. Tadeo: Interventional Cardiology), 3/1/2022.    7.  Insulin requiring diabetes mellitus.   8.  Hypertension.   9.  Hyperlipidemia.   10. Obesity.    11.  Erectile dysfunction.   12.  Right inguinal hernia.       FAMILY HISTORY:  Noncontributory.     SOCIAL HISTORY:  Patient does not smoke.  He denied any significant alcohol use.  He denied any illicit drug use.       O:  COMPLETE PHYSICAL EXAM:      BP (!) 100/50   Pulse 64   Resp 16   Ht 1.854 m (6' 1\")   Wt 101.6 kg (224 lb)   BMI 29.55 kg/m²      General:          Well-developed, well-nourished male in no acute distress.   HEENT:           Normocephalic and atraumatic head. No JVD. No carotid bruits. Carotid upstrokes normal bilaterally.  No

## 2024-02-13 RX ORDER — APIXABAN 5 MG/1
5 TABLET, FILM COATED ORAL 2 TIMES DAILY
Qty: 180 TABLET | Refills: 3 | Status: SHIPPED | OUTPATIENT
Start: 2024-02-13

## 2024-02-21 RX ORDER — LISINOPRIL 10 MG/1
10 TABLET ORAL DAILY
Qty: 90 TABLET | Refills: 3 | Status: SHIPPED | OUTPATIENT
Start: 2024-02-21

## 2024-03-19 ENCOUNTER — OFFICE VISIT (OUTPATIENT)
Dept: FAMILY MEDICINE CLINIC | Age: 74
End: 2024-03-19
Payer: MEDICARE

## 2024-03-19 VITALS
TEMPERATURE: 98.4 F | DIASTOLIC BLOOD PRESSURE: 64 MMHG | WEIGHT: 227.1 LBS | SYSTOLIC BLOOD PRESSURE: 112 MMHG | HEART RATE: 84 BPM | HEIGHT: 73 IN | RESPIRATION RATE: 16 BRPM | OXYGEN SATURATION: 98 % | BODY MASS INDEX: 30.1 KG/M2

## 2024-03-19 DIAGNOSIS — E11.9 DIABETES MELLITUS WITHOUT COMPLICATION (HCC): Primary | ICD-10-CM

## 2024-03-19 LAB
BASOPHILS ABSOLUTE: 0.1 K/UL (ref 0–0.2)
BASOPHILS RELATIVE PERCENT: 1 % (ref 0–2)
EOSINOPHILS ABSOLUTE: 0.84 K/UL (ref 0.05–0.5)
EOSINOPHILS RELATIVE PERCENT: 8 % (ref 0–6)
HBA1C MFR BLD: 8.3 %
HCT VFR BLD CALC: 47.9 % (ref 37–54)
HEMOGLOBIN: 15.6 G/DL (ref 12.5–16.5)
IMMATURE GRANULOCYTES %: 0 % (ref 0–5)
IMMATURE GRANULOCYTES ABSOLUTE: 0.04 K/UL (ref 0–0.58)
LYMPHOCYTES ABSOLUTE: 2.32 K/UL (ref 1.5–4)
LYMPHOCYTES RELATIVE PERCENT: 22 % (ref 20–42)
MCH RBC QN AUTO: 29.1 PG (ref 26–35)
MCHC RBC AUTO-ENTMCNC: 32.6 G/DL (ref 32–34.5)
MCV RBC AUTO: 89.2 FL (ref 80–99.9)
MONOCYTES ABSOLUTE: 1.02 K/UL (ref 0.1–0.95)
MONOCYTES RELATIVE PERCENT: 10 % (ref 2–12)
NEUTROPHILS ABSOLUTE: 6.2 K/UL (ref 1.8–7.3)
NEUTROPHILS RELATIVE PERCENT: 59 % (ref 43–80)
PDW BLD-RTO: 13.7 % (ref 11.5–15)
PLATELET # BLD: 273 K/UL (ref 130–450)
PMV BLD AUTO: 11 FL (ref 7–12)
RBC # BLD: 5.37 M/UL (ref 3.8–5.8)
WBC # BLD: 10.5 K/UL (ref 4.5–11.5)

## 2024-03-19 PROCEDURE — 1123F ACP DISCUSS/DSCN MKR DOCD: CPT | Performed by: FAMILY MEDICINE

## 2024-03-19 PROCEDURE — 99214 OFFICE O/P EST MOD 30 MIN: CPT | Performed by: FAMILY MEDICINE

## 2024-03-19 PROCEDURE — 36415 COLL VENOUS BLD VENIPUNCTURE: CPT | Performed by: FAMILY MEDICINE

## 2024-03-19 PROCEDURE — 83036 HEMOGLOBIN GLYCOSYLATED A1C: CPT | Performed by: FAMILY MEDICINE

## 2024-03-19 PROCEDURE — 3052F HG A1C>EQUAL 8.0%<EQUAL 9.0%: CPT | Performed by: FAMILY MEDICINE

## 2024-03-19 RX ORDER — TAMSULOSIN HYDROCHLORIDE 0.4 MG/1
0.8 CAPSULE ORAL DAILY
Qty: 180 CAPSULE | Refills: 1 | Status: SHIPPED | OUTPATIENT
Start: 2024-03-19 | End: 2024-09-15

## 2024-03-19 SDOH — ECONOMIC STABILITY: FOOD INSECURITY: WITHIN THE PAST 12 MONTHS, YOU WORRIED THAT YOUR FOOD WOULD RUN OUT BEFORE YOU GOT MONEY TO BUY MORE.: NEVER TRUE

## 2024-03-19 SDOH — ECONOMIC STABILITY: FOOD INSECURITY: WITHIN THE PAST 12 MONTHS, THE FOOD YOU BOUGHT JUST DIDN'T LAST AND YOU DIDN'T HAVE MONEY TO GET MORE.: NEVER TRUE

## 2024-03-19 SDOH — ECONOMIC STABILITY: INCOME INSECURITY: HOW HARD IS IT FOR YOU TO PAY FOR THE VERY BASICS LIKE FOOD, HOUSING, MEDICAL CARE, AND HEATING?: NOT HARD AT ALL

## 2024-03-19 ASSESSMENT — PATIENT HEALTH QUESTIONNAIRE - PHQ9
SUM OF ALL RESPONSES TO PHQ QUESTIONS 1-9: 0
SUM OF ALL RESPONSES TO PHQ QUESTIONS 1-9: 0
SUM OF ALL RESPONSES TO PHQ9 QUESTIONS 1 & 2: 0
SUM OF ALL RESPONSES TO PHQ QUESTIONS 1-9: 0
2. FEELING DOWN, DEPRESSED OR HOPELESS: NOT AT ALL
SUM OF ALL RESPONSES TO PHQ QUESTIONS 1-9: 0
1. LITTLE INTEREST OR PLEASURE IN DOING THINGS: NOT AT ALL

## 2024-03-20 LAB
ALBUMIN SERPL-MCNC: 4.1 G/DL (ref 3.5–5.2)
ALP BLD-CCNC: 103 U/L (ref 40–129)
ALT SERPL-CCNC: 16 U/L (ref 0–40)
ANION GAP SERPL CALCULATED.3IONS-SCNC: 13 MMOL/L (ref 7–16)
AST SERPL-CCNC: 14 U/L (ref 0–39)
BILIRUB SERPL-MCNC: 0.7 MG/DL (ref 0–1.2)
BUN BLDV-MCNC: 32 MG/DL (ref 6–23)
CALCIUM SERPL-MCNC: 8.7 MG/DL (ref 8.6–10.2)
CHLORIDE BLD-SCNC: 104 MMOL/L (ref 98–107)
CHOLESTEROL: 122 MG/DL
CO2: 20 MMOL/L (ref 22–29)
CREAT SERPL-MCNC: 1.1 MG/DL (ref 0.7–1.2)
GFR SERPL CREATININE-BSD FRML MDRD: >60 ML/MIN/1.73M2
GLUCOSE BLD-MCNC: 231 MG/DL (ref 74–99)
HDLC SERPL-MCNC: 38 MG/DL
LDL CHOLESTEROL: 59 MG/DL
POTASSIUM SERPL-SCNC: 4.5 MMOL/L (ref 3.5–5)
SODIUM BLD-SCNC: 137 MMOL/L (ref 132–146)
TOTAL PROTEIN: 6.6 G/DL (ref 6.4–8.3)
TRIGL SERPL-MCNC: 126 MG/DL
TSH SERPL DL<=0.05 MIU/L-ACNC: 1.25 UIU/ML (ref 0.27–4.2)
VLDLC SERPL CALC-MCNC: 25 MG/DL

## 2024-05-30 ENCOUNTER — OFFICE VISIT (OUTPATIENT)
Dept: CARDIOLOGY CLINIC | Age: 74
End: 2024-05-30
Payer: MEDICARE

## 2024-05-30 VITALS
DIASTOLIC BLOOD PRESSURE: 78 MMHG | RESPIRATION RATE: 16 BRPM | BODY MASS INDEX: 29.29 KG/M2 | WEIGHT: 221 LBS | HEIGHT: 73 IN | HEART RATE: 61 BPM | SYSTOLIC BLOOD PRESSURE: 136 MMHG

## 2024-05-30 DIAGNOSIS — E11.9 INSULIN-REQUIRING OR DEPENDENT TYPE II DIABETES MELLITUS (HCC): ICD-10-CM

## 2024-05-30 DIAGNOSIS — Z98.61 HISTORY OF PTCA: ICD-10-CM

## 2024-05-30 DIAGNOSIS — N52.9 ERECTILE DYSFUNCTION, UNSPECIFIED ERECTILE DYSFUNCTION TYPE: ICD-10-CM

## 2024-05-30 DIAGNOSIS — Z79.01 ANTICOAGULATED BY ANTICOAGULATION TREATMENT: ICD-10-CM

## 2024-05-30 DIAGNOSIS — I25.2 HISTORY OF NON-ST ELEVATION MYOCARDIAL INFARCTION (NSTEMI): ICD-10-CM

## 2024-05-30 DIAGNOSIS — Z79.4 INSULIN-REQUIRING OR DEPENDENT TYPE II DIABETES MELLITUS (HCC): ICD-10-CM

## 2024-05-30 DIAGNOSIS — Z95.1 HX OF CABG: ICD-10-CM

## 2024-05-30 DIAGNOSIS — I25.10 CORONARY ARTERY DISEASE INVOLVING NATIVE CORONARY ARTERY OF NATIVE HEART WITHOUT ANGINA PECTORIS: Primary | ICD-10-CM

## 2024-05-30 DIAGNOSIS — E78.5 DYSLIPIDEMIA: ICD-10-CM

## 2024-05-30 DIAGNOSIS — I48.0 PAF (PAROXYSMAL ATRIAL FIBRILLATION) (HCC): ICD-10-CM

## 2024-05-30 DIAGNOSIS — K40.90 RIGHT INGUINAL HERNIA: ICD-10-CM

## 2024-05-30 DIAGNOSIS — I25.2 OLD INFERIOR WALL MYOCARDIAL INFARCTION: ICD-10-CM

## 2024-05-30 DIAGNOSIS — I10 PRIMARY HYPERTENSION: ICD-10-CM

## 2024-05-30 PROCEDURE — 3075F SYST BP GE 130 - 139MM HG: CPT | Performed by: INTERNAL MEDICINE

## 2024-05-30 PROCEDURE — 1123F ACP DISCUSS/DSCN MKR DOCD: CPT | Performed by: INTERNAL MEDICINE

## 2024-05-30 PROCEDURE — 93000 ELECTROCARDIOGRAM COMPLETE: CPT | Performed by: INTERNAL MEDICINE

## 2024-05-30 PROCEDURE — 3078F DIAST BP <80 MM HG: CPT | Performed by: INTERNAL MEDICINE

## 2024-05-30 PROCEDURE — 99214 OFFICE O/P EST MOD 30 MIN: CPT | Performed by: INTERNAL MEDICINE

## 2024-05-30 PROCEDURE — 3052F HG A1C>EQUAL 8.0%<EQUAL 9.0%: CPT | Performed by: INTERNAL MEDICINE

## 2024-05-30 NOTE — PROGRESS NOTES
OFFICE VISIT        PRIMARY CARE PHYSICIAN:    Leeroy Mattson MD         ALLERGIES / SENSITIVITIES:    Allergies   Allergen Reactions    Dapagliflozin Myalgia     Other reaction(s): Myalgias (Muscle Pain)          REVIEWED MEDICATIONS:      Current Outpatient Medications:     Semaglutide,0.25 or 0.5MG/DOS, 2 MG/1.5ML SOPN, Inject 0.5 mg into the skin once a week, Disp: 1 Adjustable Dose Pre-filled Pen Syringe, Rfl: 1    tamsulosin (FLOMAX) 0.4 MG capsule, Take 2 capsules by mouth daily, Disp: 180 capsule, Rfl: 1    lisinopril (PRINIVIL;ZESTRIL) 10 MG tablet, take 1 tablet by mouth once daily, Disp: 90 tablet, Rfl: 3    ELIQUIS 5 MG TABS tablet, take 1 tablet by mouth twice a day, Disp: 180 tablet, Rfl: 3    rosuvastatin (CRESTOR) 20 MG tablet, take 1 tablet by mouth once daily, Disp: 90 tablet, Rfl: 1    isosorbide mononitrate (IMDUR) 60 MG extended release tablet, take 1 tablet by mouth once daily, Disp: 90 tablet, Rfl: 1    CIALIS 10 MG tablet, Take 1 tablet by mouth as needed for Erectile Dysfunction, Disp: 20 tablet, Rfl: 2    insulin glargine (LANTUS SOLOSTAR) 100 UNIT/ML injection pen, Inject 60 Units into the skin nightly (Patient taking differently: Inject 50 Units into the skin nightly), Disp: 45 mL, Rfl: 1    Insulin Pen Needle (BD PEN NEEDLE ANNMARIE 2ND GEN) 32G X 4 MM MISC, use 1 PEN NEEDLE to inject MEDICATION subcutaneously once daily, Disp: 100 each, Rfl: 5    metoprolol succinate (TOPROL XL) 25 MG extended release tablet, take 1 tablet by mouth once daily, Disp: 90 tablet, Rfl: 3    nitroGLYCERIN (NITROSTAT) 0.4 MG SL tablet, Place 1 tablet under the tongue every 5 minutes as needed for Chest pain up to max of 3 total doses. If no relief after 1 dose, call 911., Disp: 25 tablet, Rfl: 1    aspirin 81 MG EC tablet, Take 1 tablet by mouth daily, Disp: , Rfl:         S: REASON FOR VISIT:    Coronary artery disease. Bryant is a pleasant, 73-year-old male with cardiovascular history as described below. He was

## 2024-06-12 DIAGNOSIS — E11.9 DIABETES MELLITUS WITHOUT COMPLICATION (HCC): ICD-10-CM

## 2024-06-12 RX ORDER — SEMAGLUTIDE 0.68 MG/ML
INJECTION, SOLUTION SUBCUTANEOUS
Qty: 3 ML | Refills: 1 | Status: SHIPPED | OUTPATIENT
Start: 2024-06-12

## 2024-06-12 NOTE — TELEPHONE ENCOUNTER
Patients last appointment 3/19/2024.  Patients next scheduled appointment   Future Appointments   Date Time Provider Department Center   11/6/2024  8:00 AM Katharina Moreno MD [facility]

## 2024-06-25 RX ORDER — ISOSORBIDE MONONITRATE 60 MG/1
60 TABLET, EXTENDED RELEASE ORAL DAILY
Qty: 90 TABLET | Refills: 2 | Status: SHIPPED | OUTPATIENT
Start: 2024-06-25

## 2024-06-25 RX ORDER — ROSUVASTATIN CALCIUM 20 MG/1
20 TABLET, COATED ORAL DAILY
Qty: 90 TABLET | Refills: 2 | Status: SHIPPED | OUTPATIENT
Start: 2024-06-25

## 2024-07-16 RX ORDER — METOPROLOL SUCCINATE 25 MG/1
TABLET, EXTENDED RELEASE ORAL
Qty: 90 TABLET | Refills: 3 | Status: SHIPPED | OUTPATIENT
Start: 2024-07-16

## 2024-07-16 NOTE — TELEPHONE ENCOUNTER
Last visit 5/30/2024  Next visit 11/6/2024    Requested Prescriptions     Pending Prescriptions Disp Refills    metoprolol succinate (TOPROL XL) 25 MG extended release tablet [Pharmacy Med Name: METOPROLOL SUCC ER 25 MG TAB] 90 tablet 3     Sig: take 1 tablet by mouth once daily

## 2024-07-19 ENCOUNTER — APPOINTMENT (OUTPATIENT)
Dept: RADIOLOGY | Facility: HOSPITAL | Age: 74
End: 2024-07-19
Payer: MEDICARE

## 2024-07-19 ENCOUNTER — HOSPITAL ENCOUNTER (EMERGENCY)
Facility: HOSPITAL | Age: 74
Discharge: ANOTHER HOSPITAL | End: 2024-07-20
Attending: STUDENT IN AN ORGANIZED HEALTH CARE EDUCATION/TRAINING PROGRAM
Payer: MEDICARE

## 2024-07-19 ENCOUNTER — APPOINTMENT (OUTPATIENT)
Dept: CARDIOLOGY | Facility: HOSPITAL | Age: 74
End: 2024-07-19
Payer: MEDICARE

## 2024-07-19 DIAGNOSIS — R42 DIZZINESS: Primary | ICD-10-CM

## 2024-07-19 DIAGNOSIS — I95.9 HYPOTENSION, UNSPECIFIED HYPOTENSION TYPE: ICD-10-CM

## 2024-07-19 DIAGNOSIS — R42 LIGHTHEADEDNESS: ICD-10-CM

## 2024-07-19 DIAGNOSIS — R07.9 CHEST PAIN, UNSPECIFIED TYPE: ICD-10-CM

## 2024-07-19 LAB
ALBUMIN SERPL BCP-MCNC: 3.8 G/DL (ref 3.4–5)
ALP SERPL-CCNC: 98 U/L (ref 33–136)
ALT SERPL W P-5'-P-CCNC: 25 U/L (ref 10–52)
ANION GAP SERPL CALC-SCNC: 13 MMOL/L (ref 10–20)
APPEARANCE UR: ABNORMAL
AST SERPL W P-5'-P-CCNC: 18 U/L (ref 9–39)
BASOPHILS # BLD AUTO: 0.09 X10*3/UL (ref 0–0.1)
BASOPHILS NFR BLD AUTO: 0.7 %
BILIRUB SERPL-MCNC: 0.7 MG/DL (ref 0–1.2)
BILIRUB UR STRIP.AUTO-MCNC: NEGATIVE MG/DL
BNP SERPL-MCNC: 52 PG/ML (ref 0–99)
BUN SERPL-MCNC: 20 MG/DL (ref 6–23)
CALCIUM SERPL-MCNC: 8.7 MG/DL (ref 8.6–10.3)
CARDIAC TROPONIN I PNL SERPL HS: 5 NG/L (ref 0–20)
CARDIAC TROPONIN I PNL SERPL HS: 6 NG/L (ref 0–20)
CHLORIDE SERPL-SCNC: 102 MMOL/L (ref 98–107)
CO2 SERPL-SCNC: 24 MMOL/L (ref 21–32)
COLOR UR: YELLOW
CREAT SERPL-MCNC: 1.65 MG/DL (ref 0.5–1.3)
EGFRCR SERPLBLD CKD-EPI 2021: 43 ML/MIN/1.73M*2
EOSINOPHIL # BLD AUTO: 0.33 X10*3/UL (ref 0–0.4)
EOSINOPHIL NFR BLD AUTO: 2.7 %
ERYTHROCYTE [DISTWIDTH] IN BLOOD BY AUTOMATED COUNT: 13 % (ref 11.5–14.5)
FLUAV RNA RESP QL NAA+PROBE: NOT DETECTED
FLUBV RNA RESP QL NAA+PROBE: NOT DETECTED
GLUCOSE BLD MANUAL STRIP-MCNC: 188 MG/DL (ref 74–99)
GLUCOSE SERPL-MCNC: 199 MG/DL (ref 74–99)
GLUCOSE UR STRIP.AUTO-MCNC: ABNORMAL MG/DL
HCT VFR BLD AUTO: 42.9 % (ref 41–52)
HGB BLD-MCNC: 14.3 G/DL (ref 13.5–17.5)
HOLD SPECIMEN: NORMAL
HOLD SPECIMEN: NORMAL
HYALINE CASTS #/AREA URNS AUTO: ABNORMAL /LPF
IMM GRANULOCYTES # BLD AUTO: 0.06 X10*3/UL (ref 0–0.5)
IMM GRANULOCYTES NFR BLD AUTO: 0.5 % (ref 0–0.9)
KETONES UR STRIP.AUTO-MCNC: NEGATIVE MG/DL
LEUKOCYTE ESTERASE UR QL STRIP.AUTO: NEGATIVE
LYMPHOCYTES # BLD AUTO: 1.85 X10*3/UL (ref 0.8–3)
LYMPHOCYTES NFR BLD AUTO: 15.3 %
MAGNESIUM SERPL-MCNC: 1.96 MG/DL (ref 1.6–2.4)
MCH RBC QN AUTO: 28.8 PG (ref 26–34)
MCHC RBC AUTO-ENTMCNC: 33.3 G/DL (ref 32–36)
MCV RBC AUTO: 87 FL (ref 80–100)
MONOCYTES # BLD AUTO: 1.13 X10*3/UL (ref 0.05–0.8)
MONOCYTES NFR BLD AUTO: 9.4 %
MUCOUS THREADS #/AREA URNS AUTO: ABNORMAL /LPF
NEUTROPHILS # BLD AUTO: 8.62 X10*3/UL (ref 1.6–5.5)
NEUTROPHILS NFR BLD AUTO: 71.4 %
NITRITE UR QL STRIP.AUTO: NEGATIVE
NRBC BLD-RTO: 0 /100 WBCS (ref 0–0)
PH UR STRIP.AUTO: 5.5 [PH]
PLATELET # BLD AUTO: 276 X10*3/UL (ref 150–450)
PMV BLD AUTO: 9.9 FL (ref 7.5–11.5)
POCT GLUCOSE: 188 MG/DL (ref 74–99)
POTASSIUM SERPL-SCNC: 4.3 MMOL/L (ref 3.5–5.3)
PROT SERPL-MCNC: 6.4 G/DL (ref 6.4–8.2)
PROT UR STRIP.AUTO-MCNC: ABNORMAL MG/DL
RBC # BLD AUTO: 4.96 X10*6/UL (ref 4.5–5.9)
RBC # UR STRIP.AUTO: NEGATIVE /UL
RBC #/AREA URNS AUTO: ABNORMAL /HPF
SARS-COV-2 RNA RESP QL NAA+PROBE: NOT DETECTED
SODIUM SERPL-SCNC: 135 MMOL/L (ref 136–145)
SP GR UR STRIP.AUTO: 1.02
UROBILINOGEN UR STRIP.AUTO-MCNC: NORMAL MG/DL
WBC # BLD AUTO: 12.1 X10*3/UL (ref 4.4–11.3)
WBC #/AREA URNS AUTO: ABNORMAL /HPF

## 2024-07-19 PROCEDURE — 99285 EMERGENCY DEPT VISIT HI MDM: CPT | Mod: 25

## 2024-07-19 PROCEDURE — 71045 X-RAY EXAM CHEST 1 VIEW: CPT | Mod: FOREIGN READ | Performed by: RADIOLOGY

## 2024-07-19 PROCEDURE — 84484 ASSAY OF TROPONIN QUANT: CPT | Performed by: PHYSICIAN ASSISTANT

## 2024-07-19 PROCEDURE — 36415 COLL VENOUS BLD VENIPUNCTURE: CPT | Performed by: STUDENT IN AN ORGANIZED HEALTH CARE EDUCATION/TRAINING PROGRAM

## 2024-07-19 PROCEDURE — 2500000001 HC RX 250 WO HCPCS SELF ADMINISTERED DRUGS (ALT 637 FOR MEDICARE OP)

## 2024-07-19 PROCEDURE — 93005 ELECTROCARDIOGRAM TRACING: CPT

## 2024-07-19 PROCEDURE — 83735 ASSAY OF MAGNESIUM: CPT | Performed by: PHYSICIAN ASSISTANT

## 2024-07-19 PROCEDURE — 71045 X-RAY EXAM CHEST 1 VIEW: CPT

## 2024-07-19 PROCEDURE — 84075 ASSAY ALKALINE PHOSPHATASE: CPT | Performed by: PHYSICIAN ASSISTANT

## 2024-07-19 PROCEDURE — 82947 ASSAY GLUCOSE BLOOD QUANT: CPT

## 2024-07-19 PROCEDURE — 36415 COLL VENOUS BLD VENIPUNCTURE: CPT | Performed by: PHYSICIAN ASSISTANT

## 2024-07-19 PROCEDURE — 81001 URINALYSIS AUTO W/SCOPE: CPT | Performed by: STUDENT IN AN ORGANIZED HEALTH CARE EDUCATION/TRAINING PROGRAM

## 2024-07-19 PROCEDURE — 85025 COMPLETE CBC W/AUTO DIFF WBC: CPT | Performed by: PHYSICIAN ASSISTANT

## 2024-07-19 PROCEDURE — 87636 SARSCOV2 & INF A&B AMP PRB: CPT | Performed by: STUDENT IN AN ORGANIZED HEALTH CARE EDUCATION/TRAINING PROGRAM

## 2024-07-19 PROCEDURE — 83880 ASSAY OF NATRIURETIC PEPTIDE: CPT | Performed by: STUDENT IN AN ORGANIZED HEALTH CARE EDUCATION/TRAINING PROGRAM

## 2024-07-19 RX ORDER — NAPROXEN SODIUM 220 MG/1
TABLET, FILM COATED ORAL
Status: COMPLETED
Start: 2024-07-19 | End: 2024-07-19

## 2024-07-19 RX ORDER — NAPROXEN SODIUM 220 MG/1
324 TABLET, FILM COATED ORAL ONCE
Status: COMPLETED | OUTPATIENT
Start: 2024-07-19 | End: 2024-07-19

## 2024-07-19 RX ADMIN — NAPROXEN SODIUM 324 MG: 220 TABLET, FILM COATED ORAL at 15:30

## 2024-07-19 RX ADMIN — ASPIRIN 81 MG CHEWABLE TABLET 324 MG: 81 TABLET CHEWABLE at 15:30

## 2024-07-19 ASSESSMENT — PAIN SCALES - GENERAL
PAINLEVEL_OUTOF10: 0 - NO PAIN

## 2024-07-19 ASSESSMENT — COLUMBIA-SUICIDE SEVERITY RATING SCALE - C-SSRS
2. HAVE YOU ACTUALLY HAD ANY THOUGHTS OF KILLING YOURSELF?: NO
1. IN THE PAST MONTH, HAVE YOU WISHED YOU WERE DEAD OR WISHED YOU COULD GO TO SLEEP AND NOT WAKE UP?: NO
6. HAVE YOU EVER DONE ANYTHING, STARTED TO DO ANYTHING, OR PREPARED TO DO ANYTHING TO END YOUR LIFE?: NO

## 2024-07-19 ASSESSMENT — PAIN - FUNCTIONAL ASSESSMENT: PAIN_FUNCTIONAL_ASSESSMENT: 0-10

## 2024-07-20 ENCOUNTER — APPOINTMENT (OUTPATIENT)
Dept: RADIOLOGY | Facility: HOSPITAL | Age: 74
End: 2024-07-20
Payer: MEDICARE

## 2024-07-20 ENCOUNTER — HOSPITAL ENCOUNTER (OUTPATIENT)
Facility: HOSPITAL | Age: 74
Setting detail: OBSERVATION
Discharge: HOME | End: 2024-07-21
Attending: INTERNAL MEDICINE | Admitting: INTERNAL MEDICINE
Payer: MEDICARE

## 2024-07-20 VITALS
OXYGEN SATURATION: 100 % | SYSTOLIC BLOOD PRESSURE: 128 MMHG | TEMPERATURE: 97.9 F | HEART RATE: 64 BPM | WEIGHT: 222 LBS | DIASTOLIC BLOOD PRESSURE: 62 MMHG | BODY MASS INDEX: 29.42 KG/M2 | HEIGHT: 73 IN | RESPIRATION RATE: 15 BRPM

## 2024-07-20 DIAGNOSIS — Z86.73 STROKE OCCURRING WITHIN LAST MONTH: ICD-10-CM

## 2024-07-20 DIAGNOSIS — I25.708: ICD-10-CM

## 2024-07-20 DIAGNOSIS — I63.9 CEREBROVASCULAR ACCIDENT (CVA), UNSPECIFIED MECHANISM (MULTI): Primary | ICD-10-CM

## 2024-07-20 PROBLEM — R29.90 STROKE-LIKE SYMPTOM: Status: ACTIVE | Noted: 2024-07-20

## 2024-07-20 LAB
BNP SERPL-MCNC: 37 PG/ML (ref 0–99)
CHOLEST SERPL-MCNC: 127 MG/DL (ref 0–199)
CHOLESTEROL/HDL RATIO: 3.5
EST. AVERAGE GLUCOSE BLD GHB EST-MCNC: 186 MG/DL
GLUCOSE BLD MANUAL STRIP-MCNC: 113 MG/DL (ref 74–99)
GLUCOSE BLD MANUAL STRIP-MCNC: 142 MG/DL (ref 74–99)
HBA1C MFR BLD: 8.1 %
HDLC SERPL-MCNC: 36.8 MG/DL
LDLC SERPL CALC-MCNC: 53 MG/DL
NON HDL CHOLESTEROL: 90 MG/DL (ref 0–149)
TRIGL SERPL-MCNC: 185 MG/DL (ref 0–149)
VLDL: 37 MG/DL (ref 0–40)

## 2024-07-20 PROCEDURE — 82947 ASSAY GLUCOSE BLOOD QUANT: CPT

## 2024-07-20 PROCEDURE — 70551 MRI BRAIN STEM W/O DYE: CPT | Mod: REDUCED SERVICES | Performed by: RADIOLOGY

## 2024-07-20 PROCEDURE — 80061 LIPID PANEL: CPT | Performed by: FAMILY MEDICINE

## 2024-07-20 PROCEDURE — 83036 HEMOGLOBIN GLYCOSYLATED A1C: CPT | Mod: GEALAB | Performed by: FAMILY MEDICINE

## 2024-07-20 PROCEDURE — 36415 COLL VENOUS BLD VENIPUNCTURE: CPT | Performed by: FAMILY MEDICINE

## 2024-07-20 PROCEDURE — 2500000002 HC RX 250 W HCPCS SELF ADMINISTERED DRUGS (ALT 637 FOR MEDICARE OP, ALT 636 FOR OP/ED): Performed by: FAMILY MEDICINE

## 2024-07-20 PROCEDURE — G0378 HOSPITAL OBSERVATION PER HR: HCPCS

## 2024-07-20 PROCEDURE — 70551 MRI BRAIN STEM W/O DYE: CPT | Mod: 52

## 2024-07-20 PROCEDURE — 83880 ASSAY OF NATRIURETIC PEPTIDE: CPT | Performed by: FAMILY MEDICINE

## 2024-07-20 PROCEDURE — 2500000001 HC RX 250 WO HCPCS SELF ADMINISTERED DRUGS (ALT 637 FOR MEDICARE OP): Performed by: FAMILY MEDICINE

## 2024-07-20 PROCEDURE — 99223 1ST HOSP IP/OBS HIGH 75: CPT | Performed by: FAMILY MEDICINE

## 2024-07-20 RX ORDER — INSULIN GLARGINE 100 [IU]/ML
40 INJECTION, SOLUTION SUBCUTANEOUS NIGHTLY
Status: DISCONTINUED | OUTPATIENT
Start: 2024-07-20 | End: 2024-07-21 | Stop reason: HOSPADM

## 2024-07-20 RX ORDER — DEXTROSE 50 % IN WATER (D50W) INTRAVENOUS SYRINGE
25
Status: DISCONTINUED | OUTPATIENT
Start: 2024-07-20 | End: 2024-07-21 | Stop reason: HOSPADM

## 2024-07-20 RX ORDER — ISOSORBIDE MONONITRATE 60 MG/1
1 TABLET, EXTENDED RELEASE ORAL DAILY
COMMUNITY
Start: 2024-06-25 | End: 2024-07-21 | Stop reason: HOSPADM

## 2024-07-20 RX ORDER — ATORVASTATIN CALCIUM 80 MG/1
80 TABLET, FILM COATED ORAL NIGHTLY
Status: DISCONTINUED | OUTPATIENT
Start: 2024-07-20 | End: 2024-07-21 | Stop reason: HOSPADM

## 2024-07-20 RX ORDER — METOPROLOL SUCCINATE 25 MG/1
25 TABLET, EXTENDED RELEASE ORAL DAILY
Status: DISCONTINUED | OUTPATIENT
Start: 2024-07-20 | End: 2024-07-21 | Stop reason: HOSPADM

## 2024-07-20 RX ORDER — APIXABAN 5 MG/1
1 TABLET, FILM COATED ORAL 2 TIMES DAILY
COMMUNITY
Start: 2024-02-13

## 2024-07-20 RX ORDER — TAMSULOSIN HYDROCHLORIDE 0.4 MG/1
0.8 CAPSULE ORAL DAILY
Status: DISCONTINUED | OUTPATIENT
Start: 2024-07-20 | End: 2024-07-21 | Stop reason: HOSPADM

## 2024-07-20 RX ORDER — DEXTROSE 50 % IN WATER (D50W) INTRAVENOUS SYRINGE
12.5
Status: DISCONTINUED | OUTPATIENT
Start: 2024-07-20 | End: 2024-07-21 | Stop reason: HOSPADM

## 2024-07-20 RX ORDER — TADALAFIL 10 MG/1
10 TABLET ORAL DAILY PRN
COMMUNITY

## 2024-07-20 RX ORDER — PRAVASTATIN SODIUM 20 MG/1
20 TABLET ORAL DAILY
COMMUNITY

## 2024-07-20 RX ORDER — INSULIN LISPRO 100 [IU]/ML
0-15 INJECTION, SOLUTION INTRAVENOUS; SUBCUTANEOUS
Status: DISCONTINUED | OUTPATIENT
Start: 2024-07-20 | End: 2024-07-21 | Stop reason: HOSPADM

## 2024-07-20 RX ORDER — ASPIRIN 325 MG
325 TABLET ORAL ONCE
Status: DISCONTINUED | OUTPATIENT
Start: 2024-07-20 | End: 2024-07-20

## 2024-07-20 RX ORDER — LISINOPRIL 10 MG/1
10 TABLET ORAL DAILY
COMMUNITY
End: 2024-07-21 | Stop reason: HOSPADM

## 2024-07-20 RX ORDER — HYDRALAZINE HYDROCHLORIDE 25 MG/1
25 TABLET, FILM COATED ORAL EVERY 6 HOURS PRN
Status: DISCONTINUED | OUTPATIENT
Start: 2024-07-22 | End: 2024-07-21 | Stop reason: HOSPADM

## 2024-07-20 RX ORDER — ASPIRIN 81 MG/1
81 TABLET ORAL DAILY
Status: DISCONTINUED | OUTPATIENT
Start: 2024-07-21 | End: 2024-07-21 | Stop reason: HOSPADM

## 2024-07-20 RX ORDER — INSULIN GLARGINE 100 [IU]/ML
40 INJECTION, SOLUTION SUBCUTANEOUS NIGHTLY
COMMUNITY

## 2024-07-20 RX ORDER — METOPROLOL SUCCINATE 25 MG/1
25 TABLET, EXTENDED RELEASE ORAL DAILY
Status: ON HOLD | COMMUNITY
End: 2024-07-21

## 2024-07-20 RX ORDER — TAMSULOSIN HYDROCHLORIDE 0.4 MG/1
0.8 CAPSULE ORAL DAILY
COMMUNITY

## 2024-07-20 RX ORDER — POLYETHYLENE GLYCOL 3350 17 G/17G
17 POWDER, FOR SOLUTION ORAL DAILY
Status: DISCONTINUED | OUTPATIENT
Start: 2024-07-20 | End: 2024-07-21 | Stop reason: HOSPADM

## 2024-07-20 RX ORDER — NITROGLYCERIN 0.4 MG/1
0.4 TABLET SUBLINGUAL EVERY 5 MIN PRN
COMMUNITY

## 2024-07-20 RX ORDER — LABETALOL HYDROCHLORIDE 5 MG/ML
10 INJECTION, SOLUTION INTRAVENOUS EVERY 10 MIN PRN
Status: DISCONTINUED | OUTPATIENT
Start: 2024-07-20 | End: 2024-07-21 | Stop reason: HOSPADM

## 2024-07-20 RX ORDER — SEMAGLUTIDE 1.34 MG/ML
0.5 INJECTION, SOLUTION SUBCUTANEOUS
COMMUNITY

## 2024-07-20 RX ORDER — HYDRALAZINE HYDROCHLORIDE 20 MG/ML
10 INJECTION INTRAMUSCULAR; INTRAVENOUS EVERY 4 HOURS PRN
Status: DISCONTINUED | OUTPATIENT
Start: 2024-07-20 | End: 2024-07-21 | Stop reason: HOSPADM

## 2024-07-20 RX ORDER — ASPIRIN 81 MG/1
1 TABLET ORAL DAILY
COMMUNITY

## 2024-07-20 RX ADMIN — TAMSULOSIN HYDROCHLORIDE 0.8 MG: 0.4 CAPSULE ORAL at 14:07

## 2024-07-20 RX ADMIN — APIXABAN 5 MG: 5 TABLET, FILM COATED ORAL at 20:34

## 2024-07-20 RX ADMIN — METOPROLOL SUCCINATE 25 MG: 25 TABLET, EXTENDED RELEASE ORAL at 14:07

## 2024-07-20 RX ADMIN — INSULIN GLARGINE 40 UNITS: 100 INJECTION, SOLUTION SUBCUTANEOUS at 20:34

## 2024-07-20 RX ADMIN — APIXABAN 5 MG: 5 TABLET, FILM COATED ORAL at 14:07

## 2024-07-20 SDOH — HEALTH STABILITY: MENTAL HEALTH: HOW OFTEN DO YOU HAVE 6 OR MORE DRINKS ON ONE OCCASION?: NEVER

## 2024-07-20 SDOH — SOCIAL STABILITY: SOCIAL INSECURITY: ABUSE: ADULT

## 2024-07-20 SDOH — SOCIAL STABILITY: SOCIAL INSECURITY: DOES ANYONE TRY TO KEEP YOU FROM HAVING/CONTACTING OTHER FRIENDS OR DOING THINGS OUTSIDE YOUR HOME?: NO

## 2024-07-20 SDOH — ECONOMIC STABILITY: INCOME INSECURITY: IN THE LAST 12 MONTHS, WAS THERE A TIME WHEN YOU WERE NOT ABLE TO PAY THE MORTGAGE OR RENT ON TIME?: NO

## 2024-07-20 SDOH — SOCIAL STABILITY: SOCIAL INSECURITY: HAS ANYONE EVER THREATENED TO HURT YOUR FAMILY OR YOUR PETS?: NO

## 2024-07-20 SDOH — ECONOMIC STABILITY: HOUSING INSECURITY: AT ANY TIME IN THE PAST 12 MONTHS, WERE YOU HOMELESS OR LIVING IN A SHELTER (INCLUDING NOW)?: NO

## 2024-07-20 SDOH — SOCIAL STABILITY: SOCIAL INSECURITY: DO YOU FEEL UNSAFE GOING BACK TO THE PLACE WHERE YOU ARE LIVING?: NO

## 2024-07-20 SDOH — HEALTH STABILITY: MENTAL HEALTH: HOW OFTEN DO YOU HAVE A DRINK CONTAINING ALCOHOL?: NEVER

## 2024-07-20 SDOH — HEALTH STABILITY: MENTAL HEALTH: HOW MANY STANDARD DRINKS CONTAINING ALCOHOL DO YOU HAVE ON A TYPICAL DAY?: PATIENT DOES NOT DRINK

## 2024-07-20 SDOH — SOCIAL STABILITY: SOCIAL INSECURITY: ARE YOU OR HAVE YOU BEEN THREATENED OR ABUSED PHYSICALLY, EMOTIONALLY, OR SEXUALLY BY ANYONE?: NO

## 2024-07-20 SDOH — ECONOMIC STABILITY: HOUSING INSECURITY: IN THE PAST 12 MONTHS, HOW MANY TIMES HAVE YOU MOVED WHERE YOU WERE LIVING?: 1

## 2024-07-20 SDOH — SOCIAL STABILITY: SOCIAL INSECURITY: HAVE YOU HAD THOUGHTS OF HARMING ANYONE ELSE?: NO

## 2024-07-20 SDOH — SOCIAL STABILITY: SOCIAL INSECURITY: ARE THERE ANY APPARENT SIGNS OF INJURIES/BEHAVIORS THAT COULD BE RELATED TO ABUSE/NEGLECT?: NO

## 2024-07-20 SDOH — ECONOMIC STABILITY: INCOME INSECURITY: HOW HARD IS IT FOR YOU TO PAY FOR THE VERY BASICS LIKE FOOD, HOUSING, MEDICAL CARE, AND HEATING?: NOT HARD AT ALL

## 2024-07-20 SDOH — SOCIAL STABILITY: SOCIAL INSECURITY: DO YOU FEEL ANYONE HAS EXPLOITED OR TAKEN ADVANTAGE OF YOU FINANCIALLY OR OF YOUR PERSONAL PROPERTY?: NO

## 2024-07-20 ASSESSMENT — ACTIVITIES OF DAILY LIVING (ADL)
WALKS IN HOME: INDEPENDENT
FEEDING YOURSELF: INDEPENDENT
LACK_OF_TRANSPORTATION: NO
TOILETING: INDEPENDENT
HEARING - LEFT EAR: FUNCTIONAL
ADEQUATE_TO_COMPLETE_ADL: YES
DRESSING YOURSELF: INDEPENDENT
HEARING - RIGHT EAR: FUNCTIONAL
BATHING: INDEPENDENT
PATIENT'S MEMORY ADEQUATE TO SAFELY COMPLETE DAILY ACTIVITIES?: YES
JUDGMENT_ADEQUATE_SAFELY_COMPLETE_DAILY_ACTIVITIES: YES
GROOMING: INDEPENDENT

## 2024-07-20 ASSESSMENT — PAIN - FUNCTIONAL ASSESSMENT
PAIN_FUNCTIONAL_ASSESSMENT: 0-10
PAIN_FUNCTIONAL_ASSESSMENT: 0-10

## 2024-07-20 ASSESSMENT — COGNITIVE AND FUNCTIONAL STATUS - GENERAL
MOBILITY SCORE: 24
MOBILITY SCORE: 24
PATIENT BASELINE BEDBOUND: NO
DAILY ACTIVITIY SCORE: 24
DAILY ACTIVITIY SCORE: 24

## 2024-07-20 ASSESSMENT — PAIN SCALES - GENERAL
PAINLEVEL_OUTOF10: 0 - NO PAIN
PAINLEVEL_OUTOF10: 0 - NO PAIN

## 2024-07-20 ASSESSMENT — PATIENT HEALTH QUESTIONNAIRE - PHQ9
2. FEELING DOWN, DEPRESSED OR HOPELESS: NOT AT ALL
SUM OF ALL RESPONSES TO PHQ9 QUESTIONS 1 & 2: 0
1. LITTLE INTEREST OR PLEASURE IN DOING THINGS: NOT AT ALL

## 2024-07-20 ASSESSMENT — LIFESTYLE VARIABLES
AUDIT-C TOTAL SCORE: 0
AUDIT-C TOTAL SCORE: 0
SKIP TO QUESTIONS 9-10: 1
AUDIT-C TOTAL SCORE: 0
SKIP TO QUESTIONS 9-10: 1
HOW OFTEN DO YOU HAVE A DRINK CONTAINING ALCOHOL: NEVER
HOW OFTEN DO YOU HAVE 6 OR MORE DRINKS ON ONE OCCASION: NEVER
HOW MANY STANDARD DRINKS CONTAINING ALCOHOL DO YOU HAVE ON A TYPICAL DAY: PATIENT DOES NOT DRINK

## 2024-07-20 ASSESSMENT — COLUMBIA-SUICIDE SEVERITY RATING SCALE - C-SSRS
2. HAVE YOU ACTUALLY HAD ANY THOUGHTS OF KILLING YOURSELF?: NO
6. HAVE YOU EVER DONE ANYTHING, STARTED TO DO ANYTHING, OR PREPARED TO DO ANYTHING TO END YOUR LIFE?: NO
1. IN THE PAST MONTH, HAVE YOU WISHED YOU WERE DEAD OR WISHED YOU COULD GO TO SLEEP AND NOT WAKE UP?: NO

## 2024-07-20 ASSESSMENT — ENCOUNTER SYMPTOMS
CARDIOVASCULAR NEGATIVE: 1
RESPIRATORY NEGATIVE: 1
LIGHT-HEADEDNESS: 1

## 2024-07-20 NOTE — CARE PLAN
The patient's goals for the shift include      The clinical goals for the shift include To remain safe throughout shift      Problem: General Stroke  Goal: Establish a mutual long term goal with patient by discharge  7/20/2024 1643 by Geno White RN  Outcome: Progressing  7/20/2024 1643 by Geno White RN  Outcome: Progressing  Goal: Demonstrate improvement in neurological exam throughout the shift  7/20/2024 1643 by Geno White RN  Outcome: Progressing  7/20/2024 1643 by Geno White RN  Outcome: Progressing  Goal: Maintain BP within ordered limits throughout shift  7/20/2024 1643 by Geno White RN  Outcome: Progressing  7/20/2024 1643 by Geno White RN  Outcome: Progressing  Goal: Participate in treatment (ie., meds, therapy) throughout shift  7/20/2024 1643 by Geno White RN  Outcome: Progressing  7/20/2024 1643 by Geno White RN  Outcome: Progressing  Goal: No symptoms of aspiration throughout shift  7/20/2024 1643 by Geno White RN  Outcome: Progressing  7/20/2024 1643 by Geno White RN  Outcome: Progressing  Goal: No symptoms of hemorrhage throughout shift  7/20/2024 1643 by Geno White RN  Outcome: Progressing  7/20/2024 1643 by Geno White RN  Outcome: Progressing  Goal: Tolerate enteral feeding throughout shift  7/20/2024 1643 by Geno White RN  Outcome: Progressing  7/20/2024 1643 by Geno White RN  Outcome: Progressing  Goal: Decreased nausea/vomiting throughout shift  7/20/2024 1643 by Geno White RN  Outcome: Progressing  7/20/2024 1643 by Geno White RN  Outcome: Progressing  Goal: Controlled blood glucose throughout shift  7/20/2024 1643 by Geno White RN  Outcome: Progressing  7/20/2024 1643 by Geno White RN  Outcome: Progressing  Goal: Out of bed three times today  7/20/2024 1643 by Geno White RN  Outcome: Progressing  7/20/2024 1643 by Geno SHAW  NANCY White  Outcome: Progressing     Problem: ICU Stroke  Goal: Maintain ICP within ordered limits throughout shift  Outcome: Progressing  Goal: Tolerate EVD clamping trial throughout shift  Outcome: Progressing  Goal: Tolerate ventilator weaning trial during shift  Outcome: Progressing  Goal: Maintain patent airway throughout shift  Outcome: Progressing  Goal: Achieve/maintain targeted sodium level throughout shift  Outcome: Progressing     Problem: ICU Stroke  Goal: Maintain ICP within ordered limits throughout shift  Outcome: Progressing  Goal: Tolerate EVD clamping trial throughout shift  Outcome: Progressing  Goal: Tolerate ventilator weaning trial during shift  Outcome: Progressing  Goal: Maintain patent airway throughout shift  Outcome: Progressing  Goal: Achieve/maintain targeted sodium level throughout shift  Outcome: Progressing     Problem: Pain - Adult  Goal: Verbalizes/displays adequate comfort level or baseline comfort level  Outcome: Progressing     Problem: Safety - Adult  Goal: Free from fall injury  Outcome: Progressing     Problem: Discharge Planning  Goal: Discharge to home or other facility with appropriate resources  Outcome: Progressing     Problem: Chronic Conditions and Co-morbidities  Goal: Patient's chronic conditions and co-morbidity symptoms are monitored and maintained or improved  Outcome: Progressing

## 2024-07-20 NOTE — H&P
History Of Present Illness  Prasanna Torrez is a 73 y.o. male with history of coronary, atrial fibrillation, hypertension, hyperlipidemia, type 2 diabetes mellitus status post three-vessel coronary artery bypass and 2 stents, atrial fibrillation, hypertension, hyperlipidemia and type 2 diabetes mellitus who presents to the hospital due to lightheadedness and blurred vision.  Patient reports the day prior to admission while shooting skeet he became lightheaded and had blurred vision.  He reported he had difficulty seeing the seat.  He reported the symptoms lasted for about 4 hours.  After this occurred he sat down and then upon leaving the range gave his keys to his wife because he was he had difficulty seeing.  While driving away he asked his wife to take him to the emergency room in [place].  On the emergency room in [place] an EKG showed concerns for a STEMI however after review with the cardiologist is advised these changes were old.     Past Medical History  Coronary artery disease, hypertension, hyperlipidemia, type 2 diabetes mellitus, paroxysmal atrial fibrillation    Surgical History  Three-vessel coronary bypass, 2 cardiac stents, appendectomy     Social History  He reports that he quit smoking about 12 years ago. His smoking use included cigarettes. He started smoking about 43 years ago. He has a 31 pack-year smoking history. He has never used smokeless tobacco. No history on file for alcohol use and drug use.    Family History  No family history on file.     Allergies  Dapagliflozin    Review of Systems   Respiratory: Negative.     Cardiovascular: Negative.    Neurological:  Positive for light-headedness.        Physical Exam  Constitutional:       Appearance: Normal appearance.   HENT:      Head: Normocephalic and atraumatic.      Right Ear: Tympanic membrane normal.      Nose: Nose normal.      Mouth/Throat:      Mouth: Mucous membranes are moist.   Eyes:      Conjunctiva/sclera: Conjunctivae normal.       Pupils: Pupils are equal, round, and reactive to light.   Cardiovascular:      Rate and Rhythm: Normal rate and regular rhythm.      Pulses: Normal pulses.      Heart sounds: Normal heart sounds.   Pulmonary:      Effort: Pulmonary effort is normal.      Breath sounds: Normal breath sounds.   Abdominal:      General: Abdomen is flat. Bowel sounds are normal.   Musculoskeletal:         General: Normal range of motion.      Cervical back: Normal range of motion.   Skin:     General: Skin is warm.      Capillary Refill: Capillary refill takes less than 2 seconds.   Neurological:      General: No focal deficit present.      Mental Status: He is alert and oriented to person, place, and time. Mental status is at baseline.      Cranial Nerves: No cranial nerve deficit.      Comments: NIH 0 at 1230   Psychiatric:         Mood and Affect: Mood normal.          Last Recorded Vitals  /71 (BP Location: Left arm, Patient Position: Sitting)   Pulse 61   Temp 36.2 °C (97.2 °F) (Tympanic)   Resp 16   Wt 101 kg (222 lb)   SpO2 95%       Assessment/Plan   Prasanna Torrez is a 73 y.o. male with history of coronary, atrial fibrillation, hypertension, hyperlipidemia, type 2 diabetes mellitus status post three-vessel coronary artery bypass and 2 stents, atrial fibrillation, hypertension, hyperlipidemia and type 2 diabetes mellitus who presents to the hospital due to lightheadedness and blurred vision.    Blurred vision/strokelike symptoms  Symptoms lasted for 4 hours, patient also reports dark urine and has concerns for dehydration  Patient reports symptoms have resolved  NIH is 0  Symptoms started over 24 hours ago we will proceed with MRI/MRA  Continue aspirin and statin  Hold antihypertensive medications give hydralazine as needed for systolic greater than 220  Consult neurology  Consult PT OT  Follow neurochecks    Abnormal EKG  It is reported changes concerning for [facility] are present on prior EKGs  Case discussed with  cardiology  Have consulted cardiology  Continue aspirin, Eliquis, Lipitor and metoprolol    Type 2 diabetes mellitus  Continue diabetic diet  Give insulin sliding scale  Monitor Accu-Cheks    Elevated creatinine  Unsure of baseline  Last creatinine within system is 1.17 over 5 years ago  Repeat renal function panel in a.m.    Atrial fibrillation  Continue metoprolol  Continue Eliquis    Hyperlipidemia  Follow-up with lipid panel  Continue statin    DVT prophylaxis  With Lovenox    CODE STATUS  Patient advised he wants to be DNR        Cortez Nolasco DO

## 2024-07-20 NOTE — ED PROVIDER NOTES
Chief Complaint: Dizziness  HPI: This is a 73-year-old male, presenting to the emergency department for evaluation of lightheadedness, dizziness, and chest tightness which began about 4 hours prior to arrival.  Patient states that he took his blood pressure during this time, and noted to be low.  He states that since arriving to the emergency department his symptoms have improved, however he still does feel some lightheadedness.  He denies any associated shortness of breath.  He denies any fevers, chills, cough, congestion.  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    History reviewed. No pertinent past medical history.   History reviewed. No pertinent surgical history.    Physical Exam  Constitutional:       Appearance: Normal appearance.   HENT:      Head: Normocephalic and atraumatic.      Mouth/Throat:      Mouth: Mucous membranes are moist.   Eyes:      Extraocular Movements: Extraocular movements intact.   Cardiovascular:      Rate and Rhythm: Normal rate and regular rhythm.   Pulmonary:      Effort: Pulmonary effort is normal.   Abdominal:      General: Abdomen is flat.      Palpations: Abdomen is soft.      Tenderness: There is no abdominal tenderness.   Skin:     General: Skin is warm.   Neurological:      General: No focal deficit present.      Mental Status: He is alert.   Psychiatric:         Mood and Affect: Mood normal.            ED Course/MDM  ED Course as of 07/20/24 0939   Fri Jul 19, 2024   1520 STEMI activated []   1530 Dr. Calix, Cardiology, discussed the STEMI activation. EKG changes appear old. They state that we can deactivate the STEMI at this time, but if things change we can reassess.  Agrees with aspirin, but can hold off on brillinta []   1533 STEMI deactivated  []   2015 Dr. Guzman, Hospitalist, Accepted the patient in transfer   []      ED Course User Index  [] Ludiivna Diez,          Diagnoses as  of 07/20/24 0939   Dizziness   Lightheadedness   Chest pain, unspecified type   Hypotension, unspecified hypotension type     EKG interpreted by myself (ED attending physician):   Normal sinus rhythm, rate of 65, normal axis, normal intervals, ST segment elevation present in the inferior leads with T wave inversions in V1 and V2, this does appear somewhat similar to EKG from July 2019, however STEMI activated based on the EKG.  Time 1701, sinus bradycardia, rate of 59, normal axis, normal intervals, similar appearing ST segment changes as previous EKGs, abnormal EKG    Consults, see ED course      This is a 73 y.o. male presenting to the ED for evaluation of lightheadedness, dizziness, hypotension, and chest tightness which began about 4 hours prior to arrival.  Patient does have a significant cardiac history.  On initial evaluation, the patient is resting comfortably in the bed, no acute distress, awake, alert, oriented to person, place, time.  Heart is regular rate and rhythm, lungs are clear to auscultation bilaterally.  EKG was concerning for ST segment elevation in the inferior leads with T wave inversions in V1 and V2.  It did seem somewhat similar to previous EKG, however STEMI was activated at this time.  I did discuss the case with Dr. Calix who is a cardiologist, and reviewed the EKGs, ultimately they feel that the EKG findings are old, and deactivated the STEMI call.  Patient was given aspirin.  Lab work was overall grossly unremarkable, with 2 negative troponins.  Chest x-ray was nonconcerning for any acute cardiopulmonary pathology.  Repeat EKG showed no further changes.  I am reassured by the patient's workup today, however given his cardiac history, and presenting symptoms, I do feel that he will require observation and chest pain workup.  I talked with the hospitalist at [facility] who ultimately accepted the patient in admission.    Final Impression  1.  Chest pain  2.  Lightheadedness  3.   Hypotension  Disposition/Plan: Transfer  Condition at disposition: Stable.     Ludivina Diez DO  Emergency Medicine Physician     Ludivina Diez DO  07/20/24 4667

## 2024-07-20 NOTE — PROGRESS NOTES
07/20/24 1354   Discharge Planning   Living Arrangements Spouse/significant other   Support Systems Spouse/significant other   Assistance Needed A&OX3; independent with ADLs with no DME; drives; room air baseline currently room air; on Eliquis prior to hospitalization   Type of Residence Private residence   Number of Stairs to Enter Residence 0   Number of Stairs Within Residence 12   Do you have animals or pets at home? No   Who is requesting discharge planning? Provider   Home or Post Acute Services None   Expected Discharge Disposition Home  (Patient very independent and at this time denies any home going needs)   Does the patient need discharge transport arranged? No   Financial Resource Strain   How hard is it for you to pay for the very basics like food, housing, medical care, and heating? Not hard   Housing Stability   In the last 12 months, was there a time when you were not able to pay the mortgage or rent on time? N   In the past 12 months, how many times have you moved where you were living? 1   At any time in the past 12 months, were you homeless or living in a shelter (including now)? N   Transportation Needs   In the past 12 months, has lack of transportation kept you from medical appointments or from getting medications? no   In the past 12 months, has lack of transportation kept you from meetings, work, or from getting things needed for daily living? No

## 2024-07-21 VITALS
DIASTOLIC BLOOD PRESSURE: 71 MMHG | OXYGEN SATURATION: 95 % | BODY MASS INDEX: 29.42 KG/M2 | SYSTOLIC BLOOD PRESSURE: 117 MMHG | HEART RATE: 59 BPM | WEIGHT: 222 LBS | RESPIRATION RATE: 17 BRPM | TEMPERATURE: 97.5 F | HEIGHT: 73 IN

## 2024-07-21 LAB
ALBUMIN SERPL BCP-MCNC: 3.8 G/DL (ref 3.4–5)
ANION GAP SERPL CALC-SCNC: 13 MMOL/L (ref 10–20)
BUN SERPL-MCNC: 19 MG/DL (ref 6–23)
CALCIUM SERPL-MCNC: 8.5 MG/DL (ref 8.6–10.3)
CHLORIDE SERPL-SCNC: 105 MMOL/L (ref 98–107)
CO2 SERPL-SCNC: 25 MMOL/L (ref 21–32)
CREAT SERPL-MCNC: 0.94 MG/DL (ref 0.5–1.3)
EGFRCR SERPLBLD CKD-EPI 2021: 85 ML/MIN/1.73M*2
GLUCOSE BLD MANUAL STRIP-MCNC: 109 MG/DL (ref 74–99)
GLUCOSE BLD MANUAL STRIP-MCNC: 109 MG/DL (ref 74–99)
GLUCOSE BLD MANUAL STRIP-MCNC: 129 MG/DL (ref 74–99)
GLUCOSE SERPL-MCNC: 106 MG/DL (ref 74–99)
PHOSPHATE SERPL-MCNC: 3.5 MG/DL (ref 2.5–4.9)
POTASSIUM SERPL-SCNC: 4 MMOL/L (ref 3.5–5.3)
SODIUM SERPL-SCNC: 139 MMOL/L (ref 136–145)

## 2024-07-21 PROCEDURE — 36415 COLL VENOUS BLD VENIPUNCTURE: CPT | Performed by: FAMILY MEDICINE

## 2024-07-21 PROCEDURE — 99222 1ST HOSP IP/OBS MODERATE 55: CPT | Performed by: NURSE PRACTITIONER

## 2024-07-21 PROCEDURE — 94760 N-INVAS EAR/PLS OXIMETRY 1: CPT

## 2024-07-21 PROCEDURE — G0378 HOSPITAL OBSERVATION PER HR: HCPCS

## 2024-07-21 PROCEDURE — 99221 1ST HOSP IP/OBS SF/LOW 40: CPT | Performed by: PSYCHIATRY & NEUROLOGY

## 2024-07-21 PROCEDURE — 99233 SBSQ HOSP IP/OBS HIGH 50: CPT | Performed by: INTERNAL MEDICINE

## 2024-07-21 PROCEDURE — 80069 RENAL FUNCTION PANEL: CPT | Performed by: FAMILY MEDICINE

## 2024-07-21 PROCEDURE — 2500000001 HC RX 250 WO HCPCS SELF ADMINISTERED DRUGS (ALT 637 FOR MEDICARE OP): Performed by: FAMILY MEDICINE

## 2024-07-21 PROCEDURE — 82947 ASSAY GLUCOSE BLOOD QUANT: CPT

## 2024-07-21 PROCEDURE — 2500000002 HC RX 250 W HCPCS SELF ADMINISTERED DRUGS (ALT 637 FOR MEDICARE OP, ALT 636 FOR OP/ED): Performed by: FAMILY MEDICINE

## 2024-07-21 RX ORDER — METOPROLOL TARTRATE 25 MG/1
25 TABLET, FILM COATED ORAL 2 TIMES DAILY
Status: DISCONTINUED | OUTPATIENT
Start: 2024-07-21 | End: 2024-07-21

## 2024-07-21 RX ORDER — LISINOPRIL 10 MG/1
10 TABLET ORAL DAILY
Status: DISCONTINUED | OUTPATIENT
Start: 2024-07-21 | End: 2024-07-21 | Stop reason: HOSPADM

## 2024-07-21 RX ORDER — METOPROLOL SUCCINATE 25 MG/1
12.5 TABLET, EXTENDED RELEASE ORAL DAILY
Qty: 30 TABLET | Refills: 1 | Status: SHIPPED | OUTPATIENT
Start: 2024-07-21

## 2024-07-21 RX ADMIN — METOPROLOL SUCCINATE 25 MG: 25 TABLET, EXTENDED RELEASE ORAL at 09:49

## 2024-07-21 RX ADMIN — TAMSULOSIN HYDROCHLORIDE 0.8 MG: 0.4 CAPSULE ORAL at 09:50

## 2024-07-21 RX ADMIN — APIXABAN 5 MG: 5 TABLET, FILM COATED ORAL at 09:49

## 2024-07-21 RX ADMIN — ASPIRIN 81 MG: 81 TABLET, COATED ORAL at 09:49

## 2024-07-21 ASSESSMENT — COGNITIVE AND FUNCTIONAL STATUS - GENERAL
MOBILITY SCORE: 24
DAILY ACTIVITIY SCORE: 24

## 2024-07-21 ASSESSMENT — ENCOUNTER SYMPTOMS
RESPIRATORY NEGATIVE: 1
MYALGIAS: 1
NEUROLOGICAL NEGATIVE: 1
EYES NEGATIVE: 1
PSYCHIATRIC NEGATIVE: 1
CONSTITUTIONAL NEGATIVE: 1
ENDOCRINE NEGATIVE: 1
GASTROINTESTINAL NEGATIVE: 1
HEMATOLOGIC/LYMPHATIC NEGATIVE: 1

## 2024-07-21 ASSESSMENT — PAIN - FUNCTIONAL ASSESSMENT: PAIN_FUNCTIONAL_ASSESSMENT: 0-10

## 2024-07-21 ASSESSMENT — PAIN SCALES - GENERAL: PAINLEVEL_OUTOF10: 0 - NO PAIN

## 2024-07-21 NOTE — CARE PLAN
The patient's goals for the shift include      The clinical goals for the shift include To remain safe throughout shift      Problem: General Stroke  Goal: Establish a mutual long term goal with patient by discharge  Outcome: Progressing     Problem: General Stroke  Goal: Controlled blood glucose throughout shift  Outcome: Progressing

## 2024-07-21 NOTE — PROGRESS NOTES
Occupational Therapy    Evaluation/Screen    Patient Name: Prasanna Torrez  MRN: [id number]  Today's Date: 7/21/2024     General:  General  Reason for Referral: OT for ADL assessment  Past Medical History Relevant to Rehab: 73 YOM presented with stroke-like symptoms  General Comment: Pt educated on reason for OT consultation and acute services. Pt demonstrates good understanding of services and declines need for skilled intervention. Pt reports up in room and hallway adlib.  No concerns for completion of ADL tasks.  No acute needs identified. Evaluation not complete; Orders discontinued.

## 2024-07-21 NOTE — PROGRESS NOTES
Physical Therapy                 Therapy Communication Note    Patient Name: Prasanna Torrez  MRN: [id number]  Today's Date: 7/21/2024     Discipline: Physical Therapy    Comment: Per OT, pt does not demonstrate a need for skilled PT services in the acute setting. PT eval not completed.

## 2024-07-21 NOTE — PROGRESS NOTES
[facility] Hospitalist Progress Note       [id number]: Please page me for patient care issues.  [phone]: Please page night hospitalist for any issues.     Prasanna Torrez  :  1950(73 y.o.)  MRN:  [id number]  PCP: No Assigned PCP Generic Provider, MD      Principal Problem:    Stroke-like symptom      Assessment and Plan:     Prasanna Torrez is a 73 y.o. male with PMH CAD s/p DEMETRICE/CABG, paroxysmal Afib, hypertension, hyperlipidemia, Non-IDDM II who presented to the hospital due to lightheadedness and blurred vision. Patient reports the day prior to admission while shooting skeet he became lightheaded and had blurred vision.  He reported he had difficulty seeing the seat.  He reported the symptoms lasted for about 4 hours.  After this occurred he sat down and then upon leaving the range gave his keys to his wife because he was he had difficulty seeing.  While driving away he asked his wife to take him to the emergency room in [place].  On the emergency room in [place] an EKG showed concerns for a STEMI however after review with the cardiologist is advised these changes were old. Additional ED findings were consisted with hypotension (88/48), bradycardia (59) and NOE.      #NOE (baseline Cr 1.1), resolved   #Neurological symptoms (blurred vision, near syncope), resolved -w/o e/o acute stroke  #Blurry vision suspect medication induced bradycardia and hypotension  #Near syncope suspect medication induced bradycardia and hypotension  #Chronic ST elevation on ECG  #IDDM II  #paroxysmal Afib  #HPL  #HTN  -PVCs and one episode of sinus tach (150's, 24) on tele  -check orthostatic VS  -c/w home ASA, apixaban, statin  -PRN IVF  -Echo pending  -hold metoprolol and lisinopril (pending cardiology eval) due to borderline bradycardia, hypotension  -rec outpt eval by ophthalmology  -CS recs a ppreciated: ccards, neuro    Disposition:await test results and await consultant recommendations    DVT Prophylaxis: full anticoagulation  apixaban    Code status: DNR  Diet: Adult diet Carb Controlled; 75 gram carb/meal, 45 gram Carb evening snack    Level of MDM:  High    I personally examined the patient and I personally reviewed chart, data, labs radiology reports    Family Communication  Number :   Name of Designated Family Representative:       Total time spent: 50 minutes, of total time providing counseling or in coordination of care. Total time on this day of visit includes record and documentation review before and after visit including documentation and time not explicitly included on EMR time stamp      Subjective:   Interval History:  HPI  The patient complains of blurry vision and dizziness  The patient feels their symptoms are resolved  no events or new concerns    Scheduled Meds:apixaban, 5 mg, oral, BID  aspirin, 81 mg, oral, Daily  atorvastatin, 80 mg, oral, Nightly  insulin glargine, 40 Units, subcutaneous, Nightly  insulin lispro, 0-15 Units, subcutaneous, TID  metoprolol succinate XL, 25 mg, oral, Daily  polyethylene glycol, 17 g, oral, Daily  tamsulosin, 0.8 mg, oral, Daily      Continuous Infusions:   PRN Meds:PRN medications: dextrose, dextrose, glucagon, glucagon, hydrALAZINE **FOLLOWED BY** [START ON 2024] hydrALAZINE, labetaloL    Review of Systems   All other systems reviewed and are negative.    Interval Pertinent History:  Social History     Tobacco Use    Smoking status: Former     Current packs/day: 0.00     Average packs/day: 1 pack/day for 31.0 years (31.0 ttl pk-yrs)     Types: Cigarettes     Start date:      Quit date:      Years since quittin.5    Smokeless tobacco: Never   Substance Use Topics    Alcohol use: Not on file         Objective:   Patient Vitals for the past 24 hrs:   BP Temp Temp src Pulse Resp SpO2 Height Weight   24 0448 123/71 36.4 °C (97.5 °F) Temporal 58 16 94 % -- --   24 2350 128/67 37 °C (98.6 °F) Temporal 64 16 93 % -- --   24 1934 158/81 36.9 °C (98.4 °F)  "Temporal 61 18 95 % -- --   24 1447 154/71 36.4 °C (97.5 °F) Tympanic 65 19 95 % -- --   24 1115 -- -- -- -- -- -- 1.854 m (6' 1\") 101 kg (222 lb)   24 1112 151/71 36.2 °C (97.2 °F) Tympanic 61 16 95 % -- --       Average, Min, and Max for last 24 hours Vitals:  TEMPERATURE:  Temp  Av.6 °C (97.8 °F)  Min: 36.2 °C (97.2 °F)  Max: 37 °C (98.6 °F)    RESPIRATIONS RANGE: Resp  Av  Min: 16  Max: 19    PULSE RANGE: Pulse  Av.8  Min: 58  Max: 65    BLOOD PRESSURE RANGE:  Systolic (24hrs), Av , Min:123 , Max:158   ; Diastolic (24hrs), Av, Min:67, Max:81      PULSE OXIMETRY RANGE: SpO2  Av.4 %  Min: 93 %  Max: 95 %  Body mass index is 29.29 kg/m².    I/O last 3 completed shifts:  In: 240 (2.4 mL/kg) [P.O.:240]  Out: - (0 mL/kg)   Weight: 100.7 kg   Weight change:      Physical Exam  Vitals and nursing note reviewed.   Constitutional:       General: He is not in acute distress.     Appearance: Normal appearance. He is not ill-appearing.   HENT:      Head: Normocephalic and atraumatic.      Right Ear: External ear normal.      Left Ear: External ear normal.      Nose: Nose normal. No congestion or rhinorrhea.      Mouth/Throat:      Mouth: Mucous membranes are moist.   Eyes:      Extraocular Movements: Extraocular movements intact.      Pupils: Pupils are equal, round, and reactive to light.   Cardiovascular:      Rate and Rhythm: Regular rhythm. Bradycardia present.      Pulses: Normal pulses.      Heart sounds: Normal heart sounds.   Pulmonary:      Effort: Pulmonary effort is normal.      Breath sounds: Normal breath sounds.   Abdominal:      General: Abdomen is flat. Bowel sounds are normal.      Palpations: Abdomen is soft.   Musculoskeletal:         General: Normal range of motion.      Cervical back: Normal range of motion and neck supple.      Right lower leg: No edema.      Left lower leg: No edema.   Skin:     General: Skin is warm and dry.      Capillary Refill: " "Capillary refill takes less than 2 seconds.   Neurological:      General: No focal deficit present.      Mental Status: He is alert and oriented to person, place, and time. Mental status is at baseline.   Psychiatric:         Mood and Affect: Mood normal.         Thought Content: Thought content normal.         Judgment: Judgment normal.         Lab Results   Component Value Date     07/21/2024    K 4.0 07/21/2024     07/21/2024    CO2 25 07/21/2024    BUN 19 07/21/2024    CREATININE 0.94 07/21/2024    GLUCOSE 106 (H) 07/21/2024    CALCIUM 8.5 (L) 07/21/2024    PROT 6.4 07/19/2024    BILITOT 0.7 07/19/2024    ALKPHOS 98 07/19/2024    AST 18 07/19/2024    ALT 25 07/19/2024       Lab Results   Component Value Date    WBC 12.1 (H) 07/19/2024    HGB 14.3 07/19/2024    HCT 42.9 07/19/2024    MCV 87 07/19/2024     07/19/2024    LYMPHOPCT 15.3 07/19/2024    RBC 4.96 07/19/2024    MCH 28.8 07/19/2024    MCHC 33.3 07/19/2024    RDW 13.0 07/19/2024       No results found for: \"TSH\"  Lab Results   Component Value Date    TROPONINI <0.02 07/20/2019    BNP 37 07/20/2024       IMAGES:  No results found for this or any previous visit (from the past 4464 hour(s)).  MR brain wo IV contrast    Result Date: 7/20/2024  Impression: No evidence for acute infarct or acute intracranial pathology.   Mild degree of nonspecific white matter foci, most consistent with chronic small-vessel ischemic disease.   Signed by: Haja Jarquin 7/20/2024 6:29 PM Dictation workstation:   [id number]    No echocardiogram results found for the past 12 months  === 07/19/24 ===    XR CHEST 1 VIEW    - Impression -  1.Borderline to mild enlargement of the cardiac silhouette with  sternotomy wires in place.  2.Patchy infiltrates or atelectasis in the left base with  possible left pleural effusion or chronic pleural reaction/ scarring.  Accentuation of central markings could be related to chronic change or  a mild degree of pulmonary " vascular congestion.  Signed by Dona Broussard DO    === 07/19/24 ===    XR CHEST 1 VIEW    - Impression -  1.Borderline to mild enlargement of the cardiac silhouette with  sternotomy wires in place.  2.Patchy infiltrates or atelectasis in the left base with  possible left pleural effusion or chronic pleural reaction/ scarring.  Accentuation of central markings could be related to chronic change or  a mild degree of pulmonary vascular congestion.  Signed by Dona Broussard DO  === 07/20/24 ===    MR BRAIN WO CONTRAST    - Impression -  No evidence for acute infarct or acute intracranial pathology.    Mild degree of nonspecific white matter foci, most consistent with  chronic small-vessel ischemic disease.    Signed by: Haja Jarquin 7/20/2024 6:29 PM  Dictation workstation:   [id number]    Additional results of the last 24 hours have been reviewed.    Dictated using [employer] Version 2.4  Proof read however unrecognized voice recognition errors may have occurred     Electronically signed by Janette Arthur DO on 07/21/24 at 10:10 AM

## 2024-07-21 NOTE — CONSULTS
Inpatient consult to Neurology  Consult performed by: Noe Dutta MD  Consult ordered by: Cortez Nolasco DO          History Of Present Illness  Prasanna Torrez is a 73 y.o. male presenting with about 2 hours of dizziness, followed by dizziness and blurred vision, the symptoms resolved in 4 hours.  On the day these symptoms occurred he was dehydrated- his urine was dark in the morning, had little fluid intake, and was skeet shooting when the neurological symptoms started.  He reports a history of recurrent brief dizziness spells, and has been evaluated by the [facility].  His BP has fluctuated.  At the [facility] ED he told them he had checked his BP and it was low.  Past Medical History  No past medical history on file.  Surgical History  No past surgical history on file.  Social History  Social History     Tobacco Use    Smoking status: Former     Current packs/day: 0.00     Average packs/day: 1 pack/day for 31.0 years (31.0 ttl pk-yrs)     Types: Cigarettes     Start date:      Quit date:      Years since quittin.5    Smokeless tobacco: Never     Allergies  Dapagliflozin  Medications Prior to Admission   Medication Sig Dispense Refill Last Dose    Eliquis 5 mg tablet Take 1 tablet (5 mg) by mouth 2 times a day.       isosorbide mononitrate ER (Imdur) 60 mg 24 hr tablet Take 1 tablet (60 mg) by mouth once daily.       aspirin 81 mg EC tablet Take 1 tablet (81 mg) by mouth once daily.       insulin glargine (Lantus U-100 Insulin) 100 unit/mL injection Inject 40 Units under the skin once daily at bedtime. Take as directed per insulin instructions.       lisinopril 10 mg tablet Take 1 tablet (10 mg) by mouth once daily.       metoprolol succinate XL (Toprol-XL) 25 mg 24 hr tablet Take 1 tablet (25 mg) by mouth once daily. Do not crush or chew.       nitroglycerin (Nitrostat) 0.4 mg SL tablet Place 1 tablet (0.4 mg) under the tongue every 5 minutes if needed for chest pain.       pravastatin  "(Pravachol) 20 mg tablet Take 1 tablet (20 mg) by mouth once daily.       semaglutide (Ozempic) 0.25 mg or 0.5 mg(2 mg/1.5 mL) pen injector Inject 0.5 mg under the skin 1 (one) time per week.       tadalafil (Cialis) 10 mg tablet Take 1 tablet (10 mg) by mouth once daily as needed for erectile dysfunction.       tamsulosin (Flomax) 0.4 mg 24 hr capsule Take 2 capsules (0.8 mg) by mouth once daily.          Review of Systems  Neurological Exam  Mental Status: Awake and alert. Oriented to person, place and time. Speech was fluent to history. Naming, repetition and comprehension were intact. Short term memory intact tested by word recall and attention intact tested by serial sevens.      CN: Pupils were 4/4mm to 2/2mm. VF intact to finger counting. Ocular versions were intact. Facial sensation was intact to light touch bilaterally. Facial expression was symmetric. Palate elevated symmetrically. Shoulder shrug was symmetric. Tongue protruded midline.     Motor: Normal muscle bulk and tone. Strength was 5/5 bilaterally for shoulder abduction, elbow flexion/extension,  strength, hip flexion, knee flexion/extension, ankle dorsi- and plantar flexion. There were no abnormal movements.     Sensory: Intact to light touch and temperature in all 4 extremities. Does not extinguish to double simultaneous touch.     Coordination: Finger to nose and heel to shin were intact with no dysmetria.       Physical Exam  Last Recorded Vitals  Blood pressure 127/71, pulse 66, temperature 35.8 °C (96.4 °F), temperature source Tympanic, resp. rate 18, height 1.854 m (6' 1\"), weight 101 kg (222 lb), SpO2 95%.    Relevant Results        NIH Stroke Scale  1A. Level of Consciousness: Alert, Keenly Responsive  1B. Ask Month and Age: Both Questions Right  1C. Blink Eyes & Squeeze Hands: Performs Both Tasks  2. Best Gaze: Normal  3. Visual: No Visual Loss  4. Facial Palsy: Normal Symmetrical Movements  5A. Motor - Left Arm: No Drift  5B. Motor - " Right Arm: No Drift  6A. Motor - Left Leg: No Drift  6B. Motor - Right Leg: No Drift  7. Limb Ataxia: Absent  8. Sensory Loss: Normal  9. Best Language: No Aphasia  10. Dysarthria: Normal  11. Extinction and Inattention: No Abnormality  NIH Stroke Scale: 0           Nic Coma Scale  Best Eye Response: Spontaneous  Best Verbal Response: Oriented  Best Motor Response: Follows commands  Nic Coma Scale Score: 15                 I have personally reviewed the following imaging results MR brain wo IV contrast    Result Date: 7/20/2024  Interpreted By:  Haja Jarquin, STUDY: MR BRAIN WO IV CONTRAST; 7/20/2024 3:45 pm   INDICATION: Signs/Symptoms:blurred vision;   COMPARISON: None   ACCESSION NUMBER(S): [id number]   ORDERING CLINICIAN: KOURTNEY VERNON   TECHNIQUE: Multiple, multiplanar sequences of the brain were acquired.   FINDINGS: No focal mass effect or midline shift is identified. The ventricles and sulci are symmetric and appropriate for the patient's age.   There is a mild degree of nonspecific white matter T2 and FLAIR hyperintensities, most likely due to chronic small-vessel ischemic disease. The gray-white matter differentiation is preserved. No restricted diffusion is seen.   Sagittal T1 images show grossly normal appearance of sella and midline structures   No acute intracranial hemorrhage is seen. No intra-axial or extra-axial fluid collection is seen.   The visualized paranasal sinuses and mastoid air cells are clear.       No evidence for acute infarct or acute intracranial pathology.   Mild degree of nonspecific white matter foci, most consistent with chronic small-vessel ischemic disease.   Signed by: Haja Jarquin 7/20/2024 6:29 PM Dictation workstation:   [id number]    XR chest 1 view    Result Date: 7/19/2024  STUDY: Chest Radiograph;  7/19/2024 at 16:13. INDICATION: Patient has chest pain. COMPARISON: None available. ACCESSION NUMBER(S): [id number] ORDERING CLINICIAN: ZANDER LIU  EUCEDA TECHNIQUE:  Frontal chest was obtained at 16:12 hours. FINDINGS: CARDIOMEDIASTINAL SILHOUETTE: Assessment of heart size is limited by obscuration of the left heart border, heart size would appear to be top normal to mildly enlarged. Sternotomy wires are in place.  LUNGS: There are patchy infiltrates atelectasis or scarring in the left base obscuring the left heart border with possible left pleural effusion or chronic pleural reaction.  There is mild accentuation of central markings, the peripheral right lung appears clear.  ABDOMEN: No remarkable upper abdominal findings.  BONES: No acute osseous changes.    1.Borderline to mild enlargement of the cardiac silhouette with sternotomy wires in place. 2.Patchy infiltrates or atelectasis in the left base with possible left pleural effusion or chronic pleural reaction/ scarring. Accentuation of central markings could be related to chronic change or a mild degree of pulmonary vascular congestion. Signed by Dona Broussard DO  .Reviewed     Assessment/Plan   Principal Problem:    Stroke-like symptom      Impression: 73 year old man with an episode of dizziness and blurred vision triggered by hypotension, possibly related to dehydration and HTN medications.  I do not think he had a TIA.  Will sign off.       I spent 45 minutes in the professional and overall care of this patient.      Noe Dutta MD

## 2024-07-21 NOTE — CONSULTS
Consults  History Of Present Illness:    Prasanna Torrez is a very pleasant 73 year old gentleman with a history of CAD s/p CABG x3, atrial fibrillation, HTN, HLD, and DM, he presented to the ER with lightheadedness and vision changes. He was out skeet shooting when the symptoms started. Symptoms have since resolved. This morning had chest pain, states he has chest pain intermittently at home. Denies shortness of breath or heart palpitations.    Review of Systems   Constitutional: Negative.   HENT: Negative.     Eyes: Negative.    Cardiovascular:  Positive for chest pain.   Respiratory: Negative.     Endocrine: Negative.    Hematologic/Lymphatic: Negative.    Skin: Negative.    Musculoskeletal:  Positive for myalgias.   Gastrointestinal: Negative.    Neurological: Negative.    Psychiatric/Behavioral: Negative.           Last Recorded Vitals:  Vitals:    07/21/24 1104 07/21/24 1106 07/21/24 1107 07/21/24 1504   BP: 131/66 102/65 127/71 117/71   BP Location: Right arm Left arm Left arm Right arm   Patient Position: Lying Standing Sitting Lying   Pulse: 62 67 66 59   Resp: 18   17   Temp: 35.8 °C (96.4 °F)   36.4 °C (97.5 °F)   TempSrc: Tympanic   Tympanic   SpO2: 95%   95%   Weight:       Height:           Last Labs:  CBC - 7/19/2024:  3:27 PM  12.1 14.3 276    42.9      CMP - 7/21/2024:  6:32 AM  8.5 6.4 18 --- 0.7   3.5 3.8 25 98      PTT - No results in last year.  _   _ _     Troponin I, High Sensitivity   Date/Time Value Ref Range Status   07/19/2024 04:59 PM 5 0 - 20 ng/L Final   07/19/2024 03:27 PM 6 0 - 20 ng/L Final     BNP   Date/Time Value Ref Range Status   07/20/2024 01:48 PM 37 0 - 99 pg/mL Final   07/19/2024 03:27 PM 52 0 - 99 pg/mL Final     Hemoglobin A1C   Date/Time Value Ref Range Status   07/20/2024 01:48 PM 8.1 (H) see below % Final   03/01/2022 06:22 AM 7.4 (H) 4.0 - 5.6 % Final     LDL Calculated   Date/Time Value Ref Range Status   07/20/2024 01:48 PM 53 <=99 mg/dL Final     Comment:                "                  Near   Borderline      AGE      Desirable  Optimal    High     High     Very High     0-19 Y     0 - 109     ---    110-129   >/= 130     ----    20-24 Y     0 - 119     ---    120-159   >/= 160     ----      >24 Y     0 -  99   100-129  130-159   160-189     >/=190       VLDL   Date/Time Value Ref Range Status   07/20/2024 01:48 PM 37 0 - 40 mg/dL Final      Last I/O:  I/O last 3 completed shifts:  In: 240 (2.4 mL/kg) [P.O.:240]  Out: - (0 mL/kg)   Weight: 100.7 kg     Past Cardiology Tests (Last 3 Years):  EKG:  EKG showed sinus rhythm with PACs heart rate 80 bpm     Echo:  No results found for this or any previous visit from the past 1095 days.    Ejection Fractions:  No results found for: \"EF\"  Cath:  No results found for this or any previous visit from the past 1095 days.    Stress Test:  No results found for this or any previous visit from the past 1095 days.    Cardiac Imaging:  No results found for this or any previous visit from the past 1095 days.      Past Medical History:  He has no past medical history on file.    Past Surgical History:  He has no past surgical history on file.      Social History:  He reports that he quit smoking about 12 years ago. His smoking use included cigarettes. He started smoking about 43 years ago. He has a 31 pack-year smoking history. He has never used smokeless tobacco. No history on file for alcohol use and drug use.    Family History:  No family history on file.     Allergies:  Dapagliflozin    Inpatient Medications:  Scheduled medications   Medication Dose Route Frequency    apixaban  5 mg oral BID    aspirin  81 mg oral Daily    atorvastatin  80 mg oral Nightly    insulin glargine  40 Units subcutaneous Nightly    insulin lispro  0-15 Units subcutaneous TID    [Held by provider] lisinopril  10 mg oral Daily    [Held by provider] metoprolol succinate XL  25 mg oral Daily    polyethylene glycol  17 g oral Daily    tamsulosin  0.8 mg oral Daily     PRN " medications   Medication    dextrose    dextrose    glucagon    glucagon    hydrALAZINE    Followed by    [START ON 7/22/2024] hydrALAZINE    labetaloL     Continuous Medications   Medication Dose Last Rate     Outpatient Medications:  Current Outpatient Medications   Medication Instructions    aspirin 81 mg EC tablet 1 tablet, oral, Daily    Eliquis 5 mg tablet 1 tablet, oral, 2 times daily    isosorbide mononitrate ER (Imdur) 60 mg 24 hr tablet 1 tablet, oral, Daily    Lantus U-100 Insulin 40 Units, subcutaneous, Nightly, Take as directed per insulin instructions.    lisinopril 10 mg, oral, Daily    metoprolol succinate XL (TOPROL-XL) 12.5 mg, oral, Daily, Do not crush or chew.    nitroglycerin (NITROSTAT) 0.4 mg, sublingual, Every 5 min PRN    Ozempic 0.5 mg, subcutaneous, Once Weekly    pravastatin (PRAVACHOL) 20 mg, oral, Daily    tadalafil (CIALIS) 10 mg, oral, Daily PRN    tamsulosin (FLOMAX) 0.8 mg, oral, Daily       Physical Exam:  Physical Exam  Vitals reviewed.   HENT:      Head: Normocephalic.      Nose: Nose normal.   Eyes:      Pupils: Pupils are equal, round, and reactive to light.   Cardiovascular:      Rate and Rhythm: Normal rate and regular rhythm.   Pulmonary:      Effort: Pulmonary effort is normal.      Breath sounds: Normal breath sounds.   Abdominal:      General: Abdomen is flat.      Palpations: Abdomen is soft.   Musculoskeletal:         General: Normal range of motion.      Cervical back: Normal range of motion.   Skin:     General: Skin is warm and dry.   Neurological:      General: No focal deficit present.      Mental Status: He is alert and oriented to person, place, and time.   Psychiatric:         Mood and Affect: Mood normal.          Assessment/Plan   Mr. Torrez is a very pleasant 73 year old gentleman with a history of CAD s/p CABG x3, HTN, HLD, DM, and atrial fibrillation, he presented with change in vision and lightheadedness. Symptoms have resolved. EKG reviewed no changes  seen from the past. Hypotensive upon arrival to the ER Lisinopril and Metoprolol currently being held. Chest pain is atypical in nature troponin is negative.     Plan   -decrease the Metoprolol to 12.5 mg daily   -hold the Lisinopril   -follow up with his cardiologist in [place] in one to two weeks   -continue Aspirin and Atorvastatin   -continue Eliquis     Peripheral IV 07/19/24 20 G Right Antecubital (Active)   Site Assessment Clean;Dry;Intact 07/20/24 2030   Line Status Saline locked 07/20/24 2030   Dressing Status Clean;Dry 07/20/24 2030   Number of days: 2       Code Status:  DNR    I spent  minutes in the professional and overall care of this patient.        Suri Campbell, APRN-CNP

## 2024-07-21 NOTE — DISCHARGE SUMMARY
[facility] Hospitalist Discharge Summary and Transition Note           Prasanna Torrez                  :  1950                     MRN:  [id number]     ADMIT DATE:  2024            DISCHARGE DATE:  2024     PRIMARY CARE PHYSICIAN:  No Assigned PCP Generic Provider, MD     VISIT STATUS: Observation     CODE STATUS:  DNR     DISCHARGE DIAGNOSES:    Principal Problem:    Stroke-like symptom       HOSPITAL COURSE:  Prasanna Torrez is a 73 y.o. male with PMH CAD s/p DEMETRICE/CABG, paroxysmal Afib, hypertension, hyperlipidemia, Non-IDDM II who presented to the hospital due to lightheadedness and blurred vision. Patient reports the day prior to admission while shooting skeet he became lightheaded and had blurred vision.  He reported he had difficulty seeing the seat.  He reported the symptoms lasted for about 4 hours.  After this occurred he sat down and then upon leaving the range gave his keys to his wife because he was he had difficulty seeing.  While driving away he asked his wife to take him to the emergency room in [place].  On the emergency room in [place] an EKG showed concerns for a STEMI however after review with the cardiologist is advised these changes were old. Additional ED findings were consisted with hypotension (88/48), bradycardia (59) and NOE. Patient's symptoms were suspected to be due to drug induced hypotension and dehydration. Patient's home metoprolol was decreased and was taken off all antihypertensives with instructions to F/U with primary cardiologist in [place], [place]. Patient was discharged home once medically optimized.     #NOE (baseline Cr 1.1), resolved   #Neurological symptoms (blurred vision, near syncope), resolved -w/o e/o acute stroke  #Blurry vision suspect medication induced bradycardia and hypotension  #Near syncope suspect medication induced bradycardia and hypotension  #Chronic ST elevation on ECG  #IDDM II  #paroxysmal Afib  #HPL  #HTN  -PVCs and one episode of sinus tach  (150's, 7/21/24) on tele  -check orthostatic VS  -c/w home ASA, apixaban, statin  -PRN IVF  -Echo pending  -hold metoprolol and lisinopril (pending cardiology eval) due to borderline bradycardia, hypotension  -rec outpt eval by ophthalmology  -CS recs a ppreciated: c     PROCEDURES:  none  CONSULTANTS:  cards, neuro    COMPLEXITY OF FOLLOW UP:  [] Moderate Complexity: follow up within 7-14 calendar days (09050)  [x]Severe Complexity: follow up within 7 calendar days (85458)     FOLLOW UP TESTING, PENDING RESULTS ORREFERRALS AT TRANSITIONAL CARE VISIT:   [x]Yes   F/U with primary cardiologist in [place],   [] No     DISCHARGE MEDICATIONS:        Significant Medication Changes:         Your medication list        CHANGE how you take these medications        Instructions Last Dose Given Next Dose Due   metoprolol succinate XL 25 mg 24 hr tablet  Commonly known as: Toprol-XL  What changed: how much to take      Take 0.5 tablets (12.5 mg) by mouth once daily. Do not crush or chew.              CONTINUE taking these medications        Instructions Last Dose Given Next Dose Due   aspirin 81 mg EC tablet           Eliquis 5 mg tablet  Generic drug: apixaban           Lantus U-100 Insulin 100 unit/mL injection  Generic drug: insulin glargine           nitroglycerin 0.4 mg SL tablet  Commonly known as: Nitrostat           Ozempic 0.25 mg or 0.5 mg(2 mg/1.5 mL) pen injector  Generic drug: semaglutide           pravastatin 20 mg tablet  Commonly known as: Pravachol           tadalafil 10 mg tablet  Commonly known as: Cialis           tamsulosin 0.4 mg 24 hr capsule  Commonly known as: Flomax                  STOP taking these medications      isosorbide mononitrate ER 60 mg 24 hr tablet  Commonly known as: Imdur        lisinopril 10 mg tablet                  Where to Get Your Medications        These medications were sent to [id number] - [address]  [address]      Phone: [phone]   metoprolol succinate XL 25 mg 24 hr tablet           DIET: carb controlled      DISPOSITION:  Home     Follow up with No Assigned PCP Generic Provider, MD  .        DISCHARGE TIME: > 30 minutes     Electronically signed by Janette Arthur DO on 07/21/24 at 3:39 PM      Dictated using [employer] Version 2.4  Proof read however unrecognized voice recognition errors may have occurred

## 2024-07-21 NOTE — NURSING NOTE
Pt educated on discharge instructions, med changes, and stroke sx. Pt verbalized understanding. Pt IV removed. Telemetry returned.

## 2024-07-21 NOTE — CARE PLAN
Problem: General Stroke  Goal: Establish a mutual long term goal with patient by discharge  Outcome: Progressing  Goal: Controlled blood glucose throughout shift  Outcome: Progressing     Problem: General Stroke  Goal: Demonstrate improvement in neurological exam throughout the shift  Outcome: Met  Goal: Maintain BP within ordered limits throughout shift  Outcome: Met  Goal: Participate in treatment (ie., meds, therapy) throughout shift  Outcome: Met  Goal: No symptoms of aspiration throughout shift  Outcome: Met  Goal: No symptoms of hemorrhage throughout shift  Outcome: Met  Goal: Tolerate enteral feeding throughout shift  Outcome: Met  Goal: Decreased nausea/vomiting throughout shift  Outcome: Met  Goal: Out of bed three times today  Outcome: Met     Problem: Pain - Adult  Goal: Verbalizes/displays adequate comfort level or baseline comfort level  Outcome: Met     Problem: Safety - Adult  Goal: Free from fall injury  Outcome: Met     Problem: Discharge Planning  Goal: Discharge to home or other facility with appropriate resources  Outcome: Met     Problem: Chronic Conditions and Co-morbidities  Goal: Patient's chronic conditions and co-morbidity symptoms are monitored and maintained or improved  Outcome: Met

## 2024-07-22 ENCOUNTER — HOSPITAL ENCOUNTER (OUTPATIENT)
Dept: CARDIOLOGY | Facility: HOSPITAL | Age: 74
Discharge: HOME | End: 2024-07-22
Payer: MEDICARE

## 2024-07-22 LAB
ATRIAL RATE: 59 BPM
ATRIAL RATE: 65 BPM
ATRIAL RATE: 65 BPM
P AXIS: 0 DEGREES
P AXIS: 10 DEGREES
P AXIS: 2 DEGREES
P OFFSET: 180 MS
P OFFSET: 180 MS
P OFFSET: 182 MS
P ONSET: 112 MS
P ONSET: 114 MS
P ONSET: 117 MS
PR INTERVAL: 202 MS
PR INTERVAL: 214 MS
PR INTERVAL: 222 MS
Q ONSET: 218 MS
Q ONSET: 219 MS
Q ONSET: 225 MS
QRS COUNT: 10 BEATS
QRS DURATION: 84 MS
QRS DURATION: 86 MS
QRS DURATION: 90 MS
QT INTERVAL: 426 MS
QT INTERVAL: 430 MS
QT INTERVAL: 442 MS
QTC CALCULATION(BAZETT): 437 MS
QTC CALCULATION(BAZETT): 443 MS
QTC CALCULATION(BAZETT): 447 MS
QTC FREDERICIA: 437 MS
QTC FREDERICIA: 439 MS
QTC FREDERICIA: 441 MS
R AXIS: 18 DEGREES
R AXIS: 21 DEGREES
R AXIS: 21 DEGREES
T AXIS: 86 DEGREES
T AXIS: 87 DEGREES
T AXIS: 93 DEGREES
T OFFSET: 432 MS
T OFFSET: 433 MS
T OFFSET: 446 MS
VENTRICULAR RATE: 59 BPM
VENTRICULAR RATE: 65 BPM
VENTRICULAR RATE: 65 BPM

## 2024-07-22 PROCEDURE — 93005 ELECTROCARDIOGRAM TRACING: CPT

## 2024-07-23 LAB — GLUCOSE BLD MANUAL STRIP-MCNC: 148 MG/DL (ref 74–99)

## 2024-08-06 ENCOUNTER — OFFICE VISIT (OUTPATIENT)
Dept: CARDIOLOGY CLINIC | Age: 74
End: 2024-08-06
Payer: MEDICARE

## 2024-08-06 ENCOUNTER — OFFICE VISIT (OUTPATIENT)
Dept: FAMILY MEDICINE CLINIC | Age: 74
End: 2024-08-06
Payer: MEDICARE

## 2024-08-06 VITALS
HEART RATE: 69 BPM | OXYGEN SATURATION: 94 % | SYSTOLIC BLOOD PRESSURE: 128 MMHG | DIASTOLIC BLOOD PRESSURE: 62 MMHG | TEMPERATURE: 97.8 F | WEIGHT: 219.3 LBS | HEIGHT: 73 IN | RESPIRATION RATE: 16 BRPM | BODY MASS INDEX: 29.06 KG/M2

## 2024-08-06 VITALS
DIASTOLIC BLOOD PRESSURE: 60 MMHG | SYSTOLIC BLOOD PRESSURE: 98 MMHG | WEIGHT: 220 LBS | RESPIRATION RATE: 16 BRPM | HEART RATE: 71 BPM | BODY MASS INDEX: 29.16 KG/M2 | HEIGHT: 73 IN

## 2024-08-06 DIAGNOSIS — I48.0 PAF (PAROXYSMAL ATRIAL FIBRILLATION) (HCC): ICD-10-CM

## 2024-08-06 DIAGNOSIS — I25.2 HISTORY OF NON-ST ELEVATION MYOCARDIAL INFARCTION (NSTEMI): ICD-10-CM

## 2024-08-06 DIAGNOSIS — E11.9 DIABETES MELLITUS WITHOUT COMPLICATION (HCC): Primary | ICD-10-CM

## 2024-08-06 DIAGNOSIS — I25.2 OLD INFERIOR WALL MYOCARDIAL INFARCTION: ICD-10-CM

## 2024-08-06 DIAGNOSIS — E78.5 DYSLIPIDEMIA: ICD-10-CM

## 2024-08-06 DIAGNOSIS — Z95.1 HX OF CABG: ICD-10-CM

## 2024-08-06 DIAGNOSIS — I25.10 CORONARY ARTERY DISEASE INVOLVING NATIVE CORONARY ARTERY OF NATIVE HEART WITHOUT ANGINA PECTORIS: Primary | ICD-10-CM

## 2024-08-06 DIAGNOSIS — Z98.61 HISTORY OF PTCA: ICD-10-CM

## 2024-08-06 DIAGNOSIS — N52.9 ERECTILE DYSFUNCTION, UNSPECIFIED ERECTILE DYSFUNCTION TYPE: ICD-10-CM

## 2024-08-06 DIAGNOSIS — K40.90 RIGHT INGUINAL HERNIA: ICD-10-CM

## 2024-08-06 DIAGNOSIS — Z79.01 ANTICOAGULATED BY ANTICOAGULATION TREATMENT: ICD-10-CM

## 2024-08-06 DIAGNOSIS — Z79.4 INSULIN-REQUIRING OR DEPENDENT TYPE II DIABETES MELLITUS (HCC): ICD-10-CM

## 2024-08-06 DIAGNOSIS — Z86.79 HISTORY OF HYPERTENSION: ICD-10-CM

## 2024-08-06 DIAGNOSIS — I95.89 OTHER SPECIFIED HYPOTENSION: ICD-10-CM

## 2024-08-06 DIAGNOSIS — E11.9 INSULIN-REQUIRING OR DEPENDENT TYPE II DIABETES MELLITUS (HCC): ICD-10-CM

## 2024-08-06 DIAGNOSIS — I48.11 LONGSTANDING PERSISTENT ATRIAL FIBRILLATION (HCC): ICD-10-CM

## 2024-08-06 PROCEDURE — 99214 OFFICE O/P EST MOD 30 MIN: CPT | Performed by: FAMILY MEDICINE

## 2024-08-06 PROCEDURE — 99215 OFFICE O/P EST HI 40 MIN: CPT | Performed by: INTERNAL MEDICINE

## 2024-08-06 PROCEDURE — 1123F ACP DISCUSS/DSCN MKR DOCD: CPT | Performed by: FAMILY MEDICINE

## 2024-08-06 PROCEDURE — 93000 ELECTROCARDIOGRAM COMPLETE: CPT | Performed by: INTERNAL MEDICINE

## 2024-08-06 PROCEDURE — 1123F ACP DISCUSS/DSCN MKR DOCD: CPT | Performed by: INTERNAL MEDICINE

## 2024-08-06 PROCEDURE — 3052F HG A1C>EQUAL 8.0%<EQUAL 9.0%: CPT | Performed by: INTERNAL MEDICINE

## 2024-08-06 PROCEDURE — 3052F HG A1C>EQUAL 8.0%<EQUAL 9.0%: CPT | Performed by: FAMILY MEDICINE

## 2024-08-06 RX ORDER — LISINOPRIL 2.5 MG/1
2.5 TABLET ORAL DAILY
Qty: 30 TABLET | Refills: 3 | Status: SHIPPED | OUTPATIENT
Start: 2024-08-06

## 2024-08-06 RX ORDER — RANOLAZINE 500 MG/1
500 TABLET, EXTENDED RELEASE ORAL 2 TIMES DAILY
Qty: 60 TABLET | Refills: 3 | Status: SHIPPED | OUTPATIENT
Start: 2024-08-06

## 2024-08-06 RX ORDER — NITROGLYCERIN 0.4 MG/1
0.4 TABLET SUBLINGUAL EVERY 5 MIN PRN
Qty: 25 TABLET | Refills: 1 | Status: SHIPPED | OUTPATIENT
Start: 2024-08-06

## 2024-08-06 RX ORDER — TAMSULOSIN HYDROCHLORIDE 0.4 MG/1
0.8 CAPSULE ORAL DAILY
Qty: 180 CAPSULE | Refills: 1 | Status: SHIPPED | OUTPATIENT
Start: 2024-08-06 | End: 2025-02-02

## 2024-08-06 NOTE — PROGRESS NOTES
Hyperlipidemia.   10. Obesity.    11.  Erectile dysfunction.   12.  Right inguinal hernia  13.  Hospitalized with strokelike symptoms (lightheadedness and blurred vision) and low blood pressure in 2024.  MRI of the brain showed no acute pathology    Past Medical History:   Diagnosis Date    CAD (coronary artery disease)     Diabetes mellitus (HCC)     Hyperlipidemia     Hypertension     Paroxysmal atrial fibrillation (HCC)     S/P CABG x 3     In 2013 at [facility], per patient     Past Surgical History:   Procedure Laterality Date    CARDIAC CATHETERIZATION  2022    Dr. Moreno    COLONOSCOPY      CORONARY ANGIOPLASTY WITH STENT PLACEMENT  2022    3.5 x 38 Resolute [place] to the mid Cx and a 4.0 x 38 Resolute [place] to the prox Cx by Dr. Tadeo        History reviewed. No pertinent family history.   Social History     Socioeconomic History    Marital status:      Spouse name: None    Number of children: None    Years of education: None    Highest education level: None   Tobacco Use    Smoking status: Former     Current packs/day: 0.00     Average packs/day: 1 pack/day for 20.0 years (20.0 ttl pk-yrs)     Types: Cigarettes     Start date:      Quit date:      Years since quittin.6    Smokeless tobacco: Never   Substance and Sexual Activity    Alcohol use: Not Currently    Drug use: Never     Social Determinants of Health     Financial Resource Strain: Low Risk  (3/19/2024)    Overall Financial Resource Strain (CARDIA)     Difficulty of Paying Living Expenses: Not hard at all   Food Insecurity: No Food Insecurity (3/19/2024)    Hunger Vital Sign     Worried About Running Out of Food in the Last Year: Never true     Ran Out of Food in the Last Year: Never true   Transportation Needs: Unknown (3/19/2024)    PRAPARE - Transportation     Lack of Transportation (Non-Medical): No   Housing Stability: Unknown (3/19/2024)    Housing Stability Vital Sign     Unstable

## 2024-08-06 NOTE — PROGRESS NOTES
Bryant Mahmood is a 73 y.o. male accompanied by his wife   CC:   Chief Complaint   Patient presents with    Follow-up     3 month follow up, A1C 7/20/24 from [facility] 8.1%       3 month follow:   Doing well     Diabetes:   F/U   borderline control   Hemoglobin A1C   Date Value Ref Range Status   03/19/2024 8.3 % Final     On  statin   No results found for: \"LDLDIRECT\"  on Ace/Arb   Lab Results   Component Value Date    MALBCR 30 02/15/2023     Hypertension:   F/U   Doing well   wll Controlled   BP Readings from Last 3 Encounters:   08/06/24 128/62   08/06/24 98/60   05/30/24 136/78     Taking medications   Tolerating well   No sx/ss of low bp     Hyperlipidemia:   F/U   Controlled   Lab Results   Component Value Date    CHOL 122 03/19/2024    TRIG 126 03/19/2024    HDL 38 (L) 03/19/2024    VLDL 25 03/19/2024     The ASCVD Risk score (Jitendra MONTES, et al., 2019) failed to calculate for the following reasons:    The patient has a prior MI or stroke diagnosis  Taking Statin   Tolerating well   No myalgia  Last labs reviewed   - no evidence of elevation in ALT/AST       Patient's past medical, surgical, social and/or family history reviewed, updated in chart, and are non-contributory (unless otherwise stated).  Medications and allergies also reviewed and updated in chart.      /62 (Site: Left Upper Arm, Position: Sitting, Cuff Size: Large Adult)   Pulse 69   Temp 97.8 °F (36.6 °C) (Temporal)   Resp 16   Ht 1.854 m (6' 1\")   Wt 99.5 kg (219 lb 4.8 oz)   SpO2 94%   BMI 28.93 kg/m²     Review of Systems   Constitutional:  Negative for chills, fatigue and fever.   HENT:  Negative for congestion, ear pain, facial swelling, rhinorrhea, sinus pressure and sinus pain.    Eyes:  Negative for photophobia and visual disturbance.   Respiratory:  Negative for apnea, cough, chest tightness and shortness of breath.    Cardiovascular:  Negative for chest pain and palpitations.   Gastrointestinal:  Negative for

## 2024-08-07 ENCOUNTER — HOSPITAL ENCOUNTER (OUTPATIENT)
Facility: HOSPITAL | Age: 74
Setting detail: OBSERVATION
LOS: 1 days | Discharge: HOME | End: 2024-08-09
Attending: EMERGENCY MEDICINE | Admitting: INTERNAL MEDICINE
Payer: MEDICARE

## 2024-08-07 ENCOUNTER — APPOINTMENT (OUTPATIENT)
Dept: RADIOLOGY | Facility: HOSPITAL | Age: 74
End: 2024-08-07
Payer: MEDICARE

## 2024-08-07 ENCOUNTER — APPOINTMENT (OUTPATIENT)
Dept: CARDIOLOGY | Facility: HOSPITAL | Age: 74
End: 2024-08-07
Payer: MEDICARE

## 2024-08-07 DIAGNOSIS — I10 ESSENTIAL HYPERTENSION: ICD-10-CM

## 2024-08-07 DIAGNOSIS — I20.0 UNSTABLE ANGINA (MULTI): ICD-10-CM

## 2024-08-07 DIAGNOSIS — I21.3 ST ELEVATION MYOCARDIAL INFARCTION (STEMI), UNSPECIFIED ARTERY (MULTI): ICD-10-CM

## 2024-08-07 DIAGNOSIS — R07.9 CHEST PAIN, UNSPECIFIED: ICD-10-CM

## 2024-08-07 DIAGNOSIS — R29.90 STROKE-LIKE SYMPTOM: ICD-10-CM

## 2024-08-07 DIAGNOSIS — R94.31 ABNORMAL ELECTROCARDIOGRAM (ECG) (EKG): ICD-10-CM

## 2024-08-07 DIAGNOSIS — I21.3 STEMI (ST ELEVATION MYOCARDIAL INFARCTION) (MULTI): Primary | ICD-10-CM

## 2024-08-07 DIAGNOSIS — I47.10 SUPRAVENTRICULAR TACHYCARDIA (CMS-HCC): ICD-10-CM

## 2024-08-07 PROBLEM — Z95.1 HX OF CABG: Status: ACTIVE | Noted: 2024-08-07

## 2024-08-07 PROBLEM — I24.9 ACUTE CORONARY SYNDROME (MULTI): Status: ACTIVE | Noted: 2024-08-07

## 2024-08-07 PROBLEM — E11.9 DIABETES MELLITUS WITHOUT COMPLICATION (MULTI): Status: ACTIVE | Noted: 2022-06-27

## 2024-08-07 PROBLEM — I25.10 CAD IN NATIVE ARTERY: Status: ACTIVE | Noted: 2022-02-25

## 2024-08-07 LAB
ACT BLD: NORMAL S
ALBUMIN SERPL BCP-MCNC: 4.2 G/DL (ref 3.4–5)
ALP SERPL-CCNC: 117 U/L (ref 33–136)
ALT SERPL W P-5'-P-CCNC: 28 U/L (ref 10–52)
ANION GAP SERPL CALC-SCNC: 12 MMOL/L (ref 10–20)
AST SERPL W P-5'-P-CCNC: 34 U/L (ref 9–39)
BASOPHILS # BLD AUTO: 0.12 X10*3/UL (ref 0–0.1)
BASOPHILS NFR BLD AUTO: 1.1 %
BILIRUB SERPL-MCNC: 0.7 MG/DL (ref 0–1.2)
BUN SERPL-MCNC: 16 MG/DL (ref 6–23)
CALCIUM SERPL-MCNC: 8.7 MG/DL (ref 8.6–10.3)
CARDIAC TROPONIN I PNL SERPL HS: 9 NG/L (ref 0–20)
CHLORIDE SERPL-SCNC: 102 MMOL/L (ref 98–107)
CO2 SERPL-SCNC: 26 MMOL/L (ref 21–32)
CREAT SERPL-MCNC: 1.06 MG/DL (ref 0.5–1.3)
EGFRCR SERPLBLD CKD-EPI 2021: 74 ML/MIN/1.73M*2
EOSINOPHIL # BLD AUTO: 0.51 X10*3/UL (ref 0–0.4)
EOSINOPHIL NFR BLD AUTO: 4.5 %
ERYTHROCYTE [DISTWIDTH] IN BLOOD BY AUTOMATED COUNT: 13.2 % (ref 11.5–14.5)
GLUCOSE BLD MANUAL STRIP-MCNC: 247 MG/DL (ref 74–99)
GLUCOSE SERPL-MCNC: 246 MG/DL (ref 74–99)
HCT VFR BLD AUTO: 47.1 % (ref 41–52)
HGB BLD-MCNC: 16.2 G/DL (ref 13.5–17.5)
IMM GRANULOCYTES # BLD AUTO: 0.03 X10*3/UL (ref 0–0.5)
IMM GRANULOCYTES NFR BLD AUTO: 0.3 % (ref 0–0.9)
INR PPP: 1.3 (ref 0.9–1.1)
LYMPHOCYTES # BLD AUTO: 2.66 X10*3/UL (ref 0.8–3)
LYMPHOCYTES NFR BLD AUTO: 23.4 %
MCH RBC QN AUTO: 30 PG (ref 26–34)
MCHC RBC AUTO-ENTMCNC: 34.4 G/DL (ref 32–36)
MCV RBC AUTO: 87 FL (ref 80–100)
MONOCYTES # BLD AUTO: 1.07 X10*3/UL (ref 0.05–0.8)
MONOCYTES NFR BLD AUTO: 9.4 %
NEUTROPHILS # BLD AUTO: 6.97 X10*3/UL (ref 1.6–5.5)
NEUTROPHILS NFR BLD AUTO: 61.3 %
NRBC BLD-RTO: 0 /100 WBCS (ref 0–0)
PLATELET # BLD AUTO: 295 X10*3/UL (ref 150–450)
POTASSIUM SERPL-SCNC: 4.4 MMOL/L (ref 3.5–5.3)
PROT SERPL-MCNC: 7 G/DL (ref 6.4–8.2)
PROTHROMBIN TIME: 14.5 SECONDS (ref 9.8–12.8)
RBC # BLD AUTO: 5.4 X10*6/UL (ref 4.5–5.9)
SODIUM SERPL-SCNC: 136 MMOL/L (ref 136–145)
WBC # BLD AUTO: 11.4 X10*3/UL (ref 4.4–11.3)

## 2024-08-07 PROCEDURE — C1760 CLOSURE DEV, VASC: HCPCS | Performed by: INTERNAL MEDICINE

## 2024-08-07 PROCEDURE — 85347 COAGULATION TIME ACTIVATED: CPT

## 2024-08-07 PROCEDURE — 99152 MOD SED SAME PHYS/QHP 5/>YRS: CPT | Performed by: INTERNAL MEDICINE

## 2024-08-07 PROCEDURE — 36415 COLL VENOUS BLD VENIPUNCTURE: CPT | Performed by: EMERGENCY MEDICINE

## 2024-08-07 PROCEDURE — 2500000005 HC RX 250 GENERAL PHARMACY W/O HCPCS: Performed by: INTERNAL MEDICINE

## 2024-08-07 PROCEDURE — 93005 ELECTROCARDIOGRAM TRACING: CPT

## 2024-08-07 PROCEDURE — 99291 CRITICAL CARE FIRST HOUR: CPT | Performed by: EMERGENCY MEDICINE

## 2024-08-07 PROCEDURE — 85610 PROTHROMBIN TIME: CPT | Performed by: EMERGENCY MEDICINE

## 2024-08-07 PROCEDURE — C1887 CATHETER, GUIDING: HCPCS | Performed by: INTERNAL MEDICINE

## 2024-08-07 PROCEDURE — 85025 COMPLETE CBC W/AUTO DIFF WBC: CPT | Performed by: EMERGENCY MEDICINE

## 2024-08-07 PROCEDURE — 71045 X-RAY EXAM CHEST 1 VIEW: CPT | Performed by: RADIOLOGY

## 2024-08-07 PROCEDURE — 80053 COMPREHEN METABOLIC PANEL: CPT | Performed by: EMERGENCY MEDICINE

## 2024-08-07 PROCEDURE — C1894 INTRO/SHEATH, NON-LASER: HCPCS | Performed by: INTERNAL MEDICINE

## 2024-08-07 PROCEDURE — 1200000002 HC GENERAL ROOM WITH TELEMETRY DAILY

## 2024-08-07 PROCEDURE — C1769 GUIDE WIRE: HCPCS | Performed by: INTERNAL MEDICINE

## 2024-08-07 PROCEDURE — 82947 ASSAY GLUCOSE BLOOD QUANT: CPT

## 2024-08-07 PROCEDURE — 99222 1ST HOSP IP/OBS MODERATE 55: CPT | Performed by: STUDENT IN AN ORGANIZED HEALTH CARE EDUCATION/TRAINING PROGRAM

## 2024-08-07 PROCEDURE — 93459 L HRT ART/GRFT ANGIO: CPT | Performed by: INTERNAL MEDICINE

## 2024-08-07 PROCEDURE — 71045 X-RAY EXAM CHEST 1 VIEW: CPT

## 2024-08-07 PROCEDURE — 2720000007 HC OR 272 NO HCPCS: Performed by: INTERNAL MEDICINE

## 2024-08-07 PROCEDURE — 2500000004 HC RX 250 GENERAL PHARMACY W/ HCPCS (ALT 636 FOR OP/ED): Performed by: INTERNAL MEDICINE

## 2024-08-07 PROCEDURE — 84484 ASSAY OF TROPONIN QUANT: CPT | Performed by: EMERGENCY MEDICINE

## 2024-08-07 PROCEDURE — 2780000003 HC OR 278 NO HCPCS: Performed by: INTERNAL MEDICINE

## 2024-08-07 PROCEDURE — 2550000001 HC RX 255 CONTRASTS: Performed by: INTERNAL MEDICINE

## 2024-08-07 RX ORDER — TAMSULOSIN HYDROCHLORIDE 0.4 MG/1
0.8 CAPSULE ORAL DAILY
Status: DISCONTINUED | OUTPATIENT
Start: 2024-08-08 | End: 2024-08-09 | Stop reason: HOSPADM

## 2024-08-07 RX ORDER — LISINOPRIL 2.5 MG/1
2.5 TABLET ORAL DAILY
Status: DISCONTINUED | OUTPATIENT
Start: 2024-08-08 | End: 2024-08-08

## 2024-08-07 RX ORDER — METOPROLOL SUCCINATE 25 MG/1
12.5 TABLET, EXTENDED RELEASE ORAL DAILY
Status: DISCONTINUED | OUTPATIENT
Start: 2024-08-08 | End: 2024-08-09 | Stop reason: HOSPADM

## 2024-08-07 RX ORDER — NITROGLYCERIN 0.4 MG/1
0.4 TABLET SUBLINGUAL EVERY 5 MIN PRN
Status: DISCONTINUED | OUTPATIENT
Start: 2024-08-07 | End: 2024-08-07

## 2024-08-07 RX ORDER — LISINOPRIL 2.5 MG/1
1 TABLET ORAL DAILY
COMMUNITY
Start: 2024-08-06 | End: 2024-08-09 | Stop reason: HOSPADM

## 2024-08-07 RX ORDER — MORPHINE SULFATE 2 MG/ML
2 INJECTION, SOLUTION INTRAMUSCULAR; INTRAVENOUS EVERY 6 HOURS PRN
Status: DISCONTINUED | OUTPATIENT
Start: 2024-08-07 | End: 2024-08-09 | Stop reason: HOSPADM

## 2024-08-07 RX ORDER — ASPIRIN 81 MG/1
81 TABLET ORAL DAILY
Status: DISCONTINUED | OUTPATIENT
Start: 2024-08-08 | End: 2024-08-09 | Stop reason: HOSPADM

## 2024-08-07 RX ORDER — FENTANYL CITRATE 50 UG/ML
INJECTION, SOLUTION INTRAMUSCULAR; INTRAVENOUS AS NEEDED
Status: DISCONTINUED | OUTPATIENT
Start: 2024-08-07 | End: 2024-08-07 | Stop reason: HOSPADM

## 2024-08-07 RX ORDER — PRAVASTATIN SODIUM 40 MG/1
20 TABLET ORAL NIGHTLY
Status: DISCONTINUED | OUTPATIENT
Start: 2024-08-07 | End: 2024-08-08

## 2024-08-07 RX ORDER — NITROGLYCERIN 0.4 MG/1
0.4 TABLET SUBLINGUAL EVERY 5 MIN PRN
Status: DISCONTINUED | OUTPATIENT
Start: 2024-08-07 | End: 2024-08-09 | Stop reason: HOSPADM

## 2024-08-07 RX ORDER — SODIUM CHLORIDE 9 MG/ML
75 INJECTION, SOLUTION INTRAVENOUS CONTINUOUS
Status: ACTIVE | OUTPATIENT
Start: 2024-08-08 | End: 2024-08-08

## 2024-08-07 RX ORDER — LIDOCAINE HYDROCHLORIDE 20 MG/ML
INJECTION, SOLUTION INFILTRATION; PERINEURAL AS NEEDED
Status: DISCONTINUED | OUTPATIENT
Start: 2024-08-07 | End: 2024-08-07 | Stop reason: HOSPADM

## 2024-08-07 RX ORDER — DEXTROSE 50 % IN WATER (D50W) INTRAVENOUS SYRINGE
25
Status: DISCONTINUED | OUTPATIENT
Start: 2024-08-07 | End: 2024-08-09 | Stop reason: HOSPADM

## 2024-08-07 RX ORDER — ROSUVASTATIN CALCIUM 20 MG/1
1 TABLET, COATED ORAL DAILY
COMMUNITY
Start: 2024-06-25

## 2024-08-07 RX ORDER — INSULIN GLARGINE 100 [IU]/ML
40 INJECTION, SOLUTION SUBCUTANEOUS NIGHTLY
Status: DISCONTINUED | OUTPATIENT
Start: 2024-08-07 | End: 2024-08-09 | Stop reason: HOSPADM

## 2024-08-07 RX ORDER — MIDAZOLAM HYDROCHLORIDE 1 MG/ML
INJECTION, SOLUTION INTRAMUSCULAR; INTRAVENOUS AS NEEDED
Status: DISCONTINUED | OUTPATIENT
Start: 2024-08-07 | End: 2024-08-07 | Stop reason: HOSPADM

## 2024-08-07 RX ORDER — DEXTROSE 50 % IN WATER (D50W) INTRAVENOUS SYRINGE
12.5
Status: DISCONTINUED | OUTPATIENT
Start: 2024-08-07 | End: 2024-08-09 | Stop reason: HOSPADM

## 2024-08-07 ASSESSMENT — LIFESTYLE VARIABLES
HAVE PEOPLE ANNOYED YOU BY CRITICIZING YOUR DRINKING: NO
EVER HAD A DRINK FIRST THING IN THE MORNING TO STEADY YOUR NERVES TO GET RID OF A HANGOVER: NO
TOTAL SCORE: 0
HAVE YOU EVER FELT YOU SHOULD CUT DOWN ON YOUR DRINKING: NO
EVER FELT BAD OR GUILTY ABOUT YOUR DRINKING: NO

## 2024-08-07 ASSESSMENT — COGNITIVE AND FUNCTIONAL STATUS - GENERAL
DAILY ACTIVITIY SCORE: 24
MOBILITY SCORE: 24

## 2024-08-07 ASSESSMENT — COLUMBIA-SUICIDE SEVERITY RATING SCALE - C-SSRS
6. HAVE YOU EVER DONE ANYTHING, STARTED TO DO ANYTHING, OR PREPARED TO DO ANYTHING TO END YOUR LIFE?: NO
2. HAVE YOU ACTUALLY HAD ANY THOUGHTS OF KILLING YOURSELF?: NO
1. IN THE PAST MONTH, HAVE YOU WISHED YOU WERE DEAD OR WISHED YOU COULD GO TO SLEEP AND NOT WAKE UP?: NO

## 2024-08-07 ASSESSMENT — PAIN - FUNCTIONAL ASSESSMENT
PAIN_FUNCTIONAL_ASSESSMENT: 0-10
PAIN_FUNCTIONAL_ASSESSMENT: 0-10

## 2024-08-07 ASSESSMENT — PAIN SCALES - GENERAL
PAINLEVEL_OUTOF10: 0 - NO PAIN

## 2024-08-07 NOTE — Clinical Note
Sheath was exchanged in the right femoral artery with ACCESS KIT, S-JENNIFER MINI, 4FR 10CM 0.018IN 40CM, NT/PT, ECHO ENHANCE NEEDLE.

## 2024-08-08 ENCOUNTER — APPOINTMENT (OUTPATIENT)
Dept: CARDIOLOGY | Facility: HOSPITAL | Age: 74
End: 2024-08-08
Payer: MEDICARE

## 2024-08-08 PROBLEM — R07.9 CHEST PAIN: Status: ACTIVE | Noted: 2024-08-08

## 2024-08-08 LAB
AORTIC VALVE MEAN GRADIENT: 4 MMHG
AORTIC VALVE PEAK VELOCITY: 1.44 M/S
AV PEAK GRADIENT: 8.3 MMHG
AVA (PEAK VEL): 1.91 CM2
AVA (VTI): 1.95 CM2
EJECTION FRACTION APICAL 4 CHAMBER: 47.2
EJECTION FRACTION: 53 %
GLUCOSE BLD MANUAL STRIP-MCNC: 113 MG/DL (ref 74–99)
GLUCOSE BLD MANUAL STRIP-MCNC: 145 MG/DL (ref 74–99)
GLUCOSE BLD MANUAL STRIP-MCNC: 155 MG/DL (ref 74–99)
GLUCOSE BLD MANUAL STRIP-MCNC: 216 MG/DL (ref 74–99)
LEFT ATRIUM VOLUME AREA LENGTH INDEX BSA: 28 ML/M2
LEFT VENTRICLE INTERNAL DIMENSION DIASTOLE: 4.9 CM (ref 3.5–6)
LEFT VENTRICULAR OUTFLOW TRACT DIAMETER: 2 CM
LV EJECTION FRACTION BIPLANE: 48 %
MITRAL VALVE E/A RATIO: 0.9
RIGHT VENTRICLE FREE WALL PEAK S': 11.5 CM/S
TRICUSPID ANNULAR PLANE SYSTOLIC EXCURSION: 2.2 CM

## 2024-08-08 PROCEDURE — 99232 SBSQ HOSP IP/OBS MODERATE 35: CPT | Performed by: NURSE PRACTITIONER

## 2024-08-08 PROCEDURE — 93306 TTE W/DOPPLER COMPLETE: CPT | Performed by: INTERNAL MEDICINE

## 2024-08-08 PROCEDURE — G0378 HOSPITAL OBSERVATION PER HR: HCPCS

## 2024-08-08 PROCEDURE — 2500000001 HC RX 250 WO HCPCS SELF ADMINISTERED DRUGS (ALT 637 FOR MEDICARE OP): Performed by: NURSE PRACTITIONER

## 2024-08-08 PROCEDURE — 2500000004 HC RX 250 GENERAL PHARMACY W/ HCPCS (ALT 636 FOR OP/ED): Performed by: INTERNAL MEDICINE

## 2024-08-08 PROCEDURE — 2500000001 HC RX 250 WO HCPCS SELF ADMINISTERED DRUGS (ALT 637 FOR MEDICARE OP): Performed by: INTERNAL MEDICINE

## 2024-08-08 PROCEDURE — 2500000002 HC RX 250 W HCPCS SELF ADMINISTERED DRUGS (ALT 637 FOR MEDICARE OP, ALT 636 FOR OP/ED): Performed by: INTERNAL MEDICINE

## 2024-08-08 PROCEDURE — 2500000002 HC RX 250 W HCPCS SELF ADMINISTERED DRUGS (ALT 637 FOR MEDICARE OP, ALT 636 FOR OP/ED): Performed by: NURSE PRACTITIONER

## 2024-08-08 PROCEDURE — 93306 TTE W/DOPPLER COMPLETE: CPT

## 2024-08-08 PROCEDURE — 99232 SBSQ HOSP IP/OBS MODERATE 35: CPT | Performed by: HOSPITALIST

## 2024-08-08 PROCEDURE — 2500000001 HC RX 250 WO HCPCS SELF ADMINISTERED DRUGS (ALT 637 FOR MEDICARE OP): Performed by: STUDENT IN AN ORGANIZED HEALTH CARE EDUCATION/TRAINING PROGRAM

## 2024-08-08 PROCEDURE — 96360 HYDRATION IV INFUSION INIT: CPT | Mod: 59

## 2024-08-08 PROCEDURE — 82947 ASSAY GLUCOSE BLOOD QUANT: CPT

## 2024-08-08 RX ORDER — RANOLAZINE 500 MG/1
500 TABLET, EXTENDED RELEASE ORAL 2 TIMES DAILY
COMMUNITY
End: 2024-08-09 | Stop reason: HOSPADM

## 2024-08-08 RX ORDER — AMLODIPINE BESYLATE 5 MG/1
5 TABLET ORAL DAILY
Status: DISCONTINUED | OUTPATIENT
Start: 2024-08-08 | End: 2024-08-09 | Stop reason: HOSPADM

## 2024-08-08 RX ORDER — ROSUVASTATIN CALCIUM 20 MG/1
20 TABLET, COATED ORAL NIGHTLY
Status: DISCONTINUED | OUTPATIENT
Start: 2024-08-08 | End: 2024-08-09 | Stop reason: HOSPADM

## 2024-08-08 RX ORDER — LISINOPRIL 2.5 MG/1
2.5 TABLET ORAL ONCE
Status: COMPLETED | OUTPATIENT
Start: 2024-08-08 | End: 2024-08-08

## 2024-08-08 RX ORDER — LISINOPRIL 5 MG/1
5 TABLET ORAL DAILY
Status: DISCONTINUED | OUTPATIENT
Start: 2024-08-09 | End: 2024-08-09 | Stop reason: HOSPADM

## 2024-08-08 SDOH — SOCIAL STABILITY: SOCIAL INSECURITY: ABUSE: ADULT

## 2024-08-08 SDOH — SOCIAL STABILITY: SOCIAL INSECURITY: HAS ANYONE EVER THREATENED TO HURT YOUR FAMILY OR YOUR PETS?: NO

## 2024-08-08 SDOH — SOCIAL STABILITY: SOCIAL INSECURITY: HAVE YOU HAD THOUGHTS OF HARMING ANYONE ELSE?: NO

## 2024-08-08 SDOH — SOCIAL STABILITY: SOCIAL INSECURITY: DOES ANYONE TRY TO KEEP YOU FROM HAVING/CONTACTING OTHER FRIENDS OR DOING THINGS OUTSIDE YOUR HOME?: NO

## 2024-08-08 SDOH — SOCIAL STABILITY: SOCIAL INSECURITY: ARE THERE ANY APPARENT SIGNS OF INJURIES/BEHAVIORS THAT COULD BE RELATED TO ABUSE/NEGLECT?: NO

## 2024-08-08 SDOH — SOCIAL STABILITY: SOCIAL INSECURITY: DO YOU FEEL UNSAFE GOING BACK TO THE PLACE WHERE YOU ARE LIVING?: NO

## 2024-08-08 SDOH — SOCIAL STABILITY: SOCIAL INSECURITY: ARE YOU OR HAVE YOU BEEN THREATENED OR ABUSED PHYSICALLY, EMOTIONALLY, OR SEXUALLY BY ANYONE?: NO

## 2024-08-08 SDOH — SOCIAL STABILITY: SOCIAL INSECURITY: DO YOU FEEL ANYONE HAS EXPLOITED OR TAKEN ADVANTAGE OF YOU FINANCIALLY OR OF YOUR PERSONAL PROPERTY?: NO

## 2024-08-08 SDOH — SOCIAL STABILITY: SOCIAL INSECURITY: HAVE YOU HAD ANY THOUGHTS OF HARMING ANYONE ELSE?: NO

## 2024-08-08 ASSESSMENT — COGNITIVE AND FUNCTIONAL STATUS - GENERAL
DAILY ACTIVITIY SCORE: 24
MOBILITY SCORE: 24
DAILY ACTIVITIY SCORE: 24
MOBILITY SCORE: 24
MOBILITY SCORE: 24
DAILY ACTIVITIY SCORE: 24
PATIENT BASELINE BEDBOUND: NO

## 2024-08-08 ASSESSMENT — PATIENT HEALTH QUESTIONNAIRE - PHQ9
2. FEELING DOWN, DEPRESSED OR HOPELESS: NOT AT ALL
1. LITTLE INTEREST OR PLEASURE IN DOING THINGS: NOT AT ALL
SUM OF ALL RESPONSES TO PHQ9 QUESTIONS 1 & 2: 0

## 2024-08-08 ASSESSMENT — ENCOUNTER SYMPTOMS
COUGH: 0
SHORTNESS OF BREATH: 0
BLURRED VISION: 0
MEMORY LOSS: 0
DYSPNEA ON EXERTION: 0
SYNCOPE: 0
ORTHOPNEA: 0
RESPIRATORY NEGATIVE: 1
DEPRESSION: 0
FALLS: 0
CONSTITUTIONAL NEGATIVE: 1
NERVOUS/ANXIOUS: 1
PALPITATIONS: 0
DECREASED APPETITE: 0
FOCAL WEAKNESS: 0
GASTROINTESTINAL NEGATIVE: 1
LIGHT-HEADEDNESS: 0
IRREGULAR HEARTBEAT: 0

## 2024-08-08 ASSESSMENT — PAIN SCALES - GENERAL
PAINLEVEL_OUTOF10: 0 - NO PAIN

## 2024-08-08 ASSESSMENT — LIFESTYLE VARIABLES
HOW OFTEN DO YOU HAVE A DRINK CONTAINING ALCOHOL: NEVER
HOW MANY STANDARD DRINKS CONTAINING ALCOHOL DO YOU HAVE ON A TYPICAL DAY: PATIENT DOES NOT DRINK
SKIP TO QUESTIONS 9-10: 1
HOW OFTEN DO YOU HAVE 6 OR MORE DRINKS ON ONE OCCASION: NEVER
AUDIT-C TOTAL SCORE: 0
AUDIT-C TOTAL SCORE: 0

## 2024-08-08 ASSESSMENT — ACTIVITIES OF DAILY LIVING (ADL)
PATIENT'S MEMORY ADEQUATE TO SAFELY COMPLETE DAILY ACTIVITIES?: YES
HEARING - RIGHT EAR: FUNCTIONAL
DRESSING YOURSELF: INDEPENDENT
ADEQUATE_TO_COMPLETE_ADL: YES
FEEDING YOURSELF: INDEPENDENT
HEARING - LEFT EAR: FUNCTIONAL
TOILETING: INDEPENDENT
GROOMING: INDEPENDENT
JUDGMENT_ADEQUATE_SAFELY_COMPLETE_DAILY_ACTIVITIES: YES
LACK_OF_TRANSPORTATION: NO
WALKS IN HOME: INDEPENDENT
BATHING: INDEPENDENT

## 2024-08-08 ASSESSMENT — PAIN - FUNCTIONAL ASSESSMENT
PAIN_FUNCTIONAL_ASSESSMENT: 0-10

## 2024-08-08 NOTE — POST-PROCEDURE NOTE
Physician Transition of Care Summary  Invasive Cardiovascular Lab    Procedure Date: 8/7/2024  Attending:    * Sumeet Patricio - Primary  Resident/Fellow/Other Assistant: Surgeons and Role:  * No surgeons found with a matching role *    Indications:   Pre-op Diagnosis      * STEMI (ST elevation myocardial infarction) (Multi) [I21.3]    Post-procedure diagnosis:   Post-op Diagnosis     * STEMI (ST elevation myocardial infarction) (Multi) [I21.3]    Procedure(s):   Left Heart Cath, No LV  82681 - NH L HRT CATH W/NJX L VENTRICULOGRAPHY IMG S&I        Procedure Findings:   Patent RALPH to LAD, Patent Cx    Description of the Procedure:   Left heart cath    Complications:   None    Stents/Implants:   Implants       No implant documentation for this case.            Anticoagulation/Antiplatelet Plan:   None    Estimated Blood Loss:   10 mL    Anesthesia: Moderate Sedation Anesthesia Staff: No anesthesia staff entered.    Any Specimen(s) Removed:   No specimens collected during this procedure.    Disposition:   ICU      Electronically signed by: Sumete Patricio MD, 8/7/2024 10:13 PM

## 2024-08-08 NOTE — CONSULTS
Inpatient consult to Medicine  Consult performed by: Heavenly Anderson PA-C  Consult ordered by: Sumeet Patricio MD        [facility] General Medicine Consultation Note    ASSESSMENT and PLAN:     Prasanna Torrez is a 73 y.o. male with past medical history s/f HTN, HLD, CAD s/p MI and subsequent CABG (2013) and prior stenting, Afib on Eliquis, IDDM-II, BPH, who presented to [place] ED on 08/07/24 via EMS with chest pain, ? SVT (no rhythm strips) --> NSR with vagal maneuvers, and had ECG findings c/f STEMI in anterior leads. Was taken to cath lab this evening without acute intervention. Now recovering in ICU awaiting EP evaluation in the AM for further eval of underlying arrhythmias (SVT vs afib/flutter). Medicine was consulted for management of other chronic comorbidities.     Acute chest pain; hx CAD s/p CABG and stents   Anterior ST Elevations on ECG, suspected in setting of arrhythmias (cath with patent LIMA to LAD and Cx stents)    ? SVT vs underlying Afib/flutter  -Patient currently NSR with HR 60-70 on tele in the ICU   -Continued on metoprolol, Eliquis  -No current CP or anginal symptoms; note: patient had recurrent episodes of hypotension and reports he followed up with his PCP and cardiologist a few days ago and was taken off his antianginal (isosorbide) due to BP 60s over 40s in the office and he was started on Norvasc instead  -R groin access site is C/D/I; soft; no pain. Distal NVS intact.   -Cardiology following   -EP consulted for additional evaluation   -Majority of management as per these 2 services at this time    HTN, HLD  -Patient recently had home antihypertensives adjusted as noted above due to episodes of hypotension and dizziness/lightheadedness  -Continue high intensity statin therapy    IDDM-II with hyperglycemia   -Patient reports that he takes anywhere from 30-40 units of Lantus nightly depending on what his sugars are doing, this has been reordered on admission  -He does also take ozempic  "  -Continue with SSI, Accu-Cheks, hypoglycemic protocol  -Monitor and adjust as needed    BPH  -Confirm and resume patient's home therapies    Heavenly Anderson PA-C  General Inpatient Medicine (\"IMS\")  Available via Epic SecureChat [phone]. Outside of these hours, please contact providers assigned to the team and/or via the Medicine Acute Pager.    I spent a total of 60 minutes in the overall professional care of this patient on this date of service.    Dragon dictation software was used to dictate this note and thus there may be minor errors in translation/transcription including garbled speech or misspellings. Please contact for clarification if needed.    HISTORY OF PRESENT ILLNESS:     History Of Present Illness:    Prasanna Torrez is a 73 y.o. male with a significant past medical history of HTN, HLD, CAD s/p MI and subsequent CABG (2013) and prior stenting, Afib on Eliquis, IDDM-II, BPH, who presented to [place] ED on 08/07/24 via EMS with chest pain, ? SVT (no rhythm strips) --> NSR with vagal maneuvers, and had ECG findings c/f STEMI in anterior leads. Was taken to cath lab this evening without acute intervention. Now recovering in ICU awaiting EP evaluation in the AM for further eval of underlying arrhythmias (SVT vs afib/flutter). Medicine was consulted for management of other chronic comorbidities.     Patient seen and examined after undergoing LHC with Dr. Patricio. Currently denies any chest pain or pressure, shortness of breath, dizziness or lightheadedness, N/V/D/F/C. His femoral access site is without pain, no groin pain. He reports that he has been having some medication adjustments by his PCP and cardiologist because of dizziness/lightheadedness, and a few episodes of recorded hypotension in their office (he mostly remembers being 60/40 at his cardiologist office). He was taken off of his isosorbide and started on Norvasc because of this a few days ago, and has been keeping close track of his blood " "pressure following this adjustment. He notes that he had an episode prior to calling EMS where his blood pressure was too low to read, and his heart rate was high at home; he did have chest pressure associated with this and took 2 nitro at home. EMS were called and noted he was in SVT at that time and he was able to convert out of it with vagal maneuvers. After vagal maneuvers, patient had an EKG obtained which was concerning for ST elevations in the anterolateral leads and a prehospital STEMI alert was activated. Patient does note that by the time his heart rate improved, he was not having any chest pain, and had not had any further chest pain since EMS obtained the EKG prior to coming in. Resting comfortably, no current questions or concerns. Reports that he has been through for heart catheterizations in the past and that he is aware of the process.    Review of Systems:   I performed a complete 12 point review of systems and it is negative except as noted in HPI or above. All other systems have been reviewed and are negative.    PAST HISTORIES:     Past Medical History:  He has no past medical history on file.    Past Surgical History:  He has no past surgical history on file.      Social History:  He reports that he quit smoking about 12 years ago. His smoking use included cigarettes. He started smoking about 43 years ago. He has a 31 pack-year smoking history. He has never used smokeless tobacco. He reports that he does not use drugs. No history on file for alcohol use.    Family History:  No family history on file.     Allergies:  Dapagliflozin    OBJECTIVE:     Last Recorded Vitals:  Vitals:    08/07/24 2124 08/07/24 2132 08/07/24 2137 08/07/24 2156   BP: (!) 159/102 (!) 165/95 159/82    Pulse: 73 74 82    Resp: 16 16 16    Temp:       SpO2: 97% 97% 100% 97%   Weight:       Height:         /82   Pulse 82   Temp 36.8 °C (98.3 °F)   Resp 16   Ht 1.854 m (6' 1\")   Wt 99.8 kg (220 lb)   SpO2 97%   " BMI 29.03 kg/m²      Physical Exam:  Vital signs and nursing notes reviewed.   Constitutional: Pleasant and cooperative. Laying in bed in no acute distress. Conversant.   Skin: Warm and dry; R fem access site is well-appearing  Eyes: EOMI. Anicteric sclera.   ENT: Mucous membranes moist; no obvious injury or deformity appreciated.   Head and Neck: Normocephalic, atraumatic. ROM preserved. Trachea midline. No appreciable JVD.   Respiratory: Nonlabored on RA. Lungs clear to auscultation bilaterally without obvious adventitious sounds. Chest rise is equal.  Cardiovascular: RRR. No gross murmur, gallop, or rub. Extremities are warm and well-perfused with good capillary refill (< 3 seconds). No chest wall tenderness.   GI: Abdomen soft, nontender, nondistended. No obvious organomegaly appreciated. Bowel sounds are present and normoactive.  : No CVA tenderness.   MSK: No gross abnormalities appreciated.   Extremities: No cyanosis, edema, or clubbing evident. Neurovascularly intact. R fem access site is well-appearing  Neuro: A&Ox3. CN 2-12 grossly intact. Able to respond to questions appropriately and clearly. No acute focal neurologic deficits appreciated.  Psych: Appropriate mood and behavior.    Inpatient Medications:  [START ON 8/8/2024] apixaban, 5 mg, oral, BID  [START ON 8/8/2024] aspirin, 81 mg, oral, Daily  insulin glargine, 40 Units, subcutaneous, Nightly  [START ON 8/8/2024] insulin regular, 0-5 Units, subcutaneous, TID  [START ON 8/8/2024] metoprolol succinate XL, 12.5 mg, oral, Daily  pravastatin, 20 mg, oral, Nightly  [START ON 8/8/2024] tamsulosin, 0.8 mg, oral, Daily      PRN medications: dextrose, dextrose, glucagon, glucagon, morphine, nitroglycerin    Outpatient Medications:  Prior to Admission medications    Medication Sig Start Date End Date Taking? Authorizing Provider   aspirin 81 mg EC tablet Take 1 tablet (81 mg) by mouth once daily.    Historical Provider, MD   Eliquis 5 mg tablet Take 1 tablet  (5 mg) by mouth 2 times a day. 2/13/24   Historical Provider, MD   insulin glargine (Lantus U-100 Insulin) 100 unit/mL injection Inject 40 Units under the skin once daily at bedtime. Take as directed per insulin instructions.    Historical Provider, MD   metoprolol succinate XL (Toprol-XL) 25 mg 24 hr tablet Take 0.5 tablets (12.5 mg) by mouth once daily. Do not crush or chew. 7/21/24   Senyo Agidi, DO   nitroglycerin (Nitrostat) 0.4 mg SL tablet Place 1 tablet (0.4 mg) under the tongue every 5 minutes if needed for chest pain.    Historical Provider, MD   pravastatin (Pravachol) 20 mg tablet Take 1 tablet (20 mg) by mouth once daily.    Historical Provider, MD   semaglutide (Ozempic) 0.25 mg or 0.5 mg(2 mg/1.5 mL) pen injector Inject 0.5 mg under the skin 1 (one) time per week.    Historical Provider, MD   tadalafil (Cialis) 10 mg tablet Take 1 tablet (10 mg) by mouth once daily as needed for erectile dysfunction.    Historical Provider, MD   tamsulosin (Flomax) 0.4 mg 24 hr capsule Take 2 capsules (0.8 mg) by mouth once daily.    Historical Provider, MD       LABS AND IMAGING:     Labs:  Recent labs reviewed in the EMR.    Results from last 7 days   Lab Units 08/07/24  2133   WBC AUTO x10*3/uL 11.4*   HEMOGLOBIN g/dL 16.2   HEMATOCRIT % 47.1   PLATELETS AUTO x10*3/uL 295      Results from last 7 days   Lab Units 08/07/24  2133   SODIUM mmol/L 136   POTASSIUM mmol/L 4.4   CHLORIDE mmol/L 102   CO2 mmol/L 26   BUN mg/dL 16   CREATININE mg/dL 1.06   GLUCOSE mg/dL 246*   CALCIUM mg/dL 8.7      Results from last 7 days   Lab Units 08/07/24  2133   SODIUM mmol/L 136   POTASSIUM mmol/L 4.4   CHLORIDE mmol/L 102   CO2 mmol/L 26   BUN mg/dL 16   CREATININE mg/dL 1.06   CALCIUM mg/dL 8.7   PROTEIN TOTAL g/dL 7.0   BILIRUBIN TOTAL mg/dL 0.7   ALK PHOS U/L 117   ALT U/L 28   AST U/L 34   GLUCOSE mg/dL 246*             Imaging:  XR chest 1 view    Result Date: 8/7/2024  Interpreted By:  Vishnu Edgar, STUDY: XR CHEST 1 VIEW    INDICATION: Signs/Symptoms:STEMI.   COMPARISON: July 19   ACCESSION NUMBER(S): [id number]   ORDERING CLINICIAN: JAYCE BOGGS   FINDINGS: Obscured left lung base again suggesting pleural effusion and or airspace opacity, grossly unchanged from the prior study.   Cardiomegaly with previous median sternotomy.   No additional new findings. No pneumothorax.       Obscured left lung base again suggesting pleural effusion and or airspace opacity, grossly unchanged from the prior study.   Cardiomegaly with previous median sternotomy.   Signed by: Vishnu Edgar 8/7/2024 9:58 PM Dictation workstation:   [id number]    ECG 12 lead    Result Date: 7/22/2024  Normal sinus rhythm Inferior infarct , possibly acute ** ** ACUTE MI / STEMI ** ** Consider right ventricular involvement in acute inferior infarct Abnormal ECG When compared with ECG of 20-JUL-2019 20:26, Aberrant conduction is no longer Present See ED provider note for full interpretation and clinical correlation Confirmed by Terri Floyd ([id number]) on 7/22/2024 9:51:16 PM    ECG 12 lead    Result Date: 7/22/2024  Sinus rhythm with 1st degree AV block Inferior infarct (cited on or before 19-JUL-2024) ** ** ACUTE MI / STEMI ** ** Consider right ventricular involvement in acute inferior infarct Abnormal ECG When compared with ECG of 19-JUL-2024 15:17, (unconfirmed) No significant change was found See ED provider note for full interpretation and clinical correlation Confirmed by Terri Floyd ([id number]) on 7/22/2024 9:49:55 PM    ECG 12 lead    Result Date: 7/22/2024  Sinus bradycardia with 1st degree AV block Inferior infarct (cited on or before 19-JUL-2024) ** ** ACUTE MI / STEMI ** ** Consider right ventricular involvement in acute inferior infarct Abnormal ECG When compared with ECG of 19-JUL-2024 15:21, (unconfirmed) No significant change was found See ED provider note for full interpretation and clinical correlation Confirmed by Terri Floyd ([id number]) on 7/22/2024 7:41:26  PM    MR brain wo IV contrast    Result Date: 7/20/2024  Interpreted By:  Haja Jarquin, STUDY: MR BRAIN WO IV CONTRAST; 7/20/2024 3:45 pm   INDICATION: Signs/Symptoms:blurred vision;   COMPARISON: None   ACCESSION NUMBER(S): [id number]   ORDERING CLINICIAN: KOURTNEY VERNON   TECHNIQUE: Multiple, multiplanar sequences of the brain were acquired.   FINDINGS: No focal mass effect or midline shift is identified. The ventricles and sulci are symmetric and appropriate for the patient's age.   There is a mild degree of nonspecific white matter T2 and FLAIR hyperintensities, most likely due to chronic small-vessel ischemic disease. The gray-white matter differentiation is preserved. No restricted diffusion is seen.   Sagittal T1 images show grossly normal appearance of sella and midline structures   No acute intracranial hemorrhage is seen. No intra-axial or extra-axial fluid collection is seen.   The visualized paranasal sinuses and mastoid air cells are clear.       No evidence for acute infarct or acute intracranial pathology.   Mild degree of nonspecific white matter foci, most consistent with chronic small-vessel ischemic disease.   Signed by: Haja Jarquin 7/20/2024 6:29 PM Dictation workstation:   [id number]    XR chest 1 view    Result Date: 7/19/2024  STUDY: Chest Radiograph;  7/19/2024 at 16:13. INDICATION: Patient has chest pain. COMPARISON: None available. ACCESSION NUMBER(S): [id number] ORDERING CLINICIAN: ZANDER EUCEDA TECHNIQUE:  Frontal chest was obtained at 16:12 hours. FINDINGS: CARDIOMEDIASTINAL SILHOUETTE: Assessment of heart size is limited by obscuration of the left heart border, heart size would appear to be top normal to mildly enlarged. Sternotomy wires are in place.  LUNGS: There are patchy infiltrates atelectasis or scarring in the left base obscuring the left heart border with possible left pleural effusion or chronic pleural reaction.  There is mild accentuation of central  markings, the peripheral right lung appears clear.  ABDOMEN: No remarkable upper abdominal findings.  BONES: No acute osseous changes.    1.Borderline to mild enlargement of the cardiac silhouette with sternotomy wires in place. 2.Patchy infiltrates or atelectasis in the left base with possible left pleural effusion or chronic pleural reaction/ scarring. Accentuation of central markings could be related to chronic change or a mild degree of pulmonary vascular congestion. Signed by Dona Broussard DO

## 2024-08-08 NOTE — CARE PLAN
The clinical goals for the shift include no episodes of tachycardia, no complication at cath site, better SBP  <150.    Interventions: monitor VS every hour, notify doctor for SVT, CP, con't  SBP over 150, decrease stimulation so patient can rest. Lisinopril dose increased and Norvasc added to assist with SBP. Cath site checked per protocol and no complication present.

## 2024-08-08 NOTE — PROGRESS NOTES
08/08/24 1052   Discharge Planning   Living Arrangements Spouse/significant other   Support Systems Spouse/significant other   Assistance Needed none   Type of Residence Private residence   Home or Post Acute Services None   Expected Discharge Disposition Home   Does the patient need discharge transport arranged? No     Discharge planning assessment completed with patient and spouse at bedside. Patient is independent with mobility and self care needs. He gets most of his care near [place], and confirmed his PCP is Shelton Vasquez. Patient plans to return home at discharge and has no needs identified at this time.

## 2024-08-08 NOTE — PROGRESS NOTES
Prasanna Torrez is a 73 y.o. male admitted for No Principal Problem: There is no principal problem currently on the Problem List. Please update the Problem List and refresh.. Pharmacy reviewed the patient's [id number] medications and allergies for accuracy.    The list below reflects the PTA list prior to pharmacy medication history. A summary a changes to the PTA medication list has been listed below. Please review each medication in order reconciliation for additional clarification and justification.    Source of information:  [facility] and Pharmacy    Medications added:  Ranolazine 500mg bid    Medications modified:  Metoprolol Succ 25mg 0.5t every day --> 25mg every day   Tamsulosin 0.4mg 2c every day --> 0.4mg bid    Medications to be removed:  Pravastatin 20mg    Medications of concern:      Prior to Admission Medications   Prescriptions Last Dose Informant Patient Reported? Taking?   Eliquis 5 mg tablet   Yes No   Sig: Take 1 tablet (5 mg) by mouth 2 times a day.   aspirin 81 mg EC tablet   Yes No   Sig: Take 1 tablet (81 mg) by mouth once daily.   insulin glargine (Lantus U-100 Insulin) 100 unit/mL injection   Yes No   Sig: Inject 40 Units under the skin once daily at bedtime. Take as directed per insulin instructions.   lisinopril 2.5 mg tablet   Yes Yes   Sig: Take 1 tablet (2.5 mg) by mouth once daily.   metoprolol succinate XL (Toprol-XL) 25 mg 24 hr tablet   No No   Sig: Take 0.5 tablets (12.5 mg) by mouth once daily. Do not crush or chew.   nitroglycerin (Nitrostat) 0.4 mg SL tablet   Yes No   Sig: Place 1 tablet (0.4 mg) under the tongue every 5 minutes if needed for chest pain.   pravastatin (Pravachol) 20 mg tablet   Yes No   Sig: Take 1 tablet (20 mg) by mouth once daily.   rosuvastatin (Crestor) 20 mg tablet   Yes Yes   Sig: Take 1 tablet (20 mg) by mouth once daily.   semaglutide (Ozempic) 0.25 mg or 0.5 mg(2 mg/1.5 mL) pen injector   Yes No   Sig: Inject 0.5 mg under the skin 1 (one) time per  week.   tadalafil (Cialis) 10 mg tablet   Yes No   Sig: Take 1 tablet (10 mg) by mouth once daily as needed for erectile dysfunction.   tamsulosin (Flomax) 0.4 mg 24 hr capsule   Yes No   Sig: Take 2 capsules (0.8 mg) by mouth once daily.      Facility-Administered Medications: None       Susan Sterling

## 2024-08-08 NOTE — ED PROVIDER NOTES
HPI   Chief Complaint   Patient presents with    Chest Pain     Stemi activated @       Patient presents emergency department secondary to chest pain.  Patient states that he was taking his blood pressure this evening when he noticed that it would not read.  It was reading that his heart rate was 170.  He then started experiencing chest discomfort.  He took 2 nitro without significant relief.  Paramedics were called.  Paramedics noted that he was in SVT.  They were able to resolve the SVT with vagal maneuvers.  Patient states the chest pain pretty much went away when the heart rate improved.  He does have a previous episode where he had SVT that he required stenting.  It is unclear whether or not the SVT was induced by a cardiac event or the SVT caused the discomfort today.  His prehospital EKG was concerning for ST elevation in the inferior leads with lateral reciprocal changes.  Patient was called a STEMI prior to arrival.  He was loaded with Brilinta heparin and aspirin by the paramedics.  Patient is currently chest pain-free.                          Nic Coma Scale Score: 15                  Patient History   History reviewed. No pertinent past medical history.  History reviewed. No pertinent surgical history.  No family history on file.  Social History     Tobacco Use    Smoking status: Former     Current packs/day: 0.00     Average packs/day: 1 pack/day for 31.0 years (31.0 ttl pk-yrs)     Types: Cigarettes     Start date:      Quit date:      Years since quittin.6    Smokeless tobacco: Never   Substance Use Topics    Alcohol use: Not on file    Drug use: Never       Physical Exam   ED Triage Vitals [24]   Temperature Heart Rate Respirations BP   36.8 °C (98.3 °F) 72 16 170/87      Pulse Ox Temp src Heart Rate Source Patient Position   96 % -- -- --      BP Location FiO2 (%)     -- --       Physical Exam  Vitals and nursing note reviewed.   Constitutional:       Appearance:  Normal appearance.   HENT:      Head: Normocephalic.      Right Ear: Tympanic membrane normal.      Left Ear: Tympanic membrane normal.      Nose: Nose normal.      Mouth/Throat:      Mouth: Mucous membranes are moist.   Eyes:      Extraocular Movements: Extraocular movements intact.      Pupils: Pupils are equal, round, and reactive to light.   Cardiovascular:      Rate and Rhythm: Normal rate and regular rhythm.      Pulses: Normal pulses.      Heart sounds: Normal heart sounds. No murmur heard.  Pulmonary:      Effort: Pulmonary effort is normal.      Breath sounds: Normal breath sounds.   Chest:      Chest wall: No tenderness or crepitus.   Abdominal:      General: Abdomen is flat. Bowel sounds are normal.      Palpations: Abdomen is soft.   Musculoskeletal:         General: Normal range of motion.   Skin:     General: Skin is warm and dry.      Capillary Refill: Capillary refill takes less than 2 seconds.   Neurological:      General: No focal deficit present.      Mental Status: He is alert and oriented to person, place, and time.   Psychiatric:         Mood and Affect: Mood normal.         Behavior: Behavior normal.       Labs Reviewed   CBC WITH AUTO DIFFERENTIAL - Abnormal       Result Value    WBC 11.4 (*)     nRBC 0.0      RBC 5.40      Hemoglobin 16.2      Hematocrit 47.1      MCV 87      MCH 30.0      MCHC 34.4      RDW 13.2      Platelets 295      Neutrophils % 61.3      Immature Granulocytes %, Automated 0.3      Lymphocytes % 23.4      Monocytes % 9.4      Eosinophils % 4.5      Basophils % 1.1      Neutrophils Absolute 6.97 (*)     Immature Granulocytes Absolute, Automated 0.03      Lymphocytes Absolute 2.66      Monocytes Absolute 1.07 (*)     Eosinophils Absolute 0.51 (*)     Basophils Absolute 0.12 (*)    COMPREHENSIVE METABOLIC PANEL - Abnormal    Glucose 246 (*)     Sodium 136      Potassium 4.4      Chloride 102      Bicarbonate 26      Anion Gap 12      Urea Nitrogen 16      Creatinine 1.06       eGFR 74      Calcium 8.7      Albumin 4.2      Alkaline Phosphatase 117      Total Protein 7.0      AST 34      Bilirubin, Total 0.7      ALT 28     PROTIME-INR - Abnormal    Protime 14.5 (*)     INR 1.3 (*)    POCT GLUCOSE - Abnormal    POCT Glucose 247 (*)    TROPONIN I, HIGH SENSITIVITY - Normal    Troponin I, High Sensitivity 9      Narrative:     Less than 99th percentile of normal range cutoff-  Female and children under 18 years old <14 ng/L; Male <21 ng/L: Negative  Repeat testing should be performed if clinically indicated.     Female and children under 18 years old 14-50 ng/L; Male 21-50 ng/L:  Consistent with possible cardiac damage and possible increased clinical   risk. Serial measurements may help to assess extent of myocardial damage.     >50 ng/L: Consistent with cardiac damage, increased clinical risk and  myocardial infarction. Serial measurements may help assess extent of   myocardial damage.      NOTE: Children less than 1 year old may have higher baseline troponin   levels and results should be interpreted in conjunction with the overall   clinical context.     NOTE: Troponin I testing is performed using a different   testing methodology at [facility] than at other   [facility] hospitals. Direct result comparisons should only   be made within the same method.   ACTIVATED CLOTTING TIME LOW    POCT Activated Clotting Time Low Range         Pain Management Panel           No data to display              XR chest 1 view   Final Result   Obscured left lung base again suggesting pleural effusion and or   airspace opacity, grossly unchanged from the prior study.        Cardiomegaly with previous median sternotomy.        Signed by: Vishnu Edgar 8/7/2024 9:58 PM   Dictation workstation:   [id number]      Cardiac Catheterization Procedure    (Results Pending)   Transthoracic Echo (TTE) Complete    (Results Pending)     ED Course & MDM   Diagnoses as of 08/07/24 6498   ST elevation  myocardial infarction (STEMI), unspecified artery (Multi)       Medical Decision Making  Patient presents to the emergency department secondary to chest pain and elevated heart rate.  Heart rate improved with vagal maneuvers and chest pain is currently gone.  Prehospital EKG was concerning for anterior STEMI.  EKG performed at our facility at 9:19 PM shows still some concern for less than 1 mm of ST changes in the inferior leads.  NC interval is 203 and QTc is 438.  Patient is discussed with cardiology.  Plan is for the patient to go to the cardiac Cath Lab.  He is currently hemodynamically stable.        Procedure  Critical Care    Performed by: Evelyne Guerrero MD  Authorized by: Evelyne Guerrero MD    Critical care provider statement:     Critical care time (minutes):  30    Critical care time was exclusive of:  Separately billable procedures and treating other patients    Critical care was necessary to treat or prevent imminent or life-threatening deterioration of the following conditions: STEMI.    Critical care was time spent personally by me on the following activities:  Discussions with consultants, examination of patient, ordering and review of laboratory studies, ordering and review of radiographic studies, re-evaluation of patient's condition and review of old charts    Care discussed with: admitting provider         Evelyne Guerrero MD  08/07/24 9353       Evelyne Guerrero MD  08/07/24 9789

## 2024-08-08 NOTE — PROGRESS NOTES
HPI:  Prasanna Torrez is a 73 y.o. male presenting with Hx of Coronary artery disease S/P myocardial infarction and CABG in 2013 at [facility] in [place], Cardiac catheterization, 2/25/2022: Left main, practically nonexistent with separate ostia to the LAD and the left circumflex. LAD occluded at its origin. LCX, dominant vessel with 95% ostial narrowing and with high grade disease of 3 of its marginal branches. RCA, nondominant vessel with around 70-80% proximal/mid vessel narrowing. LIMA to LAD, widely patent vessel including its distal anastomosis with the LAD showing no significant disease after the distal anastomosis with the LIMA filling the mid and proximal LAD, the diagonal branches and the left circumflex retrogradely. Normal left ventricular size with hypokinesis of the basal half of the inferior wall with more or less preserved global left ventricular systolic function with an estimated ejection fraction of 55%. Elevated left ventricular end diastolic pressure consistent with LV diastolic dysfunction, S/P Successful IVUS guided PCI of the dominant left circumflex from distal to ostial with 2 overlapping drug-eluting coronary stents (Dr. Peñaloza: Interventional Cardiology), 3/1/2022, Paroxysmal atrial fibrillation, Insulin requiring diabetes mellitus, Hypertension, Hyperlipidemia, Hospitalized with strokelike symptoms (lightheadedness and blurred vision) and low blood pressure in 7/2024. MRI of the brain showed no acute pathology   He presented today with C/O Chest pain described as pressure like, radiating to his arm and back. EMS reported him to be in ?SVT (No strips available), converted into NSR with vagal maneuver, ECG with ST elevation in inferior leads.     Subjective Data:  Up in chair, wife at bedside.  No CP/pressure/palpitations/dyspnea.  He is anxious and worried about return of tachycardia.  Had SVT in squad which appears to be what triggered his CP.  LHC showed patent LIMA graft  and patent stent. EP has been consulted and will likely see tomorrow.  Here, no further arrhythmias.  SB on tele in the 50s.  BP is elevated and patient appears anxious.      Overnight Events:    None     Objective Data:  Last Recorded Vitals:  Vitals:    08/08/24 0800 08/08/24 0802 08/08/24 0900 08/08/24 1000   BP: 166/77  170/80 161/78   BP Location: Right arm      Patient Position: Lying      Pulse: 61  62 58   Resp: 24 22 17   Temp:  35.9 °C (96.7 °F)     TempSrc:  Temporal     SpO2: 97%  97% 96%   Weight:       Height:           Last Labs:    Results from last 7 days   Lab Units 08/07/24  2133   SODIUM mmol/L 136   POTASSIUM mmol/L 4.4   CHLORIDE mmol/L 102   CO2 mmol/L 26   BUN mg/dL 16   CREATININE mg/dL 1.06   GLUCOSE mg/dL 246*   CALCIUM mg/dL 8.7        Results from last 7 days   Lab Units 08/07/24  2133   WBC AUTO x10*3/uL 11.4*   HEMOGLOBIN g/dL 16.2   HEMATOCRIT % 47.1   PLATELETS AUTO x10*3/uL 295            Last I/O:  I/O last 3 completed shifts:  In: 55 (0.6 mL/kg) [I.V.:55 (0.6 mL/kg)]  Out: 1010 (10.1 mL/kg) [Urine:1000 (0.3 mL/kg/hr); Blood:10]  Weight: 99.8 kg     Past Cardiology Tests (Last 3 Years):    Echo:  CONCLUSIONS:   1. Left ventricular ejection fraction is low normal, by visual estimate at 50-55%.   2. Spectral Doppler shows an impaired relaxation pattern of left ventricular diastolic filling.   3. There is normal right ventricular global systolic function.   4. Mildly enlarged right ventricle.    Cath:  Recommendations:  Maximize medical therapy.  EP to see.     ____________________________________________________________________________________  CONCLUSIONS:   1. Double vessel disease.   2. Patent LIMA to lAD and previous Cx stent.   3. Elevated LV filling pressures.   4. Left Ventricular end-diastolic pressure = 17.      Inpatient Medications:  Scheduled medications   Medication Dose Route Frequency    apixaban  5 mg oral BID    aspirin  81 mg oral Daily    insulin glargine  40  Units subcutaneous Nightly    insulin regular  0-5 Units subcutaneous TID    lisinopril  2.5 mg oral Once    [START ON 8/9/2024] lisinopril  5 mg oral Daily    metoprolol succinate XL  12.5 mg oral Daily    rosuvastatin  20 mg oral Nightly    tamsulosin  0.8 mg oral Daily     PRN medications   Medication    dextrose    dextrose    glucagon    glucagon    morphine    nitroglycerin     Continuous Medications   Medication Dose Last Rate       ROS:  Review of Systems   Constitutional: Negative. Negative for decreased appetite and malaise/fatigue.        Feels well today, no further CP/pressure.   HENT: Negative.     Eyes:  Negative for blurred vision and visual disturbance.   Cardiovascular:  Negative for chest pain, dyspnea on exertion, irregular heartbeat, leg swelling, orthopnea, palpitations and syncope.   Respiratory: Negative.  Negative for cough and shortness of breath.    Musculoskeletal:  Negative for arthritis and falls.   Gastrointestinal: Negative.    Neurological:  Negative for focal weakness and light-headedness.   Psychiatric/Behavioral:  Negative for depression and memory loss. The patient is nervous/anxious.         Physical Exam:  Physical Exam  Constitutional:       Appearance: Normal appearance.   HENT:      Head: Normocephalic.   Eyes:      Conjunctiva/sclera: Conjunctivae normal.   Cardiovascular:      Rate and Rhythm: Normal rate and regular rhythm.      Pulses: Normal pulses.      Heart sounds: S1 normal and S2 normal. No murmur heard.     No friction rub. No gallop.   Pulmonary:      Effort: Pulmonary effort is normal.      Breath sounds: Normal breath sounds.   Abdominal:      General: Bowel sounds are normal.      Palpations: Abdomen is soft.      Tenderness: There is no abdominal tenderness.   Musculoskeletal:      Cervical back: Neck supple.      Right lower leg: No edema.      Left lower leg: No edema.      Comments: R femoral site without ecchymosis/hematoma, DSD intact.   Skin:      General: Skin is warm and dry.   Neurological:      General: No focal deficit present.      Mental Status: He is alert and oriented to person, place, and time.   Psychiatric:         Attention and Perception: Attention normal.         Mood and Affect: Mood normal.          Assessment/Plan   1) Acute chest pain  Cath with patent LIMA to LAD and Cx stents  I suspect secondary to arrhythmias (/SVT vs atrial fib/flutter)  Check echo  EP to see  Continue Metoprolol and apixaban  8-8-24: Cath showed patent stent and patent LIMA>LAD.  Continue on medical tx. Echo with EF 50-55%, + diastolic dysfunction, no WMA. OK to transfer to SDU.      2) SVT  Noted while in squad.  On arrival, back in SB.  EP has been consulted and patient may be candidate for outpt ablation. We discussed vagal maneuvers that he can attempt at home if needed.  Currently on low dose BB.  Unable to increase with bradycardia.     3) Atrial fibrillation:  Hx of Afib but currently SB on tele.  On low dose BB and Eliquis for anticoagulation.     4) Hypertension  Will increase dose of ACEi and monitor.  Will likely need up titration and possible another agent.  Patient currently seems anxious which may be driving up BP.  Continue to monitor.         Code Status:  DNR          Toya Awan, APRN-CNP

## 2024-08-08 NOTE — ED TRIAGE NOTES
Pt arrives via EMS. Pt was checking his BP at home d/t change in medications. Pt began to have chest pain and noted that his heart rate was high. Took two NTG at home. HR 180bpm per EMS, vagal maneuvers performed and heart rate decreased to 70 bpm.  EKG showed STEMI, pt given ASA 324mg, Brilinta 180mg, and heparin 4000 units. Pt rates pain as 0/10, has hx of 3 heart attacks with 2 stents and triple bypass in 2013. Pt takes eliquis and HTN and lantus at home meds. Pt type 2 diabetic. Axo3

## 2024-08-08 NOTE — H&P
History Of Present Illness:    Prasanna Torrez is a 73 y.o. male presenting with Hx of Coronary artery disease S/P myocardial infarction and CABG in 2013 at [facility] in [place], Cardiac catheterization, 2/25/2022: Left main, practically nonexistent with separate ostia to the LAD and the left circumflex. LAD occluded at its origin. LCX, dominant vessel with 95% ostial narrowing and with high grade disease of 3 of its marginal branches. RCA, nondominant vessel with around 70-80% proximal/mid vessel narrowing. LIMA to LAD, widely patent vessel including its distal anastomosis with the LAD showing no significant disease after the distal anastomosis with the LIMA filling the mid and proximal LAD, the diagonal branches and the left circumflex retrogradely. Normal left ventricular size with hypokinesis of the basal half of the inferior wall with more or less preserved global left ventricular systolic function with an estimated ejection fraction of 55%. Elevated left ventricular end diastolic pressure consistent with LV diastolic dysfunction, S/P Successful IVUS guided PCI of the dominant left circumflex from distal to ostial with 2 overlapping drug-eluting coronary stents (Dr. Peñaloza: Interventional Cardiology), 3/1/2022, Paroxysmal atrial fibrillation, Insulin requiring diabetes mellitus, Hypertension, Hyperlipidemia, Hospitalized with strokelike symptoms (lightheadedness and blurred vision) and low blood pressure in 7/2024. MRI of the brain showed no acute pathology   He presented today with C/O Chest pain described as pressure like, radiating to his arm and back. EMS reported him to be in ?SVT (No strips available), converted into NSR with vagal maneuver, ECG with ST elevation in inferior leads.      Last Recorded Vitals:  Vitals:    08/07/24 2124 08/07/24 2132 08/07/24 2137 08/07/24 2156   BP: (!) 159/102 (!) 165/95 159/82    Pulse: 73 74 82    Resp: 16 16 16    Temp:       SpO2: 97% 97% 100% 97%  "  Weight:       Height:           Last Labs:  CBC - 8/7/2024:  9:33 PM  11.4 16.2 295    47.1      CMP - 8/7/2024:  9:33 PM  8.7 7.0 34 --- 0.7   3.5 4.2 28 117      PTT - No results in last year.  1.3   14.5 _     Troponin I, High Sensitivity   Date/Time Value Ref Range Status   08/07/2024 09:33 PM 9 0 - 20 ng/L Final   07/19/2024 04:59 PM 5 0 - 20 ng/L Final   07/19/2024 03:27 PM 6 0 - 20 ng/L Final     BNP   Date/Time Value Ref Range Status   07/20/2024 01:48 PM 37 0 - 99 pg/mL Final   07/19/2024 03:27 PM 52 0 - 99 pg/mL Final     Hemoglobin A1C   Date/Time Value Ref Range Status   07/20/2024 01:48 PM 8.1 (H) see below % Final   03/01/2022 06:22 AM 7.4 (H) 4.0 - 5.6 % Final     LDL Calculated   Date/Time Value Ref Range Status   07/20/2024 01:48 PM 53 <=99 mg/dL Final     Comment:                                 Near   Borderline      AGE      Desirable  Optimal    High     High     Very High     0-19 Y     0 - 109     ---    110-129   >/= 130     ----    20-24 Y     0 - 119     ---    120-159   >/= 160     ----      >24 Y     0 -  99   100-129  130-159   160-189     >/=190       VLDL   Date/Time Value Ref Range Status   07/20/2024 01:48 PM 37 0 - 40 mg/dL Final      Last I/O:  No intake/output data recorded.    Past Cardiology Tests (Last 3 Years):  EKG:  ECG 12 lead 07/22/2024      ECG 12 lead 07/19/2024      ECG 12 lead 07/19/2024    Echo:  No results found for this or any previous visit from the past 1095 days.    Ejection Fractions:  No results found for: \"EF\"  Cath:  No results found for this or any previous visit from the past 1095 days.    Stress Test:  No results found for this or any previous visit from the past 1095 days.    Cardiac Imaging:  No results found for this or any previous visit from the past 1095 days.      Past Medical History:  He has no past medical history on file.    Past Surgical History:  He has no past surgical history on file.      Social History:  He reports that he quit smoking " about 12 years ago. His smoking use included cigarettes. He started smoking about 43 years ago. He has a 31 pack-year smoking history. He has never used smokeless tobacco. He reports that he does not use drugs. No history on file for alcohol use.    Family History:  No family history on file.     Allergies:  Dapagliflozin    Inpatient Medications:  Scheduled medications   Medication Dose Route Frequency    [START ON 8/8/2024] apixaban  5 mg oral BID    [START ON 8/8/2024] aspirin  81 mg oral Daily    insulin glargine  40 Units subcutaneous Nightly    [START ON 8/8/2024] insulin regular  0-5 Units subcutaneous TID    [START ON 8/8/2024] metoprolol succinate XL  12.5 mg oral Daily    [START ON 8/8/2024] pravastatin  20 mg oral Daily    [START ON 8/8/2024] tamsulosin  0.8 mg oral Daily     PRN medications   Medication    dextrose    dextrose    glucagon    glucagon    morphine    nitroglycerin     Continuous Medications   Medication Dose Last Rate    [START ON 8/8/2024] sodium chloride 0.9%  75 mL/hr       Outpatient Medications:  Current Outpatient Medications   Medication Instructions    aspirin 81 mg EC tablet 1 tablet, oral, Daily    Eliquis 5 mg tablet 1 tablet, oral, 2 times daily    Lantus U-100 Insulin 40 Units, subcutaneous, Nightly, Take as directed per insulin instructions.    metoprolol succinate XL (TOPROL-XL) 12.5 mg, oral, Daily, Do not crush or chew.    nitroglycerin (NITROSTAT) 0.4 mg, sublingual, Every 5 min PRN    Ozempic 0.5 mg, subcutaneous, Once Weekly    pravastatin (PRAVACHOL) 20 mg, oral, Daily    tadalafil (CIALIS) 10 mg, oral, Daily PRN    tamsulosin (FLOMAX) 0.8 mg, oral, Daily       Physical Exam:  Constitutional:       Appearance: Healthy appearance.   Eyes:      Pupils: Pupils are equal, round, and reactive to light.   Pulmonary:      Effort: Pulmonary effort is normal.   Cardiovascular:      Normal rate. Regular rhythm. S1 with normal intensity. S2 with normal intensity.       Murmurs:  There is no murmur.      No gallop.    Pulses:     Intact distal pulses.   Abdominal:      General: Bowel sounds are normal.   Musculoskeletal: Normal range of motion.      Cervical back: Normal range of motion and neck supple. Skin:     General: Skin is warm.   Neurological:      General: No focal deficit present.           Assessment/Plan   1) Acute chest pain  Cath with patent LIMA to LAD and Cx stents  I suspect secondary to arrhythmias (/SVT vs atrial fib/flutter)  Check echo  EP to see  Continue Metoprolol and apixaban  Peripheral IV 08/07/24 20 G Left;Posterior Hand (Active)   Site Assessment Clean;Dry;Intact 08/07/24 2136   Dressing Type Transparent 08/07/24 2136   Line Status Flushed 08/07/24 2136   Dressing Status Clean;Dry;Occlusive 08/07/24 2136   Number of days: 0       Peripheral IV 08/07/24 20 G Left Antecubital (Active)   Site Assessment Clean;Dry;Intact 08/07/24 2136   Dressing Type Transparent 08/07/24 2136   Line Status Flushed 08/07/24 2136   Dressing Status Clean;Dry;Occlusive 08/07/24 2136   Number of days: 0       Code Status:  DNR    I spent 30 minutes in the professional and overall care of this patient.        Sumeet Patricio MD

## 2024-08-08 NOTE — PROGRESS NOTES
Prasanna Torrez [id number]   Service: Internal Medicine / Hospitalist Date of service:8/8/2024                           DNR                   Subjective            Review of Systems:   Review oHistory Of Present Illness:    Prasanna Torrez is a 73 y.o. male with a significant past medical history of HTN, HLD, CAD s/p MI and subsequent CABG (2013) and prior stenting, Afib on Eliquis, IDDM-II, BPH, who presented to [facility] ED on 08/07/24 via EMS with chest pain, ? SVT (no rhythm strips) --> NSR with vagal maneuvers, and had ECG findings c/f STEMI in anterior leads. Was taken to cath lab this evening without acute intervention. Now recovering in ICU awaiting EP evaluation in the AM for further eval of underlying arrhythmias (SVT vs afib/flutter). Medicine was consulted for management of other chronic comorbidities.      Patient seen and examined after undergoing LHC with Dr. Patricio. Currently denies any chest pain or pressure, shortness of breath, dizziness or lightheadedness, N/V/D/F/C. His femoral access site is without pain, no groin pain. He reports that he has been having some medication adjustments by his PCP and cardiologist because of dizziness/lightheadedness, and a few episodes of recorded hypotension in their office (he mostly remembers being 60/40 at his cardiologist office). He was taken off of his isosorbide and started on Norvasc because of this a few days ago, and has been keeping close track of his blood pressure following this adjustment. He notes that he had an episode prior to calling EMS where his blood pressure was too low to read, and his heart rate was high at home; he did have chest pressure associated with this and took 2 nitro at home. EMS were called and noted he was in SVT at that time and he was able to convert out of it with vagal maneuvers. After vagal maneuvers, patient had an EKG obtained which was concerning for ST elevations in the anterolateral leads and a prehospital STEMI alert was  activated. Patient does note that by the time his heart rate improved, he was not having any chest pain, and had not had any further chest pain since EMS obtained the EKG prior to coming in. Resting comfortably, no current questions or concerns. Reports that he has been through for heart catheterizations in the past and that he is aware of the process.f system otherwise negative if not aforementioned above in subjective.    8/8................No reported : palpitations, chest pains, nausea, vomiting, diaphoresis or dyspnea reported this a.m.    Objective    Physical Exam     Constitutional:       Appearance: Patient appeared in no acute cardiopulmonary distress.     Comments: Patient alert and oriented to person place time and situation.  HEENT:      Head: Normocephalic and atraumatic.Trachea midline      Nose:No observed congestion or rhinorrhea.     Mouth/Throat: Mucous membranes Moist, Trachea appeared  midline.  Eyes:      Extraocular Movements: Extraocular movements intact.      Pupils: Pupils are equal, round, and reactive to light.      Comments: No scleral icterus or conjunctival injection appreciated.   Cardiovascular:      Rate and Rhythm: Normal rate and regular rhythm. No clicks rubs or gallops, normal S1 and S2.No peripheral stigmata of endocarditis appreciated.     Pulmonary:      Lungs appeared clear to auscultation, no adventitious sound appreciated.  Abdominal:      General: Abdomen soft, nontender, active bowel sounds, no involuntary guarding or rebound tenderness appreciated.     Comments: None   Musculoskeletal:       Patient appeared to have full active range of motion for upper and lower extremities, no acute apparent joint deformity appreciated on examination.   No pitting edema or cyanosis appreciated.       Lymphadenopathy:      No appreciable palpable lymphadenopathy  Skin:     General: Skin is warm.      Coloration:  No jaundice     Findings: No abnormal appearing skin rashes or lesions  that appeared acute noted on unclothed area of the skin..   Neurological:      General: No focal sensory or motor deficits appreciated, no meningeal signs or dysmetria noted.      Cranial Nerves: Cranial nerves II to XII appearing grossly intact.     Genitals:  Deferred  Psychiatric:         The patient appears to be displaying normal mood and affect at the time of evaluation.    Labs:     Lab Results   Component Value Date    GLUCOSE 246 (H) 08/07/2024    CALCIUM 8.7 08/07/2024     08/07/2024    K 4.4 08/07/2024    CO2 26 08/07/2024     08/07/2024    BUN 16 08/07/2024    CREATININE 1.06 08/07/2024      Lab Results   Component Value Date    WBC 11.4 (H) 08/07/2024    HGB 16.2 08/07/2024    HCT 47.1 08/07/2024    MCV 87 08/07/2024     08/07/2024      [unfilled]   [unfilled]   No results found for the last 90 days.              X-rays/ Images    [unfilled]   Radiology Results (last 21 days)    Procedure Component Value Units Date/Time   XR chest 1 view [[id number]] Collected: 08/07/24 2200   Order Status: Completed Updated: 08/07/24 2200   Narrative:     Interpreted By:  Vishnu Edgar,  STUDY:  XR CHEST 1 VIEW      INDICATION:  Signs/Symptoms:STEMI.      COMPARISON:  July 19      ACCESSION NUMBER(S):  [id number]      ORDERING CLINICIAN:  JAYCE BOGGS      FINDINGS:  Obscured left lung base again suggesting pleural effusion and or  airspace opacity, grossly unchanged from the prior study.      Cardiomegaly with previous median sternotomy.      No additional new findings. No pneumothorax.       Impression:     Obscured left lung base again suggesting pleural effusion and or  airspace opacity, grossly unchanged from the prior study.      Cardiomegaly with previous median sternotomy.      Signed by: Vishnu Edgar 8/7/2024 9:58 PM  Dictation workstation:   [id number]             Medical Problems       Problem List       Stroke-like symptom    STEMI (ST elevation myocardial infarction)  (Multi)    SVT (supraventricular tachycardia) (CMS-[facility])    Acute coronary syndrome (Multi)    CAD in native artery    Diabetes mellitus without complication (Multi)    Hx of CABG               Above medical problems may be reflective of historical medical problems that may have resolved and may not related to acute clinical condition/medical problems.    Clinical impression/plan:    Prasanna Torrez is a 73 y.o. male with past medical history s/f HTN, HLD, CAD s/p MI and subsequent CABG (2013) and prior stenting, Afib on Eliquis, IDDM-II, BPH, who presented to [place] ED on 08/07/24 via EMS with chest pain, ? SVT (no rhythm strips) --> NSR with vagal maneuvers, and had ECG findings c/f STEMI in anterior leads. Was taken to cath lab this evening without acute intervention. Now recovering in ICU awaiting EP evaluation in the AM for further eval of underlying arrhythmias (SVT vs afib/flutter). Medicine was consulted for management of other chronic comorbidities.     Acute chest pain; hx CAD s/p CABG and stents   Anterior ST Elevations on ECG, suspected in setting of arrhythmias (cath with patent LIMA to LAD and Cx stents)    ? SVT vs underlying Afib/flutter  -Patient currently NSR with HR 60-70 on tele in the ICU   -Continued on metoprolol, Eliquis  -No current CP or anginal symptoms; note: patient had recurrent episodes of hypotension and reports he followed up with his PCP and cardiologist a few days ago and was taken off his antianginal (isosorbide) due to BP 60s over 40s in the office and he was started on Norvasc instead  -R groin access site is C/D/I; soft; no pain. Distal NVS intact.   -Cardiology following   -EP consulted for additional evaluation   -Majority of management as per these 2 services at this time     HTN, HLD  -Patient recently had home antihypertensives adjusted as noted above due to episodes of hypotension and dizziness/lightheadedness  -Continue high intensity statin therapy     IDDM-II with  hyperglycemia   -Patient reports that he takes anywhere from 30-40 units of Lantus nightly depending on what his sugars are doing, this has been reordered on admission  -He does also take ozempic   -Continue with SSI, Accu-Cheks, hypoglycemic protocol  -Monitor and adjust as needed     BPH  -Confirm and resume patient's home therapies     Disposition/additional care plan/interventions:8/8/2024    Continue mealtime insulin sliding scale, escalate basal insulin as needed to further optimize blood glucose therapy.    Status post heart catheterization patent LAD and LIMA.    Continue rate control therapy and anticoagulation therapy paroxysmal atrial fibrillation.        Reported SVT history................ consulted EP, ?  possible ablation option , will defer to EP  Continue stewardship as per collagen    EP consult sought    Continue beta-blocker    Continue anticoagulation    Monitor for clinical signs of bleeding        The patient was informed of differential diagnosis , work up , plan of care and possible sequelae of clinical disposition.Patient in agreement with plan of care. Further recommendations forthcoming in accordance with patient's clinical disposition and response to care.    Discharge planning:Discharge timing as per admitting service .    Care time: <55 mins           Dictation performed with assistance of voice recognition device therefore transcription errors are possible.

## 2024-08-09 ENCOUNTER — PHARMACY VISIT (OUTPATIENT)
Dept: PHARMACY | Facility: CLINIC | Age: 74
End: 2024-08-09
Payer: COMMERCIAL

## 2024-08-09 VITALS
SYSTOLIC BLOOD PRESSURE: 126 MMHG | RESPIRATION RATE: 12 BRPM | HEART RATE: 65 BPM | OXYGEN SATURATION: 96 % | DIASTOLIC BLOOD PRESSURE: 71 MMHG | HEIGHT: 73 IN | BODY MASS INDEX: 27.23 KG/M2 | TEMPERATURE: 97.9 F | WEIGHT: 205.47 LBS

## 2024-08-09 PROBLEM — R07.9 CHEST PAIN: Status: RESOLVED | Noted: 2024-08-08 | Resolved: 2024-08-09

## 2024-08-09 PROBLEM — I24.9 ACUTE CORONARY SYNDROME (MULTI): Status: RESOLVED | Noted: 2024-08-07 | Resolved: 2024-08-09

## 2024-08-09 LAB
ANION GAP SERPL CALC-SCNC: 11 MMOL/L (ref 10–20)
ATRIAL RATE: 71 BPM
BUN SERPL-MCNC: 14 MG/DL (ref 6–23)
CALCIUM SERPL-MCNC: 8.9 MG/DL (ref 8.6–10.3)
CHLORIDE SERPL-SCNC: 103 MMOL/L (ref 98–107)
CO2 SERPL-SCNC: 27 MMOL/L (ref 21–32)
CREAT SERPL-MCNC: 1.08 MG/DL (ref 0.5–1.3)
EGFRCR SERPLBLD CKD-EPI 2021: 72 ML/MIN/1.73M*2
GLUCOSE BLD MANUAL STRIP-MCNC: 145 MG/DL (ref 74–99)
GLUCOSE SERPL-MCNC: 183 MG/DL (ref 74–99)
P AXIS: -8 DEGREES
POTASSIUM SERPL-SCNC: 4.2 MMOL/L (ref 3.5–5.3)
PR INTERVAL: 203 MS
Q ONSET: 251 MS
QRS COUNT: 11 BEATS
QRS DURATION: 102 MS
QT INTERVAL: 403 MS
QTC CALCULATION(BAZETT): 438 MS
QTC FREDERICIA: 426 MS
R AXIS: 32 DEGREES
SODIUM SERPL-SCNC: 137 MMOL/L (ref 136–145)
T AXIS: 78 DEGREES
T OFFSET: 453 MS
VENTRICULAR RATE: 71 BPM

## 2024-08-09 PROCEDURE — 82947 ASSAY GLUCOSE BLOOD QUANT: CPT

## 2024-08-09 PROCEDURE — 99232 SBSQ HOSP IP/OBS MODERATE 35: CPT | Performed by: HOSPITALIST

## 2024-08-09 PROCEDURE — 99239 HOSP IP/OBS DSCHRG MGMT >30: CPT | Performed by: NURSE PRACTITIONER

## 2024-08-09 PROCEDURE — 2500000001 HC RX 250 WO HCPCS SELF ADMINISTERED DRUGS (ALT 637 FOR MEDICARE OP): Performed by: NURSE PRACTITIONER

## 2024-08-09 PROCEDURE — 2500000002 HC RX 250 W HCPCS SELF ADMINISTERED DRUGS (ALT 637 FOR MEDICARE OP, ALT 636 FOR OP/ED): Performed by: INTERNAL MEDICINE

## 2024-08-09 PROCEDURE — 82374 ASSAY BLOOD CARBON DIOXIDE: CPT | Performed by: NURSE PRACTITIONER

## 2024-08-09 PROCEDURE — 2500000001 HC RX 250 WO HCPCS SELF ADMINISTERED DRUGS (ALT 637 FOR MEDICARE OP): Performed by: INTERNAL MEDICINE

## 2024-08-09 PROCEDURE — G0378 HOSPITAL OBSERVATION PER HR: HCPCS

## 2024-08-09 PROCEDURE — RXMED WILLOW AMBULATORY MEDICATION CHARGE

## 2024-08-09 PROCEDURE — 99222 1ST HOSP IP/OBS MODERATE 55: CPT | Performed by: INTERNAL MEDICINE

## 2024-08-09 PROCEDURE — 36415 COLL VENOUS BLD VENIPUNCTURE: CPT | Performed by: NURSE PRACTITIONER

## 2024-08-09 RX ORDER — LISINOPRIL 5 MG/1
5 TABLET ORAL DAILY
Qty: 30 TABLET | Refills: 1 | Status: SHIPPED | OUTPATIENT
Start: 2024-08-10

## 2024-08-09 RX ORDER — AMLODIPINE BESYLATE 5 MG/1
5 TABLET ORAL DAILY
Qty: 30 TABLET | Refills: 1 | Status: SHIPPED | OUTPATIENT
Start: 2024-08-10

## 2024-08-09 ASSESSMENT — PAIN SCALES - GENERAL
PAINLEVEL_OUTOF10: 0 - NO PAIN
PAINLEVEL_OUTOF10: 0 - NO PAIN

## 2024-08-09 ASSESSMENT — ENCOUNTER SYMPTOMS
CONSTITUTIONAL NEGATIVE: 1
ORTHOPNEA: 0
COUGH: 0
BLURRED VISION: 0
DYSPNEA ON EXERTION: 0
FALLS: 0
DEPRESSION: 0
MEMORY LOSS: 0
GASTROINTESTINAL NEGATIVE: 1
IRREGULAR HEARTBEAT: 0
SYNCOPE: 0
SHORTNESS OF BREATH: 0
DECREASED APPETITE: 0
PALPITATIONS: 1
LIGHT-HEADEDNESS: 0
FOCAL WEAKNESS: 0
RESPIRATORY NEGATIVE: 1

## 2024-08-09 ASSESSMENT — COGNITIVE AND FUNCTIONAL STATUS - GENERAL
DAILY ACTIVITIY SCORE: 24
MOBILITY SCORE: 24

## 2024-08-09 ASSESSMENT — PAIN - FUNCTIONAL ASSESSMENT
PAIN_FUNCTIONAL_ASSESSMENT: 0-10
PAIN_FUNCTIONAL_ASSESSMENT: 0-10

## 2024-08-09 NOTE — DISCHARGE SUMMARY
CARDIOLOGY DISCHARGE SUMMARY  Discharge Diagnosis  Chest pain     HPI:  Prasanna Torrez is a 73 y.o. male presenting with Hx of Coronary artery disease S/P myocardial infarction and CABG in 2013 at [facility], Cardiac catheterization, 2/25/2022: Left main, practically nonexistent with separate ostia to the LAD and the left circumflex. LAD occluded at its origin. LCX, dominant vessel with 95% ostial narrowing and with high grade disease of 3 of its marginal branches. RCA, nondominant vessel with around 70-80% proximal/mid vessel narrowing. LIMA to LAD, widely patent vessel including its distal anastomosis with the LAD showing no significant disease after the distal anastomosis with the LIMA filling the mid and proximal LAD, the diagonal branches and the left circumflex retrogradely. Normal left ventricular size with hypokinesis of the basal half of the inferior wall with more or less preserved global left ventricular systolic function with an estimated ejection fraction of 55%. Elevated left ventricular end diastolic pressure consistent with LV diastolic dysfunction, S/P Successful IVUS guided PCI of the dominant left circumflex from distal to ostial with 2 overlapping drug-eluting coronary stents (Dr. Peñaloza: Interventional Cardiology), 3/1/2022, Paroxysmal atrial fibrillation, Insulin requiring diabetes mellitus, Hypertension, Hyperlipidemia, Hospitalized with strokelike symptoms (lightheadedness and blurred vision) and low blood pressure in 7/2024. MRI of the brain showed no acute pathology   He presented today with C/O Chest pain described as pressure like, radiating to his arm and back. EMS reported him to be in ?SVT (No strips available), converted into NSR with vagal maneuver, ECG with ST elevation in inferior leads.     Subjective Data:  Up in chair, wife at bedside. No CP/pressure/palpitations/dyspnea. He is anxious and worried about return of tachycardia. Had SVT in squad which  "appears to be what triggered his CP. LHC showed patent LIMA graft and patent stent. EP has been consulted and will likely see tomorrow. Here, no further arrhythmias. SB on tele in the 50s. BP is elevated and patient appears anxious.     8-9-24: No issues overnight.  No further SVT, monitor SR.  Patient has felt well, no CP/pressure/palpitations.  Up walking in unit without lightheadedness.  BP has been up and down.     Last Labs:  Results from last 7 days   Lab Units 08/09/24  0921 08/07/24  2133   SODIUM mmol/L 137 136   POTASSIUM mmol/L 4.2 4.4   CHLORIDE mmol/L 103 102   CO2 mmol/L 27 26   BUN mg/dL 14 16   CREATININE mg/dL 1.08 1.06   GLUCOSE mg/dL 183* 246*   CALCIUM mg/dL 8.9 8.7     Results from last 7 days   Lab Units 08/07/24  2133   WBC AUTO x10*3/uL 11.4*   HEMOGLOBIN g/dL 16.2   HEMATOCRIT % 47.1   PLATELETS AUTO x10*3/uL 295         Hospital Course   Presented with CP with radiation into arm and palpitations.  Has SVT in the squad that resolved by the time he reached ER s/p vagal maneuvers.  SB on presentation.  Went to cath lab d/t \"STEMI\" alert and was found with patent LIMA to LAD and patent stent to CX.  CP was thought to be d/t SVT/tachycardia.  EP was consulted and will be considered for outpt SVT ablation.  BP was somewhat labile and BP meds were adjusted.     Echo:  CONCLUSIONS:   1. Left ventricular ejection fraction is low normal, by visual estimate at 50-55%.   2. Spectral Doppler shows an impaired relaxation pattern of left ventricular diastolic filling.   3. There is normal right ventricular global systolic function.   4. Mildly enlarged right ventricle.     Cath:  Recommendations:  Maximize medical therapy.  EP to see.     ____________________________________________________________________________________  CONCLUSIONS:   1. Double vessel disease.   2. Patent LIMA to lAD and previous Cx stent.   3. Elevated LV filling pressures.   4. Left Ventricular end-diastolic pressure = 17.   "     Review of Systems   Constitutional: Negative. Negative for decreased appetite and malaise/fatigue.   HENT: Negative.     Eyes:  Negative for blurred vision and visual disturbance.   Cardiovascular:  Positive for palpitations (on occasion.). Negative for chest pain, dyspnea on exertion, irregular heartbeat, leg swelling, orthopnea and syncope.   Respiratory: Negative.  Negative for cough and shortness of breath.    Musculoskeletal:  Negative for arthritis and falls.   Gastrointestinal: Negative.    Neurological:  Negative for focal weakness and light-headedness.   Psychiatric/Behavioral:  Negative for depression and memory loss.         Pertinent Physical Exam At Time of Discharge  Constitutional:       Appearance: Normal appearance.   HENT:      Head: Normocephalic.   Eyes:      Conjunctiva/sclera: Conjunctivae normal.   Cardiovascular:      Rate and Rhythm: Normal rate and regular rhythm.      Pulses: Normal pulses.      Heart sounds: S1 normal and S2 normal. No murmur heard.     No friction rub. No gallop.   Pulmonary:      Effort: Pulmonary effort is normal.      Breath sounds: Normal breath sounds.   Abdominal:      General: Bowel sounds are normal.      Palpations: Abdomen is soft.      Tenderness: There is no abdominal tenderness.   Musculoskeletal:      Cervical back: Neck supple.      Right lower leg: No edema.      Left lower leg: No edema.      Comments: R femoral site without hematoma, tiny amount of ecchymosis.  DSD intact.   Skin:     General: Skin is warm and dry.   Neurological:      General: No focal deficit present.      Mental Status: He is alert and oriented to person, place, and time.   Psychiatric:         Attention and Perception: Attention normal.         Mood and Affect: Mood normal.     ASSESSMENT/PLAN  1) Acute chest pain  Cath with patent LIMA to LAD and Cx stents  I suspect secondary to arrhythmias (/SVT vs atrial fib/flutter)  Check echo  EP to see  Continue Metoprolol and  apixaban  8-8-24: Cath showed patent stent and patent LIMA>LAD.  Continue on medical tx. Echo with EF 50-55%, + diastolic dysfunction, no WMA. OK to transfer to SDU.    8-9-24: No CP/pressure.  Cath site looks good. Continue on medical tx.      2) SVT  Noted while in squad.  On arrival, back in SB.  EP has been consulted and patient may be candidate for outpt ablation. We discussed vagal maneuvers that he can attempt at home if needed.  Currently on low dose BB.  Unable to increase with bradycardia.    8-9-24: No further SVT on tele.  On low dose BB.  Initially was bradycardic but HR has normalized. Will await EP consult today and likely d/c home later this afternoon.     3) Atrial fibrillation:  Hx of Afib but currently SB on tele.  On low dose BB and Eliquis for anticoagulation.   8-9-24: No Afib here.      4) Hypertension  Will increase dose of ACEi and monitor.  Will likely need up titration and possible another agent.  Patient currently seems anxious which may be driving up BP.  Continue to monitor.   8-9-24: BP has improved but climbing again this AM.  RN just gave AM meds and will check BP later.      DISPOSITION:  Discharge today after seen by EP service.   Continue on current meds on d/c  Will use M2[facility] for RX's  Patient will discuss ablation with his usual cardiologist  Needs to follow up with his usual cardiologist in 2 weeks.     Home Medications     Medication List      START taking these medications     amLODIPine 5 mg tablet; Commonly known as: Norvasc; Take 1 tablet (5 mg)   by mouth once daily.; Start taking on: August 10, 2024     CHANGE how you take these medications     lisinopril 5 mg tablet; Take 1 tablet (5 mg) by mouth once daily.; Start   taking on: August 10, 2024; What changed: medication strength, how much to   take   metoprolol succinate XL 25 mg 24 hr tablet; Commonly known as:   Toprol-XL; Take 0.5 tablets (12.5 mg) by mouth once daily. Do not crush or   chew.; What changed: how much to  take     CONTINUE taking these medications     aspirin 81 mg EC tablet   Eliquis 5 mg tablet; Generic drug: apixaban   Lantus U-100 Insulin 100 unit/mL injection; Generic drug: insulin   glargine   nitroglycerin 0.4 mg SL tablet; Commonly known as: Nitrostat   Ozempic 0.25 mg or 0.5 mg(2 mg/1.5 mL) pen injector; Generic drug:   semaglutide   rosuvastatin 20 mg tablet; Commonly known as: Crestor   tadalafil 10 mg tablet; Commonly known as: Cialis   tamsulosin 0.4 mg 24 hr capsule; Commonly known as: Flomax     STOP taking these medications     ranolazine 500 mg 12 hr tablet; Commonly known as: Ranexa       Outpatient Follow-Up  No future appointments.    Total time spent on d/c was > 35 min.     RAMU Puri  8/9/2024  10:49 AM

## 2024-08-09 NOTE — DISCHARGE INSTRUCTIONS
Please follow up with your usual cardiologist to discuss possible SVT ablation.     Cath Lab Interventional:   Activity:  You are advised to go directly home from the hospital.    Wound Care:  If slight bleeding should occur at site, lie down and have someone apply firm pressure just above the puncture site for 5 minutes.  If it continues or is profuse, call 911. Always notify your doctor if bleeding occurs.     Keep site clean and dry. Let air dry or you may use a simple bandaid.     Gently cleanse the puncture site in your groin with soap and water only.     You may experience some tenderness, bruising or minimal inflammation.  If you have any concerns, you may contact the Cath Lab or if any of these symptoms become excessive, contact your cardiologist or go to the emergency room.     No tub baths, soaking, or swimming for one week.     May shower the next day after your procedure.    Diet:  Heart healthy, low sodium diet.    Other Instructions:  If you develop diffficulty breathing, rash, hives, severe nausea, vomiting, light-headedness or any signs of infection, immediately contact your doctor and go to the nearest emergency room.     If your doctor has prescribed aspirin and/or clopidogrel (Plavix) or prasugrel (Effient) or ticagrelor (Brilinta) and you have a stent in your heart arteries, DO NOT STOP THESE MEDICATIONS for any reason without talking first to your cardiologist.     You must take your aspirin, clopidogrel (Plavix), prasugrel (Effient), or ticagrelor (Brilinta) every day without missing a single dose.  If you are getting low on these medications, contact your physician immediately for a refill.

## 2024-08-09 NOTE — CONSULTS
Consults  History Of Present Illness:    Prasanna Torrez is a 73 y.o. male presenting with history of CAD s/p CABG in 2013, severe CAD with multiple stents since admitted to our institution due to palpitations and chest pain. Found to be in SVT by ems, converted with vagal maneuvers. Consulted to our service for recommendations. He claims he had a recent episode 2 years ago. Review of ECG from this episode suggestive of non sustained runs of SVT, told he had AF at that time.     Last Recorded Vitals:  Vitals:    08/09/24 0835 08/09/24 0915 08/09/24 1007 08/09/24 1120   BP: (!) 182/98 159/86 131/80    Pulse: 60 58 62    Resp: 18 21 18    Temp:    36.6 °C (97.9 °F)   TempSrc:       SpO2: 95% 95% 96%    Weight:       Height:           Last Labs:  CBC - 8/7/2024:  9:33 PM  11.4 16.2 295    47.1      CMP - 8/9/2024:  9:21 AM  8.9 7.0 34 --- 0.7   3.5 4.2 28 117      PTT - No results in last year.  1.3   14.5 _     Troponin I, High Sensitivity   Date/Time Value Ref Range Status   08/07/2024 09:33 PM 9 0 - 20 ng/L Final   07/19/2024 04:59 PM 5 0 - 20 ng/L Final   07/19/2024 03:27 PM 6 0 - 20 ng/L Final     BNP   Date/Time Value Ref Range Status   07/20/2024 01:48 PM 37 0 - 99 pg/mL Final   07/19/2024 03:27 PM 52 0 - 99 pg/mL Final     Hemoglobin A1C   Date/Time Value Ref Range Status   07/20/2024 01:48 PM 8.1 (H) see below % Final   03/01/2022 06:22 AM 7.4 (H) 4.0 - 5.6 % Final     LDL Calculated   Date/Time Value Ref Range Status   07/20/2024 01:48 PM 53 <=99 mg/dL Final     Comment:                                 Near   Borderline      AGE      Desirable  Optimal    High     High     Very High     0-19 Y     0 - 109     ---    110-129   >/= 130     ----    20-24 Y     0 - 119     ---    120-159   >/= 160     ----      >24 Y     0 -  99   100-129  130-159   160-189     >/=190       VLDL   Date/Time Value Ref Range Status   07/20/2024 01:48 PM 37 0 - 40 mg/dL Final      Last I/O:  I/O last 3 completed shifts:  In: 3440 (30.4  mL/kg) [P.O.:1670; I.V.:1166 (12.5 mL/kg)]  Out: 4385 (47 mL/kg) [Urine:4375 (1.3 mL/kg/hr); Blood:10]  Weight: 93.2 kg     Past Cardiology Tests (Last 3 Years):  EKG:  Electrocardiogram 12 Lead 08/07/2024 (Preliminary)      ECG 12 lead 07/22/2024      ECG 12 lead 07/19/2024      ECG 12 lead 07/19/2024    Echo:  Transthoracic Echo (TTE) Complete 08/08/2024    Ejection Fractions:  EF   Date/Time Value Ref Range Status   08/08/2024 07:45 AM 53 %      Cath:  Cardiac Catheterization Procedure 08/07/2024    Stress Test:  No results found for this or any previous visit from the past 1095 days.    Cardiac Imaging:  No results found for this or any previous visit from the past 1095 days.      Past Medical History:  He has no past medical history on file.    Past Surgical History:  He has a past surgical history that includes Cardiac catheterization (N/A, 8/7/2024).      Social History:  He reports that he quit smoking about 12 years ago. His smoking use included cigarettes. He started smoking about 43 years ago. He has a 31 pack-year smoking history. He has never used smokeless tobacco. He reports that he does not use drugs. No history on file for alcohol use.    Family History:  No family history on file.     Allergies:  Dapagliflozin    Inpatient Medications:  Scheduled medications   Medication Dose Route Frequency    amLODIPine  5 mg oral Daily    apixaban  5 mg oral BID    aspirin  81 mg oral Daily    insulin glargine  40 Units subcutaneous Nightly    insulin regular  0-5 Units subcutaneous TID    lisinopril  5 mg oral Daily    metoprolol succinate XL  12.5 mg oral Daily    rosuvastatin  20 mg oral Nightly    tamsulosin  0.8 mg oral Daily     PRN medications   Medication    dextrose    dextrose    glucagon    glucagon    morphine    nitroglycerin     Continuous Medications   Medication Dose Last Rate     Outpatient Medications:  Current Outpatient Medications   Medication Instructions    [START ON 8/10/2024] amLODIPine  (NORVASC) 5 mg, oral, Daily    aspirin 81 mg EC tablet 1 tablet, oral, Daily    Eliquis 5 mg tablet 1 tablet, oral, 2 times daily    Lantus U-100 Insulin 40 Units, subcutaneous, Nightly, Take as directed per insulin instructions.    lisinopril 2.5 mg tablet 1 tablet, oral, Daily    [START ON 8/10/2024] lisinopril 5 mg, oral, Daily    metoprolol succinate XL (TOPROL-XL) 12.5 mg, oral, Daily, Do not crush or chew.    nitroglycerin (NITROSTAT) 0.4 mg, sublingual, Every 5 min PRN    Ozempic 0.5 mg, subcutaneous, Once Weekly    ranolazine (RANEXA) 500 mg, oral, 2 times daily, Do not crush, chew, or split.    rosuvastatin (Crestor) 20 mg tablet 1 tablet, oral, Daily    tadalafil (CIALIS) 10 mg, oral, Daily PRN    tamsulosin (FLOMAX) 0.4 mg, oral, 2 times daily       Physical Exam:     Appearance: Healthy appearance.   Eyes:      Pupils: Pupils are equal, round, and reactive to light.   Pulmonary:      Effort: Pulmonary effort is normal.   Cardiovascular:      Normal rate. Regular rhythm. S1 with normal intensity. S2 with normal intensity.       Murmurs: There is no murmur.      No gallop.    Pulses:     Intact distal pulses.   Abdominal:      General: Bowel sounds are normal.   Musculoskeletal: Normal range of motion.      Cervical back: Normal range of motion and neck supple. Skin:     General: Skin is warm.   Neurological:      General: No focal deficit present.         Assessment/Plan     Ambulance rhythm strip reviewed. Consistent with SVT @180bm. ECG upon arrival with NSR with nopreexcitation and some ischemic ST/T wave changes, likely from demand ischemia. Discussed  options for long term SVT suppression including AADs vs EPS/RFA. He would like to have ablation done. He will discuss this with his primary cardiologist. Will schedule.      Peripheral IV 08/07/24 20 G Left;Posterior Hand (Active)   Site Assessment Clean;Dry;Intact 08/09/24 0800   Dressing Type Transparent 08/09/24 0800   Line Status Flushed;No blood  return;Saline locked 08/09/24 0800   Dressing Status Clean;Dry;Occlusive 08/09/24 0800   Number of days: 2       Peripheral IV 08/07/24 20 G Left Antecubital (Active)   Site Assessment Clean;Dry;Intact 08/09/24 0800   Dressing Type Transparent 08/09/24 0800   Line Status Flushed;Blood return noted;Saline locked 08/09/24 0800   Dressing Status Clean;Dry;Occlusive 08/09/24 0800   Number of days: 2       Code Status:  DNR        Humberto Lopez MD

## 2024-08-09 NOTE — PROGRESS NOTES
Prasanna Torrez [id number]   Service: Internal Medicine / Hospitalist Date of service:8/9/2024                                   DNR                        Subjective                 Review of Systems:   Review oHistory Of Present Illness:    Prasanna Torrez is a 73 y.o. male with a significant past medical history of HTN, HLD, CAD s/p MI and subsequent CABG (2013) and prior stenting, Afib on Eliquis, IDDM-II, BPH, who presented to [place] ED on 08/07/24 via EMS with chest pain, ? SVT (no rhythm strips) --> NSR with vagal maneuvers, and had ECG findings c/f STEMI in anterior leads. Was taken to cath lab this evening without acute intervention. Now recovering in ICU awaiting EP evaluation in the AM for further eval of underlying arrhythmias (SVT vs afib/flutter). Medicine was consulted for management of other chronic comorbidities.      Patient seen and examined after undergoing LHC with Dr. Patricio. Currently denies any chest pain or pressure, shortness of breath, dizziness or lightheadedness, N/V/D/F/C. His femoral access site is without pain, no groin pain. He reports that he has been having some medication adjustments by his PCP and cardiologist because of dizziness/lightheadedness, and a few episodes of recorded hypotension in their office (he mostly remembers being 60/40 at his cardiologist office). He was taken off of his isosorbide and started on Norvasc because of this a few days ago, and has been keeping close track of his blood pressure following this adjustment. He notes that he had an episode prior to calling EMS where his blood pressure was too low to read, and his heart rate was high at home; he did have chest pressure associated with this and took 2 nitro at home. EMS were called and noted he was in SVT at that time and he was able to convert out of it with vagal maneuvers. After vagal maneuvers, patient had an EKG obtained which was concerning for ST elevations in the anterolateral leads and a prehospital  STEMI alert was activated. Patient does note that by the time his heart rate improved, he was not having any chest pain, and had not had any further chest pain since EMS obtained the EKG prior to coming in. Resting comfortably, no current questions or concerns. Reports that he has been through for heart catheterizations in the past and that he is aware of the process.f system otherwise negative if not aforementioned above in subjective.     8/8................No reported : palpitations, chest pains, nausea, vomiting, diaphoresis or dyspnea reported this a.m.    8/9..........................No new complaints voiced by patient today looking forward to EP evaluation and possible options of intervention concerning SVT.  No reported: Palpitations, chest pains, nausea, vomiting, fevers or chills.     Objective     Physical Exam      Constitutional:       Appearance: Patient appeared in no acute cardiopulmonary distress.     Comments: Patient alert and oriented to person place time and situation.  HEENT:      Head: Normocephalic and atraumatic.Trachea midline      Nose:No observed congestion or rhinorrhea.     Mouth/Throat: Mucous membranes Moist, Trachea appeared  midline.  Eyes:      Extraocular Movements: Extraocular movements intact.      Pupils: Pupils are equal, round, and reactive to light.      Comments: No scleral icterus or conjunctival injection appreciated.   Cardiovascular:      Rate and Rhythm: Normal rate and regular rhythm. No clicks rubs or gallops, normal S1 and S2.No peripheral stigmata of endocarditis appreciated.     Pulmonary:      Lungs appeared clear to auscultation, no adventitious sound appreciated.  Abdominal:      General: Abdomen soft, nontender, active bowel sounds, no involuntary guarding or rebound tenderness appreciated.     Comments: None   Musculoskeletal:       Patient appeared to have full active range of motion for upper and lower extremities, no acute apparent joint deformity  appreciated on examination.   No pitting edema or cyanosis appreciated.       Lymphadenopathy:      No appreciable palpable lymphadenopathy  Skin:     General: Skin is warm.      Coloration:  No jaundice     Findings: No abnormal appearing skin rashes or lesions that appeared acute noted on unclothed area of the skin..   Neurological:      General: No focal sensory or motor deficits appreciated, no meningeal signs or dysmetria noted.      Cranial Nerves: Cranial nerves II to XII appearing grossly intact.     Genitals:  Deferred  Psychiatric:         The patient appears to be displaying normal mood and affect at the time of evaluation.     Labs:     Lab Results   Component Value Date    WBC 11.4 (H) 08/07/2024    HGB 16.2 08/07/2024    HCT 47.1 08/07/2024    MCV 87 08/07/2024     08/07/2024         Lab Results   Component Value Date    GLUCOSE 183 (H) 08/09/2024    CALCIUM 8.9 08/09/2024     08/09/2024    K 4.2 08/09/2024    CO2 27 08/09/2024     08/09/2024    BUN 14 08/09/2024    CREATININE 1.08 08/09/2024         [unfilled]   [unfilled]   No results found for the last 90 days.            X-rays/ Images     [unfilled]   Radiology Results (last 21 days)     Procedure Component Value Units Date/Time   XR chest 1 view [[id number]] Collected: 08/07/24 2200   Order Status: Completed Updated: 08/07/24 2200   Narrative:     Interpreted By:  Vishnu Edgar,  STUDY:  XR CHEST 1 VIEW      INDICATION:  Signs/Symptoms:STEMI.      COMPARISON:  July 19      ACCESSION NUMBER(S):  [id number]      ORDERING CLINICIAN:  JAYCE BOGGS      FINDINGS:  Obscured left lung base again suggesting pleural effusion and or  airspace opacity, grossly unchanged from the prior study.      Cardiomegaly with previous median sternotomy.      No additional new findings. No pneumothorax.       Impression:     Obscured left lung base again suggesting pleural effusion and or  airspace opacity, grossly unchanged from the  prior study.      Cardiomegaly with previous median sternotomy.      Signed by: Vishnu Edgar 8/7/2024 9:58 PM  Dictation workstation:   [id number]                  Medical Problems         Problem List         Stroke-like symptom     STEMI (ST elevation myocardial infarction) (Multi)     SVT (supraventricular tachycardia) (CMS-HCC)     Acute coronary syndrome (Multi)     CAD in native artery     Diabetes mellitus without complication (Multi)     Hx of CABG                  Above medical problems may be reflective of historical medical problems that may have resolved and may not related to acute clinical condition/medical problems.     Clinical impression/plan:     Prasanna Torrez is a 73 y.o. male with past medical history s/f HTN, HLD, CAD s/p MI and subsequent CABG (2013) and prior stenting, Afib on Eliquis, IDDM-II, BPH, who presented to [facility] ED on 08/07/24 via EMS with chest pain, ? SVT (no rhythm strips) --> NSR with vagal maneuvers, and had ECG findings c/f STEMI in anterior leads. Was taken to cath lab this evening without acute intervention. Now recovering in ICU awaiting EP evaluation in the AM for further eval of underlying arrhythmias (SVT vs afib/flutter). Medicine was consulted for management of other chronic comorbidities.     Acute chest pain; hx CAD s/p CABG and stents   Anterior ST Elevations on ECG, suspected in setting of arrhythmias (cath with patent LIMA to LAD and Cx stents)    ? SVT vs underlying Afib/flutter  -Patient currently NSR with HR 60-70 on tele in the ICU   -Continued on metoprolol, Eliquis  -No current CP or anginal symptoms; note: patient had recurrent episodes of hypotension and reports he followed up with his PCP and cardiologist a few days ago and was taken off his antianginal (isosorbide) due to BP 60s over 40s in the office and he was started on Norvasc instead  -R groin access site is C/D/I; soft; no pain. Distal NVS intact.   -Cardiology following   -EP consulted for  additional evaluation   -Majority of management as per these 2 services at this time     HTN, HLD  -Patient recently had home antihypertensives adjusted as noted above due to episodes of hypotension and dizziness/lightheadedness  -Continue high intensity statin therapy     IDDM-II with hyperglycemia   -Patient reports that he takes anywhere from 30-40 units of Lantus nightly depending on what his sugars are doing, this has been reordered on admission  -He does also take ozempic   -Continue with SSI, Accu-Cheks, hypoglycemic protocol  -Monitor and adjust as needed     BPH  -Confirm and resume patient's home therapies     Disposition/additional care plan/interventions:8/8/2024     Continue mealtime insulin sliding scale, escalate basal insulin as needed to further optimize blood glucose therapy.     Status post heart catheterization patent LAD and LIMA.     Continue rate control therapy and anticoagulation therapy paroxysmal atrial fibrillation.          Reported SVT history................ consulted EP, ?  possible ablation option , will defer to EP  Continue stewardship as per collagen     EP consult sought     Continue beta-blocker     Continue anticoagulation     Monitor for clinical signs of bleeding      Disposition/additional care plan/interventions:8/9/2024     Continue current supportive care.    Recommend close monitoring of blood glucose levels with family physician and further adjustments of chronic therapy if hospital course is further prolonged trial of escalation of doses of basal insulin.    Repeat blood pressure later today post antihypertensive therapy      The patient was informed of differential diagnosis , work up , plan of care and possible sequelae of clinical disposition.Patient in agreement with plan of care. Further recommendations forthcoming in accordance with patient's clinical disposition and response to care.     Discharge planning:Discharge timing as per admitting service .     Care  time: <55 mins              Dictation performed with assistance of voice recognition device therefore transcription errors are possible.

## 2024-08-09 NOTE — PROGRESS NOTES
Medication Education     Medication education for Prasanna Torrez was provided to the patient  for the following medication(s):  Amlodipine, lisinopril       Medication education provided by a Pharmacist:  ADR Counseling Dose, frequency, storage How to take and what to do if a dose is missed Proper dose, indication, possible ADRs Refilling the medication  How the medication works and benefits of taking it Benefits of taking the medication  Importance of compliance    Identified potential barriers to education:  None    Method(s) of Education:  Verbal    An opportunity to ask questions and receive answers was provided.     Assessment of understanding the patient :  2= meets goals/outcomes    Additional Notes (if applicable): Provided patient with a copy of updated home medication list made by med history tech    Sadia Navarro, PharmD

## 2024-08-10 LAB — GLUCOSE BLD MANUAL STRIP-MCNC: 201 MG/DL (ref 74–99)

## 2024-08-12 DIAGNOSIS — I47.10 SVT (SUPRAVENTRICULAR TACHYCARDIA) (CMS-HCC): ICD-10-CM

## 2024-08-13 ASSESSMENT — ENCOUNTER SYMPTOMS
PHOTOPHOBIA: 0
BLOOD IN STOOL: 0
ABDOMINAL DISTENTION: 0
CONSTIPATION: 0
DIARRHEA: 0
SINUS PRESSURE: 0
RHINORRHEA: 0
SHORTNESS OF BREATH: 0
COLOR CHANGE: 0
COUGH: 0
CHEST TIGHTNESS: 0
SINUS PAIN: 0
FACIAL SWELLING: 0

## 2024-08-21 ENCOUNTER — TELEPHONE (OUTPATIENT)
Dept: CARDIOLOGY | Facility: HOSPITAL | Age: 74
End: 2024-08-21
Payer: MEDICARE

## 2024-08-21 NOTE — TELEPHONE ENCOUNTER
8/21 - Called patient to schedule SVT ablation with Dr. Romero. Patient would prefer to wait until 9/6 to see Dr. Rubio (general cardiology) to discuss ablation. Patient will call with update after that appt.

## 2024-09-03 NOTE — PROGRESS NOTES
Outpatient Cardiology Office Visit    Primary Cardiologist: Dr Moreno    Reason for Visit: Hospital follow-up      HISTORY OF PRESENT ILLNESS:   Patient is a 73-year-old male who is known to [facility] cardiology through Dr Moreno.  He was last seen outpatient 8/6/2024 for posthospital follow-up after being admitted to [facility] for strokelike symptoms and hypotension.  Patient was sinus rhythm at that visit.  Imdur was discontinued, lisinopril decreased to 2.5 mg daily, Toprol advised to keep at 25 mg daily, Ranexa 500 twice daily added, and echocardiogram ordered.  It appears patient went to [facility] in [place] 8/7/2024 for chest pain.  Patient had SVT that was converted by EMS with vagal maneuvers.  Patient underwent LHC which showed patent graft and stent with recommendation for aggressive medical therapy.  EP saw patient at that time for SVT and also reportedly reviewed ECG from 2 years ago when he was diagnosed with AF and suggested nonsustained SVT.  SVT ablation was discussed with patient..  Patient presenting to office now for posthospital follow-up.  Today, patient accompanied by wife with no acute complaints.  Patient reports he still has intermittent palpitations that are fleeting and not interfering with daily activities.  He reports overall he has felt good since recent hospitalization at  in August.  He tells me he was spoken with by electrophysiology at  for consideration of SVT ablation and he reports EP was concerned that initial A-fib RVR in 2022 was actually SVT.  He opted to follow with [facility] cardiology locally and follow with EP locally if necessary.  He reports he had lightheadedness with Ranexa and since it was discontinued has had resolution of symptoms.  He reports no lightheadedness at this time on current medication regimen.  His blood pressure today is normotensive at 124/62 and he is sinus rhythm in the 60s on EKG with no acute findings.  His physical exam is unremarkable.

## 2024-09-06 ENCOUNTER — OFFICE VISIT (OUTPATIENT)
Dept: CARDIOLOGY CLINIC | Age: 74
End: 2024-09-06
Payer: MEDICARE

## 2024-09-06 VITALS
DIASTOLIC BLOOD PRESSURE: 62 MMHG | SYSTOLIC BLOOD PRESSURE: 124 MMHG | RESPIRATION RATE: 18 BRPM | HEIGHT: 73 IN | HEART RATE: 65 BPM | BODY MASS INDEX: 29.93 KG/M2 | WEIGHT: 225.8 LBS

## 2024-09-06 DIAGNOSIS — I48.0 PAF (PAROXYSMAL ATRIAL FIBRILLATION) (HCC): ICD-10-CM

## 2024-09-06 DIAGNOSIS — I49.3 VENTRICULAR ECTOPIC BEATS: ICD-10-CM

## 2024-09-06 DIAGNOSIS — I25.10 CORONARY ARTERY DISEASE INVOLVING NATIVE CORONARY ARTERY OF NATIVE HEART WITHOUT ANGINA PECTORIS: Primary | ICD-10-CM

## 2024-09-06 DIAGNOSIS — I47.10 SVT (SUPRAVENTRICULAR TACHYCARDIA) (HCC): ICD-10-CM

## 2024-09-06 DIAGNOSIS — R00.2 PALPITATIONS: ICD-10-CM

## 2024-09-06 PROCEDURE — 99214 OFFICE O/P EST MOD 30 MIN: CPT

## 2024-09-06 PROCEDURE — 1123F ACP DISCUSS/DSCN MKR DOCD: CPT

## 2024-09-06 PROCEDURE — 93000 ELECTROCARDIOGRAM COMPLETE: CPT

## 2024-09-06 RX ORDER — ROSUVASTATIN CALCIUM 20 MG/1
20 TABLET, COATED ORAL DAILY
Qty: 90 TABLET | Refills: 3 | Status: SHIPPED | OUTPATIENT
Start: 2024-09-06

## 2024-09-06 RX ORDER — METOPROLOL SUCCINATE 25 MG/1
TABLET, EXTENDED RELEASE ORAL
Qty: 90 TABLET | Refills: 3 | Status: SHIPPED | OUTPATIENT
Start: 2024-09-06

## 2024-09-06 RX ORDER — LISINOPRIL 2.5 MG/1
5 TABLET ORAL DAILY
Qty: 30 TABLET | Refills: 3 | Status: SHIPPED | OUTPATIENT
Start: 2024-09-06

## 2024-09-06 RX ORDER — NITROGLYCERIN 0.4 MG/1
0.4 TABLET SUBLINGUAL EVERY 5 MIN PRN
Qty: 25 TABLET | Refills: 1 | Status: SHIPPED | OUTPATIENT
Start: 2024-09-06

## 2024-09-06 RX ORDER — AMLODIPINE BESYLATE 5 MG/1
5 TABLET ORAL DAILY
Qty: 30 TABLET | Refills: 3 | Status: SHIPPED | OUTPATIENT
Start: 2024-09-06

## 2024-09-06 RX ORDER — AMLODIPINE BESYLATE 5 MG/1
5 TABLET ORAL DAILY
COMMUNITY
Start: 2024-08-10 | End: 2024-09-06 | Stop reason: SDUPTHER

## 2024-09-06 NOTE — PROGRESS NOTES
14 day Xt Monitor was placed per Dr Shoaib LOMBARDI  Serial number [id number].  Instructions were given & patient verbalized understanding.

## 2024-09-06 NOTE — PATIENT INSTRUCTIONS
Follow up with Dr Moreno and EP.    Cardiac monitor for 14 days.    Continue current medications.

## 2024-09-14 ENCOUNTER — HOSPITAL ENCOUNTER (EMERGENCY)
Age: 74
Discharge: HOME | End: 2024-09-14
Attending: EMERGENCY MEDICINE
Payer: MEDICARE

## 2024-09-14 ENCOUNTER — APPOINTMENT (OUTPATIENT)
Dept: GENERAL RADIOLOGY | Age: 74
End: 2024-09-14
Payer: MEDICARE

## 2024-09-14 VITALS
DIASTOLIC BLOOD PRESSURE: 56 MMHG | SYSTOLIC BLOOD PRESSURE: 116 MMHG | TEMPERATURE: 97.9 F | HEART RATE: 67 BPM | OXYGEN SATURATION: 96 % | RESPIRATION RATE: 18 BRPM

## 2024-09-14 DIAGNOSIS — R00.2 PALPITATIONS: Primary | ICD-10-CM

## 2024-09-14 DIAGNOSIS — I47.10 PAROXYSMAL SUPRAVENTRICULAR TACHYCARDIA (HCC): ICD-10-CM

## 2024-09-14 LAB
ANION GAP SERPL CALCULATED.3IONS-SCNC: 12 MMOL/L (ref 7–16)
BASOPHILS # BLD: 0.09 K/UL (ref 0–0.2)
BASOPHILS NFR BLD: 1 % (ref 0–2)
BUN SERPL-MCNC: 25 MG/DL (ref 6–23)
CALCIUM SERPL-MCNC: 9.1 MG/DL (ref 8.6–10.2)
CHLORIDE SERPL-SCNC: 102 MMOL/L (ref 98–107)
CO2 SERPL-SCNC: 23 MMOL/L (ref 22–29)
CREAT SERPL-MCNC: 1.2 MG/DL (ref 0.7–1.2)
EOSINOPHIL # BLD: 0.51 K/UL (ref 0.05–0.5)
EOSINOPHILS RELATIVE PERCENT: 5 % (ref 0–6)
ERYTHROCYTE [DISTWIDTH] IN BLOOD BY AUTOMATED COUNT: 13.1 % (ref 11.5–15)
GFR, ESTIMATED: 66 ML/MIN/1.73M2
GLUCOSE SERPL-MCNC: 141 MG/DL (ref 74–99)
HCT VFR BLD AUTO: 45.3 % (ref 37–54)
HGB BLD-MCNC: 14.8 G/DL (ref 12.5–16.5)
IMM GRANULOCYTES # BLD AUTO: 0.05 K/UL (ref 0–0.58)
IMM GRANULOCYTES NFR BLD: 0 % (ref 0–5)
INR PPP: 1.2
LYMPHOCYTES NFR BLD: 2.65 K/UL (ref 1.5–4)
LYMPHOCYTES RELATIVE PERCENT: 24 % (ref 20–42)
MAGNESIUM SERPL-MCNC: 2.4 MG/DL (ref 1.6–2.6)
MCH RBC QN AUTO: 28.7 PG (ref 26–35)
MCHC RBC AUTO-ENTMCNC: 32.7 G/DL (ref 32–34.5)
MCV RBC AUTO: 87.8 FL (ref 80–99.9)
MONOCYTES NFR BLD: 1.2 K/UL (ref 0.1–0.95)
MONOCYTES NFR BLD: 11 % (ref 2–12)
NEUTROPHILS NFR BLD: 60 % (ref 43–80)
NEUTS SEG NFR BLD: 6.62 K/UL (ref 1.8–7.3)
PLATELET # BLD AUTO: 301 K/UL (ref 130–450)
PMV BLD AUTO: 9.8 FL (ref 7–12)
POTASSIUM SERPL-SCNC: 4.3 MMOL/L (ref 3.5–5)
PROTHROMBIN TIME: 12.5 SEC (ref 9.3–12.4)
RBC # BLD AUTO: 5.16 M/UL (ref 3.8–5.8)
SODIUM SERPL-SCNC: 137 MMOL/L (ref 132–146)
TROPONIN I SERPL HS-MCNC: 17 NG/L (ref 0–11)
TROPONIN I SERPL HS-MCNC: 20 NG/L (ref 0–11)
WBC OTHER # BLD: 11.1 K/UL (ref 4.5–11.5)

## 2024-09-14 PROCEDURE — 71046 X-RAY EXAM CHEST 2 VIEWS: CPT

## 2024-09-14 PROCEDURE — 85025 COMPLETE CBC W/AUTO DIFF WBC: CPT

## 2024-09-14 PROCEDURE — 80048 BASIC METABOLIC PNL TOTAL CA: CPT

## 2024-09-14 PROCEDURE — 99285 EMERGENCY DEPT VISIT HI MDM: CPT

## 2024-09-14 PROCEDURE — 85610 PROTHROMBIN TIME: CPT

## 2024-09-14 PROCEDURE — 93005 ELECTROCARDIOGRAM TRACING: CPT

## 2024-09-14 PROCEDURE — 84484 ASSAY OF TROPONIN QUANT: CPT

## 2024-09-14 PROCEDURE — 83735 ASSAY OF MAGNESIUM: CPT

## 2024-09-14 RX ORDER — METOPROLOL SUCCINATE 25 MG/1
75 TABLET, EXTENDED RELEASE ORAL DAILY
Qty: 30 TABLET | Refills: 3 | Status: SHIPPED | OUTPATIENT
Start: 2024-09-14

## 2024-09-14 ASSESSMENT — PAIN - FUNCTIONAL ASSESSMENT
PAIN_FUNCTIONAL_ASSESSMENT: NONE - DENIES PAIN
PAIN_FUNCTIONAL_ASSESSMENT: NONE - DENIES PAIN

## 2024-09-16 LAB
EKG ATRIAL RATE: 70 BPM
EKG P AXIS: 8 DEGREES
EKG P-R INTERVAL: 208 MS
EKG Q-T INTERVAL: 384 MS
EKG QRS DURATION: 100 MS
EKG QTC CALCULATION (BAZETT): 414 MS
EKG R AXIS: 61 DEGREES
EKG T AXIS: 59 DEGREES
EKG VENTRICULAR RATE: 70 BPM

## 2024-09-16 PROCEDURE — 93010 ELECTROCARDIOGRAM REPORT: CPT | Performed by: INTERNAL MEDICINE

## 2024-09-17 RX ORDER — TAMSULOSIN HYDROCHLORIDE 0.4 MG/1
0.8 CAPSULE ORAL DAILY
Qty: 180 CAPSULE | Refills: 1 | Status: SHIPPED | OUTPATIENT
Start: 2024-09-17 | End: 2025-03-16

## 2024-09-19 ENCOUNTER — APPOINTMENT (OUTPATIENT)
Dept: RADIOLOGY | Facility: HOSPITAL | Age: 74
End: 2024-09-19
Payer: MEDICARE

## 2024-09-19 ENCOUNTER — APPOINTMENT (OUTPATIENT)
Dept: CARDIOLOGY | Facility: HOSPITAL | Age: 74
End: 2024-09-19
Payer: MEDICARE

## 2024-09-19 ENCOUNTER — TELEPHONE (OUTPATIENT)
Dept: CARDIOLOGY | Facility: HOSPITAL | Age: 74
End: 2024-09-19

## 2024-09-19 ENCOUNTER — HOSPITAL ENCOUNTER (EMERGENCY)
Facility: HOSPITAL | Age: 74
Discharge: HOME | End: 2024-09-20
Payer: MEDICARE

## 2024-09-19 VITALS
TEMPERATURE: 98.2 F | BODY MASS INDEX: 30.75 KG/M2 | DIASTOLIC BLOOD PRESSURE: 78 MMHG | OXYGEN SATURATION: 96 % | HEIGHT: 73 IN | WEIGHT: 232 LBS | HEART RATE: 59 BPM | RESPIRATION RATE: 16 BRPM | SYSTOLIC BLOOD PRESSURE: 148 MMHG

## 2024-09-19 DIAGNOSIS — E11.9 DIABETES MELLITUS WITHOUT COMPLICATION (HCC): ICD-10-CM

## 2024-09-19 DIAGNOSIS — R00.2 PALPITATIONS: Primary | ICD-10-CM

## 2024-09-19 LAB
ANION GAP SERPL CALC-SCNC: 12 MMOL/L (ref 10–20)
BASOPHILS # BLD AUTO: 0.1 X10*3/UL (ref 0–0.1)
BASOPHILS NFR BLD AUTO: 0.8 %
BUN SERPL-MCNC: 18 MG/DL (ref 6–23)
CALCIUM SERPL-MCNC: 8.3 MG/DL (ref 8.6–10.3)
CARDIAC TROPONIN I PNL SERPL HS: 7 NG/L (ref 0–20)
CARDIAC TROPONIN I PNL SERPL HS: 8 NG/L (ref 0–20)
CHLORIDE SERPL-SCNC: 105 MMOL/L (ref 98–107)
CO2 SERPL-SCNC: 25 MMOL/L (ref 21–32)
CREAT SERPL-MCNC: 0.9 MG/DL (ref 0.5–1.3)
EGFRCR SERPLBLD CKD-EPI 2021: 89 ML/MIN/1.73M*2
EOSINOPHIL # BLD AUTO: 0.43 X10*3/UL (ref 0–0.4)
EOSINOPHIL NFR BLD AUTO: 3.6 %
ERYTHROCYTE [DISTWIDTH] IN BLOOD BY AUTOMATED COUNT: 13 % (ref 11.5–14.5)
GLUCOSE SERPL-MCNC: 169 MG/DL (ref 74–99)
HCT VFR BLD AUTO: 43.1 % (ref 41–52)
HGB BLD-MCNC: 14.3 G/DL (ref 13.5–17.5)
IMM GRANULOCYTES # BLD AUTO: 0.05 X10*3/UL (ref 0–0.5)
IMM GRANULOCYTES NFR BLD AUTO: 0.4 % (ref 0–0.9)
LYMPHOCYTES # BLD AUTO: 3.2 X10*3/UL (ref 0.8–3)
LYMPHOCYTES NFR BLD AUTO: 26.5 %
MAGNESIUM SERPL-MCNC: 2.11 MG/DL (ref 1.6–2.4)
MCH RBC QN AUTO: 29 PG (ref 26–34)
MCHC RBC AUTO-ENTMCNC: 33.2 G/DL (ref 32–36)
MCV RBC AUTO: 87 FL (ref 80–100)
MONOCYTES # BLD AUTO: 1.19 X10*3/UL (ref 0.05–0.8)
MONOCYTES NFR BLD AUTO: 9.8 %
NEUTROPHILS # BLD AUTO: 7.12 X10*3/UL (ref 1.6–5.5)
NEUTROPHILS NFR BLD AUTO: 58.9 %
NRBC BLD-RTO: 0 /100 WBCS (ref 0–0)
PLATELET # BLD AUTO: 295 X10*3/UL (ref 150–450)
POTASSIUM SERPL-SCNC: 3.9 MMOL/L (ref 3.5–5.3)
RBC # BLD AUTO: 4.93 X10*6/UL (ref 4.5–5.9)
SODIUM SERPL-SCNC: 138 MMOL/L (ref 136–145)
WBC # BLD AUTO: 12.1 X10*3/UL (ref 4.4–11.3)

## 2024-09-19 PROCEDURE — 71046 X-RAY EXAM CHEST 2 VIEWS: CPT | Performed by: RADIOLOGY

## 2024-09-19 PROCEDURE — 85025 COMPLETE CBC W/AUTO DIFF WBC: CPT

## 2024-09-19 PROCEDURE — 96360 HYDRATION IV INFUSION INIT: CPT

## 2024-09-19 PROCEDURE — 36415 COLL VENOUS BLD VENIPUNCTURE: CPT

## 2024-09-19 PROCEDURE — 82374 ASSAY BLOOD CARBON DIOXIDE: CPT

## 2024-09-19 PROCEDURE — 84484 ASSAY OF TROPONIN QUANT: CPT

## 2024-09-19 PROCEDURE — 71046 X-RAY EXAM CHEST 2 VIEWS: CPT

## 2024-09-19 PROCEDURE — 2500000004 HC RX 250 GENERAL PHARMACY W/ HCPCS (ALT 636 FOR OP/ED)

## 2024-09-19 PROCEDURE — 99283 EMERGENCY DEPT VISIT LOW MDM: CPT | Mod: 25

## 2024-09-19 PROCEDURE — 83735 ASSAY OF MAGNESIUM: CPT

## 2024-09-19 PROCEDURE — 93005 ELECTROCARDIOGRAM TRACING: CPT

## 2024-09-19 RX ORDER — PEN NEEDLE, DIABETIC 32GX 5/32"
NEEDLE, DISPOSABLE MISCELLANEOUS
Qty: 100 EACH | Refills: 5 | Status: SHIPPED | OUTPATIENT
Start: 2024-09-19

## 2024-09-19 ASSESSMENT — PAIN - FUNCTIONAL ASSESSMENT: PAIN_FUNCTIONAL_ASSESSMENT: 0-10

## 2024-09-19 ASSESSMENT — PAIN SCALES - GENERAL: PAINLEVEL_OUTOF10: 0 - NO PAIN

## 2024-09-19 NOTE — TELEPHONE ENCOUNTER
9/19 - Called and spoke with patient regarding scheduling SVT ablation with Dr. Romero. Patient saw Dr. Rubio with cardiology for second opinion and is currently wearing a monitor. Patient will wait for recommendations from Dr. Rubio before proceeding. Canceled order with Dr. Romero as scheduled date will be past Dr. Romero's last day. Will replace new order with Dr. Lowe if patient and Dr. Rubio decide to proceed with ablation.    9/26 - Patient hospitalized for SVT episodes and would like to proceed with ablation with Dr. Romero prior to his departure. Order placed. Patient agreeable to [facility] Thursday 10/3 7:30am (Arrive 6:30am).  Hold Eliquis morning of procedure and hold metoprolol 48hrs prior to procedure. Patient will need to update labs. Task routed to Elsy RN for reminder/instructions call.     Will work on pre authorization with [facility]. Website not working. Faxing clinicals to [phone] to aid in approval.

## 2024-09-20 LAB
ATRIAL RATE: 67 BPM
P AXIS: -9 DEGREES
PR INTERVAL: 217 MS
Q ONSET: 252 MS
QRS COUNT: 11 BEATS
QRS DURATION: 103 MS
QT INTERVAL: 381 MS
QTC CALCULATION(BAZETT): 403 MS
QTC FREDERICIA: 395 MS
R AXIS: 32 DEGREES
T AXIS: 74 DEGREES
T OFFSET: 442 MS
VENTRICULAR RATE: 67 BPM

## 2024-09-20 NOTE — DISCHARGE INSTRUCTIONS
Your labs do not show any abnormalities.  Please call your cardiologist first thing in the morning to let them know you had a repeat episode of SVT.  Return to the emergency department for any worsening symptoms including new chest pain or shortness of breath, palpitations that do not relieved after bearing down, confusion, or loss consciousness.

## 2024-09-20 NOTE — ED PROVIDER NOTES
HPI   Chief Complaint   Patient presents with    Rapid Heart Rate     Pt was sitting in truck & had chest discomfort, EMS checked HR & pt was in SVT, EMS gave 6mg adenosine & pt converted, pt's HR 68 upon arrival; hx MI () & open heart surgery (), 2 stents (), recently admitted for SVT five weeks ago.       HPI  Patient is a 74-year-old male who presents emergency department for palpitations.  Patient with a history of CAD status post CABG, SVT.  Patient states he was sitting in his truck when he had sudden onset chest discomfort, shortness of breath, palpitations.  States he felt like he was in SVT so tried to bear down.  He was unable to convert himself with bearing down so called EMS.  On EMS arrival, EKG showed SVT and was given 6 mg of adenosine.  Patient did convert to sinus rhythm after medication.  Currently denies any symptoms.  Patient states his last episode of SVT was 4 days ago and was seen at [facility].  He states that his cardiologist there increased his metoprolol from 12.5 mg to 25 mg daily.  Denies fevers, chills, nausea, vomiting, leg pain, leg swelling.      Patient History   No past medical history on file.  Past Surgical History:   Procedure Laterality Date    CARDIAC CATHETERIZATION N/A 2024    Procedure: Left Heart Cath, No LV;  Surgeon: Sumeet Patricio MD;  Location: [facility] Cardiac Cath Lab;  Service: Cardiovascular;  Laterality: N/A;     No family history on file.  Social History     Tobacco Use    Smoking status: Former     Current packs/day: 0.00     Average packs/day: 1 pack/day for 31.0 years (31.0 ttl pk-yrs)     Types: Cigarettes     Start date:      Quit date: 2012     Years since quittin.7    Smokeless tobacco: Never   Substance Use Topics    Alcohol use: Not on file    Drug use: Never       Physical Exam   ED Triage Vitals [24 1930]   Temperature Heart Rate Respirations BP   36.8 °C (98.2 °F) 73 18 144/77      Pulse Ox Temp Source Heart Rate Source Patient  Position   98 % Temporal Monitor --      BP Location FiO2 (%)     -- --       Physical Exam  General: Resting comfortably in bed. No acute distress.  Head: Atraumatic, normocephalic.   Eyes: Sclera anicteric.  ENT: Tacky mucous membranes  Heart: Regular rate and rhythm.  Equal bilateral radial pulses.  Lungs: Clear to auscultation bilaterally.  Normal respiratory pattern without conversational dyspnea or respiratory distress.   Abdomen: Soft, non-tender, non-distended, no guarding or rebound.   Neurologic: Awake and alert, normal speech and mental status. Moves all extremities equally well. No focal deficits or lateralizing signs.   Skin: Warm and dry  Musculoskeletal: No peripheral edema.      ED Course & MDM   ED Course as of 09/22/24 0219   Thu Sep 19, 2024   1944 ECG 12 lead  EKG as interpreted by me sinus rhythm with normal axis old inferior infarct with ST depression in aVL.  Similar compared to prior EKG from 8/7/2024. [MARIANGEL]   2128 WBC(!): 12.1  Stable [MARIANGEL]   2129 XR chest 2 views  Chest x-ray is interpreted by me with small left-sided pleural effusion but no consolidations or pneumothorax.  Radiology read in agreement. [MARIANGEL]      ED Course User Index  [MARIANGEL] Aguila Patiño MD         Diagnoses as of 09/22/24 0219   Palpitations                 No data recorded     Nic Coma Scale Score: 15 (09/19/24 1932 : Sadia Doll, RN)                           Medical Decision Making  Patient is a 74 y.o. male who presents to the emergency department with chief complaint of palpitations. History of present illness as above. Chronic conditions impacting care include CAD status post CABG, SVT. Patient remained afebrile and hemodynamically stable while in the emergency department. They saturated well on room air. The differential diagnosis associated with this patient's presentation includes electrolyte abnormalities, anemia, ACS, arrhythmia, dehydration. Our workup consisted of ordering/reviewing CBC, BMP,  magnesium, troponin, EKG, chest x-ray.  500 cc normal saline ordered.    External records reviewed:  Care everywhere reviewed for current medications and medical history.     Diagnostics interpreted by me include:  EKG and chest x-ray as noted below.      ED Course as of 09/22/24 0219   Thu Sep 19, 2024   1944 ECG 12 lead  EKG as interpreted by me sinus rhythm with normal axis old inferior infarct with ST depression in aVL.  Similar compared to prior EKG from 8/7/2024. [MARIANGEL]   2128 WBC(!): 12.1  Stable [MARIANGEL]   2129 XR chest 2 views  Chest x-ray is interpreted by me with small left-sided pleural effusion but no consolidations or pneumothorax.  Radiology read in agreement. [MARIANGEL]      ED Course User Index  [MARIANGEL] Aguila Patiño MD         Diagnoses as of 09/22/24 0219   Palpitations     Patient had no other episodes of SVT while in the emergency department.  Patient states that he has been seen by cardiology both at [facility] and .  He states that he has an appointment with his cardiologist at [facility] on Tuesday.  He will like to follow-up with that appointment.  Appropriate return precautions discussed.  Discharged in stable condition.    Social determinants of health affecting care:  None    ED Medications managed:  Medications   sodium chloride 0.9 % bolus 500 mL (0 mL intravenous Stopped 9/19/24 2211)         Procedure  Procedures     Aguila Patiño MD  09/22/24 0219

## 2024-09-22 LAB
ATRIAL RATE: 68 BPM
P OFFSET: 165 MS
P ONSET: 106 MS
PR INTERVAL: 210 MS
Q ONSET: 211 MS
QRS COUNT: 11 BEATS
QRS DURATION: 98 MS
QT INTERVAL: 420 MS
QTC CALCULATION(BAZETT): 446 MS
QTC FREDERICIA: 438 MS
R AXIS: 158 DEGREES
T AXIS: 94 DEGREES
T OFFSET: 421 MS
VENTRICULAR RATE: 68 BPM

## 2024-09-26 DIAGNOSIS — I47.10 SVT (SUPRAVENTRICULAR TACHYCARDIA) (CMS-HCC): Primary | ICD-10-CM

## 2024-09-27 ENCOUNTER — TELEPHONE (OUTPATIENT)
Dept: CARDIOLOGY | Facility: HOSPITAL | Age: 74
End: 2024-09-27
Payer: MEDICARE

## 2024-09-27 NOTE — TELEPHONE ENCOUNTER
Patient is scheduled for SVT ablation with Dr. Romero on 10/3/2024 at [facility] with an arrival time of 6:30. Patient is aware that labs will need to be updated. He plans to update labs on Monday. NPO after midnight the night before ablation. Take morning medication with a sip of water. Hold Eliquis the morning of the procedure. Hold metoprolol 48 hours prior to procedure. Patient may need to stay overnight for observation. He will need a ride home. If patient goes home the same day, he will need a ride home and someone to stay overnight with him. Patient verbalized understanding of instructions including appointment date, time, and location. All questions answered.

## 2024-09-30 ENCOUNTER — TELEPHONE (OUTPATIENT)
Dept: CARDIOLOGY CLINIC | Age: 74
End: 2024-09-30

## 2024-09-30 ENCOUNTER — LAB (OUTPATIENT)
Dept: LAB | Facility: LAB | Age: 74
End: 2024-09-30
Payer: MEDICARE

## 2024-09-30 DIAGNOSIS — I49.3 VENTRICULAR ECTOPIC BEATS: ICD-10-CM

## 2024-09-30 DIAGNOSIS — I47.10 SVT (SUPRAVENTRICULAR TACHYCARDIA) (CMS-HCC): ICD-10-CM

## 2024-09-30 LAB
ANION GAP SERPL CALC-SCNC: 12 MMOL/L (ref 10–20)
BUN SERPL-MCNC: 20 MG/DL (ref 6–23)
CALCIUM SERPL-MCNC: 8.7 MG/DL (ref 8.6–10.3)
CHLORIDE SERPL-SCNC: 108 MMOL/L (ref 98–107)
CO2 SERPL-SCNC: 25 MMOL/L (ref 21–32)
CREAT SERPL-MCNC: 0.98 MG/DL (ref 0.5–1.3)
EGFRCR SERPLBLD CKD-EPI 2021: 80 ML/MIN/1.73M*2
ERYTHROCYTE [DISTWIDTH] IN BLOOD BY AUTOMATED COUNT: 13.1 % (ref 11.5–14.5)
GLUCOSE SERPL-MCNC: 170 MG/DL (ref 74–99)
HCT VFR BLD AUTO: 44.8 % (ref 41–52)
HGB BLD-MCNC: 14.4 G/DL (ref 13.5–17.5)
INR PPP: 1.2 (ref 0.9–1.1)
MCH RBC QN AUTO: 28.5 PG (ref 26–34)
MCHC RBC AUTO-ENTMCNC: 32.1 G/DL (ref 32–36)
MCV RBC AUTO: 89 FL (ref 80–100)
NRBC BLD-RTO: 0 /100 WBCS (ref 0–0)
PLATELET # BLD AUTO: 282 X10*3/UL (ref 150–450)
POTASSIUM SERPL-SCNC: 4.4 MMOL/L (ref 3.5–5.3)
PROTHROMBIN TIME: 13.8 SECONDS (ref 9.8–12.8)
RBC # BLD AUTO: 5.05 X10*6/UL (ref 4.5–5.9)
SODIUM SERPL-SCNC: 141 MMOL/L (ref 136–145)
WBC # BLD AUTO: 9.8 X10*3/UL (ref 4.4–11.3)

## 2024-09-30 PROCEDURE — 85027 COMPLETE CBC AUTOMATED: CPT

## 2024-09-30 PROCEDURE — 85610 PROTHROMBIN TIME: CPT

## 2024-09-30 PROCEDURE — 80048 BASIC METABOLIC PNL TOTAL CA: CPT

## 2024-09-30 PROCEDURE — 36415 COLL VENOUS BLD VENIPUNCTURE: CPT

## 2024-09-30 NOTE — TELEPHONE ENCOUNTER
Shoaib Mittal, APRN - CNP  [place]in [place]  Please notify patient his Zio patch showed episodes of SVT but no atrial fibrillation.  SVT correlated with symptomatic events for patient when he pushed the button.  Continue current medications and continue referral to electrophysiology.  Has not referral been set up yet?  I put the order in when I saw him in office.  Thank you    Can you please schedule with

## 2024-10-01 NOTE — TELEPHONE ENCOUNTER
Called patient gave results. Patient stated he is going to [facility] in [place] to have an ablation done.

## 2024-10-03 ENCOUNTER — HOSPITAL ENCOUNTER (OUTPATIENT)
Facility: HOSPITAL | Age: 74
Setting detail: OUTPATIENT SURGERY
Discharge: HOME | End: 2024-10-03
Attending: INTERNAL MEDICINE | Admitting: INTERNAL MEDICINE
Payer: MEDICARE

## 2024-10-03 ENCOUNTER — ANESTHESIA (OUTPATIENT)
Dept: CARDIOLOGY | Facility: HOSPITAL | Age: 74
End: 2024-10-03

## 2024-10-03 ENCOUNTER — ANESTHESIA EVENT (OUTPATIENT)
Dept: CARDIOLOGY | Facility: HOSPITAL | Age: 74
End: 2024-10-03

## 2024-10-03 DIAGNOSIS — I47.10 SVT (SUPRAVENTRICULAR TACHYCARDIA) (CMS-HCC): ICD-10-CM

## 2024-10-03 LAB — GLUCOSE BLD MANUAL STRIP-MCNC: 161 MG/DL (ref 74–99)

## 2024-10-03 PROCEDURE — 82947 ASSAY GLUCOSE BLOOD QUANT: CPT

## 2024-10-03 SDOH — HEALTH STABILITY: MENTAL HEALTH: CURRENT SMOKER: 0

## 2024-10-03 NOTE — CONSULTS
Consults  Patient: Prasanna Torrez    Procedure Information       Date/Time: 10/03/24 0730    Procedure: Ablation SVT - SVT ablation, CARTO, anesthesia consult, hold Eliquis morning of procedure, hold metoprolol 48hrs    Location: [facility] STEREO / [facility] MAT [facility] Cardiac Cath Lab    Providers: Humberto Lopez MD          Prasanna Torrez is a 73 y.o. male presenting with history of CAD s/p CABG X 3 in 2013, severe CAD with multiple stents since admitted to our institution due to palpitations and chest pain. Found to be in SVT by ems, converted with vagal maneuvers. Consulted to our service for recommendations. He claims he had a recent episode 2 years ago. Review of ECG from this episode suggestive of non sustained runs of SVT, told he had AF at that time.     Allergies: Dapagliflozin    Past Medical History:  CV:   h/o STEMI s/p CABG & multiple coronary DEMETRICE  CAD  HLD  paroxysmal Afib on eliquis (last 10/2)  SVT      Endocrine:    IDDM-II *took weekly ozempic Tuesday 10/1* (bg THIS am 161)    :  BPH  Baseline Cr 1.1; (h/o NOE)    Denies any other organ system problems      Past Surgical History:  He has a past surgical history that includes Cardiac catheterization (N/A, 8/7/2024), colonoscopy, coronary angioplasty with stent (3/1/22), CABG       Social History:  He reports that he quit cigarettes smoking about 12 years ago. He started smoking about 43 years ago. He has a 31 pack-year smoking history. He reports that he does not use drugs. No history on file for alcohol use.     Current Outpatient Medications   Medication Instructions    [START ON 8/10/2024] amLODIPine (NORVASC) 5 mg, oral, Daily    aspirin 81 mg EC tablet 1 tablet, oral, Daily    Eliquis 5 mg tablet 1 tablet, oral, 2 times daily    Lantus U-100 Insulin 40 Units, subcutaneous, Nightly, Take as directed per insulin instructions.    lisinopril 2.5 mg tablet 1 tablet, oral, Daily    [START ON 8/10/2024] lisinopril 5 mg, oral, Daily     metoprolol succinate XL (TOPROL-XL) 12.5 mg, oral, Daily, Do not crush or chew.    nitroglycerin (NITROSTAT) 0.4 mg, sublingual, Every 5 min PRN    Ozempic 0.5 mg, subcutaneous, Once Weekly    ranolazine (RANEXA) 500 mg, oral, 2 times daily, Do not crush, chew, or split.    rosuvastatin (Crestor) 20 mg tablet 1 tablet, oral, Daily    tadalafil (CIALIS) 10 mg, oral, Daily PRN    tamsulosin (FLOMAX) 0.4 mg, oral, 2 times daily        TTE: (8/8/24)  PHYSICIAN INTERPRETATION:  Left Ventricle: Left ventricular ejection fraction is low normal, by visual estimate at 50-55%. There are no regional wall motion abnormalities. The left ventricular cavity size is normal. The left ventricular septal wall thickness is normal. There is normal left ventricular posterior wall thickness. Spectral Doppler shows an impaired relaxation pattern of left ventricular diastolic filling.  Left Atrium: The left atrium is normal in size.  Right Ventricle: The right ventricle is mildly enlarged. There is normal right ventricular global systolic function.  Right Atrium: The right atrium is normal in size.  Aortic Valve: The aortic valve is probably trileaflet. There is mild aortic valve cusp calcification. There is mild aortic valve thickening. The aortic valve dimensionless index is 0.62. There is trace to mild aortic valve regurgitation. The peak instantaneous gradient of the aortic valve is 8.3 mmHg. The mean gradient of the aortic valve is 4.0 mmHg.  Mitral Valve: The mitral valve is normal in structure. There is trace mitral valve regurgitation.  Tricuspid Valve: The tricuspid valve is structurally normal. No evidence of tricuspid regurgitation. IVC NWV.  Pulmonic Valve: The pulmonic valve is structurally normal. There is no indication of pulmonic valve regurgitation.  Pericardium: There is no pericardial effusion noted.  Aorta: The aortic root is normal. The aortic root is at the upper limits of normal size.        CONCLUSIONS:   1. Left  ventricular ejection fraction is low normal, by visual estimate at 50-55%.   2. Spectral Doppler shows an impaired relaxation pattern of left ventricular diastolic filling.   3. There is normal right ventricular global systolic function.   4. Mildly enlarged right ventricle.     Cardiac catheterization procedure: (8/8/24)  Coronary Angiography:  The coronary circulation is left dominant.     Left Main Coronary Artery:  The left main coronary artery is a normal caliber vessel. The left main arises normally from the left coronary sinus of Valsalva and bifurcates into the LAD and circumflex coronary arteries. The left main coronary artery showed no significant disease or stenosis greater than 30%.     Left Anterior Descending Coronary Artery Distribution:  The left anterior descending coronary artery is a normal caliber vessel. The LAD arises normally from the left main coronary artery. The LAD demonstrated severe atherosclerotic disease.     Circumflex Coronary Artery Distribution:  The circumflex coronary artery is a normal caliber vessel. The circumflex arises normally from the left main coronary artery and terminates in the AV groove. The circumflex revealed a previous patent stent.     Right Coronary Artery Distribution:     The right coronary artery is a small caliber vessel. The RCA arises normally from the right sinus of Valsalva. The RCA showed diffuse atherosclerotic disease.     Coronary Grafts:     LIMA Graft:  Left internal mammary artery graft conduit, originating in situ and attached to the mid left anterior descending, is patent. The left internal mammary artery graft conduit originating in situ and attached to the mid left anterior descending is patent.     Valve Findings:  No aortic valve stenosis is visualized.  CONCLUSIONS:   1. Double vessel disease.   2. Patent LIMA to lAD and previous Cx stent.   3. Elevated LV filling pressures.   4. Left Ventricular end-diastolic pressure = 17.    Physical  Exam    Airway  Mallampati: III  TM distance: >3 FB  Neck ROM: full     Cardiovascular    Dental        Pulmonary    Abdominal            Anesthesia Plan    History of general anesthesia?: yes  History of complications of general anesthesia?: no    ASA 3     other     The patient is not a current smoker.    Anesthetic plan and risks discussed with patient.  Use of blood products discussed with patient who consented to blood products.      NPO 10/2 @ midnight  Extensive discussion with patient, surgeon and  Dr. Guadarrama about risk of aspiration 2/2 recent Ozempic dose. Risks, benefits gone over with entire team; Risks of GA explained. Pt decided to cancel today and reschedule for next week.

## 2024-10-03 NOTE — ACP (ADVANCE CARE PLANNING)
Discussed with patient DNR code status, and for procedural purposes he is agreeable for full code during periprocedural time frame (24 hours) and will be back to DNR after.

## 2024-10-03 NOTE — H&P
History Of Present Illness  Prasanna Torrez is a 74 y.o. male presenting with SVT.    Patient is a 74-year-old gentleman with history of CAD with CABG, CAD with multiple stents, paroxysmal atrial fibrillation on Eliquis who presents for SVT ablation with Dr. Romero.     Patient has had about 4 episodes of paroxysmal SVT in the last few months.  He was seen by electrophysiology in August at the time when he was evaluated by EMS after being in SVT.  He was in a narrow complex tachycardia at about a rate of 180 with some associated chest pain and ECG ischemic changes.  No preexcitation on ECG otherwise.  Patient is with his significant other today and otherwise feels well.  He denies any chest pain or discomfort with activity.             Past Medical History  Past Medical History:   Diagnosis Date    Coronary artery disease     Diabetes mellitus (Multi)     Hyperlipidemia     Hypertension        Surgical History  Past Surgical History:   Procedure Laterality Date    CARDIAC CATHETERIZATION N/A 8/7/2024    Procedure: Left Heart Cath, No LV;  Surgeon: Sumeet Patricio MD;  Location: [facility] Cardiac Cath Lab;  Service: Cardiovascular;  Laterality: N/A;        Social History  He reports that he quit smoking about 12 years ago. His smoking use included cigarettes. He started smoking about 43 years ago. He has a 31 pack-year smoking history. He has never used smokeless tobacco. He reports that he does not drink alcohol and does not use drugs.    Family History  No family history on file.     Allergies  Dapagliflozin    Review of Systems     Physical Exam    GEN: NAD  HEENT: ATNC,  anicteric, no JVD  CV: RRR, no r/g/m  Pulm: CTAB  Abdomen: NTND  Ext: warm, no LE edema noted  Neuro: A+Ox3  Psych: appropriate       Last Recorded Vitals  There were no vitals taken for this visit.    Relevant Results             Assessment/Plan   Assessment & Plan  SVT (supraventricular tachycardia) (CMS-[facility])      #SVT  Plan for EPS and possible  ablation with Dr Nick Romo MD

## 2024-10-10 ENCOUNTER — ANESTHESIA (OUTPATIENT)
Dept: CARDIOLOGY | Facility: HOSPITAL | Age: 74
End: 2024-10-10
Payer: MEDICARE

## 2024-10-10 ENCOUNTER — ANESTHESIA EVENT (OUTPATIENT)
Dept: CARDIOLOGY | Facility: HOSPITAL | Age: 74
End: 2024-10-10
Payer: MEDICARE

## 2024-10-10 ENCOUNTER — HOSPITAL ENCOUNTER (OUTPATIENT)
Facility: HOSPITAL | Age: 74
Setting detail: OUTPATIENT SURGERY
Discharge: HOME | End: 2024-10-10
Attending: INTERNAL MEDICINE | Admitting: INTERNAL MEDICINE
Payer: MEDICARE

## 2024-10-10 ENCOUNTER — HOSPITAL ENCOUNTER (EMERGENCY)
Facility: HOSPITAL | Age: 74
Discharge: HOME | End: 2024-10-10
Attending: EMERGENCY MEDICINE
Payer: MEDICARE

## 2024-10-10 VITALS
DIASTOLIC BLOOD PRESSURE: 81 MMHG | WEIGHT: 217 LBS | OXYGEN SATURATION: 100 % | BODY MASS INDEX: 28.76 KG/M2 | TEMPERATURE: 97.7 F | SYSTOLIC BLOOD PRESSURE: 134 MMHG | RESPIRATION RATE: 19 BRPM | HEIGHT: 73 IN | HEART RATE: 71 BPM

## 2024-10-10 VITALS
HEIGHT: 73 IN | WEIGHT: 217 LBS | BODY MASS INDEX: 28.76 KG/M2 | OXYGEN SATURATION: 95 % | HEART RATE: 62 BPM | DIASTOLIC BLOOD PRESSURE: 74 MMHG | RESPIRATION RATE: 16 BRPM | SYSTOLIC BLOOD PRESSURE: 153 MMHG | TEMPERATURE: 97.9 F

## 2024-10-10 DIAGNOSIS — I47.10 SUPRAVENTRICULAR TACHYCARDIA, UNSPECIFIED (CMS-HCC): Primary | ICD-10-CM

## 2024-10-10 DIAGNOSIS — I47.10 SVT (SUPRAVENTRICULAR TACHYCARDIA) (CMS-HCC): Primary | ICD-10-CM

## 2024-10-10 LAB
ANION GAP SERPL CALC-SCNC: 11 MMOL/L (ref 10–20)
BASOPHILS # BLD AUTO: 0.08 X10*3/UL (ref 0–0.1)
BASOPHILS NFR BLD AUTO: 0.8 %
BUN SERPL-MCNC: 14 MG/DL (ref 6–23)
CALCIUM SERPL-MCNC: 8.7 MG/DL (ref 8.6–10.3)
CHLORIDE SERPL-SCNC: 107 MMOL/L (ref 98–107)
CO2 SERPL-SCNC: 24 MMOL/L (ref 21–32)
CREAT SERPL-MCNC: 0.9 MG/DL (ref 0.5–1.3)
EGFRCR SERPLBLD CKD-EPI 2021: 89 ML/MIN/1.73M*2
EOSINOPHIL # BLD AUTO: 0.38 X10*3/UL (ref 0–0.4)
EOSINOPHIL NFR BLD AUTO: 3.8 %
ERYTHROCYTE [DISTWIDTH] IN BLOOD BY AUTOMATED COUNT: 13.1 % (ref 11.5–14.5)
GLUCOSE SERPL-MCNC: 167 MG/DL (ref 74–99)
HCT VFR BLD AUTO: 46 % (ref 41–52)
HGB BLD-MCNC: 15.5 G/DL (ref 13.5–17.5)
IMM GRANULOCYTES # BLD AUTO: 0.04 X10*3/UL (ref 0–0.5)
IMM GRANULOCYTES NFR BLD AUTO: 0.4 % (ref 0–0.9)
LYMPHOCYTES # BLD AUTO: 2.03 X10*3/UL (ref 0.8–3)
LYMPHOCYTES NFR BLD AUTO: 20.1 %
MCH RBC QN AUTO: 29.2 PG (ref 26–34)
MCHC RBC AUTO-ENTMCNC: 33.7 G/DL (ref 32–36)
MCV RBC AUTO: 87 FL (ref 80–100)
MONOCYTES # BLD AUTO: 1.02 X10*3/UL (ref 0.05–0.8)
MONOCYTES NFR BLD AUTO: 10.1 %
NEUTROPHILS # BLD AUTO: 6.53 X10*3/UL (ref 1.6–5.5)
NEUTROPHILS NFR BLD AUTO: 64.8 %
NRBC BLD-RTO: 0 /100 WBCS (ref 0–0)
PLATELET # BLD AUTO: 265 X10*3/UL (ref 150–450)
POTASSIUM SERPL-SCNC: 4.6 MMOL/L (ref 3.5–5.3)
RBC # BLD AUTO: 5.31 X10*6/UL (ref 4.5–5.9)
SODIUM SERPL-SCNC: 137 MMOL/L (ref 136–145)
WBC # BLD AUTO: 10.1 X10*3/UL (ref 4.4–11.3)

## 2024-10-10 PROCEDURE — 93621 COMP EP EVL L PAC&REC C SINS: CPT | Performed by: INTERNAL MEDICINE

## 2024-10-10 PROCEDURE — C1730 CATH, EP, 19 OR FEW ELECT: HCPCS | Performed by: INTERNAL MEDICINE

## 2024-10-10 PROCEDURE — 93623 PRGRMD STIMJ&PACG IV RX NFS: CPT | Performed by: INTERNAL MEDICINE

## 2024-10-10 PROCEDURE — 99153 MOD SED SAME PHYS/QHP EA: CPT | Performed by: INTERNAL MEDICINE

## 2024-10-10 PROCEDURE — 93613 INTRACARDIAC EPHYS 3D MAPG: CPT | Performed by: INTERNAL MEDICINE

## 2024-10-10 PROCEDURE — 99283 EMERGENCY DEPT VISIT LOW MDM: CPT

## 2024-10-10 PROCEDURE — C1732 CATH, EP, DIAG/ABL, 3D/VECT: HCPCS | Performed by: INTERNAL MEDICINE

## 2024-10-10 PROCEDURE — 93653 COMPRE EP EVAL TX SVT: CPT | Performed by: INTERNAL MEDICINE

## 2024-10-10 PROCEDURE — 99152 MOD SED SAME PHYS/QHP 5/>YRS: CPT | Performed by: INTERNAL MEDICINE

## 2024-10-10 PROCEDURE — 7100000010 HC PHASE TWO TIME - EACH INCREMENTAL 1 MINUTE: Performed by: INTERNAL MEDICINE

## 2024-10-10 PROCEDURE — 85025 COMPLETE CBC W/AUTO DIFF WBC: CPT | Performed by: EMERGENCY MEDICINE

## 2024-10-10 PROCEDURE — 7100000009 HC PHASE TWO TIME - INITIAL BASE CHARGE: Performed by: INTERNAL MEDICINE

## 2024-10-10 PROCEDURE — 36415 COLL VENOUS BLD VENIPUNCTURE: CPT | Performed by: EMERGENCY MEDICINE

## 2024-10-10 PROCEDURE — C1887 CATHETER, GUIDING: HCPCS | Performed by: INTERNAL MEDICINE

## 2024-10-10 PROCEDURE — 2720000007 HC OR 272 NO HCPCS: Performed by: INTERNAL MEDICINE

## 2024-10-10 PROCEDURE — 80048 BASIC METABOLIC PNL TOTAL CA: CPT | Performed by: EMERGENCY MEDICINE

## 2024-10-10 PROCEDURE — 93609 INTRA-VNTR MAPG TCHYCAR SITE: CPT | Performed by: INTERNAL MEDICINE

## 2024-10-10 PROCEDURE — 2500000004 HC RX 250 GENERAL PHARMACY W/ HCPCS (ALT 636 FOR OP/ED): Performed by: INTERNAL MEDICINE

## 2024-10-10 RX ORDER — LIDOCAINE HYDROCHLORIDE 10 MG/ML
INJECTION, SOLUTION EPIDURAL; INFILTRATION; INTRACAUDAL; PERINEURAL AS NEEDED
Status: DISCONTINUED | OUTPATIENT
Start: 2024-10-10 | End: 2024-10-10 | Stop reason: HOSPADM

## 2024-10-10 RX ORDER — ONDANSETRON 4 MG/1
4 TABLET, FILM COATED ORAL EVERY 8 HOURS PRN
Status: DISCONTINUED | OUTPATIENT
Start: 2024-10-10 | End: 2024-10-10 | Stop reason: HOSPADM

## 2024-10-10 RX ORDER — FENTANYL CITRATE 50 UG/ML
50 INJECTION, SOLUTION INTRAMUSCULAR; INTRAVENOUS EVERY 5 MIN PRN
Status: DISCONTINUED | OUTPATIENT
Start: 2024-10-10 | End: 2024-10-10 | Stop reason: HOSPADM

## 2024-10-10 RX ORDER — ONDANSETRON HYDROCHLORIDE 2 MG/ML
4 INJECTION, SOLUTION INTRAVENOUS ONCE AS NEEDED
Status: DISCONTINUED | OUTPATIENT
Start: 2024-10-10 | End: 2024-10-10 | Stop reason: HOSPADM

## 2024-10-10 RX ORDER — ONDANSETRON HYDROCHLORIDE 2 MG/ML
4 INJECTION, SOLUTION INTRAVENOUS EVERY 8 HOURS PRN
Status: DISCONTINUED | OUTPATIENT
Start: 2024-10-10 | End: 2024-10-10 | Stop reason: HOSPADM

## 2024-10-10 RX ORDER — FENTANYL CITRATE 50 UG/ML
25 INJECTION, SOLUTION INTRAMUSCULAR; INTRAVENOUS EVERY 5 MIN PRN
Status: DISCONTINUED | OUTPATIENT
Start: 2024-10-10 | End: 2024-10-10 | Stop reason: HOSPADM

## 2024-10-10 RX ORDER — LIDOCAINE HYDROCHLORIDE 10 MG/ML
0.1 INJECTION, SOLUTION INFILTRATION; PERINEURAL ONCE
Status: DISCONTINUED | OUTPATIENT
Start: 2024-10-10 | End: 2024-10-10 | Stop reason: HOSPADM

## 2024-10-10 RX ORDER — FENTANYL CITRATE 50 UG/ML
INJECTION, SOLUTION INTRAMUSCULAR; INTRAVENOUS AS NEEDED
Status: DISCONTINUED | OUTPATIENT
Start: 2024-10-10 | End: 2024-10-10 | Stop reason: HOSPADM

## 2024-10-10 RX ORDER — FENTANYL CITRATE 50 UG/ML
12.5 INJECTION, SOLUTION INTRAMUSCULAR; INTRAVENOUS EVERY 5 MIN PRN
Status: DISCONTINUED | OUTPATIENT
Start: 2024-10-10 | End: 2024-10-10 | Stop reason: HOSPADM

## 2024-10-10 RX ORDER — SODIUM CHLORIDE, SODIUM LACTATE, POTASSIUM CHLORIDE, CALCIUM CHLORIDE 600; 310; 30; 20 MG/100ML; MG/100ML; MG/100ML; MG/100ML
100 INJECTION, SOLUTION INTRAVENOUS CONTINUOUS
Status: DISCONTINUED | OUTPATIENT
Start: 2024-10-10 | End: 2024-10-10 | Stop reason: HOSPADM

## 2024-10-10 RX ORDER — MIDAZOLAM HYDROCHLORIDE 1 MG/ML
INJECTION, SOLUTION INTRAMUSCULAR; INTRAVENOUS AS NEEDED
Status: DISCONTINUED | OUTPATIENT
Start: 2024-10-10 | End: 2024-10-10 | Stop reason: HOSPADM

## 2024-10-10 RX ORDER — DEXTROSE 50 % IN WATER (D50W) INTRAVENOUS SYRINGE
25
Status: DISCONTINUED | OUTPATIENT
Start: 2024-10-10 | End: 2024-10-10 | Stop reason: HOSPADM

## 2024-10-10 RX ORDER — DROPERIDOL 2.5 MG/ML
0.62 INJECTION, SOLUTION INTRAMUSCULAR; INTRAVENOUS ONCE AS NEEDED
Status: DISCONTINUED | OUTPATIENT
Start: 2024-10-10 | End: 2024-10-10 | Stop reason: HOSPADM

## 2024-10-10 RX ORDER — DEXTROSE 50 % IN WATER (D50W) INTRAVENOUS SYRINGE
12.5
Status: DISCONTINUED | OUTPATIENT
Start: 2024-10-10 | End: 2024-10-10 | Stop reason: HOSPADM

## 2024-10-10 RX ORDER — ACETAMINOPHEN 325 MG/1
650 TABLET ORAL EVERY 4 HOURS PRN
Status: DISCONTINUED | OUTPATIENT
Start: 2024-10-10 | End: 2024-10-10 | Stop reason: HOSPADM

## 2024-10-10 ASSESSMENT — ENCOUNTER SYMPTOMS
ENDOCRINE NEGATIVE: 1
PALPITATIONS: 1
SHORTNESS OF BREATH: 1
ALLERGIC/IMMUNOLOGIC NEGATIVE: 1
MUSCULOSKELETAL NEGATIVE: 1
EYES NEGATIVE: 1
GASTROINTESTINAL NEGATIVE: 1
HEMATOLOGIC/LYMPHATIC NEGATIVE: 1
CONSTITUTIONAL NEGATIVE: 1
PSYCHIATRIC NEGATIVE: 1
NEUROLOGICAL NEGATIVE: 1

## 2024-10-10 ASSESSMENT — COLUMBIA-SUICIDE SEVERITY RATING SCALE - C-SSRS
6. HAVE YOU EVER DONE ANYTHING, STARTED TO DO ANYTHING, OR PREPARED TO DO ANYTHING TO END YOUR LIFE?: NO
2. HAVE YOU ACTUALLY HAD ANY THOUGHTS OF KILLING YOURSELF?: NO
1. IN THE PAST MONTH, HAVE YOU WISHED YOU WERE DEAD OR WISHED YOU COULD GO TO SLEEP AND NOT WAKE UP?: NO
2. HAVE YOU ACTUALLY HAD ANY THOUGHTS OF KILLING YOURSELF?: NO
1. IN THE PAST MONTH, HAVE YOU WISHED YOU WERE DEAD OR WISHED YOU COULD GO TO SLEEP AND NOT WAKE UP?: NO
6. HAVE YOU EVER DONE ANYTHING, STARTED TO DO ANYTHING, OR PREPARED TO DO ANYTHING TO END YOUR LIFE?: NO

## 2024-10-10 ASSESSMENT — LIFESTYLE VARIABLES
HAVE PEOPLE ANNOYED YOU BY CRITICIZING YOUR DRINKING: NO
EVER HAD A DRINK FIRST THING IN THE MORNING TO STEADY YOUR NERVES TO GET RID OF A HANGOVER: NO
EVER FELT BAD OR GUILTY ABOUT YOUR DRINKING: NO
TOTAL SCORE: 0
HAVE YOU EVER FELT YOU SHOULD CUT DOWN ON YOUR DRINKING: NO

## 2024-10-10 ASSESSMENT — PAIN SCALES - GENERAL
PAINLEVEL_OUTOF10: 0 - NO PAIN

## 2024-10-10 ASSESSMENT — PAIN - FUNCTIONAL ASSESSMENT
PAIN_FUNCTIONAL_ASSESSMENT: 0-10

## 2024-10-10 NOTE — NURSING NOTE
Home going instructions specific to procedure given. Easily arousable; responding appropriately. Vs +/- 20% of pre procedure status. Significant complications are absent. Ambulates without dizziness/age appropriate activity, pulse ox above or equal to 92% on room air/ordered oxygen treatment. Care Plan Complete. Discharge to home accompanied by (Wife, Michelle). Discharged via wheelchair.

## 2024-10-10 NOTE — H&P
History Of Present Illness  Prasanna Torrez is a 74 y.o. male presenting with SVT, here for SVT RFA. PMH includes CAD with Lcx PCI and CABG x3 (2013) but only patent graft now is LIMA-LAD, CAD with multiple stents, paroxysmal atrial fibrillation on Eliquis.  Patient reports that he had an episode of SVT this morning where his heart rate was 176 beats per minute.  Patient called EMS and he received adenosine per ED note.  Patient converted to sinus rhythm by the time he arrived to the ED.  Patient reports that he had some chest discomfort along with shortness of breath during the episode.  Patient reports that is since resolved.  Patient has had multiple of palpitations this morning but not sustaining.     Past Medical History:  Past Medical History:   Diagnosis Date    Coronary artery disease     Diabetes mellitus (Multi)     Hyperlipidemia     Hypertension         Past Surgical History:  Past Surgical History:   Procedure Laterality Date    CARDIAC CATHETERIZATION N/A 8/7/2024    Procedure: Left Heart Cath, No LV;  Surgeon: Sumeet Patricio MD;  Location: [facility] Cardiac Cath Lab;  Service: Cardiovascular;  Laterality: N/A;          Social History:   reports that he quit smoking about 12 years ago. His smoking use included cigarettes. He started smoking about 43 years ago. He has a 31 pack-year smoking history. He has never used smokeless tobacco. He reports that he does not drink alcohol and does not use drugs.     Family History:  No family history on file.     Allergies:  Allergies   Allergen Reactions    Dapagliflozin Myalgia     Other reaction(s): Myalgias (Muscle Pain)        Home Medications:  Current Outpatient Medications   Medication Instructions    amLODIPine (NORVASC) 5 mg, oral, Daily    aspirin 81 mg EC tablet 1 tablet, oral, Daily    Eliquis 5 mg tablet 1 tablet, oral, 2 times daily    Lantus U-100 Insulin 40 Units, subcutaneous, Nightly, Take as directed per insulin instructions.    lisinopril 5 mg, oral,  Daily    metoprolol succinate XL (TOPROL-XL) 12.5 mg, oral, Daily, Do not crush or chew.    nitroglycerin (NITROSTAT) 0.4 mg, sublingual, Every 5 min PRN    Ozempic 0.5 mg, subcutaneous, Once Weekly    rosuvastatin (Crestor) 20 mg tablet 1 tablet, oral, Daily    tadalafil (CIALIS) 10 mg, oral, Daily PRN    tamsulosin (FLOMAX) 0.4 mg, oral, 2 times daily       Inpatient Medications:            Review of Systems   Constitutional: Negative.    HENT: Negative.     Eyes: Negative.    Respiratory:  Positive for shortness of breath.    Cardiovascular:  Positive for chest pain and palpitations.   Gastrointestinal: Negative.    Endocrine: Negative.    Genitourinary: Negative.    Musculoskeletal: Negative.    Skin: Negative.    Allergic/Immunologic: Negative.    Neurological: Negative.    Hematological: Negative.    Psychiatric/Behavioral: Negative.            Physical Exam  Constitutional:       General: He is awake. He is not in acute distress.     Appearance: He is not ill-appearing.   Cardiovascular:      Rate and Rhythm: Normal rate and regular rhythm.      Pulses: Normal pulses.           Radial pulses are 2+ on the right side.        Dorsalis pedis pulses are 2+ on the right side and 2+ on the left side.      Heart sounds: Normal heart sounds. No murmur heard.     Comments: Left radial absent s/p harvest for CABG. 2+ left ulnar pulse  Pulmonary:      Effort: Pulmonary effort is normal.      Breath sounds: Normal breath sounds and air entry.   Abdominal:      General: Bowel sounds are normal.      Palpations: Abdomen is soft.      Tenderness: There is no abdominal tenderness.   Musculoskeletal:      Right lower leg: No edema.      Left lower leg: No edema.   Skin:     General: Skin is warm and dry.   Neurological:      General: No focal deficit present.      Mental Status: He is alert and oriented to person, place, and time.      GCS: GCS eye subscore is 4. GCS verbal subscore is 5. GCS motor subscore is 6.  "  Psychiatric:         Mood and Affect: Mood normal.         Behavior: Behavior is cooperative.        Sedation Plan    ASA 3     Mallampati class: III.           NPO since last night around 1900    Last Recorded Vitals  Blood pressure 120/72, pulse 67, temperature 35.8 °C (96.4 °F), temperature source Temporal, resp. rate 16, height 1.854 m (6' 1\"), weight 98.4 kg (217 lb), SpO2 97%.         Vitals from the Past 24 Hours  Heart Rate:  [67-71]   Temp:  [35.8 °C (96.4 °F)-36.5 °C (97.7 °F)]   Resp:  [16-19]   BP: (120-134)/(72-81)   Height:  [185.4 cm (6' 1\")]   Weight:  [98.4 kg (217 lb)]   SpO2:  [97 %-100 %]          Relevant Results    Labs    CBC:   Recent Labs     10/10/24  0751 09/30/24  0854 09/19/24 2023 08/07/24 2133 07/19/24 1527 07/20/19 2029   WBC 10.1 9.8 12.1* 11.4* 12.1* 15.4*   HGB 15.5 14.4 14.3 16.2 14.3 15.2   HCT 46.0 44.8 43.1 47.1 42.9 47.9    282 295 295 276 369   MCV 87 89 87 87 87 86     BMP/CMP:   Recent Labs     10/10/24  0751 09/30/24  0854 09/19/24 2023 08/09/24  0921 08/07/24 2133 07/21/24  0632 07/19/24  1527 07/19/24  1527 07/20/19 2029    141 138 137 136 139   < > 135* 141   K 4.6 4.4 3.9 4.2 4.4 4.0   < > 4.3 4.1    108* 105 103 102 105   < > 102 105   BUN 14 20 18 14 16 19   < > 20 28*   CREATININE 0.90 0.98 0.90 1.08 1.06 0.94   < > 1.65* 1.17   CO2 24 25 25 27 26 25   < > 24 25   CALCIUM 8.7 8.7 8.3* 8.9 8.7 8.5*   < > 8.7 9.1   PROT  --   --   --   --  7.0  --   --  6.4 6.6   BILITOT  --   --   --   --  0.7  --   --  0.7 0.3   ALKPHOS  --   --   --   --  117  --   --  98 99   ALT  --   --   --   --  28  --   --  25 20   AST  --   --   --   --  34  --   --  18 15   GLUCOSE 167* 170* 169* 183* 246* 106*   < > 199* 62*    < > = values in this interval not displayed.      Lipid Panel:   Recent Labs     07/20/24  1348   CHOL 127   HDL 36.8   CHHDL 3.5   VLDL 37   TRIG 185*   NHDL 90     Cardiac       No lab exists for component: \"CK\", \"CKMBP\"   Hemoglobin " "A1C:   Recent Labs     07/20/24  1348 03/01/22  0622   HGBA1C 8.1* 7.4*     TSH/ Free T4: No results for input(s): \"TSH\", \"FREET4\" in the last 26319 hours.  Iron:   Recent Labs     07/20/24  1348 07/19/24  1527   BNP 37 52     Coag:     ABO: No results found for: \"ABO\"    Past Cardiology Tests (Last 3 Years):    EKG:  Recent Labs     09/19/24  1945 08/07/24 2120 07/22/24  1046   ATRRATE 67 71 68   VENTRATE 67 71 68   PRINT 217 203 210   QRSDUR 103 102 98   QTCFRED 395 426 438   QTCCALCB 403 438 446     Encounter Date: 09/19/24   ECG 12 lead   Result Value    Ventricular Rate 67    Atrial Rate 67    NC Interval 217    QRS Duration 103    QT Interval 381    QTC Calculation(Bazett) 403    P Axis -9    R Axis 32    T Axis 74    QRS Count 11    Q Onset 252    T Offset 442    QTC Fredericia 395    Narrative    Sinus rhythm  Borderline prolonged NC interval  Inferior infarct, old    See ED provider note for full interpretation and clinical correlation  Confirmed by Terri Floyd ([id number]) on 9/23/2024 5:27:44 PM     Echo:  Echocardiogram:   Transthoracic Echo (TTE) Complete With Contrast 08/08/2024    PHYSICIAN INTERPRETATION:  Left Ventricle: Left ventricular ejection fraction is low normal, by visual estimate at 50-55%. There are no regional wall motion abnormalities. The left ventricular cavity size is normal. The left ventricular septal wall thickness is normal. There is normal left ventricular posterior wall thickness. Spectral Doppler shows an impaired relaxation pattern of left ventricular diastolic filling.  Left Atrium: The left atrium is normal in size.  Right Ventricle: The right ventricle is mildly enlarged. There is normal right ventricular global systolic function.  Right Atrium: The right atrium is normal in size.  Aortic Valve: The aortic valve is probably trileaflet. There is mild aortic valve cusp calcification. There is mild aortic valve thickening. The aortic valve dimensionless index is 0.62. There is " trace to mild aortic valve regurgitation. The peak instantaneous gradient of the aortic valve is 8.3 mmHg. The mean gradient of the aortic valve is 4.0 mmHg.  Mitral Valve: The mitral valve is normal in structure. There is trace mitral valve regurgitation.  Tricuspid Valve: The tricuspid valve is structurally normal. No evidence of tricuspid regurgitation. IVC NWV.  Pulmonic Valve: The pulmonic valve is structurally normal. There is no indication of pulmonic valve regurgitation.  Pericardium: There is no pericardial effusion noted.  Aorta: The aortic root is normal. The aortic root is at the upper limits of normal size.      CONCLUSIONS:  1. Left ventricular ejection fraction is low normal, by visual estimate at 50-55%.  2. Spectral Doppler shows an impaired relaxation pattern of left ventricular diastolic filling.  3. There is normal right ventricular global systolic function.  4. Mildly enlarged right ventricle.      Ejection Fractions:  LV EF   Date/Time Value Ref Range Status   08/08/2024 07:45 AM 53 %      Cath:  Coronary Angiography:   Left Heart Cath, No LV 08/07/2024    Study: Left Heart Cath    Coronary Angiography:  The coronary circulation is left dominant.    Left Main Coronary Artery:  The left main coronary artery is a normal caliber vessel. The left main arises normally from the left coronary sinus of Valsalva and bifurcates into the LAD and circumflex coronary arteries. The left main coronary artery showed no significant disease or stenosis greater than 30%.    Left Anterior Descending Coronary Artery Distribution:  The left anterior descending coronary artery is a normal caliber vessel. The LAD arises normally from the left main coronary artery. The LAD demonstrated severe atherosclerotic disease.    Circumflex Coronary Artery Distribution:  The circumflex coronary artery is a normal caliber vessel. The circumflex arises normally from the left main coronary artery and terminates in the AV groove.  The circumflex revealed a previous patent stent.    Right Coronary Artery Distribution:    The right coronary artery is a small caliber vessel. The RCA arises normally from the right sinus of Valsalva. The RCA showed diffuse atherosclerotic disease.    Coronary Grafts:    LIMA Graft:  Left internal mammary artery graft conduit, originating in situ and attached to the mid left anterior descending, is patent. The left internal mammary artery graft conduit originating in situ and attached to the mid left anterior descending is patent.      Valve Findings:  No aortic valve stenosis is visualized.    Graft Stenosis Summary:  Graft     Destination of Graft  LIMA  mid left anterior descending      Complications:  No in-lab complications observed.    Cardiac Cath Post Procedure Notes:  Post Procedure           Double vessel disease.  Diagnosis:  Blood Loss:              Estimated blood loss during the procedure was <10 ml  mls.  Specimens Removed:       Number of specimen(s) removed: none.      Recommendations:  Maximize medical therapy.  EP to see.    ____________________________________________________________________________________  CONCLUSIONS:  1. Double vessel disease.  2. Patent LIMA to lAD and previous Cx stent.  3. Elevated LV filling pressures.  4. Left Ventricular end-diastolic pressure = 17.      Right Heart Cath: No results found for this or any previous visit from the past 1800 days.    Stress Test:  Nuclear:No results found for this or any previous visit from the past 1800 days.    Metabolic Stress: No results found for this or any previous visit from the past 1800 days.    Cardiac Imaging:  Cardiac Scoring: No results found for this or any previous visit from the past 1800 days.    Cardiac MRI: No results found for this or any previous visit from the past 1800 days.         Assessment/Plan  Assessment/Plan   Active Problems:  There are no active Hospital Problems.      SVT  -SVT RFA with Dr. Vogt on  10/10/24       NP discussed with Dr. Vogt regarding plan of care/ discharge plan      I spent 30 minutes in the professional and overall care of this patient.      Ayad Kumar, APRN-CNP, DNP

## 2024-10-10 NOTE — ANESTHESIA PREPROCEDURE EVALUATION
Patient: Prasanna Torrez    Procedure Information       Date/Time: 10/10/24 1230    Procedure: Ablation SVT - w/ Carto & consult anesthesia    [facility]: [facility]    Providers: Bobby Vogt MD            Relevant Problems   Cardiac   (+) CAD in native artery   (+) STEMI (ST elevation myocardial infarction) (Multi)   (+) SVT (supraventricular tachycardia) (CMS-HCC)       Clinical information reviewed:   Tobacco  Allergies  Meds   Med Hx  Surg Hx   Fam Hx  Soc Hx        NPO Detail:  NPO/Void Status  Carbohydrate Drink Given Prior to Surgery? : N  Date of Last Liquid: 10/09/24  Time of Last Liquid: 1800  Date of Last Solid: 10/09/24  Time of Last Solid: 1800  Last Intake Type: Food; Clear fluids  Time of Last Void: 1020         Physical Exam    Airway  Mallampati: II  TM distance: >3 FB  Neck ROM: full     Cardiovascular    Dental    Pulmonary    Abdominal        Anesthesia Plan    History of general anesthesia?: yes  History of complications of general anesthesia?: no    ASA 3     general   ([place], [place])  intravenous induction   Anesthetic plan and risks discussed with patient and spouse.    Plan discussed with CRNA.

## 2024-10-10 NOTE — ED PROVIDER NOTES
HPI   Chief Complaint   Patient presents with   • Rapid Heart Rate     Patient called EMS for chest discomfort, was found in SVT. Six milligrams of adenosine given en route-patient converted. Per patient this is the fifth episode recently. He is scheduled for an ablation today in [place] with .        Patient comes in with another episode of SVT.  He has a history of this in the past and this is his fifth episode. He woke up with palpitations this morning. EMS was called and gave him adenosine and he converted to NSR. He is scheduled for an ablation today with Dr. Romero. He has been off of his metoprolol for 2 days in preperation for the procedure.  He denies any chest pain or shortness of breath at this time.  He states he feels back to his baseline now that he converted.                          Dulac Coma Scale Score: 15                  Patient History   Past Medical History:   Diagnosis Date   • Coronary artery disease    • Diabetes mellitus (Multi)    • Hyperlipidemia    • Hypertension      Past Surgical History:   Procedure Laterality Date   • CARDIAC CATHETERIZATION N/A 2024    Procedure: Left Heart Cath, No LV;  Surgeon: Sumeet Patricio MD;  Location: [facility] Cardiac Cath Lab;  Service: Cardiovascular;  Laterality: N/A;     No family history on file.  Social History     Tobacco Use   • Smoking status: Former     Current packs/day: 0.00     Average packs/day: 1 pack/day for 31.0 years (31.0 ttl pk-yrs)     Types: Cigarettes     Start date:      Quit date:      Years since quittin.7   • Smokeless tobacco: Never   Vaping Use   • Vaping status: Never Used   Substance Use Topics   • Alcohol use: Never   • Drug use: Never       Physical Exam   ED Triage Vitals [10/10/24 0732]   Temperature Heart Rate Respirations BP   36.5 °C (97.7 °F) 71 18 134/76      Pulse Ox Temp Source Heart Rate Source Patient Position   98 % Tympanic Monitor Sitting      BP Location FiO2 (%)     Left arm --        Physical Exam  Vitals and nursing note reviewed.   Constitutional:       Appearance: Normal appearance.   HENT:      Head: Normocephalic and atraumatic.      Mouth/Throat:      Mouth: Mucous membranes are moist.   Eyes:      Extraocular Movements: Extraocular movements intact.      Pupils: Pupils are equal, round, and reactive to light.   Cardiovascular:      Rate and Rhythm: Normal rate and regular rhythm.      Heart sounds: No murmur heard.  Pulmonary:      Effort: Pulmonary effort is normal. No respiratory distress.      Breath sounds: Normal breath sounds.   Abdominal:      General: There is no distension.      Palpations: Abdomen is soft.      Tenderness: There is no abdominal tenderness.   Musculoskeletal:         General: No tenderness or deformity. Normal range of motion.      Right lower leg: No edema.      Left lower leg: No edema.   Skin:     General: Skin is warm and dry.      Findings: No lesion or rash.   Neurological:      General: No focal deficit present.      Mental Status: He is alert and oriented to person, place, and time.      Sensory: No sensory deficit.      Motor: No weakness.   Psychiatric:         Behavior: Behavior normal.       Labs Reviewed   CBC WITH AUTO DIFFERENTIAL   BASIC METABOLIC PANEL     No orders to display     ED Course & MDM   Diagnoses as of 10/10/24 0830   SVT (supraventricular tachycardia) (CMS-[facility])       Medical Decision Making  Differentials include SVT, arrhythmia, medication issue. Imaging studies, if performed, were independently reviewed and interpreted by myself and confirmed by radiologist. EKG(s), if performed, were interpreted by myself.  The patient's EKG here is normal sinus rhythm at a rate of 60.  Nonspecific ST and T wave changes noted.  QTc 430,  laboratory studies were obtained and were unremarkable except for glucose of 167.  He remained in normal sinus rhythm.  He is scheduled for an ablation today with Dr. Vogt.  I sent him a message and he  states the patient is still okay to have the procedure even though he got the adenosine.  I did send him a picture of the SVT episode with EMS.  Patient will be discharged to go to [facility] for his procedure.        Procedure  Procedures     Angel Luis De La Cruz MD  10/10/24 0061

## 2024-10-30 ENCOUNTER — HOSPITAL ENCOUNTER (EMERGENCY)
Age: 74
Discharge: HOME | End: 2024-10-30
Payer: MEDICARE

## 2024-10-30 ENCOUNTER — APPOINTMENT (OUTPATIENT)
Dept: GENERAL RADIOLOGY | Age: 74
End: 2024-10-30
Payer: MEDICARE

## 2024-10-30 VITALS
DIASTOLIC BLOOD PRESSURE: 57 MMHG | OXYGEN SATURATION: 96 % | TEMPERATURE: 98.4 F | SYSTOLIC BLOOD PRESSURE: 114 MMHG | HEART RATE: 68 BPM | RESPIRATION RATE: 18 BRPM

## 2024-10-30 DIAGNOSIS — M61.472: ICD-10-CM

## 2024-10-30 DIAGNOSIS — M77.30 BONE SPUR OF INFERIOR PORTION OF CALCANEUS, UNSPECIFIED LATERALITY: Primary | ICD-10-CM

## 2024-10-30 PROCEDURE — 73630 X-RAY EXAM OF FOOT: CPT

## 2024-10-30 PROCEDURE — 99283 EMERGENCY DEPT VISIT LOW MDM: CPT

## 2024-10-30 RX ORDER — ACETAMINOPHEN 500 MG
1000 TABLET ORAL ONCE
Status: DISCONTINUED | OUTPATIENT
Start: 2024-10-30 | End: 2024-10-30 | Stop reason: HOSPADM

## 2024-10-30 ASSESSMENT — PAIN DESCRIPTION - ONSET: ONSET: ON-GOING

## 2024-10-30 ASSESSMENT — PAIN DESCRIPTION - LOCATION
LOCATION: FOOT
LOCATION: FOOT

## 2024-10-30 ASSESSMENT — PAIN DESCRIPTION - PAIN TYPE: TYPE: ACUTE PAIN

## 2024-10-30 ASSESSMENT — PAIN - FUNCTIONAL ASSESSMENT: PAIN_FUNCTIONAL_ASSESSMENT: 0-10

## 2024-10-30 ASSESSMENT — LIFESTYLE VARIABLES
HOW OFTEN DO YOU HAVE A DRINK CONTAINING ALCOHOL: NEVER
HOW MANY STANDARD DRINKS CONTAINING ALCOHOL DO YOU HAVE ON A TYPICAL DAY: PATIENT DOES NOT DRINK

## 2024-10-30 ASSESSMENT — PAIN DESCRIPTION - ORIENTATION
ORIENTATION: LEFT
ORIENTATION: LEFT

## 2024-10-30 ASSESSMENT — PAIN SCALES - GENERAL
PAINLEVEL_OUTOF10: 8
PAINLEVEL_OUTOF10: 10

## 2024-10-30 ASSESSMENT — PAIN DESCRIPTION - DESCRIPTORS
DESCRIPTORS: ACHING;SORE;THROBBING
DESCRIPTORS: ACHING;DISCOMFORT;THROBBING

## 2024-10-30 ASSESSMENT — PAIN DESCRIPTION - FREQUENCY: FREQUENCY: CONTINUOUS

## 2024-10-30 NOTE — ED PROVIDER NOTES
Medication List as of 10/30/2024  3:20 PM          DISCONTINUED MEDICATIONS:  Discharge Medication List as of 10/30/2024  3:20 PM          Record Review:  Records Reviewed : None       Disposition Considerations:  This patient's ED course included: a personal history and physicial examination and re-evaluation prior to disposition  This patient has remained hemodynamically stable and been closely monitored during their ED course.       I emphasized the importance of follow-up with the physician I referred them to in the timeframe recommended.  I discussed with the patient emergent symptoms and the need to immediately return to the ER. Written information was included in their discharge instructions. Additional verbal discharge instructions were also given and discussed with the patient to supplement those generated by the EMR. We also discussed medications that were prescribed  (if any) including common side effects and interactions. The patient was advised to abstain from driving, operating heavy machinery or making significant decisions while taking medications such as opiates and muscle relaxers that may impair this. All questions were addressed.  They understand return precautions and discharge instructions. The patient and spouse / life partner  expressed understanding. Vitals were stable and they were in no distress at discharge.        Assessment      1. Bone spur of inferior portion of calcaneus, unspecified laterality    2. Other calcification of muscle, left ankle and foot      Plan   Discharged home.  Patient condition is good    New Medications     Discharge Medication List as of 10/30/2024  3:20 PM        Electronically signed by ODETTE Lackey   DD: 10/30/24  **This report was transcribed using voice recognition software. Every effort was made to ensure accuracy; however, inadvertent computerized transcription errors may be present.  END OF ED PROVIDER NOTE      Cecilia Butler PA  10/30/24  1829

## 2024-11-07 RX ORDER — LISINOPRIL 2.5 MG/1
5 TABLET ORAL DAILY
Qty: 30 TABLET | Refills: 3 | Status: SHIPPED | OUTPATIENT
Start: 2024-11-07

## 2024-11-25 ENCOUNTER — OFFICE VISIT (OUTPATIENT)
Dept: CARDIOLOGY | Facility: HOSPITAL | Age: 74
End: 2024-11-25
Payer: MEDICARE

## 2024-11-25 ENCOUNTER — TELEPHONE (OUTPATIENT)
Dept: CARDIOLOGY | Facility: CLINIC | Age: 74
End: 2024-11-25

## 2024-11-25 VITALS
SYSTOLIC BLOOD PRESSURE: 119 MMHG | HEART RATE: 62 BPM | OXYGEN SATURATION: 96 % | HEIGHT: 73 IN | BODY MASS INDEX: 30.22 KG/M2 | WEIGHT: 228 LBS | DIASTOLIC BLOOD PRESSURE: 75 MMHG

## 2024-11-25 DIAGNOSIS — I47.10 SVT (SUPRAVENTRICULAR TACHYCARDIA) (CMS-HCC): Primary | ICD-10-CM

## 2024-11-25 LAB
ATRIAL RATE: 62 BPM
P AXIS: 13 DEGREES
P OFFSET: 170 MS
P ONSET: 104 MS
PR INTERVAL: 230 MS
Q ONSET: 219 MS
QRS COUNT: 10 BEATS
QRS DURATION: 100 MS
QT INTERVAL: 414 MS
QTC CALCULATION(BAZETT): 420 MS
QTC FREDERICIA: 418 MS
R AXIS: 52 DEGREES
T AXIS: 90 DEGREES
T OFFSET: 426 MS
VENTRICULAR RATE: 62 BPM

## 2024-11-25 PROCEDURE — 1159F MED LIST DOCD IN RCRD: CPT | Performed by: INTERNAL MEDICINE

## 2024-11-25 PROCEDURE — 99214 OFFICE O/P EST MOD 30 MIN: CPT | Performed by: INTERNAL MEDICINE

## 2024-11-25 PROCEDURE — 3078F DIAST BP <80 MM HG: CPT | Performed by: INTERNAL MEDICINE

## 2024-11-25 PROCEDURE — 93005 ELECTROCARDIOGRAM TRACING: CPT | Performed by: INTERNAL MEDICINE

## 2024-11-25 PROCEDURE — 93010 ELECTROCARDIOGRAM REPORT: CPT | Performed by: INTERNAL MEDICINE

## 2024-11-25 PROCEDURE — 3074F SYST BP LT 130 MM HG: CPT | Performed by: INTERNAL MEDICINE

## 2024-11-25 PROCEDURE — 4010F ACE/ARB THERAPY RXD/TAKEN: CPT | Performed by: INTERNAL MEDICINE

## 2024-11-25 PROCEDURE — 3052F HG A1C>EQUAL 8.0%<EQUAL 9.0%: CPT | Performed by: INTERNAL MEDICINE

## 2024-11-25 PROCEDURE — 99214 OFFICE O/P EST MOD 30 MIN: CPT | Mod: 25 | Performed by: INTERNAL MEDICINE

## 2024-11-25 PROCEDURE — 3048F LDL-C <100 MG/DL: CPT | Performed by: INTERNAL MEDICINE

## 2024-11-25 PROCEDURE — 1036F TOBACCO NON-USER: CPT | Performed by: INTERNAL MEDICINE

## 2024-11-25 ASSESSMENT — ENCOUNTER SYMPTOMS
DEPRESSION: 0
LOSS OF SENSATION IN FEET: 0
OCCASIONAL FEELINGS OF UNSTEADINESS: 0

## 2024-11-25 NOTE — PROGRESS NOTES
"Referred by Bobby Littlejohn MD provider found for   Chief Complaint   Patient presents with    SVT        Prasanna Torrez is a 74 y.o. year old male patient with h/o AVNRT s/p RFA 6 weeks ago. Presents for follow up.    PMHx/PSHx: As above    FamHx: unremarkable     Allergies:  Allergies   Allergen Reactions    Dapagliflozin Myalgia     Other reaction(s): Myalgias (Muscle Pain)        Review of Systems    Constitutional: not feeling tired.   Eyes: no eyesight problems.   ENT: no hearing loss and no nosebleeds.   Cardiovascular: no intermittent leg claudication and as noted in HPI.   Respiratory: no chronic cough and no shortness of breath.   Gastrointestinal: no change in bowel habits and no blood in stools.   Genitourinary: no urinary frequency and no hematuria.   Skin: no skin rashes.   Neurological: no seizures and no frequent falls.   Psychiatric: no depression and not suicidal.   All other systems have been reviewed and are negative for complaint.     Outpatient Medications:  Current Outpatient Medications   Medication Instructions    amLODIPine (NORVASC) 5 mg, oral, Daily    aspirin 81 mg EC tablet 1 tablet, Daily    Eliquis 5 mg tablet 1 tablet, 2 times daily    Lantus U-100 Insulin 40 Units, Nightly    lisinopril 5 mg, oral, Daily    metoprolol succinate XL (TOPROL-XL) 12.5 mg, oral, Daily, Do not crush or chew.    nitroglycerin (NITROSTAT) 0.4 mg, Every 5 min PRN    Ozempic 0.5 mg, Once Weekly    rosuvastatin (Crestor) 20 mg tablet 1 tablet, Daily    tadalafil (CIALIS) 10 mg, Daily PRN         Last Recorded Vitals:      10/10/2024     3:15 PM 10/10/2024     3:45 PM 10/10/2024     4:00 PM 10/10/2024     4:30 PM 10/10/2024     5:00 PM 10/10/2024     5:26 PM 11/25/2024    10:08 AM   Vitals   Systolic 125 127 138 137 131 153 119   Diastolic 65 68 71 75 67 74 75   Heart Rate 60 60 60 62 59 62 62   Resp 14 14 14 14 14 16    Height       1.854 m (6' 1\")   Weight (lb)       228   BMI       30.08 kg/m2   BSA (m2)   " "    2.3 m2   Visit Report       Report    Visit Vitals  /75   Pulse 62   Ht 1.854 m (6' 1\")   Wt 103 kg (228 lb)   SpO2 96%   BMI 30.08 kg/m²   Smoking Status Former   BSA 2.3 m²        Physical Exam:  Constitutional: alert and in no acute distress.   Eyes: no erythema, swelling or discharge from the eye .   Neck: neck is supple, symmetric, trachea midline, no masses  and no thyromegaly .   Pulmonary: no increased work of breathing or signs of respiratory distress  and lungs clear to auscultation.    Cardiovascular: carotid pulses 2+ bilaterally with no bruit , JVP was normal, no thrills , regular rhythm, normal S1 and S2, no murmurs , pedal pulses 2+ bilaterally  and no edema .   Abdomen: abdomen non-tender, no masses  and no hepatomegaly .   Skin: skin warm and dry, normal skin turgor .   Psychiatric judgment and insight is normal  and oriented to person, place and time .        Assessment/Plan   Problem List Items Addressed This Visit             ICD-10-CM    SVT (supraventricular tachycardia) (CMS-HCC) - Primary I47.10    Relevant Orders    ECG 12 lead (Clinic Performed)       Prasanna Torrez is a 74 y.o. year old male patient with h/o AVNRT s/p RFA 6 weeks ago. Presents for follow up.  The patient reports doing well and denies symptoms. His current ECG shows NSR with narrow QRS and HR of 62 bpm. He is on no AAD. He is on Eliquis for his previous stents and will allow his cardiologist to decide on need to continue it. Patient will continue follow up with general cardiology and PCP.         Bobby Vogt MD  Cardiac Electrophysiology      Thank you very much for allowing me to participate in the care of this pleasant patient. Please do not hesitate to contact me with any further questions or concerns regarding his care.    **Disclaimer: This note was dictated by speech recognition, and every effort has been made to prevent any error in transcription, however minor errors may be present**    "

## 2024-12-04 ENCOUNTER — TELEPHONE (OUTPATIENT)
Dept: NON INVASIVE DIAGNOSTICS | Age: 74
End: 2024-12-04

## 2024-12-04 NOTE — TELEPHONE ENCOUNTER
Called and left message for patient to call back and schedule an appointment from a referral from  MILENA OSBORN

## 2024-12-16 ENCOUNTER — OFFICE VISIT (OUTPATIENT)
Dept: CARDIOLOGY | Facility: CLINIC | Age: 74
End: 2024-12-16
Payer: MEDICARE

## 2024-12-16 VITALS
HEIGHT: 73 IN | HEART RATE: 70 BPM | WEIGHT: 227 LBS | BODY MASS INDEX: 30.09 KG/M2 | SYSTOLIC BLOOD PRESSURE: 145 MMHG | DIASTOLIC BLOOD PRESSURE: 75 MMHG

## 2024-12-16 DIAGNOSIS — E78.2 MIXED HYPERLIPIDEMIA: ICD-10-CM

## 2024-12-16 DIAGNOSIS — I10 ESSENTIAL HYPERTENSION: ICD-10-CM

## 2024-12-16 DIAGNOSIS — I25.810 CORONARY ARTERY DISEASE INVOLVING CORONARY BYPASS GRAFT OF NATIVE HEART WITHOUT ANGINA PECTORIS: ICD-10-CM

## 2024-12-16 DIAGNOSIS — I47.10 SVT (SUPRAVENTRICULAR TACHYCARDIA) (CMS-HCC): Primary | ICD-10-CM

## 2024-12-16 DIAGNOSIS — Z86.73 STROKE OCCURRING WITHIN LAST MONTH: ICD-10-CM

## 2024-12-16 DIAGNOSIS — I48.0 PAF (PAROXYSMAL ATRIAL FIBRILLATION) (MULTI): ICD-10-CM

## 2024-12-16 PROCEDURE — 1160F RVW MEDS BY RX/DR IN RCRD: CPT | Performed by: INTERNAL MEDICINE

## 2024-12-16 PROCEDURE — 99215 OFFICE O/P EST HI 40 MIN: CPT | Performed by: INTERNAL MEDICINE

## 2024-12-16 PROCEDURE — 3077F SYST BP >= 140 MM HG: CPT | Performed by: INTERNAL MEDICINE

## 2024-12-16 PROCEDURE — 3048F LDL-C <100 MG/DL: CPT | Performed by: INTERNAL MEDICINE

## 2024-12-16 PROCEDURE — RXMED WILLOW AMBULATORY MEDICATION CHARGE

## 2024-12-16 PROCEDURE — 1159F MED LIST DOCD IN RCRD: CPT | Performed by: INTERNAL MEDICINE

## 2024-12-16 PROCEDURE — 3078F DIAST BP <80 MM HG: CPT | Performed by: INTERNAL MEDICINE

## 2024-12-16 PROCEDURE — 4010F ACE/ARB THERAPY RXD/TAKEN: CPT | Performed by: INTERNAL MEDICINE

## 2024-12-16 PROCEDURE — 3052F HG A1C>EQUAL 8.0%<EQUAL 9.0%: CPT | Performed by: INTERNAL MEDICINE

## 2024-12-16 PROCEDURE — 1036F TOBACCO NON-USER: CPT | Performed by: INTERNAL MEDICINE

## 2024-12-16 RX ORDER — PRAVASTATIN SODIUM 20 MG/1
20 TABLET ORAL DAILY
COMMUNITY
End: 2024-12-18 | Stop reason: WASHOUT

## 2024-12-16 RX ORDER — METOPROLOL SUCCINATE 50 MG/1
50 TABLET, EXTENDED RELEASE ORAL DAILY
Qty: 90 TABLET | Refills: 3 | Status: SHIPPED | OUTPATIENT
Start: 2024-12-16 | End: 2025-12-16

## 2024-12-16 NOTE — PROGRESS NOTES
Chief Complaint:   Coronary Artery Disease     History of Present Illness     Prasanna Torrez is a 74 y.o. male presenting with coronary artery disease.  He had a history of MI and underwent coronary artery bypass surgery 2013 (L-LAD) and NSTEMI 3/1/22 and AF s/p DEMETRICE LCX x 2 - CP and SVT 8/7/24 - patent L-LAD and CX stent.  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 The patient is tolerating guideline-directed medical therapy with antiplatelet and statin medication and is compliant.  No recurrent AF on Eliquis (2022) [id number].  No Hx CVA. The patient is active regularly and follows a heart healthy diet.  The patient has been well since RFA and is not having any anginal symptoms or dyspnea on exertion.      Review of Systems  All pertinent systems have been reviewed and are negative except for what is stated in the history of present illness.    All other systems have been reviewed and are negative and noncontributory to this patient's current ailments.  .       Previous History     Past Medical History:  He has a past medical history of Coronary artery disease, Diabetes mellitus (Multi), Essential hypertension (12/16/2024), Hyperlipidemia, Hypertension, Mixed hyperlipidemia (12/16/2024), and PAF (paroxysmal atrial fibrillation) (Multi) (12/16/2024).    Past Surgical History:  He has a past surgical history that includes Cardiac catheterization (N/A, 8/7/2024) and Cardiac electrophysiology procedure (N/A, 10/10/2024).      Social History:  He reports that he quit smoking about 12 years ago. His smoking use included cigarettes. He started smoking about 43 years ago. He has a 31 pack-year smoking history. He has never used smokeless tobacco. He reports that he does not drink alcohol and does not use drugs.    Family History:  Family History   Problem Relation Name Age of Onset    Coronary artery disease Father      Other (CABG) Father          Allergies:  Dapagliflozin    Outpatient Medications:  Current Outpatient  "Medications   Medication Instructions    amLODIPine (NORVASC) 5 mg, oral, Daily    aspirin 81 mg EC tablet 1 tablet, Daily    Eliquis 5 mg tablet 1 tablet, 2 times daily    Lantus U-100 Insulin 40 Units, Nightly    lisinopril 5 mg, oral, Daily    metoprolol succinate XL (TOPROL-XL) 12.5 mg, oral, Daily, Do not crush or chew.    nitroglycerin (NITROSTAT) 0.4 mg, Every 5 min PRN    Ozempic 0.5 mg, Once Weekly    rosuvastatin (Crestor) 20 mg tablet 1 tablet, Daily    tadalafil (CIALIS) 10 mg, Daily PRN       Physical Examination   Vitals:  Visit Vitals  /75 (BP Location: Right arm, Patient Position: Sitting, BP Cuff Size: Large adult)   Pulse 70   Ht 1.854 m (6' 1\")   Wt 103 kg (227 lb)   BMI 29.95 kg/m²   Smoking Status Former   BSA 2.3 m²    Physical Exam  Vitals reviewed.   Constitutional:       General: He is not in acute distress.     Appearance: Normal appearance.   HENT:      Head: Normocephalic and atraumatic.      Nose: Nose normal.   Eyes:      Conjunctiva/sclera: Conjunctivae normal.   Cardiovascular:      Rate and Rhythm: Normal rate and regular rhythm.      Pulses: Normal pulses.      Heart sounds: No murmur heard.  Pulmonary:      Effort: Pulmonary effort is normal. No respiratory distress.      Breath sounds: Normal breath sounds. No wheezing, rhonchi or rales.   Abdominal:      General: Bowel sounds are normal. There is no distension.      Palpations: Abdomen is soft.      Tenderness: There is no abdominal tenderness.   Musculoskeletal:         General: No swelling.      Right lower leg: No edema.      Left lower leg: No edema.   Skin:     General: Skin is warm and dry.      Capillary Refill: Capillary refill takes less than 2 seconds.   Neurological:      General: No focal deficit present.      Mental Status: He is alert.   Psychiatric:         Mood and Affect: Mood normal.             Labs/Imaging/Cardiac Studies   I have personally reviewed the patient's available lab work, primary care " appointment notes, pertinent imaging studies, and cardiac studies and have discussed them and my independent interpretation of those results with the patient and caregiver at this appointment.  All pertinent recent Emergency Department evaluations and Hospital admissions were also reviewed in detail with the patient and caregiver.    Extensive records reviewed.  Caths, EPS, Echos, Labs    Echo:  Transthoracic Echo (TTE) Complete    Result Date: 8/8/2024              [place] [id number]      Phone [phone] Fax [phone] TRANSTHORACIC ECHOCARDIOGRAM REPORT Patient Name:      YOVANI Johnson Physician:   [id number] Jesus López DO Study Date:        8/8/2024             Ordering Provider:   [id number] LAYTON MRN/PID:           [id number]             Fellow: Accession#:        [id number]         Nurse: Date of Birth/Age: 1950 / 73 years  Sonographer:         Meena Serrano VERONA Gender:            M                    Additional Staff: Height:            185.42 cm            Admit Date:          8/7/2024 Weight:            99.79 kg             Admission Status:    Inpatient - Routine BSA / BMI:         2.24 m2 / 29.03      Department Location: [facility]                    kg/m2 Blood Pressure: 145 /77 mmHg Study Type:    TRANSTHORACIC ECHO (TTE) COMPLETE Diagnosis/ICD: Supraventricular tachycardia-I47.1; Chest pain,                unspecified-R07.9; Abnormal electrocardiogram [ECG] [EKG]-R94.31 Indication:    Abnormal EKG CPT Codes:     Echo Complete w Full Doppler-[id number] Patient History: Pertinent History: CAD. Study Detail: The following Echo studies were performed: 2D, M-Mode, Doppler and               color flow. Optison used as a contrast agent for endocardial               border definition. Total contrast used for this procedure was 3 mL               via IV push.  PHYSICIAN INTERPRETATION: Left Ventricle: Left ventricular ejection fraction is low  normal, by visual estimate at 50-55%. There are no regional wall motion abnormalities. The left ventricular cavity size is normal. The left ventricular septal wall thickness is normal. There is normal left ventricular posterior wall thickness. Spectral Doppler shows an impaired relaxation pattern of left ventricular diastolic filling. Left Atrium: The left atrium is normal in size. Right Ventricle: The right ventricle is mildly enlarged. There is normal right ventricular global systolic function. Right Atrium: The right atrium is normal in size. Aortic Valve: The aortic valve is probably trileaflet. There is mild aortic valve cusp calcification. There is mild aortic valve thickening. The aortic valve dimensionless index is 0.62. There is trace to mild aortic valve regurgitation. The peak instantaneous gradient of the aortic valve is 8.3 mmHg. The mean gradient of the aortic valve is 4.0 mmHg. Mitral Valve: The mitral valve is normal in structure. There is trace mitral valve regurgitation. Tricuspid Valve: The tricuspid valve is structurally normal. No evidence of tricuspid regurgitation. IVC NWV. Pulmonic Valve: The pulmonic valve is structurally normal. There is no indication of pulmonic valve regurgitation. Pericardium: There is no pericardial effusion noted. Aorta: The aortic root is normal. The aortic root is at the upper limits of normal size.  CONCLUSIONS:  1. Left ventricular ejection fraction is low normal, by visual estimate at 50-55%.  2. Spectral Doppler shows an impaired relaxation pattern of left ventricular diastolic filling.  3. There is normal right ventricular global systolic function.  4. Mildly enlarged right ventricle. QUANTITATIVE DATA SUMMARY: 2D MEASUREMENTS:                          Normal Ranges: Ao Root d:     3.70 cm   (2.0-3.7cm) LAs:           4.40 cm   (2.7-4.0cm) IVSd:          1.10 cm   (0.6-1.1cm) LVPWd:         1.10 cm   (0.6-1.1cm) LVIDd:         4.90 cm   (3.9-5.9cm) LVIDs:          3.30 cm LV Mass Index: 89.5 g/m2 LV % FS        32.7 % LA VOLUME:                               Normal Ranges: LA Vol A4C:        62.2 ml    (22+/-6mL/m2) LA Vol A2C:        55.0 ml LA Vol BP:         62.8 ml LA Vol Index A4C:  27.8ml/m2 LA Vol Index A2C:  24.5 ml/m2 LA Vol Index BP:   28.0 ml/m2 LA Area A4C:       20.1 cm2 LA Area A2C:       20.3 cm2 LA Major Axis A4C: 5.5 cm LA Major Axis A2C: 6.4 cm LA Volume Index:   26.2 ml/m2 RA VOLUME BY A/L METHOD:                       Normal Ranges: RA Area A4C: 16.6 cm2 AORTA MEASUREMENTS:                      Normal Ranges: Ao Sinus, d: 3.40 cm (2.1-3.5cm) Asc Ao, d:   3.40 cm (2.1-3.4cm) LV SYSTOLIC FUNCTION BY 2D PLANIMETRY (MOD):                      Normal Ranges: EF-A4C View:    47 % (>=55%) EF-A2C View:    44 % EF-Biplane:     48 % EF-Visual:      53 % LV EF Reported: 53 % LV DIASTOLIC FUNCTION:                         Normal Ranges: MV Peak E:    0.80 m/s  (0.7-1.2 m/s) MV Peak A:    0.89 m/s  (0.42-0.7 m/s) E/A Ratio:    0.90      (1.0-2.2) MV e'         0.072 m/s (>8.0) MV lateral e' 0.08 m/s MV medial e'  0.06 m/s E/e' Ratio:   11.16     (<8.0) MITRAL VALVE:                 Normal Ranges: MV DT: 275 msec (150-240msec) AORTIC VALVE:                                   Normal Ranges: AoV Vmax:                1.44 m/s (<=1.7m/s) AoV Peak P.3 mmHg (<20mmHg) AoV Mean P.0 mmHg (1.7-11.5mmHg) LVOT Max Pravin:            0.88 m/s (<=1.1m/s) AoV VTI:                 37.10 cm (18-25cm) LVOT VTI:                23.00 cm LVOT Diameter:           2.00 cm  (1.8-2.4cm) AoV Area, VTI:           1.95 cm2 (2.5-5.5cm2) AoV Area,Vmax:           1.91 cm2 (2.5-4.5cm2) AoV Dimensionless Index: 0.62  RIGHT VENTRICLE: RV Basal 3.60 cm RV Mid   4.20 cm RV Major 7.0 cm TAPSE:   22.0 mm RV s'    0.12 m/s  [id number] Jesus López DO Electronically signed on 2024 at 10:15:53 AM  ** Final **         Assessment and Recommendations     Assessment/Plan       1. 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    2. PAF (paroxysmal atrial fibrillation) (Multi)  The patient has been clinically stable, asymptomatic, and maintaining normal sinus rhythm.  They will continue treatment with rate-controlling medications and anticoagulation for stroke prophylaxis based upon their present [id number] score, the risks and benefits of which were discussed with the patient/family/caregiver.     3. Coronary artery disease involving coronary bypass graft of native heart without angina pectoris  The patient's CAD, as detailed in the HPI, has been clinically stable, without any anginal symptoms or dyspnea.  The patient will continue treatment with guideline-directed medical therapy with antiplatelet (ASA) and statin medications and was advised regular exercise and a heart healthy diet.        4. Essential hypertension  The patient's blood pressure has been well-controlled at today's appointment or by recent primary care provider's measurements/home measurements and meets their goal blood pressure per the ACC/AHA guidelines.  The patient has been compliant with their anti-hypertensive medications and is following a low sodium/DASH diet. I advised continuation of their present medical treatment and lifestyle modification.      5. Mixed hyperlipidemia  The patient's lipids are well controlled on chronic pravastatin therapy and they are meeting their goal LDL cholesterol per the ACC/AHA guidelines.       Prasanna Torrez will return in 1 year for an office visit.         Kennedy Sandhu MD    Exclusive of any other services or procedures performed, I, Kennedy Sandhu MD , spent 30 minutes in duration for this visit today.  This time consisted of chart review, obtaining history, and/or performing the exam as documented above as well as documenting the clinical information for the encounter in the electronic record, discussing treatment options, plans, and/or  goals with patient, family, and/or caregiver, refilling medications, updating the electronic record, ordering medicines, lab work, imaging, referrals, and/or procedures as documented above and communicating with other [facility] professionals. I have discussed the results of laboratory, radiology, and cardiology studies with the patient and their family/caregiver.

## 2024-12-18 ENCOUNTER — PHARMACY VISIT (OUTPATIENT)
Dept: PHARMACY | Facility: CLINIC | Age: 74
End: 2024-12-18
Payer: COMMERCIAL

## 2024-12-18 DIAGNOSIS — E78.2 MIXED HYPERLIPIDEMIA: Primary | ICD-10-CM

## 2024-12-18 RX ORDER — ROSUVASTATIN CALCIUM 20 MG/1
20 TABLET, COATED ORAL DAILY
Qty: 30 TABLET | Refills: 11 | Status: SHIPPED | OUTPATIENT
Start: 2024-12-18 | End: 2025-12-18

## 2024-12-25 DIAGNOSIS — E11.9 DIABETES MELLITUS WITHOUT COMPLICATION (HCC): ICD-10-CM

## 2024-12-26 NOTE — TELEPHONE ENCOUNTER
Name of Medication(s) Requested:  Requested Prescriptions     Pending Prescriptions Disp Refills    LANTUS SOLOSTAR 100 UNIT/ML injection pen [Pharmacy Med Name: LANTUS SOLOSTAR PEN INJ 3ML] 45 mL 1     Sig: INJECT 60 UNITS SUBCUTANEOUSLY NIGHTLY       Medication is on current medication list Yes    Dosage and directions were verified? Yes    Quantity verified: 30 day supply     Pharmacy Verified?  Yes    Last Appointment:  8/6/2024    Future appts:  No future appointments.     (If no appt send self scheduling link. .REFILLAPPT)  Scheduling request sent?     [] Yes  [x] No    Does patient need updated?  [] Yes  [x] No

## 2024-12-27 RX ORDER — INSULIN GLARGINE 100 [IU]/ML
40 INJECTION, SOLUTION SUBCUTANEOUS NIGHTLY
Qty: 1 ADJUSTABLE DOSE PRE-FILLED PEN SYRINGE | Refills: 3 | Status: SHIPPED | OUTPATIENT
Start: 2024-12-27

## 2025-01-06 ENCOUNTER — TELEPHONE (OUTPATIENT)
Dept: CARDIOLOGY | Facility: CLINIC | Age: 75
End: 2025-01-06
Payer: MEDICARE

## 2025-01-06 DIAGNOSIS — I10 ESSENTIAL HYPERTENSION: ICD-10-CM

## 2025-01-06 RX ORDER — AMLODIPINE BESYLATE 5 MG/1
5 TABLET ORAL DAILY
Qty: 90 TABLET | Refills: 3 | Status: SHIPPED | OUTPATIENT
Start: 2025-01-06

## 2025-01-07 RX ORDER — AMLODIPINE BESYLATE 5 MG/1
5 TABLET ORAL DAILY
Qty: 30 TABLET | Refills: 3 | Status: SHIPPED | OUTPATIENT
Start: 2025-01-07

## 2025-01-08 ENCOUNTER — OFFICE VISIT (OUTPATIENT)
Dept: FAMILY MEDICINE CLINIC | Age: 75
End: 2025-01-08
Payer: MEDICARE

## 2025-01-08 VITALS
HEIGHT: 73 IN | HEART RATE: 72 BPM | SYSTOLIC BLOOD PRESSURE: 136 MMHG | DIASTOLIC BLOOD PRESSURE: 74 MMHG | RESPIRATION RATE: 16 BRPM | OXYGEN SATURATION: 97 % | TEMPERATURE: 98.4 F | BODY MASS INDEX: 29.97 KG/M2 | WEIGHT: 226.1 LBS

## 2025-01-08 DIAGNOSIS — N52.9 VASCULOGENIC ERECTILE DYSFUNCTION, UNSPECIFIED VASCULOGENIC ERECTILE DYSFUNCTION TYPE: ICD-10-CM

## 2025-01-08 DIAGNOSIS — E11.9 DIABETES MELLITUS WITHOUT COMPLICATION (HCC): ICD-10-CM

## 2025-01-08 LAB — HBA1C MFR BLD: 8.9 %

## 2025-01-08 PROCEDURE — 1123F ACP DISCUSS/DSCN MKR DOCD: CPT | Performed by: FAMILY MEDICINE

## 2025-01-08 PROCEDURE — 83036 HEMOGLOBIN GLYCOSYLATED A1C: CPT | Performed by: FAMILY MEDICINE

## 2025-01-08 PROCEDURE — 1159F MED LIST DOCD IN RCRD: CPT | Performed by: FAMILY MEDICINE

## 2025-01-08 PROCEDURE — 99214 OFFICE O/P EST MOD 30 MIN: CPT | Performed by: FAMILY MEDICINE

## 2025-01-08 PROCEDURE — 3052F HG A1C>EQUAL 8.0%<EQUAL 9.0%: CPT | Performed by: FAMILY MEDICINE

## 2025-01-08 RX ORDER — INSULIN GLARGINE 100 [IU]/ML
INJECTION, SOLUTION SUBCUTANEOUS
Qty: 12 ADJUSTABLE DOSE PRE-FILLED PEN SYRINGE | Refills: 2 | Status: SHIPPED | OUTPATIENT
Start: 2025-01-08

## 2025-01-08 RX ORDER — PIMECROLIMUS 10 MG/G
CREAM TOPICAL
COMMUNITY
Start: 2025-01-02

## 2025-01-08 RX ORDER — TADALAFIL 10 MG/1
TABLET ORAL
Qty: 20 TABLET | Refills: 2 | OUTPATIENT
Start: 2025-01-08

## 2025-01-08 RX ORDER — TADALAFIL 10 MG
10 TABLET ORAL PRN
Qty: 20 TABLET | Refills: 2 | Status: SHIPPED | OUTPATIENT
Start: 2025-01-08

## 2025-01-08 RX ORDER — SODIUM FLUORIDE 6.1 MG/ML
GEL, DENTIFRICE DENTAL
COMMUNITY
Start: 2024-12-11

## 2025-01-08 SDOH — ECONOMIC STABILITY: FOOD INSECURITY: WITHIN THE PAST 12 MONTHS, YOU WORRIED THAT YOUR FOOD WOULD RUN OUT BEFORE YOU GOT MONEY TO BUY MORE.: NEVER TRUE

## 2025-01-08 SDOH — ECONOMIC STABILITY: FOOD INSECURITY: WITHIN THE PAST 12 MONTHS, THE FOOD YOU BOUGHT JUST DIDN'T LAST AND YOU DIDN'T HAVE MONEY TO GET MORE.: NEVER TRUE

## 2025-01-08 ASSESSMENT — ENCOUNTER SYMPTOMS
APNEA: 0
VOMITING: 0
FACIAL SWELLING: 0
SINUS PRESSURE: 0
BLOOD IN STOOL: 0
SINUS PAIN: 0
CONSTIPATION: 0
DIARRHEA: 0
ABDOMINAL DISTENTION: 0
COUGH: 0
PHOTOPHOBIA: 0
RHINORRHEA: 0
CHEST TIGHTNESS: 0
SHORTNESS OF BREATH: 0
COLOR CHANGE: 0

## 2025-01-08 ASSESSMENT — PATIENT HEALTH QUESTIONNAIRE - PHQ9
SUM OF ALL RESPONSES TO PHQ QUESTIONS 1-9: 0
2. FEELING DOWN, DEPRESSED OR HOPELESS: NOT AT ALL
SUM OF ALL RESPONSES TO PHQ QUESTIONS 1-9: 0
SUM OF ALL RESPONSES TO PHQ9 QUESTIONS 1 & 2: 0
1. LITTLE INTEREST OR PLEASURE IN DOING THINGS: NOT AT ALL

## 2025-01-08 NOTE — PROGRESS NOTES
flow. He gets up twice at night to urinate, down from four times, and feels his bladder empties completely.  - Onset: Lightheadedness when taking tamsulosin on an empty stomach in the morning.  - Character: Lightheadedness, urinary difficulty at night, weak stream.  - Alleviating Factors: Adjusted medication schedule to take tamsulosin at 3:00 PM after lunch.  - Severity: Improved symptoms with normal stream at night, no dizziness, and semen flow; gets up twice at night to urinate, down from four times.    SVT Attacks and Ablation Surgery  He had 4 SVT attacks and ablation surgery on 10/10/2024. He had to change cardiologist. He had chest pains during his third attack. He went to [place], [place] and saw a nurse pediatrics who put a monitor on him. Two weeks later, he had another SVT attack and was taken to [facility] where they did the ablation surgery. He has a new cardiologist at [place]. He had 4 attacks in 6 weeks and they were painful. They did a cath at the [place]. He has 2 stents. He had an A. fib attack a few years ago. The ablation surgery has taken care of it. Every once in a while, if he has just a little bit of sugar or ice cream cone or some scotch, then he gets a palpitation.  - Onset: 4 SVT attacks and ablation surgery on 10/10/2024.  - Character: Chest pains during third attack, 4 attacks in 6 weeks, painful.  - Alleviating Factors: Ablation surgery, new cardiologist, 2 stents.  - Timing: Every once in a while, gets a palpitation with a little bit of sugar, ice cream cone, or scotch.  - Severity: Ablation surgery has taken care of A. fib attack; palpitations with certain foods or drinks.    MEDICATIONS  Current: Ozempic, insulin, tamsulosin, Cialis.    Patient's past medical, surgical, social and/or family history reviewed, updated in chart, and are non-contributory (unless otherwise stated).  Medications and allergies also reviewed and updated in chart.

## 2025-01-09 ENCOUNTER — TELEPHONE (OUTPATIENT)
Dept: FAMILY MEDICINE CLINIC | Age: 75
End: 2025-01-09

## 2025-01-09 DIAGNOSIS — E11.9 DIABETES MELLITUS WITHOUT COMPLICATION (HCC): ICD-10-CM

## 2025-01-09 RX ORDER — INSULIN GLARGINE 100 [IU]/ML
INJECTION, SOLUTION SUBCUTANEOUS
Qty: 18 ML | Refills: 2 | Status: CANCELLED | OUTPATIENT
Start: 2025-01-09

## 2025-01-09 NOTE — TELEPHONE ENCOUNTER
Bryant called back today and stated that all is well with his prescriptions. He picked up several insulin pens at the pharmacy.      Bryant called to inform that when he went to the pharmacy to  the Insulin pens, they only gave him 1 pen.    He questioned that because he was expecting more than that. I see the script was written for 12 pens.    I will call the pharmacy once they open to see what we need to do.

## 2025-03-11 DIAGNOSIS — I10 ESSENTIAL HYPERTENSION: ICD-10-CM

## 2025-03-12 RX ORDER — LISINOPRIL 5 MG/1
5 TABLET ORAL DAILY
Qty: 90 TABLET | Refills: 2 | Status: SHIPPED | OUTPATIENT
Start: 2025-03-12

## 2025-03-17 RX ORDER — TAMSULOSIN HYDROCHLORIDE 0.4 MG/1
0.8 CAPSULE ORAL DAILY
Qty: 180 CAPSULE | Refills: 1 | Status: SHIPPED | OUTPATIENT
Start: 2025-03-17 | End: 2025-09-13

## 2025-03-17 NOTE — TELEPHONE ENCOUNTER
Name of Medication(s) Requested:  Requested Prescriptions     Pending Prescriptions Disp Refills    tamsulosin (FLOMAX) 0.4 MG capsule [Pharmacy Med Name: TAMSULOSIN 0.4MG CAPSULES] 180 capsule 1     Sig: TAKE 2 CAPSULES BY MOUTH DAILY       Medication is on current medication list Yes    Dosage and directions were verified? Yes    Quantity verified: 90 day supply     Pharmacy Verified?  Yes    Last Appointment:  1/8/2025    Future appts:  No future appointments.     (If no appt send self scheduling link. .REFILLAPPT)  Scheduling request sent?     [] Yes  [x] No    Does patient need updated?  [] Yes  [x] No

## 2025-03-20 ENCOUNTER — OFFICE VISIT (OUTPATIENT)
Dept: FAMILY MEDICINE CLINIC | Age: 75
End: 2025-03-20
Payer: MEDICARE

## 2025-03-20 VITALS
HEART RATE: 64 BPM | SYSTOLIC BLOOD PRESSURE: 120 MMHG | DIASTOLIC BLOOD PRESSURE: 64 MMHG | WEIGHT: 224 LBS | HEIGHT: 73 IN | RESPIRATION RATE: 16 BRPM | BODY MASS INDEX: 29.69 KG/M2 | OXYGEN SATURATION: 98 %

## 2025-03-20 VITALS
BODY MASS INDEX: 29.69 KG/M2 | HEART RATE: 64 BPM | DIASTOLIC BLOOD PRESSURE: 64 MMHG | OXYGEN SATURATION: 98 % | WEIGHT: 224 LBS | HEIGHT: 73 IN | TEMPERATURE: 97.4 F | RESPIRATION RATE: 16 BRPM | SYSTOLIC BLOOD PRESSURE: 120 MMHG

## 2025-03-20 DIAGNOSIS — E11.9 DIABETES MELLITUS WITHOUT COMPLICATION: ICD-10-CM

## 2025-03-20 DIAGNOSIS — Z00.00 INITIAL MEDICARE ANNUAL WELLNESS VISIT: Primary | ICD-10-CM

## 2025-03-20 DIAGNOSIS — Z87.891 PERSONAL HISTORY OF TOBACCO USE: ICD-10-CM

## 2025-03-20 DIAGNOSIS — N52.9 VASCULOGENIC ERECTILE DYSFUNCTION, UNSPECIFIED VASCULOGENIC ERECTILE DYSFUNCTION TYPE: ICD-10-CM

## 2025-03-20 DIAGNOSIS — M54.10 RADICULOPATHY AFFECTING UPPER EXTREMITY: Primary | ICD-10-CM

## 2025-03-20 PROCEDURE — G0438 PPPS, INITIAL VISIT: HCPCS | Performed by: FAMILY MEDICINE

## 2025-03-20 PROCEDURE — G0296 VISIT TO DETERM LDCT ELIG: HCPCS | Performed by: FAMILY MEDICINE

## 2025-03-20 PROCEDURE — 1159F MED LIST DOCD IN RCRD: CPT | Performed by: FAMILY MEDICINE

## 2025-03-20 PROCEDURE — 1123F ACP DISCUSS/DSCN MKR DOCD: CPT | Performed by: FAMILY MEDICINE

## 2025-03-20 PROCEDURE — 99214 OFFICE O/P EST MOD 30 MIN: CPT | Performed by: FAMILY MEDICINE

## 2025-03-20 PROCEDURE — 3052F HG A1C>EQUAL 8.0%<EQUAL 9.0%: CPT | Performed by: FAMILY MEDICINE

## 2025-03-20 RX ORDER — PREDNISONE 10 MG/1
TABLET ORAL
Qty: 45 TABLET | Refills: 0 | Status: SHIPPED | OUTPATIENT
Start: 2025-03-20

## 2025-03-20 RX ORDER — LISINOPRIL 5 MG/1
5 TABLET ORAL DAILY
COMMUNITY

## 2025-03-20 RX ORDER — PEN NEEDLE, DIABETIC 32GX 5/32"
NEEDLE, DISPOSABLE MISCELLANEOUS
Qty: 100 EACH | Refills: 5 | Status: SHIPPED
Start: 2025-03-20 | End: 2025-03-24

## 2025-03-20 RX ORDER — METOPROLOL SUCCINATE 50 MG/1
50 TABLET, EXTENDED RELEASE ORAL DAILY
COMMUNITY

## 2025-03-20 RX ORDER — INSULIN GLARGINE 100 [IU]/ML
INJECTION, SOLUTION SUBCUTANEOUS
Qty: 45 ADJUSTABLE DOSE PRE-FILLED PEN SYRINGE | Refills: 2 | Status: SHIPPED | OUTPATIENT
Start: 2025-03-20

## 2025-03-20 RX ORDER — TADALAFIL 10 MG
10 TABLET ORAL PRN
Qty: 20 TABLET | Refills: 2 | Status: SHIPPED | OUTPATIENT
Start: 2025-03-20

## 2025-03-20 ASSESSMENT — PATIENT HEALTH QUESTIONNAIRE - PHQ9
SUM OF ALL RESPONSES TO PHQ QUESTIONS 1-9: 0
SUM OF ALL RESPONSES TO PHQ QUESTIONS 1-9: 0
2. FEELING DOWN, DEPRESSED OR HOPELESS: NOT AT ALL
SUM OF ALL RESPONSES TO PHQ QUESTIONS 1-9: 0
SUM OF ALL RESPONSES TO PHQ QUESTIONS 1-9: 0
1. LITTLE INTEREST OR PLEASURE IN DOING THINGS: NOT AT ALL

## 2025-03-20 ASSESSMENT — LIFESTYLE VARIABLES
HOW MANY STANDARD DRINKS CONTAINING ALCOHOL DO YOU HAVE ON A TYPICAL DAY: PATIENT DOES NOT DRINK
HOW OFTEN DO YOU HAVE A DRINK CONTAINING ALCOHOL: NEVER

## 2025-03-21 ENCOUNTER — HOSPITAL ENCOUNTER (OUTPATIENT)
Dept: MRI IMAGING | Age: 75
Discharge: HOME | End: 2025-03-23
Attending: FAMILY MEDICINE

## 2025-03-21 DIAGNOSIS — M54.10 RADICULOPATHY AFFECTING UPPER EXTREMITY: ICD-10-CM

## 2025-03-24 RX ORDER — FLURBIPROFEN SODIUM 0.3 MG/ML
1 SOLUTION/ DROPS OPHTHALMIC DAILY
COMMUNITY
End: 2025-03-24 | Stop reason: SDUPTHER

## 2025-03-24 RX ORDER — FLURBIPROFEN SODIUM 0.3 MG/ML
1 SOLUTION/ DROPS OPHTHALMIC DAILY
Qty: 100 EACH | Refills: 1 | Status: SHIPPED | OUTPATIENT
Start: 2025-03-24

## 2025-03-24 NOTE — TELEPHONE ENCOUNTER
Name of Medication(s) Requested:  Requested Prescriptions     Pending Prescriptions Disp Refills    Insulin Pen Needle (B-D UF III MINI PEN NEEDLES) 31G X 5 MM MISC 100 each 1     Si each by Does not apply route daily       Medication is on current medication list Yes    Dosage and directions were verified? Yes    Quantity verified: 90 day supply     Pharmacy Verified?  Yes    Last Appointment:  3/20/2025    Future appts:  Future Appointments   Date Time Provider Department Center   2025 10:45 AM Leeroy Mattson MD [facility]        (If no appt send self scheduling link. .REFILLAPPT)  Scheduling request sent?     [] Yes  [x] No    Does patient need updated?  [] Yes  [x] No

## 2025-03-25 ENCOUNTER — TELEPHONE (OUTPATIENT)
Dept: FAMILY MEDICINE CLINIC | Age: 75
End: 2025-03-25

## 2025-03-25 NOTE — TELEPHONE ENCOUNTER
Fax received from [facility]:  MRI on 3/21/25 due to extreme pain that he experienced while laying on the table.     I called patient, states that prednisone that you prescribed is starting to help. He is unsure if he will need MRI now. He has 4 more days of medication left and will call with an update.

## 2025-03-31 ASSESSMENT — ENCOUNTER SYMPTOMS
SINUS PAIN: 0
CONSTIPATION: 0
VOMITING: 0
PHOTOPHOBIA: 0
DIARRHEA: 0
SINUS PRESSURE: 0
SHORTNESS OF BREATH: 0
FACIAL SWELLING: 0
CHEST TIGHTNESS: 0
BLOOD IN STOOL: 0
APNEA: 0
RHINORRHEA: 0
ABDOMINAL DISTENTION: 0
COUGH: 0
COLOR CHANGE: 0

## 2025-03-31 NOTE — PROGRESS NOTES
Bryant Mahmood is a 74 y.o. male   CC:   Chief Complaint   Patient presents with    Shoulder Pain     Right shoulder pain  from the elbow up- pinched nerve?. Occurred 3 weeks ago. Oral surgeon was pulling a molar, the next day he woke up w/ pain, swelling, and spasms in the arm where the IV was placed.        History of Present Illness  The patient presents for evaluation of diabetes and right shoulder pain.    Diabetes Management  He has been diligently monitoring his blood glucose levels daily, which have remained within the range of 120 to 130. This morning, his blood glucose level was recorded at 128. He reports no significant spikes in his blood glucose levels during the afternoon. His current medication regimen includes insulin, administered at a dose of 15 units in the morning and 20 units in the evening, and Ozempic, which was recently increased from 1 mg to 1.5 mg. He believes that the increase in Ozempic dosage has contributed to the improved control of his blood glucose levels. Despite this, he has not observed any significant weight loss, although he does not weigh himself daily. He mentions a weight gain of 5 to 6 pounds during a recent 6-week trip to [place].    Right Shoulder Pain  Three weeks ago, he underwent a dental procedure under intravenous anesthesia administered by an oral surgeon. The following day, he experienced severe pain, spasms, some paralysis, and tingling in his arm. He also reports a specific area on his shoulder that is extremely sensitive to touch, with the pain radiating down his arm. The pain is present even when his arm is at rest. He also reports numbness in his thumb and difficulty lifting objects due to weakness. He has tried Aleve and Tylenol for pain relief, but only Aleve provided some relief. He suspects inflammation as the cause of his pain. He has previously received cortisone injections.  - Onset: The day after a dental procedure three weeks ago.  - Location: Right

## 2025-03-31 NOTE — PATIENT INSTRUCTIONS
Learning About Being Active as an Older Adult  Why is being active important as you get older?     Being active is one of the best things you can do for your health. And it's never too late to start. Being active--or getting active, if you aren't already--has definite benefits. It can:  Give you more energy,  Keep your mind sharp.  Improve balance to reduce your risk of falls.  Help you manage chronic illness with fewer medicines.  No matter how old you are, how fit you are, or what health problems you have, there is a form of activity that will work for you. And the more physical activity you can do, the better your overall health will be.  What kinds of activity can help you stay healthy?  Being more active will make your daily activities easier. Physical activity includes planned exercise and things you do in daily life. There are four types of activity:  Aerobic.  Doing aerobic activity makes your heart and lungs strong.  Includes walking, dancing, and gardening.  Aim for at least 2½ hours spread throughout the week.  It improves your energy and can help you sleep better.  Muscle-strengthening.  This type of activity can help maintain muscle and strengthen bones.  Includes climbing stairs, using resistance bands, and lifting or carrying heavy loads.  Aim for at least twice a week.  It can help protect the knees and other joints.  Stretching.  Stretching gives you better range of motion in joints and muscles.  Includes upper arm stretches, calf stretches, and gentle yoga.  Aim for at least twice a week, preferably after your muscles are warmed up from other activities.  It can help you function better in daily life.  Balancing.  This helps you stay coordinated and have good posture.  Includes heel-to-toe walking, devaughn chi, and certain types of yoga.  Aim for at least 3 days a week.  It can reduce your risk of falling.  Even if you have a hard time meeting the recommendations, it's better to be more active

## 2025-03-31 NOTE — PROGRESS NOTES
Medicare Annual Wellness Visit    Bryant Mahmood is here for Medicare AWV    Assessment & Plan   Initial Medicare annual wellness visit  -     WI VISIT TO DISCUSS LUNG CA SCREEN W LDCT  Personal history of tobacco use  -     WI VISIT TO DISCUSS LUNG CA SCREEN W LDCT     Return for Medicare Annual Wellness Visit in 1 year.     Subjective       Patient's complete Health Risk Assessment and screening values have been reviewed and are found in Flowsheets. The following problems were reviewed today and where indicated follow up appointments were made and/or referrals ordered.    Positive Risk Factor Screenings with Interventions:             General HRA Questions:         Inactivity:  On average, how many days per week do you engage in moderate to strenuous exercise (like a brisk walk)?: 2 days (!) Abnormal  On average, how many minutes do you engage in exercise at this level?: 20 min  Interventions:  Patient advised to follow up in the office for further evaluation and treatment    Poor Eating Habits/Diet:  Do you eat balanced/healthy meals regularly?: (!) No  Interventions:  Patient advised to follow-up in this office for further evaluation and treatment       Vision Screen:  Do you have difficulty driving, watching TV, or doing any of your daily activities because of your eyesight?: No  Have you had an eye exam within the past year?: (!) No  Interventions:   Patient encouraged to make appointment with their eye specialist    Safety:  Do you have non-slip mats or non-slip surfaces or shower bars or grab bars in your shower or bathtub?: (!) No  Interventions:  Patient advised to follow up in the office for further evaluation and treatment       Lung Cancer Screening:  LDCT Screening: Discussed with patient the benefits and harms of screening, follow-up diagnostic testing, over-diagnosis, false positive rate, and total radiation exposure. Counseled on the importance of adherence to annual lung cancer LDCT screening,

## 2025-04-02 ENCOUNTER — APPOINTMENT (OUTPATIENT)
Dept: GENERAL RADIOLOGY | Age: 75
End: 2025-04-02
Payer: MEDICARE

## 2025-04-02 ENCOUNTER — HOSPITAL ENCOUNTER (EMERGENCY)
Age: 75
Discharge: HOME | End: 2025-04-02
Attending: EMERGENCY MEDICINE
Payer: MEDICARE

## 2025-04-02 ENCOUNTER — APPOINTMENT (OUTPATIENT)
Dept: CT IMAGING | Age: 75
End: 2025-04-02
Payer: MEDICARE

## 2025-04-02 VITALS
TEMPERATURE: 97.9 F | OXYGEN SATURATION: 97 % | DIASTOLIC BLOOD PRESSURE: 82 MMHG | HEIGHT: 73 IN | HEART RATE: 71 BPM | RESPIRATION RATE: 16 BRPM | SYSTOLIC BLOOD PRESSURE: 137 MMHG | WEIGHT: 220 LBS | BODY MASS INDEX: 29.16 KG/M2

## 2025-04-02 DIAGNOSIS — M79.601 PAIN OF RIGHT UPPER EXTREMITY: ICD-10-CM

## 2025-04-02 DIAGNOSIS — S43.109A: Primary | ICD-10-CM

## 2025-04-02 LAB
ANION GAP SERPL CALCULATED.3IONS-SCNC: 14 MMOL/L (ref 7–16)
BUN SERPL-MCNC: 25 MG/DL (ref 6–23)
CALCIUM SERPL-MCNC: 9.2 MG/DL (ref 8.6–10.2)
CHLORIDE SERPL-SCNC: 103 MMOL/L (ref 98–107)
CK SERPL-CCNC: 50 U/L (ref 20–200)
CO2 SERPL-SCNC: 21 MMOL/L (ref 22–29)
CREAT SERPL-MCNC: 0.9 MG/DL (ref 0.7–1.2)
ERYTHROCYTE [DISTWIDTH] IN BLOOD BY AUTOMATED COUNT: 13.4 % (ref 11.5–15)
GFR, ESTIMATED: 85 ML/MIN/1.73M2
GLUCOSE SERPL-MCNC: 280 MG/DL (ref 74–99)
HCT VFR BLD AUTO: 50 % (ref 37–54)
HGB BLD-MCNC: 16.5 G/DL (ref 12.5–16.5)
MCH RBC QN AUTO: 29 PG (ref 26–35)
MCHC RBC AUTO-ENTMCNC: 33 G/DL (ref 32–34.5)
MCV RBC AUTO: 88 FL (ref 80–99.9)
PLATELET # BLD AUTO: 356 K/UL (ref 130–450)
PMV BLD AUTO: 9.6 FL (ref 7–12)
POTASSIUM SERPL-SCNC: 4.7 MMOL/L (ref 3.5–5)
RBC # BLD AUTO: 5.68 M/UL (ref 3.8–5.8)
SODIUM SERPL-SCNC: 138 MMOL/L (ref 132–146)
WBC OTHER # BLD: 19.2 K/UL (ref 4.5–11.5)

## 2025-04-02 PROCEDURE — 73030 X-RAY EXAM OF SHOULDER: CPT

## 2025-04-02 PROCEDURE — 70470 CT HEAD/BRAIN W/O & W/DYE: CPT

## 2025-04-02 PROCEDURE — 96374 THER/PROPH/DIAG INJ IV PUSH: CPT

## 2025-04-02 PROCEDURE — 6360000004 HC RX CONTRAST MEDICATION: Performed by: RADIOLOGY

## 2025-04-02 PROCEDURE — 82550 ASSAY OF CK (CPK): CPT

## 2025-04-02 PROCEDURE — 85027 COMPLETE CBC AUTOMATED: CPT

## 2025-04-02 PROCEDURE — 99285 EMERGENCY DEPT VISIT HI MDM: CPT

## 2025-04-02 PROCEDURE — 6370000000 HC RX 637 (ALT 250 FOR IP): Performed by: EMERGENCY MEDICINE

## 2025-04-02 PROCEDURE — 6360000002 HC RX W HCPCS: Performed by: EMERGENCY MEDICINE

## 2025-04-02 PROCEDURE — 80048 BASIC METABOLIC PNL TOTAL CA: CPT

## 2025-04-02 PROCEDURE — 72125 CT NECK SPINE W/O DYE: CPT

## 2025-04-02 PROCEDURE — 96375 TX/PRO/DX INJ NEW DRUG ADDON: CPT

## 2025-04-02 RX ORDER — KETOROLAC TROMETHAMINE 15 MG/ML
15 INJECTION, SOLUTION INTRAMUSCULAR; INTRAVENOUS ONCE
Status: COMPLETED | OUTPATIENT
Start: 2025-04-02 | End: 2025-04-02

## 2025-04-02 RX ORDER — HYDROCODONE BITARTRATE AND ACETAMINOPHEN 5; 325 MG/1; MG/1
1 TABLET ORAL EVERY 4 HOURS PRN
Qty: 30 TABLET | Refills: 0 | Status: SHIPPED | OUTPATIENT
Start: 2025-04-02 | End: 2025-04-07

## 2025-04-02 RX ORDER — DEXAMETHASONE SODIUM PHOSPHATE 10 MG/ML
10 INJECTION, SOLUTION INTRA-ARTICULAR; INTRALESIONAL; INTRAMUSCULAR; INTRAVENOUS; SOFT TISSUE ONCE
Status: COMPLETED | OUTPATIENT
Start: 2025-04-02 | End: 2025-04-02

## 2025-04-02 RX ORDER — OXYCODONE AND ACETAMINOPHEN 5; 325 MG/1; MG/1
1 TABLET ORAL ONCE
Refills: 0 | Status: COMPLETED | OUTPATIENT
Start: 2025-04-02 | End: 2025-04-02

## 2025-04-02 RX ORDER — IOPAMIDOL 755 MG/ML
75 INJECTION, SOLUTION INTRAVASCULAR
Status: COMPLETED | OUTPATIENT
Start: 2025-04-02 | End: 2025-04-02

## 2025-04-02 RX ADMIN — KETOROLAC TROMETHAMINE 15 MG: 15 INJECTION, SOLUTION INTRAMUSCULAR; INTRAVENOUS at 10:09

## 2025-04-02 RX ADMIN — OXYCODONE HYDROCHLORIDE AND ACETAMINOPHEN 1 TABLET: 5; 325 TABLET ORAL at 13:49

## 2025-04-02 RX ADMIN — DEXAMETHASONE SODIUM PHOSPHATE 10 MG: 10 INJECTION INTRAMUSCULAR; INTRAVENOUS at 10:10

## 2025-04-02 RX ADMIN — IOPAMIDOL 75 ML: 755 INJECTION, SOLUTION INTRAVENOUS at 11:20

## 2025-04-02 ASSESSMENT — PAIN - FUNCTIONAL ASSESSMENT
PAIN_FUNCTIONAL_ASSESSMENT: 0-10
PAIN_FUNCTIONAL_ASSESSMENT: PREVENTS OR INTERFERES SOME ACTIVE ACTIVITIES AND ADLS

## 2025-04-02 ASSESSMENT — PAIN DESCRIPTION - DESCRIPTORS: DESCRIPTORS: ACHING;CRAMPING;CRUSHING

## 2025-04-02 ASSESSMENT — PAIN SCALES - GENERAL
PAINLEVEL_OUTOF10: 8
PAINLEVEL_OUTOF10: 7

## 2025-04-02 ASSESSMENT — PAIN DESCRIPTION - ORIENTATION
ORIENTATION: RIGHT;UPPER
ORIENTATION: RIGHT

## 2025-04-02 ASSESSMENT — PAIN DESCRIPTION - LOCATION
LOCATION: ARM;OTHER (COMMENT)
LOCATION: ARM

## 2025-04-02 NOTE — DISCHARGE INSTR - COC
Continuity of Care Form    Patient Name: Bryant Mahmood   :  1950  MRN:  [id number]    Admit date:  2025  Discharge date:  ***    Code Status Order: Prior   Advance Directives:     Admitting Physician:  No admitting provider for patient encounter.  PCP: Leeroy Mattson MD    Discharging Nurse: ***  Discharging Hospital Unit/Room#: DISPO/D02  Discharging Unit Phone Number: ***    Emergency Contact:   Extended Emergency Contact Information  Primary Emergency Contact: Mary Mahmood  Address: [address] [place] [address]  Home Phone: [phone]  Mobile Phone: [phone]  Relation: Spouse   needed? No    Past Surgical History:  Past Surgical History:   Procedure Laterality Date    CARDIAC CATHETERIZATION  2022    Dr. Moreno    CARDIAC ELECTROPHYSIOLOGY STUDY AND ABLATION  10/10/2024    COLONOSCOPY      CORONARY ANGIOPLASTY WITH STENT PLACEMENT  2022    3.5 x 38 Resolute [place] to the mid Cx and a 4.0 x 38 Resolute [place] to the prox Cx by Dr. Tadeo       Immunization History:   Immunization History   Administered Date(s) Administered    COVID-19, MODERNA BLUE border, Primary or Immunocompromised, (age 12y+), IM, 100 mcg/0.5mL 03/10/2021, 2021    COVID-19, [place] Vaccine, Vaccine Unspecified 2021, 2021    Influenza Virus Vaccine 2016, 2016, 10/25/2021    Influenza, FLUAD, (age 65 y+), IM, Quadv, 0.5mL 2022, 2023    Influenza, FLUAD, (age 65 y+), IM, Trivalent PF, 0.5mL 10/08/2017, 2018, 2019, 10/26/2021    Influenza, FLUARIX, FLULAVAL, FLUZONE (age 6 mo+) and AFLURIA, (age 3 y+), Quadv PF, 0.5mL 2018    Influenza, FLUZONE High Dose, (age 65 y+), IM, Trivalent PF, 0.5mL 2020    Pneumococcal, PCV-13, PREVNAR 13, (age 6w+), IM, 0.5mL 10/07/2016    Pneumococcal, PPSV23, PNEUMOVAX 23, (age 2y+), SC/IM, 0.5mL 10/08/2017    TDaP, ADACEL (age 10y-64y), BOOSTRIX (age 10y+), IM, 0.5mL 2020

## 2025-04-02 NOTE — ED PROVIDER NOTES
leukocytosis but he just recently finished up prednisone.  Sent for CT of the head and neck which were unremarkable for spinal stenosis or tumor.  X-rays of the shoulder however demonstrated grade 2 distal clavicle dislocation.  I questioned the patient again regarding possible injury he does not recall any injuries.  States the symptoms started after he had his wisdom teeth extracted several weeks ago stated happened the next day.  He did follow-up with his dentist states there was no trauma during the procedure.    History From: Patient and wife.    CC/HPI Summary, DDx, ED Course, Reassessment, Tests Considered, Patient expectation:   As above    Social Determinants affecting Dx or Tx: Patient lives with wife    Chronic Conditions: History of hernia with repair STEMI history of coronary artery disease, atrial fibrillation rapid ventricular response is currently on Eliquis.  History of non-STEMI, history of diabetes.    Records Reviewed: Controlled substances monitoring: no signs of potential drug abuse or diversion identified.         I am the Primary Clinician of Record.    DISPOSITION: DC home patient was placed in a sling and swath.  He was given Norco for pain control.  He was given outpatient follow-up with Dr. Lee from on-call orthopedics.    ED COURSE:       Counseling:   The emergency provider has spoken with the patient and spouse/SO and discussed today’s results, in addition to providing specific details for the plan of care and counseling regarding the diagnosis and prognosis.  Questions are answered at this time and they are agreeable with the plan.      --------------------------------- IMPRESSION AND DISPOSITION ---------------------------------    IMPRESSION  1. Closed dislocation of clavicle, initial encounter    2. Pain of right upper extremity        DISPOSITION  Disposition: Discharge to home  Patient condition is stable      NOTE: This report was transcribed using voice recognition

## 2025-04-07 ENCOUNTER — TELEPHONE (OUTPATIENT)
Dept: ORTHOPEDIC SURGERY | Age: 75
End: 2025-04-07

## 2025-04-07 NOTE — TELEPHONE ENCOUNTER
----- Message from Dr. Ron Lee MD sent at 4/4/2025 12:09 PM EDT -----  Non urgent next new with me  ----- Message -----  From: Bay Sagastume DO  Sent: 4/3/2025   1:56 AM EDT  To: Ron Lee MD

## 2025-04-21 ENCOUNTER — TELEPHONE (OUTPATIENT)
Dept: CARDIOLOGY | Facility: CLINIC | Age: 75
End: 2025-04-21
Payer: MEDICARE

## 2025-04-21 ENCOUNTER — TELEPHONE (OUTPATIENT)
Dept: FAMILY MEDICINE CLINIC | Age: 75
End: 2025-04-21

## 2025-04-21 DIAGNOSIS — I48.0 PAF (PAROXYSMAL ATRIAL FIBRILLATION) (MULTI): ICD-10-CM

## 2025-04-21 DIAGNOSIS — E78.2 MIXED HYPERLIPIDEMIA: ICD-10-CM

## 2025-04-21 DIAGNOSIS — I25.810 CORONARY ARTERY DISEASE INVOLVING CORONARY BYPASS GRAFT OF NATIVE HEART WITHOUT ANGINA PECTORIS: ICD-10-CM

## 2025-04-21 DIAGNOSIS — K40.90 RIGHT INGUINAL HERNIA: Primary | ICD-10-CM

## 2025-04-21 DIAGNOSIS — Z86.73 STROKE OCCURRING WITHIN LAST MONTH: ICD-10-CM

## 2025-04-21 DIAGNOSIS — I10 ESSENTIAL HYPERTENSION: ICD-10-CM

## 2025-04-21 DIAGNOSIS — I47.10 SVT (SUPRAVENTRICULAR TACHYCARDIA) (CMS-HCC): ICD-10-CM

## 2025-04-21 RX ORDER — METOPROLOL SUCCINATE 50 MG/1
50 TABLET, EXTENDED RELEASE ORAL DAILY
Qty: 90 TABLET | Refills: 2 | Status: SHIPPED | OUTPATIENT
Start: 2025-04-21 | End: 2026-01-16

## 2025-04-21 NOTE — TELEPHONE ENCOUNTER
----- Message from Wesley FLORES sent at 4/21/2025  2:09 PM EDT -----  Regarding: E[facility] Referral Request  E[facility] Referral Request    Reason for referral request: Specialty Provider    Specialist/Lab/Test patient is requesting (if known): General Surgeon    Specialist Phone Number (if applicable): N/A     Additional Information : Patient need a referral from his PCP for him to get a surgery for his hernia.  --------------------------------------------------------------------------------------------------------------------------    Relationship to Patient: Self     Call Back Information: OK to leave message on voicemail  Preferred Call Back Number: Phone [phone]

## 2025-04-21 NOTE — TELEPHONE ENCOUNTER
Spoke w/ Dr Mattson, if pain is not going away he needs to go to the ER. If pain lets up okay to schedule office visit for Dr Mattson to examine and then place a referral.

## 2025-04-21 NOTE — TELEPHONE ENCOUNTER
Patient states that he has a hernia in his right inguinal hernia. He was going to have it repaired a few years ago but then had a NSTEMI so it was put on hold. Hernia went back in so patient did not follow up on repair.     Hernia popped back out and is painful. Can you refer or do you need to evaluate the hernia first?    He is agreeable to seeing a surgeon w/ [facility] or [facility].

## 2025-04-21 NOTE — TELEPHONE ENCOUNTER
Spoke w/ patient states that hernia is currently painful. He is unable to bend over and tie his shoes with out pain. Explained that Dr Mattson would like him to go to the ER for evaluation if pain is persistent or worsens. If pain subsides, Dr Mattson can see him this week for an appointment to assess.     Patient requested to schedule an appointment for tomorrow. States he will cancel appointment if pain continues or worsens and will go to the ER. He understands the concern for a strangulated hernia and that it is considered emergent and requires immediate intervention.

## 2025-04-22 ENCOUNTER — OFFICE VISIT (OUTPATIENT)
Dept: FAMILY MEDICINE CLINIC | Age: 75
End: 2025-04-22
Payer: MEDICARE

## 2025-04-22 VITALS
SYSTOLIC BLOOD PRESSURE: 134 MMHG | DIASTOLIC BLOOD PRESSURE: 76 MMHG | BODY MASS INDEX: 29.48 KG/M2 | WEIGHT: 222.4 LBS | RESPIRATION RATE: 16 BRPM | OXYGEN SATURATION: 95 % | TEMPERATURE: 97.2 F | HEART RATE: 70 BPM | HEIGHT: 73 IN

## 2025-04-22 DIAGNOSIS — K40.90 RIGHT INGUINAL HERNIA: Primary | ICD-10-CM

## 2025-04-22 PROCEDURE — 1123F ACP DISCUSS/DSCN MKR DOCD: CPT | Performed by: FAMILY MEDICINE

## 2025-04-22 PROCEDURE — 99213 OFFICE O/P EST LOW 20 MIN: CPT | Performed by: FAMILY MEDICINE

## 2025-04-24 ENCOUNTER — OFFICE VISIT (OUTPATIENT)
Dept: SURGERY | Age: 75
End: 2025-04-24
Payer: MEDICARE

## 2025-04-24 VITALS
HEIGHT: 73 IN | DIASTOLIC BLOOD PRESSURE: 71 MMHG | SYSTOLIC BLOOD PRESSURE: 109 MMHG | OXYGEN SATURATION: 98 % | RESPIRATION RATE: 18 BRPM | BODY MASS INDEX: 30.35 KG/M2 | HEART RATE: 64 BPM | WEIGHT: 229 LBS

## 2025-04-24 DIAGNOSIS — K40.90 INGUINAL HERNIA, RIGHT: Primary | ICD-10-CM

## 2025-04-24 PROCEDURE — 1123F ACP DISCUSS/DSCN MKR DOCD: CPT | Performed by: SURGERY

## 2025-04-24 PROCEDURE — 1159F MED LIST DOCD IN RCRD: CPT | Performed by: SURGERY

## 2025-04-24 PROCEDURE — 99204 OFFICE O/P NEW MOD 45 MIN: CPT | Performed by: SURGERY

## 2025-04-25 ASSESSMENT — ENCOUNTER SYMPTOMS
PHOTOPHOBIA: 0
VOMITING: 0
CHEST TIGHTNESS: 0
COUGH: 0
SINUS PAIN: 0
ABDOMINAL DISTENTION: 0
COLOR CHANGE: 0
SINUS PRESSURE: 0
FACIAL SWELLING: 0
APNEA: 0
DIARRHEA: 0
SHORTNESS OF BREATH: 0
CONSTIPATION: 0
RHINORRHEA: 0
BLOOD IN STOOL: 0

## 2025-04-25 NOTE — PROGRESS NOTES
Bryant Mahmood is a 74 y.o. male   CC:   Chief Complaint   Patient presents with    Other     Hernia, right shoulder displacement       History of Present Illness  The patient presents for evaluation of right-sided inguinal hernia and right shoulder displacement.    Right-Sided Inguinal Hernia  Intermittent pain in the right-sided inguinal hernia has been experienced for several years, occasionally escalating to a debilitating level. Yesterday, the pain was so severe that simple tasks such as putting on socks were impossible. Significant improvement in the condition is reported today. An incident 2 years ago involved pain so intense that crawling to the bathroom was necessary.  - Onset: Experienced for several years, with severe episodes.  - Location: Right-sided inguinal area.  - Duration: Intermittent pain over several years.  - Character: Pain escalating to a debilitating level.  - Timing: Severe pain yesterday, significant improvement today.  - Severity: Pain so severe that simple tasks were impossible; incident 2 years ago required crawling to the bathroom.    Right Shoulder Displacement  A displaced clavicle in the right shoulder is also reported. Initially, prescribed medication provided relief, but upon discontinuation, the pain recurred. Pain intensity, previously rated between 7 and 9, has now decreased to a level of 2. Numbness in the thumb and swelling in the affected area persist. Consultation with Dr. Vega indicated that surgical intervention was not an option, and the condition was classified as class 2. Pain management has been achieved with ibuprofen, taking 2 tablets in the morning and 2 at bedtime, which is found helpful. Physical therapy is not currently being undertaken, and a sling has been used for the past month. Walking has become a challenge, and there is reluctance to engage in physical therapy at this time.  - Onset: Pain recurred upon discontinuation of prescribed medication.  -

## 2025-04-28 NOTE — PROGRESS NOTES
Normocephalic and atraumatic.   Eyes:      General:         Right eye: No discharge.         Left eye: No discharge.   Neck:      Trachea: No tracheal deviation.   Cardiovascular:      Rate and Rhythm: Normal rate.   Pulmonary:      Effort: Pulmonary effort is normal. No respiratory distress.   Abdominal:      General: There is no distension.      Palpations: Abdomen is soft.      Tenderness: There is no guarding or rebound.   Skin:     General: Skin is warm and dry.   Neurological:      Mental Status: She is alert and oriented to person, place, and time.     Physical Exam  Abdominal:              CBC:   Lab Results   Component Value Date/Time    WBC 19.2 04/02/2025 10:11 AM    RBC 5.68 04/02/2025 10:11 AM    HGB 16.5 04/02/2025 10:11 AM    HCT 50.0 04/02/2025 10:11 AM    MCV 88.0 04/02/2025 10:11 AM    MCH 29.0 04/02/2025 10:11 AM    MCHC 33.0 04/02/2025 10:11 AM    RDW 13.4 04/02/2025 10:11 AM     04/02/2025 10:11 AM    MPV 9.6 04/02/2025 10:11 AM     CMP:    Lab Results   Component Value Date/Time     04/02/2025 10:11 AM    K 4.7 04/02/2025 10:11 AM    K 4.1 03/02/2022 02:08 AM     04/02/2025 10:11 AM    CO2 21 04/02/2025 10:11 AM    BUN 25 04/02/2025 10:11 AM    CREATININE 0.9 04/02/2025 10:11 AM    GFRAA >60 03/02/2022 02:08 AM    LABGLOM 85 04/02/2025 10:11 AM    LABGLOM >60 03/19/2024 09:53 AM    GLUCOSE 280 04/02/2025 10:11 AM    CALCIUM 9.2 04/02/2025 10:11 AM    BILITOT 0.7 03/19/2024 09:53 AM    ALKPHOS 103 03/19/2024 09:53 AM    AST 14 03/19/2024 09:53 AM    ALT 16 03/19/2024 09:53 AM     PT/INR:    Lab Results   Component Value Date/Time    PROTIME 12.5 09/14/2024 05:06 PM    INR 1.2 09/14/2024 05:06 PM     HgBA1c:    Lab Results   Component Value Date/Time    LABA1C 8.9 01/08/2025 10:53 AM     LIPASE:  No results found for: \"LIPASE\"     Results  Laboratory Studies  Blood sugar was 120. A1c was 8.9.      Brenton Barajas MD    I have examined the patient and performed the key aspects of

## 2025-05-01 ENCOUNTER — PREP FOR PROCEDURE (OUTPATIENT)
Dept: SURGERY | Age: 75
End: 2025-05-01

## 2025-05-01 ENCOUNTER — TELEPHONE (OUTPATIENT)
Dept: SURGERY | Age: 75
End: 2025-05-01

## 2025-05-01 PROBLEM — K40.90 INGUINAL HERNIA: Status: ACTIVE | Noted: 2025-05-01

## 2025-05-01 NOTE — TELEPHONE ENCOUNTER
Bryant Mahmood is scheduled for laparoscopic robotic XI assisted rt inguinal hernia repair with mesh with Dr Barajas on 06-09-25 at [facility]. Patient needs to be NPO after midnight the night before procedure. All surgery instructions were explained to the patient and a surgery letter was also mailed out. MA informed patient that PAT will also be calling to review pre-op instructions and medications. Patient verbalized understanding.  Electronically signed by Yumiko Mccartney MA on 5/1/2025 at 10:29 AM

## 2025-05-05 ENCOUNTER — OFFICE VISIT (OUTPATIENT)
Dept: ORTHOPEDIC SURGERY | Age: 75
End: 2025-05-05
Payer: MEDICARE

## 2025-05-05 VITALS
SYSTOLIC BLOOD PRESSURE: 123 MMHG | WEIGHT: 222 LBS | BODY MASS INDEX: 29.42 KG/M2 | HEART RATE: 64 BPM | OXYGEN SATURATION: 96 % | RESPIRATION RATE: 18 BRPM | HEIGHT: 73 IN | TEMPERATURE: 97.8 F | DIASTOLIC BLOOD PRESSURE: 73 MMHG

## 2025-05-05 DIAGNOSIS — M25.511 RIGHT SHOULDER PAIN, UNSPECIFIED CHRONICITY: ICD-10-CM

## 2025-05-05 DIAGNOSIS — S43.101A CLOSED DISLOCATION OF RIGHT CLAVICLE, INITIAL ENCOUNTER: Primary | ICD-10-CM

## 2025-05-05 PROCEDURE — 20610 DRAIN/INJ JOINT/BURSA W/O US: CPT | Performed by: ORTHOPAEDIC SURGERY

## 2025-05-05 PROCEDURE — 1123F ACP DISCUSS/DSCN MKR DOCD: CPT | Performed by: ORTHOPAEDIC SURGERY

## 2025-05-05 PROCEDURE — 99204 OFFICE O/P NEW MOD 45 MIN: CPT | Performed by: ORTHOPAEDIC SURGERY

## 2025-05-05 PROCEDURE — 1159F MED LIST DOCD IN RCRD: CPT | Performed by: ORTHOPAEDIC SURGERY

## 2025-05-05 RX ORDER — TRIAMCINOLONE ACETONIDE 40 MG/ML
40 INJECTION, SUSPENSION INTRA-ARTICULAR; INTRAMUSCULAR ONCE
Status: COMPLETED | OUTPATIENT
Start: 2025-05-05 | End: 2025-05-05

## 2025-05-05 RX ORDER — LIDOCAINE HYDROCHLORIDE 10 MG/ML
4 INJECTION, SOLUTION INFILTRATION; PERINEURAL ONCE
Status: COMPLETED | OUTPATIENT
Start: 2025-05-05 | End: 2025-05-05

## 2025-05-05 RX ADMIN — TRIAMCINOLONE ACETONIDE 40 MG: 40 INJECTION, SUSPENSION INTRA-ARTICULAR; INTRAMUSCULAR at 14:37

## 2025-05-05 RX ADMIN — LIDOCAINE HYDROCHLORIDE 4 ML: 10 INJECTION, SOLUTION INFILTRATION; PERINEURAL at 14:36

## 2025-05-05 NOTE — PROGRESS NOTES
amLODIPine (NORVASC) 5 MG tablet, TAKE 1 TABLET BY MOUTH DAILY, Disp: 30 tablet, Rfl: 3    apixaban (ELIQUIS) 5 MG TABS tablet, Take 1 tablet by mouth 2 times daily, Disp: 180 tablet, Rfl: 3    nitroGLYCERIN (NITROSTAT) 0.4 MG SL tablet, Place 1 tablet under the tongue every 5 minutes as needed for Chest pain up to max of 3 total doses. If no relief after 1 dose, call 911., Disp: 25 tablet, Rfl: 1    rosuvastatin (CRESTOR) 20 MG tablet, Take 1 tablet by mouth daily, Disp: 90 tablet, Rfl: 3    aspirin 81 MG EC tablet, Take 1 tablet by mouth daily, Disp: , Rfl:     ALLERGIES  Allergies   Allergen Reactions    Dapagliflozin Myalgia     Other reaction(s): Myalgias (Muscle Pain)       Controlled Substances Monitoring:          REVIEW OF SYSTEMS:     Constitutional:  Negative for weight loss, fevers, chills, fatigue  Cardiovascular: Negative for chest pain, palpitations  Pulmonary: Negative for shortness of breath, labored breathing, cough  GI: negative for abdominal pain, nausea, vomiting   MSK: per HPI  Skin: negative for rash, open wounds    All other systems reviewed and are negative     PHYSICAL EXAM     VITALS: /73 (BP Site: Left Upper Arm, Patient Position: Sitting, BP Cuff Size: Large Adult)   Pulse 64   Temp 97.8 °F (36.6 °C) (Temporal)   Resp 18   Ht 1.854 m (6' 1\")   Wt 100.7 kg (222 lb)   SpO2 96%   BMI 29.29 kg/m²        General: The patient is alert and oriented x 3, appears to be stated age and in no distress.   HEENT: head is normocephalic, atraumatic.  EOMI.   Neck: supple, trachea midline, no thyromegaly   Cardiovascular: peripheral pulses palpable.  Normal Capillary refill   Respiratory: breathing unlabored, chest expansion symmetric   Skin: no rash, no open wounds, no erythema  Psych: normal affect; mood stable  Neurologic: gait normal, sensation grossly intact in extremities  MSK:            Physical Exam  Musculoskeletal:  Right Shoulder: Pain upon movement. Limited range of motion.

## 2025-05-06 ENCOUNTER — TELEPHONE (OUTPATIENT)
Dept: CARDIOLOGY | Facility: CLINIC | Age: 75
End: 2025-05-06
Payer: MEDICARE

## 2025-05-06 NOTE — TELEPHONE ENCOUNTER
Pt needs cleared for laparoscopic robotic assisted rt inguinal hernia repair with mesh under general.  Form on counter

## 2025-05-10 ENCOUNTER — CLINICAL DOCUMENTATION (OUTPATIENT)
Dept: SURGERY | Age: 75
End: 2025-05-10

## 2025-05-10 NOTE — PROGRESS NOTES
MA received cardiac clearance from   Dr Dudley   Electronically signed by Yumiko Mccartney MA on 5/10/2025 at 11:03 AM

## 2025-06-02 NOTE — TELEPHONE ENCOUNTER
Name of Medication(s) Requested:  Requested Prescriptions     Pending Prescriptions Disp Refills    OZEMPIC, 1 MG/DOSE, 4 MG/3ML SOPN sc injection [Pharmacy Med Name: OZEMPIC 1MG PER DOSE (4MG/3ML) PFP] 3 mL 3     Sig: INJECT 1 MG INTO THE SKIN ONCE EVERY WEEK       Medication is on current medication list Yes    Dosage and directions were verified? Yes    Quantity verified: 30 day supply     Pharmacy Verified?  Yes    Last Appointment:  4/22/2025    Future appts:  Future Appointments   Date Time Provider Department Center   6/6/2025 10:45 AM Leeroy Mattson MD [facility]   6/26/2025  2:00 PM Brenton Barajas MD [facility]        (If no appt send self scheduling link. .REFILLAPPT)  Scheduling request sent?     [] Yes  [x] No    Does patient need updated?  [] Yes  [x] No

## 2025-06-03 RX ORDER — SEMAGLUTIDE 1.34 MG/ML
INJECTION, SOLUTION SUBCUTANEOUS
Qty: 3 ML | Refills: 3 | Status: SHIPPED | OUTPATIENT
Start: 2025-06-03

## 2025-06-05 NOTE — PROGRESS NOTES
Reviewed with Dr. Diaz that pt took his ozempic at 0700 on 6/2/2025. Per Dr. Diaz, pt is ok for surgery

## 2025-06-05 NOTE — PROGRESS NOTES
[facility] PRE-ADMISSION TESTING INSTRUCTIONS  PROCEDURE TIME: 1050                                 ARRIVAL TIME: 0845  The Preadmission Testing patient is instructed accordingly using the following criteria (check applicable):    ARRIVAL INSTRUCTIONS:  [x] Parking the day of Surgery is located in the Main Entrance lot.  Upon entering the door, make an immediate right to the surgery reception desk    [x] Bring photo ID and insurance card    [] Bring in a copy of Living will or Durable Power of  papers.    [x] Please be sure to arrange for responsible adult to provide transportation to and from the hospital    [x] Please arrange for responsible adult to be with you for the 24 hour period post procedure due to having anesthesia    [x] If you awake am of surgery not feeling well or have temperature >100 please call [phone]    GENERAL INSTRUCTIONS:    [x] May have clear liquids until 4 hours prior to surgery. Examples include water, fruit juices (no pulp), jello, popsicles, black coffee or tea, beef or chicken broth.               No gum, candy or mints.    [x] You may brush your teeth, but do not swallow any water    [x] Take medications as instructed with 1-2 oz of water (METOPROLOL AND AMLODIPINE)    [] Stop herbal supplements and vitamins 5 days prior to procedure    [x] Follow preop dosing of blood thinners per physician instructions    [x] Take 1/2 dose of evening insulin, but no insulin after midnight    [] No oral diabetic medications after midnight    [x] If diabetic and have low blood sugar or feel symptomatic, take 1-2oz apple juice only    [] Bring inhalers day of surgery    [] Bring C-PAP/ Bi-Pap day of surgery    [] Bring urine specimen day of surgery    [x] Shower or bath with soap, lather and rinse well, AM of Surgery, no lotion, powders or creams to surgical site    [] Follow bowel prep as instructed per surgeon    [x] No tobacco products within 24 hours of  Detail Level: Detailed Additional Notes: Mom (Penny) does a great job with skin care regimen.\\n[facility] suggested [facility]ent (did get new indication for 4 y/o).\\nDoes better when he doesn’t have a dog or horse exposure. 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 \\n\\nAlso taking hydroxyzine TID which is helping (7.5 mg in oral susp).\\nPlan to take Claritin bid (instead of daily). Recently saw allergist who also agrees with this. Try to avoid po steroids although this helps significantly. Cont mupirocin and topical steroids.

## 2025-06-08 ENCOUNTER — ANESTHESIA EVENT (OUTPATIENT)
Dept: OPERATING ROOM | Age: 75
End: 2025-06-08
Payer: MEDICARE

## 2025-06-09 ENCOUNTER — ANESTHESIA (OUTPATIENT)
Dept: OPERATING ROOM | Age: 75
End: 2025-06-09
Payer: MEDICARE

## 2025-06-09 ENCOUNTER — HOSPITAL ENCOUNTER (OUTPATIENT)
Age: 75
Setting detail: OUTPATIENT SURGERY
Discharge: HOME | End: 2025-06-09
Attending: SURGERY | Admitting: SURGERY
Payer: MEDICARE

## 2025-06-09 VITALS
SYSTOLIC BLOOD PRESSURE: 134 MMHG | HEART RATE: 59 BPM | WEIGHT: 222 LBS | BODY MASS INDEX: 29.42 KG/M2 | RESPIRATION RATE: 20 BRPM | OXYGEN SATURATION: 92 % | HEIGHT: 73 IN | DIASTOLIC BLOOD PRESSURE: 64 MMHG | TEMPERATURE: 97.8 F

## 2025-06-09 DIAGNOSIS — K40.90 NON-RECURRENT UNILATERAL INGUINAL HERNIA WITHOUT OBSTRUCTION OR GANGRENE: Primary | ICD-10-CM

## 2025-06-09 LAB
ANION GAP SERPL CALCULATED.3IONS-SCNC: 11 MMOL/L (ref 7–16)
BUN SERPL-MCNC: 24 MG/DL (ref 6–23)
CALCIUM SERPL-MCNC: 9.1 MG/DL (ref 8.6–10.2)
CHLORIDE SERPL-SCNC: 106 MMOL/L (ref 98–107)
CO2 SERPL-SCNC: 24 MMOL/L (ref 22–29)
CREAT SERPL-MCNC: 1 MG/DL (ref 0.7–1.2)
ERYTHROCYTE [DISTWIDTH] IN BLOOD BY AUTOMATED COUNT: 13.2 % (ref 11.5–15)
GFR, ESTIMATED: 80 ML/MIN/1.73M2
GLUCOSE SERPL-MCNC: 167 MG/DL (ref 74–99)
HCT VFR BLD AUTO: 47.8 % (ref 37–54)
HGB BLD-MCNC: 15.2 G/DL (ref 12.5–16.5)
MCH RBC QN AUTO: 28.8 PG (ref 26–35)
MCHC RBC AUTO-ENTMCNC: 31.8 G/DL (ref 32–34.5)
MCV RBC AUTO: 90.5 FL (ref 80–99.9)
PLATELET # BLD AUTO: 299 K/UL (ref 130–450)
PMV BLD AUTO: 9.8 FL (ref 7–12)
POTASSIUM SERPL-SCNC: 4.3 MMOL/L (ref 3.5–5)
RBC # BLD AUTO: 5.28 M/UL (ref 3.8–5.8)
SODIUM SERPL-SCNC: 141 MMOL/L (ref 132–146)
WBC OTHER # BLD: 10.7 K/UL (ref 4.5–11.5)

## 2025-06-09 PROCEDURE — 3600000019 HC SURGERY ROBOT ADDTL 15MIN: Performed by: SURGERY

## 2025-06-09 PROCEDURE — 85027 COMPLETE CBC AUTOMATED: CPT

## 2025-06-09 PROCEDURE — 6360000002 HC RX W HCPCS: Performed by: SURGERY

## 2025-06-09 PROCEDURE — 2580000003 HC RX 258

## 2025-06-09 PROCEDURE — 2500000003 HC RX 250 WO HCPCS: Performed by: SURGERY

## 2025-06-09 PROCEDURE — 2709999900 HC NON-CHARGEABLE SUPPLY: Performed by: SURGERY

## 2025-06-09 PROCEDURE — 3600000009 HC SURGERY ROBOT BASE: Performed by: SURGERY

## 2025-06-09 PROCEDURE — 3700000001 HC ADD 15 MINUTES (ANESTHESIA): Performed by: SURGERY

## 2025-06-09 PROCEDURE — 7100000001 HC PACU RECOVERY - ADDTL 15 MIN: Performed by: SURGERY

## 2025-06-09 PROCEDURE — 7100000011 HC PHASE II RECOVERY - ADDTL 15 MIN: Performed by: SURGERY

## 2025-06-09 PROCEDURE — 2500000003 HC RX 250 WO HCPCS

## 2025-06-09 PROCEDURE — 80048 BASIC METABOLIC PNL TOTAL CA: CPT

## 2025-06-09 PROCEDURE — 7100000010 HC PHASE II RECOVERY - FIRST 15 MIN: Performed by: SURGERY

## 2025-06-09 PROCEDURE — 3700000000 HC ANESTHESIA ATTENDED CARE: Performed by: SURGERY

## 2025-06-09 PROCEDURE — 7100000000 HC PACU RECOVERY - FIRST 15 MIN: Performed by: SURGERY

## 2025-06-09 PROCEDURE — 6360000002 HC RX W HCPCS: Performed by: ANESTHESIOLOGY

## 2025-06-09 PROCEDURE — S2900 ROBOTIC SURGICAL SYSTEM: HCPCS | Performed by: SURGERY

## 2025-06-09 PROCEDURE — 49650 LAP ING HERNIA REPAIR INIT: CPT | Performed by: SURGERY

## 2025-06-09 PROCEDURE — 6360000002 HC RX W HCPCS

## 2025-06-09 PROCEDURE — C1781 MESH (IMPLANTABLE): HCPCS | Performed by: SURGERY

## 2025-06-09 DEVICE — LAPAROSCOPIC SELF-FIXATING MESH, RIGHT ANATOMICAL
Type: IMPLANTABLE DEVICE | Site: ABDOMEN | Status: FUNCTIONAL
Brand: PROGRIP

## 2025-06-09 RX ORDER — SODIUM CHLORIDE 9 MG/ML
INJECTION, SOLUTION INTRAVENOUS PRN
Status: DISCONTINUED | OUTPATIENT
Start: 2025-06-09 | End: 2025-06-09 | Stop reason: HOSPADM

## 2025-06-09 RX ORDER — SODIUM CHLORIDE 0.9 % (FLUSH) 0.9 %
5-40 SYRINGE (ML) INJECTION EVERY 12 HOURS SCHEDULED
Status: DISCONTINUED | OUTPATIENT
Start: 2025-06-09 | End: 2025-06-09 | Stop reason: HOSPADM

## 2025-06-09 RX ORDER — SODIUM CHLORIDE 9 MG/ML
INJECTION, SOLUTION INTRAVENOUS
Status: DISCONTINUED | OUTPATIENT
Start: 2025-06-09 | End: 2025-06-09 | Stop reason: SDUPTHER

## 2025-06-09 RX ORDER — METHOCARBAMOL 750 MG/1
750 TABLET, FILM COATED ORAL 4 TIMES DAILY
Qty: 40 TABLET | Refills: 0 | Status: SHIPPED | OUTPATIENT
Start: 2025-06-09 | End: 2025-06-19

## 2025-06-09 RX ORDER — NALOXONE HYDROCHLORIDE 0.4 MG/ML
INJECTION, SOLUTION INTRAMUSCULAR; INTRAVENOUS; SUBCUTANEOUS PRN
Status: DISCONTINUED | OUTPATIENT
Start: 2025-06-09 | End: 2025-06-09 | Stop reason: HOSPADM

## 2025-06-09 RX ORDER — OXYCODONE AND ACETAMINOPHEN 5; 325 MG/1; MG/1
1 TABLET ORAL EVERY 6 HOURS PRN
Qty: 28 TABLET | Refills: 0 | Status: SHIPPED | OUTPATIENT
Start: 2025-06-09 | End: 2025-06-16

## 2025-06-09 RX ORDER — POLYETHYLENE GLYCOL 3350 17 G/17G
17 POWDER, FOR SOLUTION ORAL DAILY
Qty: 238 G | Refills: 1 | Status: SHIPPED | OUTPATIENT
Start: 2025-06-09 | End: 2025-07-09

## 2025-06-09 RX ORDER — SODIUM CHLORIDE 0.9 % (FLUSH) 0.9 %
5-40 SYRINGE (ML) INJECTION PRN
Status: DISCONTINUED | OUTPATIENT
Start: 2025-06-09 | End: 2025-06-09 | Stop reason: HOSPADM

## 2025-06-09 RX ORDER — PROCHLORPERAZINE EDISYLATE 5 MG/ML
5 INJECTION INTRAMUSCULAR; INTRAVENOUS
Status: DISCONTINUED | OUTPATIENT
Start: 2025-06-09 | End: 2025-06-09 | Stop reason: HOSPADM

## 2025-06-09 RX ORDER — ONDANSETRON 2 MG/ML
INJECTION INTRAMUSCULAR; INTRAVENOUS
Status: DISCONTINUED | OUTPATIENT
Start: 2025-06-09 | End: 2025-06-09 | Stop reason: SDUPTHER

## 2025-06-09 RX ORDER — ONDANSETRON 4 MG/1
4 TABLET, FILM COATED ORAL 3 TIMES DAILY PRN
Qty: 15 TABLET | Refills: 0 | Status: SHIPPED | OUTPATIENT
Start: 2025-06-09

## 2025-06-09 RX ORDER — PROPOFOL 10 MG/ML
INJECTION, EMULSION INTRAVENOUS
Status: DISCONTINUED | OUTPATIENT
Start: 2025-06-09 | End: 2025-06-09 | Stop reason: SDUPTHER

## 2025-06-09 RX ORDER — FENTANYL CITRATE 50 UG/ML
INJECTION, SOLUTION INTRAMUSCULAR; INTRAVENOUS
Status: DISCONTINUED | OUTPATIENT
Start: 2025-06-09 | End: 2025-06-09 | Stop reason: SDUPTHER

## 2025-06-09 RX ORDER — LIDOCAINE HYDROCHLORIDE 20 MG/ML
INJECTION, SOLUTION EPIDURAL; INFILTRATION; INTRACAUDAL; PERINEURAL
Status: DISCONTINUED | OUTPATIENT
Start: 2025-06-09 | End: 2025-06-09 | Stop reason: SDUPTHER

## 2025-06-09 RX ORDER — MIDAZOLAM HYDROCHLORIDE 1 MG/ML
INJECTION, SOLUTION INTRAMUSCULAR; INTRAVENOUS
Status: DISCONTINUED | OUTPATIENT
Start: 2025-06-09 | End: 2025-06-09 | Stop reason: SDUPTHER

## 2025-06-09 RX ORDER — ROCURONIUM BROMIDE 10 MG/ML
INJECTION, SOLUTION INTRAVENOUS
Status: DISCONTINUED | OUTPATIENT
Start: 2025-06-09 | End: 2025-06-09 | Stop reason: SDUPTHER

## 2025-06-09 RX ORDER — DEXAMETHASONE SODIUM PHOSPHATE 4 MG/ML
INJECTION, SOLUTION INTRA-ARTICULAR; INTRALESIONAL; INTRAMUSCULAR; INTRAVENOUS; SOFT TISSUE
Status: DISCONTINUED | OUTPATIENT
Start: 2025-06-09 | End: 2025-06-09 | Stop reason: SDUPTHER

## 2025-06-09 RX ORDER — BUPIVACAINE HYDROCHLORIDE AND EPINEPHRINE 2.5; 5 MG/ML; UG/ML
INJECTION, SOLUTION EPIDURAL; INFILTRATION; INTRACAUDAL; PERINEURAL PRN
Status: DISCONTINUED | OUTPATIENT
Start: 2025-06-09 | End: 2025-06-09 | Stop reason: ALTCHOICE

## 2025-06-09 RX ADMIN — DEXAMETHASONE SODIUM PHOSPHATE 10 MG: 4 INJECTION, SOLUTION INTRAMUSCULAR; INTRAVENOUS at 11:59

## 2025-06-09 RX ADMIN — MIDAZOLAM 1 MG: 1 INJECTION INTRAMUSCULAR; INTRAVENOUS at 11:44

## 2025-06-09 RX ADMIN — HYDROMORPHONE HYDROCHLORIDE 0.5 MG: 1 INJECTION, SOLUTION INTRAMUSCULAR; INTRAVENOUS; SUBCUTANEOUS at 13:25

## 2025-06-09 RX ADMIN — FENTANYL CITRATE 50 MCG: 50 INJECTION, SOLUTION INTRAMUSCULAR; INTRAVENOUS at 11:52

## 2025-06-09 RX ADMIN — SODIUM CHLORIDE: 9 INJECTION, SOLUTION INTRAVENOUS at 11:36

## 2025-06-09 RX ADMIN — ROCURONIUM BROMIDE 50 MG: 10 INJECTION, SOLUTION INTRAVENOUS at 11:53

## 2025-06-09 RX ADMIN — ONDANSETRON 4 MG: 2 INJECTION, SOLUTION INTRAMUSCULAR; INTRAVENOUS at 12:47

## 2025-06-09 RX ADMIN — MIDAZOLAM 1 MG: 1 INJECTION INTRAMUSCULAR; INTRAVENOUS at 11:36

## 2025-06-09 RX ADMIN — FENTANYL CITRATE 25 MCG: 50 INJECTION, SOLUTION INTRAMUSCULAR; INTRAVENOUS at 12:08

## 2025-06-09 RX ADMIN — SUGAMMADEX 200 MG: 100 INJECTION, SOLUTION INTRAVENOUS at 12:47

## 2025-06-09 RX ADMIN — LIDOCAINE HYDROCHLORIDE 100 MG: 20 INJECTION, SOLUTION EPIDURAL; INFILTRATION; INTRACAUDAL; PERINEURAL at 11:54

## 2025-06-09 RX ADMIN — WATER 2000 MG: 1 INJECTION INTRAMUSCULAR; INTRAVENOUS; SUBCUTANEOUS at 11:57

## 2025-06-09 RX ADMIN — PROPOFOL 100 MG: 10 INJECTION, EMULSION INTRAVENOUS at 11:52

## 2025-06-09 RX ADMIN — FENTANYL CITRATE 25 MCG: 50 INJECTION, SOLUTION INTRAMUSCULAR; INTRAVENOUS at 12:11

## 2025-06-09 ASSESSMENT — PAIN SCALES - GENERAL
PAINLEVEL_OUTOF10: 7
PAINLEVEL_OUTOF10: 4

## 2025-06-09 ASSESSMENT — PAIN DESCRIPTION - DESCRIPTORS
DESCRIPTORS: ACHING;DISCOMFORT;SORE
DESCRIPTORS: DISCOMFORT
DESCRIPTORS: ACHING;DISCOMFORT;SORE

## 2025-06-09 ASSESSMENT — PAIN DESCRIPTION - LOCATION
LOCATION: ABDOMEN

## 2025-06-09 ASSESSMENT — LIFESTYLE VARIABLES: SMOKING_STATUS: 0

## 2025-06-09 ASSESSMENT — PAIN DESCRIPTION - ORIENTATION
ORIENTATION: INNER;MID
ORIENTATION: INNER;MID

## 2025-06-09 ASSESSMENT — PAIN - FUNCTIONAL ASSESSMENT: PAIN_FUNCTIONAL_ASSESSMENT: 0-10

## 2025-06-09 NOTE — OP NOTE
Operative Note    Bryant Mahmood  YOB: 1950  [id number]    Pre-operative Diagnosis: Inguinal hernia [K40.90] right    Post-operative Diagnosis: Inguinal hernia [K40.90] right    Procedure(s):  LAPAROSCOPIC ROBOTIC XI ASSISTED RIGHT INGUINAL HERNIA REPAIR WITH MESH  Implant Name Type Inv. Item Serial No.  Lot No. LRB No. Used Action   MESH X[employer]ILT [id number] TEXTILE MFIL POLYETH TEREPHTHALATE - [id number]  MESH XOCHILT [id number] TEXTILE MFIL POLYETH TEREPHTHALATE  [employer]  SURGICAL-WD [id number] Right 1 Implanted       Surgeon: Brenton Barajas MD    Staff:  Scrub Person First: Jay Jay Parks  Scrub Person Second: Jamie Ochoa    Anesthesia:   General    Estimated Blood Loss: less than 50     Complications:   None    Specimens: * No specimens in log *    Findings: Right small inguinal Hernia    Indications: Patient is a 74 y.o. male who was diagnosed with Right Inguinal Hernia. Risks/Benefits/Alternatives were discussed with the patient, including bleeding, infection, iatrogenic injury to surrounding structures, nerve/vascular injury, recurrence, need for further operations. The patient agreed to undergo the procedure and informed consent was obtained.    Procedure: After informed consent, the patient was brought to the operating room and placed Supine. General anesthesia was then induced which the patient tolerated well. Time out was performed to identify the correct patient and procedure. They received appropriate perioperative antibiotics. The abdomen and genitalia were prepped and draped in the usual sterile fashion.    Pneumoperitoneum was created with Veress in the left upper quadrant at the site of one of the proposed robotic trocars and 8mm robotic optiview port used in this location for abdominal entry. In the midline between umbilicus and epigastrium, incision was made and 8 mm port was placed into the abdomen. Additional 8 millimeter port was placed under direct

## 2025-06-09 NOTE — ANESTHESIA POSTPROCEDURE EVALUATION
Department of Anesthesiology  Postprocedure Note    Patient: Bryant Mahmood  MRN: [id number]  YOB: 1950  Date of evaluation: 6/9/2025    Procedure Summary       Date: 06/09/25 Room / Location: [address]    Anesthesia Start: 1136 Anesthesia Stop: 1307    Procedure: LAPAROSCOPIC ROBOTIC XI ASSISTED RIGHT INGUINAL HERNIA REPAIR WITH MESH (Right) Diagnosis:       Inguinal hernia      (Inguinal hernia [K40.90])    Surgeons: Brenton Barajas MD Responsible Provider: Paola Clemente MD    Anesthesia Type: General ASA Status: 3            Anesthesia Type: General    Natty Phase I: Natty Score: 8    Natty Phase II: Natty Score: 10    Anesthesia Post Evaluation    Patient location during evaluation: PACU  Patient participation: complete - patient participated  Level of consciousness: awake and alert  Pain score: 0  Airway patency: patent  Nausea & Vomiting: no vomiting and no nausea  Cardiovascular status: hemodynamically stable  Respiratory status: spontaneous ventilation, nonlabored ventilation and acceptable  Hydration status: stable  Pain management: adequate        No notable events documented.   0 = understands/communicates without difficulty

## 2025-06-09 NOTE — ANESTHESIA PRE PROCEDURE
II DM, no interval change.                 Abdominal:             Vascular: negative vascular ROS.         Other Findings:             Anesthesia Plan      general     ASA 3       Induction: intravenous.    MIPS: Postoperative opioids intended and Prophylactic antiemetics administered.  Anesthetic plan and risks discussed with patient and spouse.      Plan discussed with CRNA.                    Paola Clemente MD   6/9/2025

## 2025-06-09 NOTE — H&P
GENERAL SURGERY  HISTORY AND PHYSICAL      Patient's Name/Date of Birth: Bryant Mahmood / 1950    Date: June 9, 2025     PCP: Leeroy Mattson MD     Chief Complaint: Right Inguinal hernia       HPI  Bryant Mahmood is a 74 y.o. male who presents for evaluation of right inguinal hernia. He has been experiencing intermittent hernia symptoms for approximately 4 to 5 years, which have recently escalated in severity. The hernia was previously so debilitating that he had to crawl on his hands and knees to the bathroom. However, he managed to manually reduce the hernia while at work. The hernia is currently protruding, but he believes it has extended further in the past, causing significant pain and impairing his ability to walk or perform simple tasks such as tying his shoes. He expresses a desire to have the hernia surgically repaired. He reports no respiratory issues and maintains regular bowel movements. His medical history includes an open surgical procedure on the left side of his abdomen performed a decade ago.     He has a history of cardiac complications, including bypass surgery and stent placement, which occurred a few years ago. He also has atrial fibrillation. His current medication regimen includes Eliquis and nightly aspirin, with no use of Plavix. His cardiologist is Dr. Dudley at [facility].     His diabetes is well-managed. His most recent A1c level was 8.9. He has increased his Ozempic dosage by 2 units.    The patient denies any changes in medical history since his last office visit.  States has not taken his Ozempic in 1 week.  Last dose of Eliquis was Saturday.        Past Medical History:   Diagnosis Date    CAD (coronary artery disease)     Diabetes mellitus (HCC)     Hyperlipidemia     Hypertension     Paroxysmal atrial fibrillation (HCC)     Right inguinal hernia        Past Surgical History:   Procedure Laterality Date    CARDIAC CATHETERIZATION  02/25/2022    Dr. Moreno    CARDIAC

## 2025-06-09 NOTE — DISCHARGE INSTRUCTIONS
Instructions After Hernia Surgery    Please follow the instructions on this sheet for your follow-up care and make an appointment for a follow-up visit.    OK TO RESUME OZEMPIC TODAY AND RESUME ELIQUIS TOMORROW 6/10    Activity/Sleep/Return to Work  Unless instructed otherwise, you may walk, climb stairs, ride as a passenger in a car, and engage in other activities of daily living as tolerated.  It is normal to feel fatigued and require more sleep than usual during your recovery.  Also, major surgery and being in the hospital can disrupt sleep patterns.  We recommend allowing sleep patterns to return to normal over time and avoid sleep medication unless previously prescribed.  Avoid heavy lifting (> 10 pounds or more) for 6 weeks.  Avoid driving for 2 weeks or as long as you have pain and are taking narcotic pain medication.  You may resume work as tolerated unless instructed otherwise or if your work involves heavy lifting.  Please bring any forms related to work, insurance, or disability to your first postoperative visit.    Bowel Movements  It is common to have irregular, loose watery stools for several days after abdominal surgery.  If watery diarrhea lasts more than a few days or you have more than 8 large volume liquid bowel movements in one day then call the office.  You may have altered bacteria in your colon and need a prescription for an antibiotic.  Avoid caffeine and alcohol (they alter bowel activity and can contribute to diarrhea or constipation).  You may take Mineral Oil 1 tablespoon twice daily to soften your stool.  If you have diarrhea, stop this medication.  If you have constipation and feel uncomfortable, then please call the office during office hours.    Urination    Patients who had a urinary catheter (“Francis”) placed for surgery often have minor discomfort with urination for several days after removal of the catheter.  If discomfort persists or worsens, an infection may have occurred and you

## 2025-06-11 LAB
EKG ATRIAL RATE: 66 BPM
EKG P AXIS: 4 DEGREES
EKG P-R INTERVAL: 202 MS
EKG Q-T INTERVAL: 418 MS
EKG QRS DURATION: 98 MS
EKG QTC CALCULATION (BAZETT): 438 MS
EKG R AXIS: 22 DEGREES
EKG T AXIS: 79 DEGREES
EKG VENTRICULAR RATE: 66 BPM

## 2025-06-18 ENCOUNTER — APPOINTMENT (OUTPATIENT)
Dept: CARDIOLOGY | Facility: HOSPITAL | Age: 75
DRG: 287 | End: 2025-06-18
Payer: MEDICARE

## 2025-06-18 ENCOUNTER — HOSPITAL ENCOUNTER (INPATIENT)
Facility: HOSPITAL | Age: 75
LOS: 1 days | Discharge: HOME | End: 2025-06-21
Attending: STUDENT IN AN ORGANIZED HEALTH CARE EDUCATION/TRAINING PROGRAM | Admitting: STUDENT IN AN ORGANIZED HEALTH CARE EDUCATION/TRAINING PROGRAM
Payer: MEDICARE

## 2025-06-18 ENCOUNTER — APPOINTMENT (OUTPATIENT)
Dept: RADIOLOGY | Facility: HOSPITAL | Age: 75
DRG: 287 | End: 2025-06-18
Payer: MEDICARE

## 2025-06-18 DIAGNOSIS — Z95.1 HX OF CABG: ICD-10-CM

## 2025-06-18 DIAGNOSIS — R07.9 CHEST PAIN, UNSPECIFIED TYPE: ICD-10-CM

## 2025-06-18 DIAGNOSIS — I20.0 UNSTABLE ANGINA (MULTI): Primary | ICD-10-CM

## 2025-06-18 DIAGNOSIS — I20.81 ANGINA PECTORIS WITH CORONARY MICROVASCULAR DYSFUNCTION: ICD-10-CM

## 2025-06-18 DIAGNOSIS — R94.39 ABNORMAL STRESS TEST: ICD-10-CM

## 2025-06-18 LAB
ALBUMIN SERPL BCP-MCNC: 4 G/DL (ref 3.4–5)
ALP SERPL-CCNC: 84 U/L (ref 33–136)
ALT SERPL W P-5'-P-CCNC: 19 U/L (ref 10–52)
ANION GAP SERPL CALC-SCNC: 11 MMOL/L (ref 10–20)
AST SERPL W P-5'-P-CCNC: 14 U/L (ref 9–39)
BASOPHILS # BLD AUTO: 0.1 X10*3/UL (ref 0–0.1)
BASOPHILS NFR BLD AUTO: 0.8 %
BILIRUB SERPL-MCNC: 0.7 MG/DL (ref 0–1.2)
BUN SERPL-MCNC: 16 MG/DL (ref 6–23)
CALCIUM SERPL-MCNC: 8.5 MG/DL (ref 8.6–10.3)
CARDIAC TROPONIN I PNL SERPL HS: 7 NG/L (ref 0–20)
CARDIAC TROPONIN I PNL SERPL HS: 8 NG/L (ref 0–20)
CHLORIDE SERPL-SCNC: 105 MMOL/L (ref 98–107)
CO2 SERPL-SCNC: 24 MMOL/L (ref 21–32)
CREAT SERPL-MCNC: 0.86 MG/DL (ref 0.5–1.3)
EGFRCR SERPLBLD CKD-EPI 2021: 90 ML/MIN/1.73M*2
EOSINOPHIL # BLD AUTO: 0.3 X10*3/UL (ref 0–0.4)
EOSINOPHIL NFR BLD AUTO: 2.5 %
ERYTHROCYTE [DISTWIDTH] IN BLOOD BY AUTOMATED COUNT: 13.2 % (ref 11.5–14.5)
GLUCOSE BLD MANUAL STRIP-MCNC: 126 MG/DL (ref 74–99)
GLUCOSE BLD MANUAL STRIP-MCNC: 174 MG/DL (ref 74–99)
GLUCOSE BLD MANUAL STRIP-MCNC: 188 MG/DL (ref 74–99)
GLUCOSE SERPL-MCNC: 177 MG/DL (ref 74–99)
HCT VFR BLD AUTO: 44.2 % (ref 41–52)
HGB BLD-MCNC: 14.8 G/DL (ref 13.5–17.5)
IMM GRANULOCYTES # BLD AUTO: 0.14 X10*3/UL (ref 0–0.5)
IMM GRANULOCYTES NFR BLD AUTO: 1.2 % (ref 0–0.9)
LYMPHOCYTES # BLD AUTO: 2.17 X10*3/UL (ref 0.8–3)
LYMPHOCYTES NFR BLD AUTO: 18.2 %
MAGNESIUM SERPL-MCNC: 2 MG/DL (ref 1.6–2.4)
MCH RBC QN AUTO: 29 PG (ref 26–34)
MCHC RBC AUTO-ENTMCNC: 33.5 G/DL (ref 32–36)
MCV RBC AUTO: 87 FL (ref 80–100)
MONOCYTES # BLD AUTO: 1.29 X10*3/UL (ref 0.05–0.8)
MONOCYTES NFR BLD AUTO: 10.8 %
NEUTROPHILS # BLD AUTO: 7.95 X10*3/UL (ref 1.6–5.5)
NEUTROPHILS NFR BLD AUTO: 66.5 %
NRBC BLD-RTO: 0 /100 WBCS (ref 0–0)
PLATELET # BLD AUTO: 332 X10*3/UL (ref 150–450)
POTASSIUM SERPL-SCNC: 3.8 MMOL/L (ref 3.5–5.3)
PROT SERPL-MCNC: 6.5 G/DL (ref 6.4–8.2)
RBC # BLD AUTO: 5.11 X10*6/UL (ref 4.5–5.9)
SODIUM SERPL-SCNC: 136 MMOL/L (ref 136–145)
WBC # BLD AUTO: 12 X10*3/UL (ref 4.4–11.3)

## 2025-06-18 PROCEDURE — 36415 COLL VENOUS BLD VENIPUNCTURE: CPT | Performed by: STUDENT IN AN ORGANIZED HEALTH CARE EDUCATION/TRAINING PROGRAM

## 2025-06-18 PROCEDURE — 83735 ASSAY OF MAGNESIUM: CPT | Performed by: STUDENT IN AN ORGANIZED HEALTH CARE EDUCATION/TRAINING PROGRAM

## 2025-06-18 PROCEDURE — 93005 ELECTROCARDIOGRAM TRACING: CPT

## 2025-06-18 PROCEDURE — 71046 X-RAY EXAM CHEST 2 VIEWS: CPT | Mod: FOREIGN READ | Performed by: RADIOLOGY

## 2025-06-18 PROCEDURE — 2500000001 HC RX 250 WO HCPCS SELF ADMINISTERED DRUGS (ALT 637 FOR MEDICARE OP): Performed by: NURSE PRACTITIONER

## 2025-06-18 PROCEDURE — G0378 HOSPITAL OBSERVATION PER HR: HCPCS

## 2025-06-18 PROCEDURE — 71046 X-RAY EXAM CHEST 2 VIEWS: CPT

## 2025-06-18 PROCEDURE — 99285 EMERGENCY DEPT VISIT HI MDM: CPT | Mod: 25 | Performed by: STUDENT IN AN ORGANIZED HEALTH CARE EDUCATION/TRAINING PROGRAM

## 2025-06-18 PROCEDURE — 84484 ASSAY OF TROPONIN QUANT: CPT | Performed by: STUDENT IN AN ORGANIZED HEALTH CARE EDUCATION/TRAINING PROGRAM

## 2025-06-18 PROCEDURE — 82947 ASSAY GLUCOSE BLOOD QUANT: CPT

## 2025-06-18 PROCEDURE — 2500000002 HC RX 250 W HCPCS SELF ADMINISTERED DRUGS (ALT 637 FOR MEDICARE OP, ALT 636 FOR OP/ED): Performed by: NURSE PRACTITIONER

## 2025-06-18 PROCEDURE — 85025 COMPLETE CBC W/AUTO DIFF WBC: CPT | Performed by: STUDENT IN AN ORGANIZED HEALTH CARE EDUCATION/TRAINING PROGRAM

## 2025-06-18 PROCEDURE — 2500000001 HC RX 250 WO HCPCS SELF ADMINISTERED DRUGS (ALT 637 FOR MEDICARE OP): Performed by: STUDENT IN AN ORGANIZED HEALTH CARE EDUCATION/TRAINING PROGRAM

## 2025-06-18 PROCEDURE — 99223 1ST HOSP IP/OBS HIGH 75: CPT | Performed by: NURSE PRACTITIONER

## 2025-06-18 PROCEDURE — 80053 COMPREHEN METABOLIC PANEL: CPT | Performed by: STUDENT IN AN ORGANIZED HEALTH CARE EDUCATION/TRAINING PROGRAM

## 2025-06-18 PROCEDURE — 99222 1ST HOSP IP/OBS MODERATE 55: CPT | Performed by: INTERNAL MEDICINE

## 2025-06-18 RX ORDER — SEMAGLUTIDE 1.34 MG/ML
1 INJECTION, SOLUTION SUBCUTANEOUS WEEKLY
COMMUNITY

## 2025-06-18 RX ORDER — INSULIN LISPRO 100 [IU]/ML
0-5 INJECTION, SOLUTION INTRAVENOUS; SUBCUTANEOUS
Status: DISCONTINUED | OUTPATIENT
Start: 2025-06-18 | End: 2025-06-21 | Stop reason: HOSPADM

## 2025-06-18 RX ORDER — TAMSULOSIN HYDROCHLORIDE 0.4 MG/1
0.8 CAPSULE ORAL DAILY
Status: DISCONTINUED | OUTPATIENT
Start: 2025-06-18 | End: 2025-06-21 | Stop reason: HOSPADM

## 2025-06-18 RX ORDER — REGADENOSON 0.08 MG/ML
0.4 INJECTION, SOLUTION INTRAVENOUS
Status: COMPLETED | OUTPATIENT
Start: 2025-06-18 | End: 2025-06-19

## 2025-06-18 RX ORDER — DEXTROSE 50 % IN WATER (D50W) INTRAVENOUS SYRINGE
25
Status: DISCONTINUED | OUTPATIENT
Start: 2025-06-18 | End: 2025-06-21 | Stop reason: HOSPADM

## 2025-06-18 RX ORDER — ONDANSETRON 4 MG/1
4 TABLET, FILM COATED ORAL EVERY 8 HOURS PRN
COMMUNITY

## 2025-06-18 RX ORDER — INSULIN GLARGINE 100 [IU]/ML
40 INJECTION, SOLUTION SUBCUTANEOUS NIGHTLY
Status: DISCONTINUED | OUTPATIENT
Start: 2025-06-18 | End: 2025-06-21 | Stop reason: HOSPADM

## 2025-06-18 RX ORDER — DEXTROSE 50 % IN WATER (D50W) INTRAVENOUS SYRINGE
12.5
Status: DISCONTINUED | OUTPATIENT
Start: 2025-06-18 | End: 2025-06-21 | Stop reason: HOSPADM

## 2025-06-18 RX ORDER — ACETAMINOPHEN 325 MG/1
650 TABLET ORAL EVERY 4 HOURS PRN
Status: DISCONTINUED | OUTPATIENT
Start: 2025-06-18 | End: 2025-06-21 | Stop reason: HOSPADM

## 2025-06-18 RX ORDER — LISINOPRIL 5 MG/1
5 TABLET ORAL DAILY
Status: DISCONTINUED | OUTPATIENT
Start: 2025-06-18 | End: 2025-06-21 | Stop reason: HOSPADM

## 2025-06-18 RX ORDER — OXYCODONE AND ACETAMINOPHEN 5; 325 MG/1; MG/1
1 TABLET ORAL EVERY 6 HOURS PRN
COMMUNITY
Start: 2025-06-09 | End: 2025-06-21 | Stop reason: HOSPADM

## 2025-06-18 RX ORDER — POLYETHYLENE GLYCOL 3350 17 G/17G
17 POWDER, FOR SOLUTION ORAL DAILY
COMMUNITY

## 2025-06-18 RX ORDER — NITROGLYCERIN 0.4 MG/1
0.4 TABLET SUBLINGUAL EVERY 5 MIN PRN
Status: DISCONTINUED | OUTPATIENT
Start: 2025-06-18 | End: 2025-06-21 | Stop reason: HOSPADM

## 2025-06-18 RX ORDER — INSULIN GLARGINE 100 [IU]/ML
15 INJECTION, SOLUTION SUBCUTANEOUS EVERY MORNING
Status: DISCONTINUED | OUTPATIENT
Start: 2025-06-18 | End: 2025-06-21 | Stop reason: HOSPADM

## 2025-06-18 RX ORDER — TAMSULOSIN HYDROCHLORIDE 0.4 MG/1
0.8 CAPSULE ORAL DAILY
COMMUNITY

## 2025-06-18 RX ORDER — ROSUVASTATIN CALCIUM 20 MG/1
20 TABLET, COATED ORAL NIGHTLY
Status: DISCONTINUED | OUTPATIENT
Start: 2025-06-18 | End: 2025-06-21 | Stop reason: HOSPADM

## 2025-06-18 RX ORDER — METHOCARBAMOL 750 MG/1
750 TABLET, FILM COATED ORAL 4 TIMES DAILY
COMMUNITY
Start: 2025-06-09 | End: 2025-06-18

## 2025-06-18 RX ORDER — AMLODIPINE BESYLATE 5 MG/1
5 TABLET ORAL DAILY
Status: DISCONTINUED | OUTPATIENT
Start: 2025-06-18 | End: 2025-06-21 | Stop reason: HOSPADM

## 2025-06-18 RX ORDER — ASPIRIN 81 MG/1
81 TABLET ORAL DAILY
Status: DISCONTINUED | OUTPATIENT
Start: 2025-06-19 | End: 2025-06-21 | Stop reason: HOSPADM

## 2025-06-18 RX ORDER — AMINOPHYLLINE 25 MG/ML
125 INJECTION, SOLUTION INTRAVENOUS AS NEEDED
Status: DISCONTINUED | OUTPATIENT
Start: 2025-06-18 | End: 2025-06-21 | Stop reason: HOSPADM

## 2025-06-18 RX ORDER — AMOXICILLIN 250 MG
1 CAPSULE ORAL NIGHTLY
Status: DISCONTINUED | OUTPATIENT
Start: 2025-06-18 | End: 2025-06-21 | Stop reason: HOSPADM

## 2025-06-18 RX ORDER — NAPROXEN SODIUM 220 MG/1
324 TABLET, FILM COATED ORAL ONCE
Status: COMPLETED | OUTPATIENT
Start: 2025-06-18 | End: 2025-06-18

## 2025-06-18 RX ORDER — METOPROLOL SUCCINATE 50 MG/1
50 TABLET, EXTENDED RELEASE ORAL DAILY
Status: DISCONTINUED | OUTPATIENT
Start: 2025-06-18 | End: 2025-06-21 | Stop reason: HOSPADM

## 2025-06-18 RX ADMIN — APIXABAN 5 MG: 5 TABLET, FILM COATED ORAL at 21:01

## 2025-06-18 RX ADMIN — ROSUVASTATIN 20 MG: 20 TABLET, FILM COATED ORAL at 21:01

## 2025-06-18 RX ADMIN — INSULIN LISPRO 1 UNITS: 100 INJECTION, SOLUTION INTRAVENOUS; SUBCUTANEOUS at 17:09

## 2025-06-18 RX ADMIN — ASPIRIN 81 MG CHEWABLE TABLET 324 MG: 81 TABLET CHEWABLE at 07:29

## 2025-06-18 RX ADMIN — INSULIN GLARGINE 40 UNITS: 100 INJECTION, SOLUTION SUBCUTANEOUS at 21:01

## 2025-06-18 RX ADMIN — SENNOSIDES AND DOCUSATE SODIUM 1 TABLET: 50; 8.6 TABLET ORAL at 21:01

## 2025-06-18 SDOH — ECONOMIC STABILITY: HOUSING INSECURITY: AT ANY TIME IN THE PAST 12 MONTHS, WERE YOU HOMELESS OR LIVING IN A SHELTER (INCLUDING NOW)?: NO

## 2025-06-18 SDOH — ECONOMIC STABILITY: FOOD INSECURITY: WITHIN THE PAST 12 MONTHS, THE FOOD YOU BOUGHT JUST DIDN'T LAST AND YOU DIDN'T HAVE MONEY TO GET MORE.: NEVER TRUE

## 2025-06-18 SDOH — SOCIAL STABILITY: SOCIAL INSECURITY
WITHIN THE LAST YEAR, HAVE YOU BEEN KICKED, HIT, SLAPPED, OR OTHERWISE PHYSICALLY HURT BY YOUR PARTNER OR EX-PARTNER?: NO

## 2025-06-18 SDOH — SOCIAL STABILITY: SOCIAL INSECURITY: WITHIN THE LAST YEAR, HAVE YOU BEEN HUMILIATED OR EMOTIONALLY ABUSED IN OTHER WAYS BY YOUR PARTNER OR EX-PARTNER?: NO

## 2025-06-18 SDOH — ECONOMIC STABILITY: INCOME INSECURITY: IN THE PAST 12 MONTHS HAS THE ELECTRIC, GAS, OIL, OR WATER COMPANY THREATENED TO SHUT OFF SERVICES IN YOUR HOME?: NO

## 2025-06-18 SDOH — HEALTH STABILITY: MENTAL HEALTH: HOW OFTEN DO YOU HAVE A DRINK CONTAINING ALCOHOL?: NEVER

## 2025-06-18 SDOH — ECONOMIC STABILITY: HOUSING INSECURITY: IN THE PAST 12 MONTHS, HOW MANY TIMES HAVE YOU MOVED WHERE YOU WERE LIVING?: 1

## 2025-06-18 SDOH — SOCIAL STABILITY: SOCIAL INSECURITY: DOES ANYONE TRY TO KEEP YOU FROM HAVING/CONTACTING OTHER FRIENDS OR DOING THINGS OUTSIDE YOUR HOME?: NO

## 2025-06-18 SDOH — HEALTH STABILITY: MENTAL HEALTH: HOW MANY DRINKS CONTAINING ALCOHOL DO YOU HAVE ON A TYPICAL DAY WHEN YOU ARE DRINKING?: PATIENT DOES NOT DRINK

## 2025-06-18 SDOH — HEALTH STABILITY: MENTAL HEALTH: HOW OFTEN DO YOU HAVE SIX OR MORE DRINKS ON ONE OCCASION?: NEVER

## 2025-06-18 SDOH — SOCIAL STABILITY: SOCIAL INSECURITY: ARE YOU OR HAVE YOU BEEN THREATENED OR ABUSED PHYSICALLY, EMOTIONALLY, OR SEXUALLY BY ANYONE?: NO

## 2025-06-18 SDOH — SOCIAL STABILITY: SOCIAL INSECURITY: HAVE YOU HAD THOUGHTS OF HARMING ANYONE ELSE?: NO

## 2025-06-18 SDOH — ECONOMIC STABILITY: FOOD INSECURITY: HOW HARD IS IT FOR YOU TO PAY FOR THE VERY BASICS LIKE FOOD, HOUSING, MEDICAL CARE, AND HEATING?: NOT HARD AT ALL

## 2025-06-18 SDOH — ECONOMIC STABILITY: TRANSPORTATION INSECURITY: IN THE PAST 12 MONTHS, HAS LACK OF TRANSPORTATION KEPT YOU FROM MEDICAL APPOINTMENTS OR FROM GETTING MEDICATIONS?: NO

## 2025-06-18 SDOH — SOCIAL STABILITY: SOCIAL INSECURITY: WITHIN THE LAST YEAR, HAVE YOU BEEN AFRAID OF YOUR PARTNER OR EX-PARTNER?: NO

## 2025-06-18 SDOH — ECONOMIC STABILITY: FOOD INSECURITY: WITHIN THE PAST 12 MONTHS, YOU WORRIED THAT YOUR FOOD WOULD RUN OUT BEFORE YOU GOT THE MONEY TO BUY MORE.: NEVER TRUE

## 2025-06-18 SDOH — SOCIAL STABILITY: SOCIAL INSECURITY
WITHIN THE LAST YEAR, HAVE YOU BEEN RAPED OR FORCED TO HAVE ANY KIND OF SEXUAL ACTIVITY BY YOUR PARTNER OR EX-PARTNER?: NO

## 2025-06-18 SDOH — ECONOMIC STABILITY: HOUSING INSECURITY: IN THE LAST 12 MONTHS, WAS THERE A TIME WHEN YOU WERE NOT ABLE TO PAY THE MORTGAGE OR RENT ON TIME?: NO

## 2025-06-18 SDOH — SOCIAL STABILITY: SOCIAL INSECURITY: ABUSE: ADULT

## 2025-06-18 SDOH — SOCIAL STABILITY: SOCIAL INSECURITY: HAS ANYONE EVER THREATENED TO HURT YOUR FAMILY OR YOUR PETS?: NO

## 2025-06-18 SDOH — SOCIAL STABILITY: SOCIAL INSECURITY: DO YOU FEEL UNSAFE GOING BACK TO THE PLACE WHERE YOU ARE LIVING?: NO

## 2025-06-18 SDOH — SOCIAL STABILITY: SOCIAL INSECURITY: WERE YOU ABLE TO COMPLETE ALL THE BEHAVIORAL HEALTH SCREENINGS?: YES

## 2025-06-18 SDOH — SOCIAL STABILITY: SOCIAL INSECURITY: ARE THERE ANY APPARENT SIGNS OF INJURIES/BEHAVIORS THAT COULD BE RELATED TO ABUSE/NEGLECT?: NO

## 2025-06-18 SDOH — SOCIAL STABILITY: SOCIAL INSECURITY: DO YOU FEEL ANYONE HAS EXPLOITED OR TAKEN ADVANTAGE OF YOU FINANCIALLY OR OF YOUR PERSONAL PROPERTY?: NO

## 2025-06-18 SDOH — SOCIAL STABILITY: SOCIAL INSECURITY: HAVE YOU HAD ANY THOUGHTS OF HARMING ANYONE ELSE?: NO

## 2025-06-18 ASSESSMENT — COGNITIVE AND FUNCTIONAL STATUS - GENERAL
DAILY ACTIVITIY SCORE: 24
PATIENT BASELINE BEDBOUND: NO
MOBILITY SCORE: 24

## 2025-06-18 ASSESSMENT — ACTIVITIES OF DAILY LIVING (ADL)
BATHING: INDEPENDENT
PATIENT'S MEMORY ADEQUATE TO SAFELY COMPLETE DAILY ACTIVITIES?: YES
HEARING - RIGHT EAR: FUNCTIONAL
ADEQUATE_TO_COMPLETE_ADL: YES
GROOMING: INDEPENDENT
LACK_OF_TRANSPORTATION: NO
WALKS IN HOME: INDEPENDENT
TOILETING: INDEPENDENT
DRESSING YOURSELF: INDEPENDENT
JUDGMENT_ADEQUATE_SAFELY_COMPLETE_DAILY_ACTIVITIES: YES
HEARING - LEFT EAR: FUNCTIONAL
FEEDING YOURSELF: INDEPENDENT

## 2025-06-18 ASSESSMENT — PAIN - FUNCTIONAL ASSESSMENT
PAIN_FUNCTIONAL_ASSESSMENT: 0-10

## 2025-06-18 ASSESSMENT — LIFESTYLE VARIABLES
HAVE YOU EVER FELT YOU SHOULD CUT DOWN ON YOUR DRINKING: NO
HAVE PEOPLE ANNOYED YOU BY CRITICIZING YOUR DRINKING: NO
TOTAL SCORE: 0
EVER FELT BAD OR GUILTY ABOUT YOUR DRINKING: NO
SKIP TO QUESTIONS 9-10: 1
EVER HAD A DRINK FIRST THING IN THE MORNING TO STEADY YOUR NERVES TO GET RID OF A HANGOVER: NO
AUDIT-C TOTAL SCORE: 0

## 2025-06-18 ASSESSMENT — PAIN SCALES - GENERAL
PAINLEVEL_OUTOF10: 0 - NO PAIN
PAINLEVEL_OUTOF10: 2
PAINLEVEL_OUTOF10: 0 - NO PAIN

## 2025-06-18 ASSESSMENT — PAIN DESCRIPTION - LOCATION: LOCATION: CHEST

## 2025-06-18 NOTE — CONSULTS
"Inpatient consult to Cardiology  Consult performed by: Deborah Chaney MD  Consult ordered by: BOWEN Copeland-CNP  Reason for consult: chest pain        History Of Present Illness:    Prasanna Torrez is a 74 y.o. male presenting with chest pain.  Patient of Dr. Kennedy Sandhu.  He has a history of MI and underwent coronary artery bypass surgery 2013 (L-LAD) and NSTEMI 3/1/22 and AF s/p DEMETRICE LCX x 2 - CP and SVT 8/7/24 - patent L-LAD and CX stent.  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     The patient has noticed \"abnormal heart rate\" over the last 5 days - concerned he might have recurrent arrhythmia.  This morning he had an episode of \"angina\" for which he took SL Ntg - after first dose he still had pain, so he took another dose and the pain completely resolved.  Tells me he has been compliant with home CV meds.  He takes metoprolol XL 50mg daily, reduced from 75mgt daily in 11/24 by Dr. Sandhu (possibly because patient was started on amlodipine).  Of note, he had laparoscopic hernia repair recently on 6/9/25 - tells me surgery was uneventful, no cardiac issues.    In the ED:  /78, HR 72.  I personally reviewed the EKG on presentation which demonstrated sinus rhythm, cannot rule out old inferior infarct (this finding seen on prior EKG's).  Tele reviewed - SR, no Afib/pSVT  Labs personally reviewed:  K 3.8, Cr 0.86, Mg 2.00, WBC 12, Hb 14.8  HSTI 8 > 7  CXR - hazy opacity at the L base    Review Of Systems:  The remainder of the review of systems was obtained, and was negative as pertains to the chief complaint.    Fhx:  strong Fhx premature CAD on both sides  SocHx:  remote tobacco use quit > 20 yrs ago, denies etoh/illicits     Last Recorded Vitals:  Vitals:    06/18/25 1015 06/18/25 1055 06/18/25 1115 06/18/25 1655   BP: 126/71  135/64 117/68   BP Location:   Right arm Right arm   Patient Position:   Lying Lying   Pulse: 64  80 65   Resp: 18  16 17   Temp:   36.1 °C (97 °F) 36.4 °C (97.5 °F)   TempSrc:   Temporal " Temporal   SpO2: 95%  94% 94%   Weight:  99.8 kg (220 lb)     Height:  1.829 m (6')         Last Labs:  CBC - 6/18/2025:  7:27 AM  12.0 14.8 332    44.2      CMP - 6/18/2025:  7:27 AM  8.5 6.5 14 --- 0.7   3.5 4.0 19 84      PTT - No results in last year.  1.2   13.8 _     Troponin I, High Sensitivity   Date/Time Value Ref Range Status   06/18/2025 08:32 AM 7 0 - 20 ng/L Final   06/18/2025 07:27 AM 8 0 - 20 ng/L Final   09/19/2024 09:44 PM 8 0 - 20 ng/L Final     BNP   Date/Time Value Ref Range Status   07/20/2024 01:48 PM 37 0 - 99 pg/mL Final   07/19/2024 03:27 PM 52 0 - 99 pg/mL Final     Hemoglobin A1C   Date/Time Value Ref Range Status   07/20/2024 01:48 PM 8.1 (H) see below % Final   03/01/2022 06:22 AM 7.4 (H) 4.0 - 5.6 % Final     LDL Calculated   Date/Time Value Ref Range Status   07/20/2024 01:48 PM 53 <=99 mg/dL Final     Comment:                                 Near   Borderline      AGE      Desirable  Optimal    High     High     Very High     0-19 Y     0 - 109     ---    110-129   >/= 130     ----    20-24 Y     0 - 119     ---    120-159   >/= 160     ----      >24 Y     0 -  99   100-129  130-159   160-189     >/=190       VLDL   Date/Time Value Ref Range Status   07/20/2024 01:48 PM 37 0 - 40 mg/dL Final      Last I/O:  No intake/output data recorded.    Past Cardiology Tests (Last 3 Years):  EKG:  ECG 12 lead (Clinic Performed) 11/25/2024      ECG 12 lead 09/19/2024      Electrocardiogram 12 Lead 08/07/2024      ECG 12 lead 07/22/2024      ECG 12 lead 07/19/2024      ECG 12 lead 07/19/2024    Echo:  Transthoracic Echo (TTE) Complete 08/08/2024    Ejection Fractions:  EF   Date/Time Value Ref Range Status   08/08/2024 07:45 AM 53 %      Cath:  Cardiac Catheterization Procedure 08/07/2024    Stress Test:  No results found for this or any previous visit from the past 1095 days.    Cardiac Imaging:  No results found for this or any previous visit from the past 1095 days.      Past Medical  History:  He has a past medical history of Coronary artery disease, Diabetes (Multi), Hyperlipidemia, Hypertension, Paroxysmal atrial fibrillation (Multi), and SVT (supraventricular tachycardia).    Past Surgical History:  He has a past surgical history that includes Cardiac catheterization (N/A, 08/07/2024); Cardiac electrophysiology procedure (N/A, 10/10/2024); Coronary angioplasty with stent; Coronary artery bypass graft (2013); and Radiofrequency ablation (10/10/2024).      Social History:  He reports that he quit smoking about 13 years ago. His smoking use included cigarettes. He started smoking about 44 years ago. He has a 31 pack-year smoking history. He has never used smokeless tobacco. He reports that he does not drink alcohol and does not use drugs.    Family History:  Family History[1]     Allergies:  Dapagliflozin    Inpatient Medications:  Scheduled Medications[2]  PRN Medications[3]  Continuous Medications[4]  Outpatient Medications:  Current Outpatient Medications   Medication Instructions    amLODIPine (NORVASC) 5 mg, oral, Daily    aspirin 81 mg EC tablet 1 tablet, Daily    Eliquis 5 mg tablet 1 tablet, 2 times daily    insulin glargine (Lantus U-100 Insulin) 100 unit/mL injection subcutaneous, 40 UNITS HS AND 15 IN AM    lisinopril 5 mg, oral, Daily    methocarbamol (ROBAXIN) 750 mg, oral, 4 times daily    metoprolol succinate XL (TOPROL-XL) 50 mg, oral, Daily, Do not crush or chew.    nitroglycerin (NITROSTAT) 0.4 mg, Every 5 min PRN    ondansetron (ZOFRAN) 4 mg, oral, Every 8 hours PRN    Ozempic 1 mg, subcutaneous, Weekly    polyethylene glycol (GLYCOLAX, MIRALAX) 17 g, oral, Daily    rosuvastatin (CRESTOR) 20 mg, oral, Daily    tamsulosin (FLOMAX) 0.8 mg, oral, Daily, Do not crush, chew, or split.       Physical Exam:  Physical Exam  HENT:      Head: Normocephalic.   Cardiovascular:      Rate and Rhythm: Normal rate and regular rhythm.   Pulmonary:      Effort: Pulmonary effort is normal.    Abdominal:      Palpations: Abdomen is soft.      Comments: + healing wounds from laparascopic hernia repair on 6/9/25   Musculoskeletal:      Cervical back: Neck supple.   Skin:     General: Skin is warm.   Neurological:      Mental Status: He is alert and oriented to person, place, and time.   Psychiatric:         Mood and Affect: Mood normal.            Assessment/Plan   Chest pain  Elevated WBC coung  Abnormal CXR with hazy opacity LLL  H/o ASHD s/p CABG - last cor angio 8/24 with patent L-LAD and patent Lcx stent, RCA was small and diffusely disease  Afib s/p RFA AVNRT 10/10/24, on apixaban for OAC    Palpitations x 5 days and CP relieved by 2SL Ntg today.  Normal cardiac enzymes, EKG appears grossly unchanged compared with prior.    Recs:  -tele monitoring  -check chemical MPI stress test  -check TTE  -consider PNA, given LLL hazy opacity on CXR and elevated WBC count  -ASA 81mg daily  -[id number] 5 - continue apixaban 5mg bid  -metop XL 50mg daily  -lisinorpil 5mg daiy  -amlodipine 5mg daily    Peripheral IV 06/18/25 20 G Right Antecubital (Active)   Site Assessment Clean;Dry;Intact 06/18/25 1055   Dressing Type Transparent 06/18/25 1055   Line Status Flushed 06/18/25 1055   Dressing Status Clean;Dry 06/18/25 1055   Number of days: 0       Code Status:  Full Code    Deborah Chaney MD       [1]   Family History  Problem Relation Name Age of Onset    Coronary artery disease Father      Other (CABG) Father     [2]   Scheduled medications   Medication Dose Route Frequency    amLODIPine  5 mg oral Daily    apixaban  5 mg oral BID    [START ON 6/19/2025] aspirin  81 mg oral Daily    insulin glargine  15 Units subcutaneous q AM    insulin glargine  40 Units subcutaneous Nightly    insulin lispro  0-5 Units subcutaneous TID AC    lisinopril  5 mg oral Daily    metoprolol succinate XL  50 mg oral Daily    regadenoson  0.4 mg intravenous Once in imaging    rosuvastatin  20 mg oral Nightly    sennosides-docusate  sodium  1 tablet oral Nightly    tamsulosin  0.8 mg oral Daily   [3]   PRN medications   Medication    acetaminophen    aminophylline    dextrose    dextrose    glucagon    glucagon    nitroglycerin   [4]   Continuous Medications   Medication Dose Last Rate

## 2025-06-18 NOTE — PROGRESS NOTES
Prasanna Torrez is a 74 y.o. male admitted for No Principal Problem: There is no principal problem currently on the Problem List. Please update the Problem List and refresh.. Pharmacy reviewed the patient's [id number] medications and allergies for accuracy.    The list below reflects the PTA list prior to pharmacy medication history. A summary a changes to the PTA medication list has been listed below. Please review each medication in order reconciliation for additional clarification and justification.    Source of information:  T2P (CONFUSED)  DISCHARGE SUMMARY  SURESCRIPTS    Medications added:  METHOCARBAMOL 750MG  1 QID  ONDANSETRON 4MG  1 TID PRN  POLYETHYLENE GLYCOL 17 GRAMS DAILY  TAMSULOSIN 0.4MG X 2 every day  OZEMPIC 1MG/DOSE  I NJECT ONCE A WEEK    Medications modified:    LANTUS SOLOSTAR 40 UNITS NIGHTLY AND 15 UNITS EVERY AM  Medications to be removed:  TADALAFIL  PIMECROLIMUS CREAM    Medications of concern:      Prior to Admission Medications   Prescriptions Last Dose Informant Patient Reported? Taking?   Eliquis 5 mg tablet   Yes No   Sig: Take 1 tablet (5 mg) by mouth 2 times a day.   amLODIPine (Norvasc) 5 mg tablet   No No   Sig: Take 1 tablet (5 mg) by mouth once daily.   aspirin 81 mg EC tablet   Yes No   Sig: Take 1 tablet (81 mg) by mouth once daily.   insulin glargine (Lantus U-100 Insulin) 100 unit/mL injection   Yes No   Sig: Inject 40 Units under the skin once daily at bedtime. Take as directed per insulin instructions.   lisinopril 5 mg tablet   No No   Sig: Take 1 tablet (5 mg) by mouth once daily.   metoprolol succinate XL (Toprol-XL) 50 mg 24 hr tablet   No No   Sig: Take 1 tablet (50 mg) by mouth once daily. Do not crush or chew.   nitroglycerin (Nitrostat) 0.4 mg SL tablet   Yes No   Sig: Place 1 tablet (0.4 mg) under the tongue every 5 minutes if needed for chest pain.   rosuvastatin (Crestor) 20 mg tablet   No No   Sig: Take 1 tablet (20 mg) by mouth once daily.   semaglutide  (Ozempic) 0.25 mg or 0.5 mg(2 mg/1.5 mL) pen injector   Yes No   Sig: Inject 0.5 mg under the skin 1 (one) time per week.   tadalafil (Cialis) 10 mg tablet   Yes No   Sig: Take 1 tablet (10 mg) by mouth once daily as needed for erectile dysfunction.      Facility-Administered Medications: None       Thania Perea

## 2025-06-18 NOTE — H&P (VIEW-ONLY)
"Inpatient consult to Cardiology  Consult performed by: Deborah Chaney MD  Consult ordered by: BOWEN Copeland-CNP  Reason for consult: chest pain        History Of Present Illness:    Prasanna Torrez is a 74 y.o. male presenting with chest pain.  Patient of Dr. Kennedy Sandhu.  He has a history of MI and underwent coronary artery bypass surgery 2013 (L-LAD) and NSTEMI 3/1/22 and AF s/p DEMETRICE LCX x 2 - CP and SVT 8/7/24 - patent L-LAD and CX stent.  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     The patient has noticed \"abnormal heart rate\" over the last 5 days - concerned he might have recurrent arrhythmia.  This morning he had an episode of \"angina\" for which he took SL Ntg - after first dose he still had pain, so he took another dose and the pain completely resolved.  Tells me he has been compliant with home CV meds.  He takes metoprolol XL 50mg daily, reduced from 75mgt daily in 11/24 by Dr. Sandhu (possibly because patient was started on amlodipine).  Of note, he had laparoscopic hernia repair recently on 6/9/25 - tells me surgery was uneventful, no cardiac issues.    In the ED:  /78, HR 72.  I personally reviewed the EKG on presentation which demonstrated sinus rhythm, cannot rule out old inferior infarct (this finding seen on prior EKG's).  Tele reviewed - SR, no Afib/pSVT  Labs personally reviewed:  K 3.8, Cr 0.86, Mg 2.00, WBC 12, Hb 14.8  HSTI 8 > 7  CXR - hazy opacity at the L base    Review Of Systems:  The remainder of the review of systems was obtained, and was negative as pertains to the chief complaint.    Fhx:  strong Fhx premature CAD on both sides  SocHx:  remote tobacco use quit > 20 yrs ago, denies etoh/illicits     Last Recorded Vitals:  Vitals:    06/18/25 1015 06/18/25 1055 06/18/25 1115 06/18/25 1655   BP: 126/71  135/64 117/68   BP Location:   Right arm Right arm   Patient Position:   Lying Lying   Pulse: 64  80 65   Resp: 18  16 17   Temp:   36.1 °C (97 °F) 36.4 °C (97.5 °F)   TempSrc:   Temporal " Temporal   SpO2: 95%  94% 94%   Weight:  99.8 kg (220 lb)     Height:  1.829 m (6')         Last Labs:  CBC - 6/18/2025:  7:27 AM  12.0 14.8 332    44.2      CMP - 6/18/2025:  7:27 AM  8.5 6.5 14 --- 0.7   3.5 4.0 19 84      PTT - No results in last year.  1.2   13.8 _     Troponin I, High Sensitivity   Date/Time Value Ref Range Status   06/18/2025 08:32 AM 7 0 - 20 ng/L Final   06/18/2025 07:27 AM 8 0 - 20 ng/L Final   09/19/2024 09:44 PM 8 0 - 20 ng/L Final     BNP   Date/Time Value Ref Range Status   07/20/2024 01:48 PM 37 0 - 99 pg/mL Final   07/19/2024 03:27 PM 52 0 - 99 pg/mL Final     Hemoglobin A1C   Date/Time Value Ref Range Status   07/20/2024 01:48 PM 8.1 (H) see below % Final   03/01/2022 06:22 AM 7.4 (H) 4.0 - 5.6 % Final     LDL Calculated   Date/Time Value Ref Range Status   07/20/2024 01:48 PM 53 <=99 mg/dL Final     Comment:                                 Near   Borderline      AGE      Desirable  Optimal    High     High     Very High     0-19 Y     0 - 109     ---    110-129   >/= 130     ----    20-24 Y     0 - 119     ---    120-159   >/= 160     ----      >24 Y     0 -  99   100-129  130-159   160-189     >/=190       VLDL   Date/Time Value Ref Range Status   07/20/2024 01:48 PM 37 0 - 40 mg/dL Final      Last I/O:  No intake/output data recorded.    Past Cardiology Tests (Last 3 Years):  EKG:  ECG 12 lead (Clinic Performed) 11/25/2024      ECG 12 lead 09/19/2024      Electrocardiogram 12 Lead 08/07/2024      ECG 12 lead 07/22/2024      ECG 12 lead 07/19/2024      ECG 12 lead 07/19/2024    Echo:  Transthoracic Echo (TTE) Complete 08/08/2024    Ejection Fractions:  EF   Date/Time Value Ref Range Status   08/08/2024 07:45 AM 53 %      Cath:  Cardiac Catheterization Procedure 08/07/2024    Stress Test:  No results found for this or any previous visit from the past 1095 days.    Cardiac Imaging:  No results found for this or any previous visit from the past 1095 days.      Past Medical  History:  He has a past medical history of Coronary artery disease, Diabetes (Multi), Hyperlipidemia, Hypertension, Paroxysmal atrial fibrillation (Multi), and SVT (supraventricular tachycardia).    Past Surgical History:  He has a past surgical history that includes Cardiac catheterization (N/A, 08/07/2024); Cardiac electrophysiology procedure (N/A, 10/10/2024); Coronary angioplasty with stent; Coronary artery bypass graft (2013); and Radiofrequency ablation (10/10/2024).      Social History:  He reports that he quit smoking about 13 years ago. His smoking use included cigarettes. He started smoking about 44 years ago. He has a 31 pack-year smoking history. He has never used smokeless tobacco. He reports that he does not drink alcohol and does not use drugs.    Family History:  Family History[1]     Allergies:  Dapagliflozin    Inpatient Medications:  Scheduled Medications[2]  PRN Medications[3]  Continuous Medications[4]  Outpatient Medications:  Current Outpatient Medications   Medication Instructions    amLODIPine (NORVASC) 5 mg, oral, Daily    aspirin 81 mg EC tablet 1 tablet, Daily    Eliquis 5 mg tablet 1 tablet, 2 times daily    insulin glargine (Lantus U-100 Insulin) 100 unit/mL injection subcutaneous, 40 UNITS HS AND 15 IN AM    lisinopril 5 mg, oral, Daily    methocarbamol (ROBAXIN) 750 mg, oral, 4 times daily    metoprolol succinate XL (TOPROL-XL) 50 mg, oral, Daily, Do not crush or chew.    nitroglycerin (NITROSTAT) 0.4 mg, Every 5 min PRN    ondansetron (ZOFRAN) 4 mg, oral, Every 8 hours PRN    Ozempic 1 mg, subcutaneous, Weekly    polyethylene glycol (GLYCOLAX, MIRALAX) 17 g, oral, Daily    rosuvastatin (CRESTOR) 20 mg, oral, Daily    tamsulosin (FLOMAX) 0.8 mg, oral, Daily, Do not crush, chew, or split.       Physical Exam:  Physical Exam  HENT:      Head: Normocephalic.   Cardiovascular:      Rate and Rhythm: Normal rate and regular rhythm.   Pulmonary:      Effort: Pulmonary effort is normal.    Abdominal:      Palpations: Abdomen is soft.      Comments: + healing wounds from laparascopic hernia repair on 6/9/25   Musculoskeletal:      Cervical back: Neck supple.   Skin:     General: Skin is warm.   Neurological:      Mental Status: He is alert and oriented to person, place, and time.   Psychiatric:         Mood and Affect: Mood normal.            Assessment/Plan   Chest pain  Elevated WBC coung  Abnormal CXR with hazy opacity LLL  H/o ASHD s/p CABG - last cor angio 8/24 with patent L-LAD and patent Lcx stent, RCA was small and diffusely disease  Afib s/p RFA AVNRT 10/10/24, on apixaban for OAC    Palpitations x 5 days and CP relieved by 2SL Ntg today.  Normal cardiac enzymes, EKG appears grossly unchanged compared with prior.    Recs:  -tele monitoring  -check chemical MPI stress test  -check TTE  -consider PNA, given LLL hazy opacity on CXR and elevated WBC count  -ASA 81mg daily  -[id number] 5 - continue apixaban 5mg bid  -metop XL 50mg daily  -lisinorpil 5mg daiy  -amlodipine 5mg daily    Peripheral IV 06/18/25 20 G Right Antecubital (Active)   Site Assessment Clean;Dry;Intact 06/18/25 1055   Dressing Type Transparent 06/18/25 1055   Line Status Flushed 06/18/25 1055   Dressing Status Clean;Dry 06/18/25 1055   Number of days: 0       Code Status:  Full Code    Deborah Chaney MD       [1]   Family History  Problem Relation Name Age of Onset    Coronary artery disease Father      Other (CABG) Father     [2]   Scheduled medications   Medication Dose Route Frequency    amLODIPine  5 mg oral Daily    apixaban  5 mg oral BID    [START ON 6/19/2025] aspirin  81 mg oral Daily    insulin glargine  15 Units subcutaneous q AM    insulin glargine  40 Units subcutaneous Nightly    insulin lispro  0-5 Units subcutaneous TID AC    lisinopril  5 mg oral Daily    metoprolol succinate XL  50 mg oral Daily    regadenoson  0.4 mg intravenous Once in imaging    rosuvastatin  20 mg oral Nightly    sennosides-docusate  sodium  1 tablet oral Nightly    tamsulosin  0.8 mg oral Daily   [3]   PRN medications   Medication    acetaminophen    aminophylline    dextrose    dextrose    glucagon    glucagon    nitroglycerin   [4]   Continuous Medications   Medication Dose Last Rate

## 2025-06-18 NOTE — Clinical Note
Closure device placed in the left radial artery. Site closed by radial compression system. Deployed By: Teresita Amos, [id number] AIR

## 2025-06-18 NOTE — CARE PLAN
The patient's goals for the shift include  to feel better and figure out what is going on.     The clinical goals for the shift include Patient to have a decrease in chest pain and palpitations, patient to remain free from cardiac dysthrhymias    Problem: Pain - Adult  Goal: Verbalizes/displays adequate comfort level or baseline comfort level  Outcome: Progressing     Problem: Safety - Adult  Goal: Free from fall injury  Outcome: Progressing     Problem: Discharge Planning  Goal: Discharge to home or other facility with appropriate resources  Outcome: Progressing     Problem: Chronic Conditions and Co-morbidities  Goal: Patient's chronic conditions and co-morbidity symptoms are monitored and maintained or improved  Outcome: Progressing  Flowsheets (Taken 6/18/2025 1122)  Care Plan - Patient's Chronic Conditions and Co-Morbidity Symptoms are Monitored and Maintained or Improved:   Monitor and assess patient's chronic conditions and comorbid symptoms for stability, deterioration, or improvement   Update acute care plan with appropriate goals if chronic or comorbid symptoms are exacerbated and prevent overall improvement and discharge     Problem: Nutrition  Goal: Nutrient intake appropriate for maintaining nutritional needs  Outcome: Progressing     Problem: Cardiovascular - Adult  Goal: Maintains optimal cardiac output and hemodynamic stability  Outcome: Progressing  Flowsheets (Taken 6/18/2025 1122)  Maintains optimal cardiac output and hemodynamic stability:   Monitor blood pressure and heart rate   Assess for signs of decreased cardiac output  Goal: Absence of cardiac dysrhythmias or at baseline  Outcome: Progressing  Flowsheets (Taken 6/18/2025 1122)  Absence of cardiac dysrhythmias or at baseline:   Assess for signs of decreased cardiac output   Administer antiarrhythmia medication and electrolyte replacement as ordered   Monitor cardiac rate and rhythm

## 2025-06-18 NOTE — H&P
[facility] MEDICINE HISTORY AND PHYSICAL    History Of Present Illness     Prasanna Torrez is a 74 y.o. male with PMHx of CAD s/p CABG 2013, paroxysmal atrial fib on apixaban, SVT s/p RFA, HTN and diabetes who presented with chest pain. He recently underwent hernia surgery on 6/9/25 for which he was off of apixaban for two days prior. Over the 4 days PTA he was feeling his heart skipping beats and irregular. Today in the shower he developed one episode of chest pain which was midsternal and a  pressure sensation, without radiation. He took one nitro without improvement and a second nitro with reduction of his pain to 0. During my exam he had two more palpitations, which coincided with isolated PVCs on telemetry. He says his last stress test was in 2015. Remainder of ROS reviewed and negative except as indicated in HPI.     ED workup was significant for troponins 8/7, blood glucose 177, WBC 12.0. CXR revealed hazy opacity at the left base, suggestive of left basilar pneumonia, improved from 7/19/2024. VSS. The pt received  mg.     The pt's case and plan of care was discussed with the ED provider. The ED notes were reviewed in detail, as were prior outpatient and patient notes as part of the admission chart review. The pt is at high risk for cardiovascular events including MI due to chest pain with hx of CAD/CABG. The decision was made to escalate care and admit the pt under observation status.     Objective     Past Medical History  Medical History[1]    Surgical History  Surgical History[2]    Social History  Social History[3]    Family History  Family History[4]    Allergies  Dapagliflozin    Visit Vitals  /68   Pulse 66   Temp 37 °C (98.6 °F)   Resp 16   Ht 1.829 m (6')   Wt 99.8 kg (220 lb)   SpO2 95%   BMI 29.84 kg/m²   Smoking Status Former   BSA 2.25 m²       Vitals:    06/18/25 0712   Weight: 99.8 kg (220 lb)       Scheduled medications  Scheduled Medications[5]  Continuous  medications  Continuous Medications[6]  PRN medications  PRN Medications[7]    The following labwork was reviewed:  Results for orders placed or performed during the hospital encounter of 06/18/25 (from the past 24 hours)   CBC and Auto Differential   Result Value Ref Range    WBC 12.0 (H) 4.4 - 11.3 x10*3/uL    nRBC 0.0 0.0 - 0.0 /100 WBCs    RBC 5.11 4.50 - 5.90 x10*6/uL    Hemoglobin 14.8 13.5 - 17.5 g/dL    Hematocrit 44.2 41.0 - 52.0 %    MCV 87 80 - 100 fL    MCH 29.0 26.0 - 34.0 pg    MCHC 33.5 32.0 - 36.0 g/dL    RDW 13.2 11.5 - 14.5 %    Platelets 332 150 - 450 x10*3/uL    Neutrophils % 66.5 40.0 - 80.0 %    Immature Granulocytes %, Automated 1.2 (H) 0.0 - 0.9 %    Lymphocytes % 18.2 13.0 - 44.0 %    Monocytes % 10.8 2.0 - 10.0 %    Eosinophils % 2.5 0.0 - 6.0 %    Basophils % 0.8 0.0 - 2.0 %    Neutrophils Absolute 7.95 (H) 1.60 - 5.50 x10*3/uL    Immature Granulocytes Absolute, Automated 0.14 0.00 - 0.50 x10*3/uL    Lymphocytes Absolute 2.17 0.80 - 3.00 x10*3/uL    Monocytes Absolute 1.29 (H) 0.05 - 0.80 x10*3/uL    Eosinophils Absolute 0.30 0.00 - 0.40 x10*3/uL    Basophils Absolute 0.10 0.00 - 0.10 x10*3/uL   Comprehensive Metabolic Panel   Result Value Ref Range    Glucose 177 (H) 74 - 99 mg/dL    Sodium 136 136 - 145 mmol/L    Potassium 3.8 3.5 - 5.3 mmol/L    Chloride 105 98 - 107 mmol/L    Bicarbonate 24 21 - 32 mmol/L    Anion Gap 11 10 - 20 mmol/L    Urea Nitrogen 16 6 - 23 mg/dL    Creatinine 0.86 0.50 - 1.30 mg/dL    eGFR 90 >60 mL/min/1.73m*2    Calcium 8.5 (L) 8.6 - 10.3 mg/dL    Albumin 4.0 3.4 - 5.0 g/dL    Alkaline Phosphatase 84 33 - 136 U/L    Total Protein 6.5 6.4 - 8.2 g/dL    AST 14 9 - 39 U/L    Bilirubin, Total 0.7 0.0 - 1.2 mg/dL    ALT 19 10 - 52 U/L   Magnesium   Result Value Ref Range    Magnesium 2.00 1.60 - 2.40 mg/dL   Troponin I, High Sensitivity, Initial   Result Value Ref Range    Troponin I, High Sensitivity 8 0 - 20 ng/L   Troponin, High Sensitivity, 1 Hour   Result Value  Ref Range    Troponin I, High Sensitivity 7 0 - 20 ng/L       The following imaging was reviewed:  XR chest 2 views  Result Date: 6/18/2025  Hazy opacity at the left base.  Suggestive for left basilar pneumonia.  Appears improved from 7/19/2024. Signed by Js Sumner MD      Constitutional: Well developed, awake, alert, calm, oriented x4, no acute distress, cooperative   Eyes: EOMI, clear sclerae   ENMT: mucous membranes moist, no lesions seen   Head/Neck: Neck supple, no apparent injury, head atraumatic   Respiratory/Thorax: CTAB, good chest expansion, respirations even and unlabored   Cardiovascular: Regular rate and rhythm, occasional PVCs on tele, no murmurs/rubs/gallops, normal S1 and S 2   Gastrointestinal: Abdomen nondistended, soft, nontender, +BS, no bruits   Musculoskeletal: ROM intact, no joint swelling, normal  strength   Extremities: no cyanosis, contusions or clubbing, or edema   Neurological: no focal deficit, pt alert and oriented x4   Psychological: Appropriate affect and behavior, pleasant   Skin: Warm and dry, no lesions, no rashes       Assessment/Plan     Chest pain  Troponins are negative x2 and EKG was nonischemic  Consult cardiology and keep NPO for now    Hx CAD s/p CABG 2013 and PCI  Continue BASA, rosuvastatin and metoprolol    Hx paroxysmal atrial fib   Continue apixaban and metoprolol, pt is maintaining NSR    Hx SVT s/p RFA  Pt c/o recent palpitations, which coincided with PVCs on exam today    HTN   BP is controlled on home regimen    Diabetes  Hold home diabetes meds (except Lantus) and start insulin sliding scale with hypoglycemia order set    DVT ppx: apixaban    Discharge disposition  Discharge when medically stable        Juliana Augustien, CNP  [facility] Medicine         [1]   Past Medical History:  Diagnosis Date    Coronary artery disease     Diabetes (Multi)     Hyperlipidemia     Hypertension     Paroxysmal atrial fibrillation (Multi)     SVT (supraventricular  tachycardia)    [2]   Past Surgical History:  Procedure Laterality Date    CARDIAC CATHETERIZATION N/A 2024    Procedure: Left Heart Cath, No LV;  Surgeon: Sumeet Patricio MD;  Location: [facility] Cardiac Cath Lab;  Service: Cardiovascular;  Laterality: N/A;    CARDIAC ELECTROPHYSIOLOGY PROCEDURE N/A 10/10/2024    Procedure: Ablation SVT;  Surgeon: Bobby Vogt MD;  Location: [facility] Cardiac Cath Lab;  Service: Electrophysiology;  Laterality: N/A;  w/ Carto & consult anesthesia    CORONARY ANGIOPLASTY WITH STENT PLACEMENT      DEMETRICE LCX x 2    CORONARY ARTERY BYPASS GRAFT      LAD    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   [3]   Social History  Tobacco Use    Smoking status: Former     Current packs/day: 0.00     Average packs/day: 1 pack/day for 31.0 years (31.0 ttl pk-yrs)     Types: Cigarettes     Start date:      Quit date:      Years since quittin.4    Smokeless tobacco: Never   Vaping Use    Vaping status: Never Used   Substance Use Topics    Alcohol use: Never    Drug use: Never   [4]   Family History  Problem Relation Name Age of Onset    Coronary artery disease Father      Other (CABG) Father     [5] sennosides-docusate sodium, 1 tablet, oral, Nightly     [6]    [7] PRN medications: acetaminophen, nitroglycerin

## 2025-06-18 NOTE — Clinical Note
Patient Clipped and Prepped: left groin and left radial. Prepped with ChloraPrep, a minimum of 3 minute dry time, longer if needed, no pooling noted, patient draped in sterile fashion. By Teresita

## 2025-06-18 NOTE — ED PROVIDER NOTES
HPI   Chief Complaint   Patient presents with    Chest Pain     Started this am states irregular heart beat last 2 days.   States took 2 nitro pta       HPI: Patient is a 74-year-old male, known history of coronary artery disease status post bypass surgery in 2013, NSTEMI in March 2022, A-fib status post drug-eluting stent and cardioversion in 2024, he is on Eliquis, he recently underwent a hernia surgery on 9 June for which she was off of his Eliquis for 2 days prior, who is presenting to the ER today for chest pain.  He reports that over the past 4 days he has been feeling his heart skipping beats and feeling irregular.  He reports that today while in the shower he developed chest pain, midsternal, pressure sensation, without radiation.  Patient took 1 nitro without any improvement, took a second nitro with reduction of his pain to 0, but due to him having to take 2 nitroglycerin, he came into the ER for further evaluation.  He denies any current pain at this time.  Denied any fevers or chills, no lightheadedness or dizziness, no nausea or vomiting, no shortness of breath.      ROS: Complete 12 point review of systems performed, otherwise negative except as noted in the history of present illness    PMH: Reviewed, documented below in note. Pertinents in HPI  PSH: Reviewed and documented below in note. Pertinents in HPI  SH: No tobacco alcohol or illicits   Fam: Reviewed, noncontributory to patients current complaint  MEDS: Reviewed and documented below in note. Pertinents in HPI  ALLERGIES: Reviewed and documented below in note.            History provided by:  Patient and medical records   used: No                          Nic Coma Scale Score: 15                  Patient History   Medical History[1]  Surgical History[2]  Family History[3]  Social History[4]    Physical Exam   Visit Vitals  /68   Pulse 66   Temp 37 °C (98.6 °F)   Resp 16   Ht 1.829 m (6')   Wt 99.8 kg (220 lb)    SpO2 95%   BMI 29.84 kg/m²   Smoking Status Former   BSA 2.25 m²      Physical Exam  Vitals and nursing note reviewed.   Constitutional:       Appearance: Normal appearance.   HENT:      Head: Normocephalic and atraumatic.   Neck:      Vascular: No carotid bruit.   Cardiovascular:      Rate and Rhythm: Normal rate and regular rhythm.      Pulses: Normal pulses.           Carotid pulses are 2+ on the right side and 2+ on the left side.       Radial pulses are 2+ on the right side and 2+ on the left side.        Dorsalis pedis pulses are 2+ on the right side and 2+ on the left side.        Posterior tibial pulses are 2+ on the right side and 2+ on the left side.      Heart sounds: Normal heart sounds.   Pulmonary:      Effort: Pulmonary effort is normal.      Breath sounds: Normal breath sounds.   Abdominal:      General: There is no distension.      Palpations: Abdomen is soft.      Tenderness: There is no abdominal tenderness. There is no guarding or rebound.   Musculoskeletal:         General: No tenderness, deformity or signs of injury.      Cervical back: Normal range of motion. No rigidity.      Right lower leg: No edema.      Left lower leg: No edema.   Skin:     General: Skin is warm and dry.      Capillary Refill: Capillary refill takes less than 2 seconds.   Neurological:      General: No focal deficit present.      Mental Status: He is alert and oriented to person, place, and time.      Sensory: No sensory deficit.      Motor: No weakness.   Psychiatric:         Mood and Affect: Mood normal.         Behavior: Behavior normal.         XR chest 2 views   Final Result   Hazy opacity at the left base.  Suggestive for left basilar pneumonia.    Appears improved from 7/19/2024.   Signed by Js Sumner MD          Labs Reviewed   CBC WITH AUTO DIFFERENTIAL - Abnormal       Result Value    WBC 12.0 (*)     nRBC 0.0      RBC 5.11      Hemoglobin 14.8      Hematocrit 44.2      MCV 87      MCH 29.0      MCHC 33.5       RDW 13.2      Platelets 332      Neutrophils % 66.5      Immature Granulocytes %, Automated 1.2 (*)     Lymphocytes % 18.2      Monocytes % 10.8      Eosinophils % 2.5      Basophils % 0.8      Neutrophils Absolute 7.95 (*)     Immature Granulocytes Absolute, Automated 0.14      Lymphocytes Absolute 2.17      Monocytes Absolute 1.29 (*)     Eosinophils Absolute 0.30      Basophils Absolute 0.10     COMPREHENSIVE METABOLIC PANEL - Abnormal    Glucose 177 (*)     Sodium 136      Potassium 3.8      Chloride 105      Bicarbonate 24      Anion Gap 11      Urea Nitrogen 16      Creatinine 0.86      eGFR 90      Calcium 8.5 (*)     Albumin 4.0      Alkaline Phosphatase 84      Total Protein 6.5      AST 14      Bilirubin, Total 0.7      ALT 19     MAGNESIUM - Normal    Magnesium 2.00     SERIAL TROPONIN-INITIAL - Normal    Troponin I, High Sensitivity 8      Narrative:     Less than 99th percentile of normal range cutoff-  Female and children under 18 years old <14 ng/L; Male <21 ng/L: Negative  Repeat testing should be performed if clinically indicated.     Female and children under 18 years old 14-50 ng/L; Male 21-50 ng/L:  Consistent with possible cardiac damage and possible increased clinical   risk. Serial measurements may help to assess extent of myocardial damage.     >50 ng/L: Consistent with cardiac damage, increased clinical risk and  myocardial infarction. Serial measurements may help assess extent of   myocardial damage.      NOTE: Children less than 1 year old may have higher baseline troponin   levels and results should be interpreted in conjunction with the overall   clinical context.     NOTE: Troponin I testing is performed using a different   testing methodology at [facility] than at other   [facility]. Direct result comparisons should only   be made within the same method.   SERIAL TROPONIN, 1 HOUR - Normal    Troponin I, High Sensitivity 7      Narrative:     Less than 99th  percentile of normal range cutoff-  Female and children under 18 years old <14 ng/L; Male <21 ng/L: Negative  Repeat testing should be performed if clinically indicated.     Female and children under 18 years old 14-50 ng/L; Male 21-50 ng/L:  Consistent with possible cardiac damage and possible increased clinical   risk. Serial measurements may help to assess extent of myocardial damage.     >50 ng/L: Consistent with cardiac damage, increased clinical risk and  myocardial infarction. Serial measurements may help assess extent of   myocardial damage.      NOTE: Children less than 1 year old may have higher baseline troponin   levels and results should be interpreted in conjunction with the overall   clinical context.     NOTE: Troponin I testing is performed using a different   testing methodology at [facility] than at other   [facility]. Direct result comparisons should only   be made within the same method.   TROPONIN SERIES- (INITIAL, 1 HR)    Narrative:     The following orders were created for panel order Troponin I Series, High Sensitivity (0, 1 HR).  Procedure                               Abnormality         Status                     ---------                               -----------         ------                     Troponin I, High Sensiti...[[id number]]  Normal              Final result               Troponin, High Sensitivi...[[id number]]  Normal              Final result                 Please view results for these tests on the individual orders.         ED Course & MDM   ED Course as of 06/18/25 1001   Wed Jun 18, 2025   0716 EKG as interpreted by myself demonstrate sinus rhythm with ventricular rate of 73, normal axis, normal intervals, there is some elevations in the inferior leads which appears to be stable from prior.  No acute findings. [NS]   0959 Repeat EKG demonstrates sinus rhythm with a ventricular rate of 65, normal axis, first-degree AV block noted with prolonged OK  interval and normal QRS and QTc interval, no evidence of an acute STEMI [NS]      ED Course User Index  [NS] Carig Mariano MD         Diagnoses as of 06/18/25 1001   Chest pain, unspecified type           Medical Decision Making  All mentioned lab results, ECGs, and imaging were independently reviewed by myself  - Patient evaluated. Patient is presenting to the emergency department for chest pain.  Differential includes but is not limited to potential ACS event, Prinzmetal angina, pericarditis, musculoskeletal pain, anxiety, referred GI pain to name a few.  Patient was given aspirin here in the emergency department and basic labs chest x-ray and EKGs were obtained.  Cardiac enzymes within normal limits, labs reassuring apart from a mild leukocytosis.  Chest x-ray does have a mild hazy opacity at the left base, appears improved though from almost a year ago, the patient has not had any infectious signs or symptoms, therefore my suspicion that this is an acute infection is lower, therefore while antibiotics were considered, they were held at this time.  On repeat evaluation the patient does not have any active chest pain, but does have intermittent palpitations.  He has a heart score of 6.  We will admit the patient to medicine to undergo continued workup and treatment    - Monitored for any changes in stability or symptomatology. Patient remained stable.   - Counseled regarding labs, imaging, diagnosis, and plan. Patient was agreeable. All questions were answered. The patient was receptive and agreeable to the plan of care.       *Disclaimer: This note was dictated by speech recognition. Minor errors in transcription may be present. Please call with questions.    Urbano Mariano MD             Your medication list        ASK your doctor about these medications        Instructions Last Dose Given Next Dose Due   amLODIPine 5 mg tablet  Commonly known as: Norvasc      Take 1 tablet (5 mg) by mouth once  daily.       aspirin 81 mg EC tablet           Eliquis 5 mg tablet  Generic drug: apixaban           Lantus U-100 Insulin 100 unit/mL injection  Generic drug: insulin glargine           lisinopril 5 mg tablet      Take 1 tablet (5 mg) by mouth once daily.       methocarbamol 750 mg tablet  Commonly known as: Robaxin           metoprolol succinate XL 50 mg 24 hr tablet  Commonly known as: Toprol-XL      Take 1 tablet (50 mg) by mouth once daily. Do not crush or chew.       nitroglycerin 0.4 mg SL tablet  Commonly known as: Nitrostat           ondansetron 4 mg tablet  Commonly known as: Zofran           oxyCODONE-acetaminophen 5-325 mg tablet  Commonly known as: Percocet           Ozempic 0.25 mg or 0.5 mg(2 mg/1.5 mL) pen injector  Generic drug: semaglutide           polyethylene glycol 17 gram packet  Commonly known as: Glycolax, Miralax           rosuvastatin 20 mg tablet  Commonly known as: Crestor      Take 1 tablet (20 mg) by mouth once daily.       tamsulosin 0.4 mg 24 hr capsule  Commonly known as: Flomax                    Procedure  Procedures     *This report was transcribed using voice recognition software.  Every effort was made to ensure accuracy; however, inadvertent computerized transcription errors may be present.*  Craig Mariano MD  06/18/25           [1]   Past Medical History:  Diagnosis Date    Coronary artery disease     Hyperlipidemia     Hypertension     Paroxysmal atrial fibrillation (Multi)     SVT (supraventricular tachycardia)    [2]   Past Surgical History:  Procedure Laterality Date    CARDIAC CATHETERIZATION N/A 08/07/2024    Procedure: Left Heart Cath, No LV;  Surgeon: Sumeet Patricio MD;  Location: [facility] Cardiac Cath Lab;  Service: Cardiovascular;  Laterality: N/A;    CARDIAC ELECTROPHYSIOLOGY PROCEDURE N/A 10/10/2024    Procedure: Ablation SVT;  Surgeon: Bobby Vogt MD;  Location: [facility] Cardiac Cath Lab;  Service: Electrophysiology;  Laterality: N/A;  w/ Carto & consult  anesthesia    CORONARY ANGIOPLASTY WITH STENT PLACEMENT      DEMETRICE LCX x 2    CORONARY ARTERY BYPASS GRAFT  2013    LAD    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

## 2025-06-19 ENCOUNTER — APPOINTMENT (OUTPATIENT)
Dept: CARDIOLOGY | Facility: HOSPITAL | Age: 75
DRG: 287 | End: 2025-06-19
Payer: MEDICARE

## 2025-06-19 ENCOUNTER — APPOINTMENT (OUTPATIENT)
Dept: CARDIOLOGY | Facility: HOSPITAL | Age: 75
End: 2025-06-19
Payer: MEDICARE

## 2025-06-19 ENCOUNTER — APPOINTMENT (OUTPATIENT)
Dept: RADIOLOGY | Facility: HOSPITAL | Age: 75
DRG: 287 | End: 2025-06-19
Payer: MEDICARE

## 2025-06-19 LAB
ANION GAP SERPL CALC-SCNC: 10 MMOL/L (ref 10–20)
AORTIC VALVE MEAN GRADIENT: 6 MMHG
AORTIC VALVE PEAK VELOCITY: 1.72 M/S
ATRIAL RATE: 65 BPM
ATRIAL RATE: 72 BPM
AV PEAK GRADIENT: 12 MMHG
AVA (PEAK VEL): 2.38 CM2
AVA (VTI): 2.59 CM2
BUN SERPL-MCNC: 16 MG/DL (ref 6–23)
CALCIUM SERPL-MCNC: 8.3 MG/DL (ref 8.6–10.3)
CHLORIDE SERPL-SCNC: 107 MMOL/L (ref 98–107)
CO2 SERPL-SCNC: 25 MMOL/L (ref 21–32)
CREAT SERPL-MCNC: 0.81 MG/DL (ref 0.5–1.3)
EGFRCR SERPLBLD CKD-EPI 2021: >90 ML/MIN/1.73M*2
EJECTION FRACTION APICAL 4 CHAMBER: 75.1
EJECTION FRACTION: 76 %
ERYTHROCYTE [DISTWIDTH] IN BLOOD BY AUTOMATED COUNT: 13.2 % (ref 11.5–14.5)
GLUCOSE BLD MANUAL STRIP-MCNC: 126 MG/DL (ref 74–99)
GLUCOSE BLD MANUAL STRIP-MCNC: 169 MG/DL (ref 74–99)
GLUCOSE BLD MANUAL STRIP-MCNC: 236 MG/DL (ref 74–99)
GLUCOSE SERPL-MCNC: 136 MG/DL (ref 74–99)
HCT VFR BLD AUTO: 43.9 % (ref 41–52)
HGB BLD-MCNC: 14.1 G/DL (ref 13.5–17.5)
LEFT ATRIUM VOLUME AREA LENGTH INDEX BSA: 22.1 ML/M2
LEFT VENTRICLE INTERNAL DIMENSION DIASTOLE: 4.8 CM (ref 3.5–6)
LEFT VENTRICULAR OUTFLOW TRACT DIAMETER: 2.4 CM
LV EJECTION FRACTION BIPLANE: 76 %
MCH RBC QN AUTO: 28.6 PG (ref 26–34)
MCHC RBC AUTO-ENTMCNC: 32.1 G/DL (ref 32–36)
MCV RBC AUTO: 89 FL (ref 80–100)
MITRAL VALVE E/A RATIO: 0.79
NRBC BLD-RTO: 0 /100 WBCS (ref 0–0)
P AXIS: -2 DEGREES
P AXIS: 60 DEGREES
P OFFSET: 148 MS
P OFFSET: 169 MS
P ONSET: 109 MS
P ONSET: 113 MS
PLATELET # BLD AUTO: 328 X10*3/UL (ref 150–450)
POTASSIUM SERPL-SCNC: 3.8 MMOL/L (ref 3.5–5.3)
PR INTERVAL: 202 MS
PR INTERVAL: 210 MS
Q ONSET: 214 MS
Q ONSET: 214 MS
QRS COUNT: 11 BEATS
QRS COUNT: 12 BEATS
QRS DURATION: 102 MS
QRS DURATION: 88 MS
QT INTERVAL: 388 MS
QT INTERVAL: 406 MS
QTC CALCULATION(BAZETT): 422 MS
QTC CALCULATION(BAZETT): 424 MS
QTC FREDERICIA: 412 MS
QTC FREDERICIA: 416 MS
R AXIS: 12 DEGREES
R AXIS: 14 DEGREES
RBC # BLD AUTO: 4.93 X10*6/UL (ref 4.5–5.9)
RIGHT VENTRICLE FREE WALL PEAK S': 8.92 CM/S
SODIUM SERPL-SCNC: 138 MMOL/L (ref 136–145)
T AXIS: 81 DEGREES
T AXIS: 81 DEGREES
T OFFSET: 408 MS
T OFFSET: 417 MS
TRICUSPID ANNULAR PLANE SYSTOLIC EXCURSION: 1.7 CM
VENTRICULAR RATE: 65 BPM
VENTRICULAR RATE: 72 BPM
WBC # BLD AUTO: 11.4 X10*3/UL (ref 4.4–11.3)

## 2025-06-19 PROCEDURE — 93018 CV STRESS TEST I&R ONLY: CPT | Performed by: INTERNAL MEDICINE

## 2025-06-19 PROCEDURE — 78452 HT MUSCLE IMAGE SPECT MULT: CPT

## 2025-06-19 PROCEDURE — 78452 HT MUSCLE IMAGE SPECT MULT: CPT | Performed by: INTERNAL MEDICINE

## 2025-06-19 PROCEDURE — 36415 COLL VENOUS BLD VENIPUNCTURE: CPT | Performed by: NURSE PRACTITIONER

## 2025-06-19 PROCEDURE — 93017 CV STRESS TEST TRACING ONLY: CPT

## 2025-06-19 PROCEDURE — C8929 TTE W OR WO FOL WCON,DOPPLER: HCPCS

## 2025-06-19 PROCEDURE — 93016 CV STRESS TEST SUPVJ ONLY: CPT | Performed by: INTERNAL MEDICINE

## 2025-06-19 PROCEDURE — 80048 BASIC METABOLIC PNL TOTAL CA: CPT | Performed by: NURSE PRACTITIONER

## 2025-06-19 PROCEDURE — 3430000001 HC RX 343 DIAGNOSTIC RADIOPHARMACEUTICALS: Performed by: STUDENT IN AN ORGANIZED HEALTH CARE EDUCATION/TRAINING PROGRAM

## 2025-06-19 PROCEDURE — 99232 SBSQ HOSP IP/OBS MODERATE 35: CPT | Performed by: STUDENT IN AN ORGANIZED HEALTH CARE EDUCATION/TRAINING PROGRAM

## 2025-06-19 PROCEDURE — 93306 TTE W/DOPPLER COMPLETE: CPT | Performed by: INTERNAL MEDICINE

## 2025-06-19 PROCEDURE — 2500000002 HC RX 250 W HCPCS SELF ADMINISTERED DRUGS (ALT 637 FOR MEDICARE OP, ALT 636 FOR OP/ED): Performed by: NURSE PRACTITIONER

## 2025-06-19 PROCEDURE — G0378 HOSPITAL OBSERVATION PER HR: HCPCS

## 2025-06-19 PROCEDURE — A9502 TC99M TETROFOSMIN: HCPCS | Performed by: STUDENT IN AN ORGANIZED HEALTH CARE EDUCATION/TRAINING PROGRAM

## 2025-06-19 PROCEDURE — 2500000004 HC RX 250 GENERAL PHARMACY W/ HCPCS (ALT 636 FOR OP/ED): Performed by: NURSE PRACTITIONER

## 2025-06-19 PROCEDURE — 71250 CT THORAX DX C-: CPT

## 2025-06-19 PROCEDURE — 71250 CT THORAX DX C-: CPT | Performed by: STUDENT IN AN ORGANIZED HEALTH CARE EDUCATION/TRAINING PROGRAM

## 2025-06-19 PROCEDURE — 82947 ASSAY GLUCOSE BLOOD QUANT: CPT

## 2025-06-19 PROCEDURE — 85027 COMPLETE CBC AUTOMATED: CPT | Performed by: NURSE PRACTITIONER

## 2025-06-19 PROCEDURE — 2500000004 HC RX 250 GENERAL PHARMACY W/ HCPCS (ALT 636 FOR OP/ED): Mod: JW | Performed by: NURSE PRACTITIONER

## 2025-06-19 PROCEDURE — 99232 SBSQ HOSP IP/OBS MODERATE 35: CPT | Performed by: NURSE PRACTITIONER

## 2025-06-19 PROCEDURE — 2500000001 HC RX 250 WO HCPCS SELF ADMINISTERED DRUGS (ALT 637 FOR MEDICARE OP): Performed by: NURSE PRACTITIONER

## 2025-06-19 RX ADMIN — TETROFOSMIN 11.9 MILLICURIE: 0.23 INJECTION, POWDER, LYOPHILIZED, FOR SOLUTION INTRAVENOUS at 08:30

## 2025-06-19 RX ADMIN — APIXABAN 5 MG: 5 TABLET, FILM COATED ORAL at 08:05

## 2025-06-19 RX ADMIN — METOPROLOL SUCCINATE 50 MG: 50 TABLET, EXTENDED RELEASE ORAL at 08:05

## 2025-06-19 RX ADMIN — ROSUVASTATIN 20 MG: 20 TABLET, FILM COATED ORAL at 20:45

## 2025-06-19 RX ADMIN — HUMAN ALBUMIN MICROSPHERES AND PERFLUTREN 0.5 ML: 10; .22 INJECTION, SOLUTION INTRAVENOUS at 12:48

## 2025-06-19 RX ADMIN — AMLODIPINE BESYLATE 5 MG: 5 TABLET ORAL at 08:05

## 2025-06-19 RX ADMIN — INSULIN GLARGINE 15 UNITS: 100 INJECTION, SOLUTION SUBCUTANEOUS at 08:06

## 2025-06-19 RX ADMIN — SENNOSIDES AND DOCUSATE SODIUM 1 TABLET: 50; 8.6 TABLET ORAL at 20:45

## 2025-06-19 RX ADMIN — INSULIN LISPRO 2 UNITS: 100 INJECTION, SOLUTION INTRAVENOUS; SUBCUTANEOUS at 16:34

## 2025-06-19 RX ADMIN — TAMSULOSIN HYDROCHLORIDE 0.8 MG: 0.4 CAPSULE ORAL at 08:05

## 2025-06-19 RX ADMIN — REGADENOSON 0.4 MG: 0.08 INJECTION, SOLUTION INTRAVENOUS at 11:25

## 2025-06-19 RX ADMIN — LISINOPRIL 5 MG: 5 TABLET ORAL at 08:06

## 2025-06-19 RX ADMIN — ASPIRIN 81 MG: 81 TABLET, COATED ORAL at 08:05

## 2025-06-19 RX ADMIN — TETROFOSMIN 32 MILLICURIE: 0.23 INJECTION, POWDER, LYOPHILIZED, FOR SOLUTION INTRAVENOUS at 11:20

## 2025-06-19 RX ADMIN — INSULIN LISPRO 1 UNITS: 100 INJECTION, SOLUTION INTRAVENOUS; SUBCUTANEOUS at 08:09

## 2025-06-19 ASSESSMENT — COGNITIVE AND FUNCTIONAL STATUS - GENERAL
DAILY ACTIVITIY SCORE: 24
MOBILITY SCORE: 24

## 2025-06-19 ASSESSMENT — PAIN SCALES - GENERAL
PAINLEVEL_OUTOF10: 0 - NO PAIN
PAINLEVEL_OUTOF10: 0 - NO PAIN

## 2025-06-19 ASSESSMENT — PAIN - FUNCTIONAL ASSESSMENT: PAIN_FUNCTIONAL_ASSESSMENT: 0-10

## 2025-06-19 NOTE — SIGNIFICANT EVENT
Stress test with small area of partially reversible perfusion defect of the  inferolateral segment, consistent with ischemia in left circumflex  territory. Recommend C and will discuss this with patient tomorrow. Will hold apixaban and order NPO after midnight. BMP and CBC in AM.

## 2025-06-19 NOTE — PROGRESS NOTES
06/19/25 1528   Discharge Planning   Living Arrangements Spouse/significant other   Support Systems Spouse/significant other   Assistance Needed none   Type of Residence Private residence   Expected Discharge Disposition Home   Stroke Family Assessment   Stroke Family Assessment Needed No   Intensity of Service   Intensity of Service 0-30 min     I spoke with patient to introduce discharge planning and explain role of TCC. Pt is here under observation for CP.  Pt states he is independent at home where he lives with his wife.  Denies use of any mobility aids or falls. PCP is Shelton Vasquez.  Pt planning on DC to home when medically cleared.  No discharge needs identified at this time.

## 2025-06-19 NOTE — CARE PLAN
The patient's goals for the shift include      The clinical goals for the shift include to be dc    Over the shift, the patient did not make progress toward the following goals. Barriers to progression include   Problem: Chronic Conditions and Co-morbidities  Goal: Patient's chronic conditions and co-morbidity symptoms are monitored and maintained or improved  Outcome: Not Progressing   . Recommendations to address these barriers include   Problem: Discharge Planning  Goal: Discharge to home or other facility with appropriate resources  Outcome: Progressing   .    Problem: Chronic Conditions and Co-morbidities  Goal: Patient's chronic conditions and co-morbidity symptoms are monitored and maintained or improved  Outcome: Not Progressing     Problem: Nutrition  Goal: Nutrient intake appropriate for maintaining nutritional needs  Outcome: Not Progressing     Problem: Cardiovascular - Adult  Goal: Maintains optimal cardiac output and hemodynamic stability  Outcome: Not Progressing  Goal: Absence of cardiac dysrhythmias or at baseline  Outcome: Not Progressing

## 2025-06-19 NOTE — CARE PLAN
The patient's goals for the shift include      The clinical goals for the shift include Patient to have a decrease in chest pain and palpitations, patient to remain free from cardiac dysthrhymias    Over the shift, the patient did not make progress toward the following goals. Barriers to progression include . Recommendations to address these barriers include   Problem: Pain - Adult  Goal: Verbalizes/displays adequate comfort level or baseline comfort level  Outcome: Progressing     Problem: Safety - Adult  Goal: Free from fall injury  Outcome: Progressing     Problem: Discharge Planning  Goal: Discharge to home or other facility with appropriate resources  Outcome: Progressing     Problem: Chronic Conditions and Co-morbidities  Goal: Patient's chronic conditions and co-morbidity symptoms are monitored and maintained or improved  Outcome: Progressing     Problem: Nutrition  Goal: Nutrient intake appropriate for maintaining nutritional needs  Outcome: Progressing     Problem: Cardiovascular - Adult  Goal: Maintains optimal cardiac output and hemodynamic stability  Outcome: Progressing  Goal: Absence of cardiac dysrhythmias or at baseline  Outcome: Progressing   .

## 2025-06-19 NOTE — NURSING NOTE
Pt slept well and will be NPO today after 10 am for a nuclear stress test.  He can have a light breakfast.  He had no complaints or concerns for me and his vs remain wnl. GAVIN HARMON RN

## 2025-06-19 NOTE — PROGRESS NOTES
"[facility]  Patient Name: Prasanna Torrez  Patient : 1950  Room/Bed: 2309/2309-A    HPI from cardiology consult:  Prasanna Torrez is a 74 y.o. male presenting with chest pain.  Patient of Dr. Kennedy Sandhu.  He has a history of MI and underwent coronary artery bypass surgery  (L-LAD) and NSTEMI 3/1/22 and AF s/p DEMETRICE LCX x 2 - CP and SVT 24 - patent L-LAD and CX stent.  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      The patient has noticed \"abnormal heart rate\" over the last 5 days - concerned he might have recurrent arrhythmia.  This morning he had an episode of \"angina\" for which he took SL Ntg - after first dose he still had pain, so he took another dose and the pain completely resolved.  Tells me he has been compliant with home CV meds.  He takes metoprolol XL 50mg daily, reduced from 75mgt daily in  by Dr. Sandhu (possibly because patient was started on amlodipine).  Of note, he had laparoscopic hernia repair recently on 25 - tells me surgery was uneventful, no cardiac issues.     In the ED:  /78, HR 72.  I personally reviewed the EKG on presentation which demonstrated sinus rhythm, cannot rule out old inferior infarct (this finding seen on prior EKG's).  Tele reviewed - SR, no Afib/pSVT  Labs personally reviewed:  K 3.8, Cr 0.86, Mg 2.00, WBC 12, Hb 14.8  HSTI 8 > 7  CXR - hazy opacity at the L base    Allergies:  Dapagliflozin    Subjective Data:  25: Seen while off unit for stress test.  Denies any chest pain/pressure or SOB.  +palpitations that are brief and were occurring every 3-5 minutes yesterday, but are less frequent today. SR on telemetry.     Overnight Events:  None    Objective Data:  Vitals:    25 2029 25 0001 25 0459 25 0700   BP: 143/78 126/76 143/77 147/90   BP Location: Right arm Left arm Left arm Left arm   Patient Position: Lying Lying Lying Lying   Pulse: 70 65 68 75   Resp: 16 16 18 18   Temp: 36.7 °C (98.1 °F) 37.3 " "°C (99.2 °F) 36.8 °C (98.2 °F) 36.5 °C (97.7 °F)   TempSrc: Temporal Temporal Temporal Temporal   SpO2: 94% 94% 96% 95%   Weight:       Height:           Reviewed the following Labs:  Results from last 7 days   Lab Units 06/19/25  0504 06/18/25  0727   WBC AUTO x10*3/uL 11.4* 12.0*   HEMOGLOBIN g/dL 14.1 14.8   HEMATOCRIT % 43.9 44.2   PLATELETS AUTO x10*3/uL 328 332       Results from last 7 days   Lab Units 06/19/25  0504 06/18/25  0727   SODIUM mmol/L 138 136   POTASSIUM mmol/L 3.8 3.8   CHLORIDE mmol/L 107 105   CO2 mmol/L 25 24   BUN mg/dL 16 16   CREATININE mg/dL 0.81 0.86   CALCIUM mg/dL 8.3* 8.5*   PROTEIN TOTAL g/dL  --  6.5   BILIRUBIN TOTAL mg/dL  --  0.7   ALK PHOS U/L  --  84   ALT U/L  --  19   AST U/L  --  14   GLUCOSE mg/dL 136* 177*       Results from last 7 days   Lab Units 06/18/25  0727   MAGNESIUM mg/dL 2.00       No results found for: \"LDLF\"     No results found for: \"BNP\"    Lab Results   Component Value Date    TROPHS 7 06/18/2025    TROPHS 8 06/18/2025        No results found for: \"TSH\", \"FT4\"        Lab Results   Component Value Date    HGBA1C 8.1 (H) 07/20/2024       Intake/Output  No intake or output data in the 24 hours ending 06/19/25 0956   No intake/output data recorded.    Past Medical History:  He has a past medical history of Coronary artery disease, Diabetes (Multi), Hyperlipidemia, Hypertension, Paroxysmal atrial fibrillation (Multi), and SVT (supraventricular tachycardia).    Past Surgical History:  He has a past surgical history that includes Cardiac catheterization (N/A, 08/07/2024); Cardiac electrophysiology procedure (N/A, 10/10/2024); Coronary angioplasty with stent; Coronary artery bypass graft (2013); and Radiofrequency ablation (10/10/2024).      Social History:  He reports that he quit smoking about 13 years ago. His smoking use included cigarettes. He started smoking about 44 years ago. He has a 31 pack-year smoking history. He has never used smokeless tobacco. He " reports that he does not drink alcohol and does not use drugs.    Family History:  Family History[1]    Outpatient Medications  Medications Ordered Prior to Encounter[2]    Scheduled medications  Scheduled Medications[3]  Continuous medications  Continuous Medications[4]  PRN medications  PRN Medications[5]   Prescriptions Prior to Admission[6]    Reviewed the following cardiology tests:    Echo 8/8/24:   1. Left ventricular ejection fraction is low normal, by visual estimate at 50-55%.   2. Spectral Doppler shows an impaired relaxation pattern of left ventricular diastolic filling.   3. There is normal right ventricular global systolic function.   4. Mildly enlarged right ventricle.  EF   Date/Time Value Ref Range Status   08/08/2024 07:45 AM 53 %         [facility] 8/7/24:  1. Double vessel disease.   2. Patent LIMA to lAD and previous Cx stent.   3. Elevated LV filling pressures.   4. Left Ventricular end-diastolic pressure = 17.    Physical Exam:  Vitals reviewed.   Constitutional:       Appearance: Not in distress.   Pulmonary:      Effort: Pulmonary effort is normal.      Breath sounds: Normal breath sounds.   Cardiovascular:      PMI at left midclavicular line. Normal rate. Regular rhythm. S1 with normal intensity. S2 with normal intensity.       Murmurs: There is no murmur.   Edema:     Peripheral edema absent.   Abdominal:      General: Bowel sounds are normal.   Skin:     General: Skin is warm and dry.   Psychiatric:         Attention and Perception: Attention normal.         Mood and Affect: Mood normal.         Behavior: Behavior is cooperative.       Assessment/Plan:   Chest pain  Elevated WBC coung  Abnormal CXR with hazy opacity LLL  H/o ASHD s/p CABG - last cor angio 8/24 with patent L-LAD and patent Lcx stent, RCA was small and diffusely disease  Afib s/p RFA AVNRT 10/10/24, on apixaban for OAC     Palpitations x 5 days and CP relieved by 2SL Ntg today.  Normal cardiac enzymes, EKG appears grossly  unchanged compared with prior.     Recs:  -tele monitoring  -check chemical MPI stress test  -check TTE  -consider PNA, given LLL hazy opacity on CXR and elevated WBC count  -ASA 81mg daily  -[id number] 5 - continue apixaban 5mg bid  -metop XL 50mg daily  -lisinorpil 5mg daiy  -amlodipine 5mg daily    25: Stress test pending. Order placed for echocardiogram as recommended above.  Continues to report palpitations that feel similar to previous SVT, but are less frequent.  SR on telemetry. Continue metoprolol succinate 50 mg daily.     -consider PNA, given LLL hazy opacity on CXR and elevated WBC count    Code Status  Full Code      Terri CANDACE Abad, APRN-CNP          [1]   Family History  Problem Relation Name Age of Onset    Coronary artery disease Father      Other (CABG) Father     [2]   No current facility-administered medications on file prior to encounter.     Current Outpatient Medications on File Prior to Encounter   Medication Sig Dispense Refill    amLODIPine (Norvasc) 5 mg tablet Take 1 tablet (5 mg) by mouth once daily. 90 tablet 3    aspirin 81 mg EC tablet Take 1 tablet (81 mg) by mouth once daily.      Eliquis 5 mg tablet Take 1 tablet (5 mg) by mouth 2 times a day.      insulin glargine (Lantus U-100 Insulin) 100 unit/mL injection Inject under the skin. 40 UNITS HS AND 15 IN AM      lisinopril 5 mg tablet Take 1 tablet (5 mg) by mouth once daily. 90 tablet 2    methocarbamol (Robaxin) 750 mg tablet Take 1 tablet (750 mg) by mouth 4 times a day.      metoprolol succinate XL (Toprol-XL) 50 mg 24 hr tablet Take 1 tablet (50 mg) by mouth once daily. Do not crush or chew. 90 tablet 2    nitroglycerin (Nitrostat) 0.4 mg SL tablet Place 1 tablet (0.4 mg) under the tongue every 5 minutes if needed for chest pain.      ondansetron (Zofran) 4 mg tablet Take 1 tablet (4 mg) by mouth every 8 hours if needed for nausea or vomiting.      [] oxyCODONE-acetaminophen (Percocet) 5-325 mg tablet Take 1 tablet  by mouth every 6 hours if needed for severe pain (7 - 10).      polyethylene glycol (Glycolax, Miralax) 17 gram packet Take 17 g by mouth once daily.      rosuvastatin (Crestor) 20 mg tablet Take 1 tablet (20 mg) by mouth once daily. 30 tablet 11    semaglutide (Ozempic) 1 mg/dose (4 mg/3 mL) pen injector Inject 1 mg under the skin 1 (one) time per week.      tamsulosin (Flomax) 0.4 mg 24 hr capsule Take 2 capsules (0.8 mg) by mouth once daily. Do not crush, chew, or split.      [DISCONTINUED] semaglutide (Ozempic) 0.25 mg or 0.5 mg(2 mg/1.5 mL) pen injector Inject 0.5 mg under the skin 1 (one) time per week.      [DISCONTINUED] tadalafil (Cialis) 10 mg tablet Take 1 tablet (10 mg) by mouth once daily as needed for erectile dysfunction.     [3] amLODIPine, 5 mg, oral, Daily  apixaban, 5 mg, oral, BID  aspirin, 81 mg, oral, Daily  insulin glargine, 15 Units, subcutaneous, q AM  insulin glargine, 40 Units, subcutaneous, Nightly  insulin lispro, 0-5 Units, subcutaneous, TID AC  lisinopril, 5 mg, oral, Daily  metoprolol succinate XL, 50 mg, oral, Daily  regadenoson, 0.4 mg, intravenous, Once in imaging  rosuvastatin, 20 mg, oral, Nightly  sennosides-docusate sodium, 1 tablet, oral, Nightly  tamsulosin, 0.8 mg, oral, Daily    [4]    [5] PRN medications: acetaminophen, aminophylline, dextrose, dextrose, glucagon, glucagon, nitroglycerin  [6]   Medications Prior to Admission   Medication Sig Dispense Refill Last Dose/Taking    amLODIPine (Norvasc) 5 mg tablet Take 1 tablet (5 mg) by mouth once daily. 90 tablet 3 Unknown    aspirin 81 mg EC tablet Take 1 tablet (81 mg) by mouth once daily.   Unknown    Eliquis 5 mg tablet Take 1 tablet (5 mg) by mouth 2 times a day.   Unknown    insulin glargine (Lantus U-100 Insulin) 100 unit/mL injection Inject under the skin. 40 UNITS HS AND 15 IN AM       lisinopril 5 mg tablet Take 1 tablet (5 mg) by mouth once daily. 90 tablet 2 Unknown    methocarbamol (Robaxin) 750 mg tablet Take 1  tablet (750 mg) by mouth 4 times a day.       metoprolol succinate XL (Toprol-XL) 50 mg 24 hr tablet Take 1 tablet (50 mg) by mouth once daily. Do not crush or chew. 90 tablet 2 Unknown    nitroglycerin (Nitrostat) 0.4 mg SL tablet Place 1 tablet (0.4 mg) under the tongue every 5 minutes if needed for chest pain.   Unknown    ondansetron (Zofran) 4 mg tablet Take 1 tablet (4 mg) by mouth every 8 hours if needed for nausea or vomiting.       [] oxyCODONE-acetaminophen (Percocet) 5-325 mg tablet Take 1 tablet by mouth every 6 hours if needed for severe pain (7 - 10).       polyethylene glycol (Glycolax, Miralax) 17 gram packet Take 17 g by mouth once daily.       rosuvastatin (Crestor) 20 mg tablet Take 1 tablet (20 mg) by mouth once daily. 30 tablet 11 Unknown    semaglutide (Ozempic) 1 mg/dose (4 mg/3 mL) pen injector Inject 1 mg under the skin 1 (one) time per week.       tamsulosin (Flomax) 0.4 mg 24 hr capsule Take 2 capsules (0.8 mg) by mouth once daily. Do not crush, chew, or split.

## 2025-06-19 NOTE — PROGRESS NOTES
Prasanna Torrez is a 74 y.o. male on day 0 of admission presenting with Chest pain.      Subjective   Admitted for chest pain plan for echocardiogram and stress test.  Patient is very anxious stated that he does feel he is having skipped heartbeat similar to what he had in the past requesting his metoprolol to be increased back to 75 mg twice a day       Objective     Last Recorded Vitals  /90 (BP Location: Left arm, Patient Position: Lying)   Pulse 75   Temp 36.5 °C (97.7 °F) (Temporal)   Resp 18   Wt 99.8 kg (220 lb)   SpO2 95%   Intake/Output last 3 Shifts:  No intake or output data in the 24 hours ending 06/19/25 1428    Admission Weight  Weight: 99.8 kg (220 lb) (06/18/25 0712)    Daily Weight  06/18/25 : 99.8 kg (220 lb)    Image Results  ECG 12 lead  Sinus rhythm with 1st degree AV block  Possible Inferior infarct (cited on or before 18-JUN-2025)  Abnormal ECG  When compared with ECG of 18-JUN-2025 07:11, (unconfirmed)  Premature ventricular complexes are no longer Present  Premature atrial complexes are no longer Present  ECG 12 lead  Sinus rhythm with occasional Premature ventricular complexes and Premature atrial complexes  Inferior infarct (cited on or before 18-JUN-2025)  Abnormal ECG  When compared with ECG of 18-JUN-2025 07:10, (unconfirmed)  Premature ventricular complexes are now Present  Premature atrial complexes are now Present      Physical Exam  Constitutional:       Appearance: Normal appearance.   HENT:      Head: Normocephalic and atraumatic.      Nose: Nose normal.   Eyes:      Extraocular Movements: Extraocular movements intact.      Pupils: Pupils are equal, round, and reactive to light.   Cardiovascular:      Rate and Rhythm: Normal rate and regular rhythm.      Heart sounds: Normal heart sounds.   Pulmonary:      Effort: Pulmonary effort is normal.      Breath sounds: Normal breath sounds.   Abdominal:      General: Bowel sounds are normal.      Palpations: Abdomen is soft.    Musculoskeletal:      Cervical back: Neck supple.   Skin:     General: Skin is warm and dry.   Neurological:      General: No focal deficit present.      Mental Status: He is alert. Mental status is at baseline.   Psychiatric:         Mood and Affect: Mood normal.         Relevant Results                              Assessment & Plan  Chest pain    Problem list    Chest pain  History of A-fib status post radiofrequency ablation  History of coronary artery disease  Hypertension  Type 2 diabetes  Questionable left lower lobe pneumonia    Treatment plan    Mild leukocytosis is noted, no fever.  Chest x-ray is not convincing.  Will obtain CT chest noncontrast to further evaluate the possibility of pneumonia.    Cardiology recommendations are as noted.  Echocardiogram and stress test ordered.  Patient is on Eliquis, aspirin, statin and beta-blocker.    Patient appears to be anxious.  Requesting his metoprolol to be increased.  Will discuss with cardiology           Alisson Miguel MD

## 2025-06-20 ENCOUNTER — APPOINTMENT (OUTPATIENT)
Dept: CARDIOLOGY | Facility: HOSPITAL | Age: 75
DRG: 287 | End: 2025-06-20
Payer: MEDICARE

## 2025-06-20 PROBLEM — I20.0 UNSTABLE ANGINA (MULTI): Status: ACTIVE | Noted: 2025-06-20

## 2025-06-20 PROBLEM — R94.39 ABNORMAL STRESS TEST: Status: ACTIVE | Noted: 2025-06-18

## 2025-06-20 LAB
ANION GAP SERPL CALC-SCNC: 12 MMOL/L (ref 10–20)
BUN SERPL-MCNC: 19 MG/DL (ref 6–23)
CALCIUM SERPL-MCNC: 8.4 MG/DL (ref 8.6–10.3)
CHLORIDE SERPL-SCNC: 107 MMOL/L (ref 98–107)
CO2 SERPL-SCNC: 24 MMOL/L (ref 21–32)
CREAT SERPL-MCNC: 0.85 MG/DL (ref 0.5–1.3)
EGFRCR SERPLBLD CKD-EPI 2021: >90 ML/MIN/1.73M*2
ERYTHROCYTE [DISTWIDTH] IN BLOOD BY AUTOMATED COUNT: 13.2 % (ref 11.5–14.5)
GLUCOSE BLD MANUAL STRIP-MCNC: 168 MG/DL (ref 74–99)
GLUCOSE BLD MANUAL STRIP-MCNC: 173 MG/DL (ref 74–99)
GLUCOSE BLD MANUAL STRIP-MCNC: 186 MG/DL (ref 74–99)
GLUCOSE SERPL-MCNC: 135 MG/DL (ref 74–99)
HCT VFR BLD AUTO: 43.5 % (ref 41–52)
HGB BLD-MCNC: 14.1 G/DL (ref 13.5–17.5)
MCH RBC QN AUTO: 28.7 PG (ref 26–34)
MCHC RBC AUTO-ENTMCNC: 32.4 G/DL (ref 32–36)
MCV RBC AUTO: 88 FL (ref 80–100)
NRBC BLD-RTO: 0 /100 WBCS (ref 0–0)
PLATELET # BLD AUTO: 304 X10*3/UL (ref 150–450)
POTASSIUM SERPL-SCNC: 3.8 MMOL/L (ref 3.5–5.3)
RBC # BLD AUTO: 4.92 X10*6/UL (ref 4.5–5.9)
SODIUM SERPL-SCNC: 139 MMOL/L (ref 136–145)
WBC # BLD AUTO: 12.9 X10*3/UL (ref 4.4–11.3)

## 2025-06-20 PROCEDURE — C1760 CLOSURE DEV, VASC: HCPCS | Performed by: INTERNAL MEDICINE

## 2025-06-20 PROCEDURE — 2720000007 HC OR 272 NO HCPCS: Performed by: INTERNAL MEDICINE

## 2025-06-20 PROCEDURE — 2500000002 HC RX 250 W HCPCS SELF ADMINISTERED DRUGS (ALT 637 FOR MEDICARE OP, ALT 636 FOR OP/ED): Performed by: NURSE PRACTITIONER

## 2025-06-20 PROCEDURE — 36415 COLL VENOUS BLD VENIPUNCTURE: CPT | Performed by: NURSE PRACTITIONER

## 2025-06-20 PROCEDURE — 93459 L HRT ART/GRFT ANGIO: CPT | Performed by: INTERNAL MEDICINE

## 2025-06-20 PROCEDURE — 2500000004 HC RX 250 GENERAL PHARMACY W/ HCPCS (ALT 636 FOR OP/ED): Performed by: NURSE PRACTITIONER

## 2025-06-20 PROCEDURE — 99232 SBSQ HOSP IP/OBS MODERATE 35: CPT | Performed by: STUDENT IN AN ORGANIZED HEALTH CARE EDUCATION/TRAINING PROGRAM

## 2025-06-20 PROCEDURE — 2500000001 HC RX 250 WO HCPCS SELF ADMINISTERED DRUGS (ALT 637 FOR MEDICARE OP): Performed by: NURSE PRACTITIONER

## 2025-06-20 PROCEDURE — 2500000004 HC RX 250 GENERAL PHARMACY W/ HCPCS (ALT 636 FOR OP/ED): Performed by: INTERNAL MEDICINE

## 2025-06-20 PROCEDURE — 2550000001 HC RX 255 CONTRASTS: Performed by: INTERNAL MEDICINE

## 2025-06-20 PROCEDURE — C1894 INTRO/SHEATH, NON-LASER: HCPCS | Performed by: INTERNAL MEDICINE

## 2025-06-20 PROCEDURE — 85027 COMPLETE CBC AUTOMATED: CPT | Performed by: NURSE PRACTITIONER

## 2025-06-20 PROCEDURE — 93455 CORONARY ART/GRFT ANGIO S&I: CPT | Performed by: INTERNAL MEDICINE

## 2025-06-20 PROCEDURE — 99233 SBSQ HOSP IP/OBS HIGH 50: CPT | Performed by: NURSE PRACTITIONER

## 2025-06-20 PROCEDURE — 2060000001 HC INTERMEDIATE ICU ROOM DAILY

## 2025-06-20 PROCEDURE — 93005 ELECTROCARDIOGRAM TRACING: CPT

## 2025-06-20 PROCEDURE — 7100000010 HC PHASE TWO TIME - EACH INCREMENTAL 1 MINUTE: Performed by: INTERNAL MEDICINE

## 2025-06-20 PROCEDURE — 7100000009 HC PHASE TWO TIME - INITIAL BASE CHARGE: Performed by: INTERNAL MEDICINE

## 2025-06-20 PROCEDURE — 99152 MOD SED SAME PHYS/QHP 5/>YRS: CPT | Performed by: INTERNAL MEDICINE

## 2025-06-20 PROCEDURE — 82947 ASSAY GLUCOSE BLOOD QUANT: CPT

## 2025-06-20 PROCEDURE — 4A023N7 MEASUREMENT OF CARDIAC SAMPLING AND PRESSURE, LEFT HEART, PERCUTANEOUS APPROACH: ICD-10-PCS | Performed by: INTERNAL MEDICINE

## 2025-06-20 PROCEDURE — B2111ZZ FLUOROSCOPY OF MULTIPLE CORONARY ARTERIES USING LOW OSMOLAR CONTRAST: ICD-10-PCS | Performed by: INTERNAL MEDICINE

## 2025-06-20 PROCEDURE — 96373 THER/PROPH/DIAG INJ IA: CPT | Performed by: INTERNAL MEDICINE

## 2025-06-20 PROCEDURE — C1769 GUIDE WIRE: HCPCS | Performed by: INTERNAL MEDICINE

## 2025-06-20 PROCEDURE — 80048 BASIC METABOLIC PNL TOTAL CA: CPT | Performed by: NURSE PRACTITIONER

## 2025-06-20 RX ORDER — SODIUM CHLORIDE 9 MG/ML
75 INJECTION, SOLUTION INTRAVENOUS CONTINUOUS
Status: ACTIVE | OUTPATIENT
Start: 2025-06-20 | End: 2025-06-20

## 2025-06-20 RX ORDER — MIDAZOLAM HYDROCHLORIDE 1 MG/ML
INJECTION, SOLUTION INTRAMUSCULAR; INTRAVENOUS AS NEEDED
Status: DISCONTINUED | OUTPATIENT
Start: 2025-06-20 | End: 2025-06-20 | Stop reason: HOSPADM

## 2025-06-20 RX ORDER — FENTANYL CITRATE 50 UG/ML
INJECTION, SOLUTION INTRAMUSCULAR; INTRAVENOUS AS NEEDED
Status: DISCONTINUED | OUTPATIENT
Start: 2025-06-20 | End: 2025-06-20 | Stop reason: HOSPADM

## 2025-06-20 RX ORDER — HEPARIN SODIUM 1000 [USP'U]/ML
INJECTION, SOLUTION INTRAVENOUS; SUBCUTANEOUS AS NEEDED
Status: DISCONTINUED | OUTPATIENT
Start: 2025-06-20 | End: 2025-06-20 | Stop reason: HOSPADM

## 2025-06-20 RX ORDER — ISOSORBIDE MONONITRATE 30 MG/1
30 TABLET, EXTENDED RELEASE ORAL DAILY
Status: DISCONTINUED | OUTPATIENT
Start: 2025-06-20 | End: 2025-06-21 | Stop reason: HOSPADM

## 2025-06-20 RX ORDER — SODIUM CHLORIDE 9 MG/ML
1000 INJECTION, SOLUTION INTRAVENOUS ONCE AS NEEDED
Status: ACTIVE | OUTPATIENT
Start: 2025-06-20 | End: 2025-06-20

## 2025-06-20 RX ORDER — SODIUM CHLORIDE 9 MG/ML
1 INJECTION, SOLUTION INTRAVENOUS CONTINUOUS
Status: ACTIVE | OUTPATIENT
Start: 2025-06-20 | End: 2025-06-21

## 2025-06-20 RX ORDER — LIDOCAINE HYDROCHLORIDE 20 MG/ML
INJECTION, SOLUTION INFILTRATION; PERINEURAL AS NEEDED
Status: DISCONTINUED | OUTPATIENT
Start: 2025-06-20 | End: 2025-06-20 | Stop reason: HOSPADM

## 2025-06-20 RX ADMIN — INSULIN GLARGINE 40 UNITS: 100 INJECTION, SOLUTION SUBCUTANEOUS at 21:55

## 2025-06-20 RX ADMIN — APIXABAN 5 MG: 5 TABLET, FILM COATED ORAL at 23:30

## 2025-06-20 RX ADMIN — INSULIN GLARGINE 15 UNITS: 100 INJECTION, SOLUTION SUBCUTANEOUS at 08:42

## 2025-06-20 RX ADMIN — LISINOPRIL 5 MG: 5 TABLET ORAL at 08:41

## 2025-06-20 RX ADMIN — ROSUVASTATIN 20 MG: 20 TABLET, FILM COATED ORAL at 21:54

## 2025-06-20 RX ADMIN — ISOSORBIDE MONONITRATE 30 MG: 30 TABLET, EXTENDED RELEASE ORAL at 17:08

## 2025-06-20 RX ADMIN — AMLODIPINE BESYLATE 5 MG: 5 TABLET ORAL at 08:42

## 2025-06-20 RX ADMIN — TAMSULOSIN HYDROCHLORIDE 0.8 MG: 0.4 CAPSULE ORAL at 08:42

## 2025-06-20 RX ADMIN — ASPIRIN 81 MG: 81 TABLET, COATED ORAL at 08:42

## 2025-06-20 RX ADMIN — SODIUM CHLORIDE 1 ML/KG/HR: 0.9 INJECTION, SOLUTION INTRAVENOUS at 23:32

## 2025-06-20 RX ADMIN — SODIUM CHLORIDE 75 ML/HR: 0.9 INJECTION, SOLUTION INTRAVENOUS at 08:41

## 2025-06-20 ASSESSMENT — PAIN SCALES - GENERAL
PAINLEVEL_OUTOF10: 2
PAINLEVEL_OUTOF10: 0 - NO PAIN

## 2025-06-20 ASSESSMENT — PAIN - FUNCTIONAL ASSESSMENT
PAIN_FUNCTIONAL_ASSESSMENT: 0-10

## 2025-06-20 ASSESSMENT — COGNITIVE AND FUNCTIONAL STATUS - GENERAL
MOBILITY SCORE: 24
DAILY ACTIVITIY SCORE: 24

## 2025-06-20 NOTE — PROGRESS NOTES
"[facility] Heart and Vascular Cardiology  Patient Name: Prasanna Torrez  Patient : 1950  Room/Bed: 2309/2309-A    HPI from cardiology consult:  Prasanna Torrez is a 74 y.o. male presenting with chest pain.  Patient of Dr. Kennedy Sandhu.  He has a history of MI and underwent coronary artery bypass surgery  (L-LAD) and NSTEMI 3/1/22 and AF s/p DEMETRICE LCX x 2 - CP and SVT 24 - patent L-LAD and CX stent.  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      The patient has noticed \"abnormal heart rate\" over the last 5 days - concerned he might have recurrent arrhythmia.  This morning he had an episode of \"angina\" for which he took SL Ntg - after first dose he still had pain, so he took another dose and the pain completely resolved.  Tells me he has been compliant with home CV meds.  He takes metoprolol XL 50mg daily, reduced from 75mgt daily in  by Dr. Sandhu (possibly because patient was started on amlodipine).  Of note, he had laparoscopic hernia repair recently on 25 - tells me surgery was uneventful, no cardiac issues.     In the ED:  /78, HR 72.  I personally reviewed the EKG on presentation which demonstrated sinus rhythm, cannot rule out old inferior infarct (this finding seen on prior EKG's).  Tele reviewed - SR, no Afib/pSVT  Labs personally reviewed:  K 3.8, Cr 0.86, Mg 2.00, WBC 12, Hb 14.8  HSTI 8 > 7  CXR - hazy opacity at the L base    Allergies:  Dapagliflozin    Subjective Data:  25: Seen while off unit for stress test.  Denies any chest pain/pressure or SOB.  +palpitations that are brief and were occurring every 3-5 minutes yesterday, but are less frequent today. SR on telemetry.   25:  Wife at bedside. Up ambulating in room.  Continues to report intermittent \"backup feeling\", palpitations and had chest pain at home.  SR with HR 71 bpm.    Overnight Events:  None    Objective Data:  Vitals:    25 2029 25 0001 25 0459 25 0700   BP: 143/78 126/76 143/77 147/90   BP " "Location: Right arm Left arm Left arm Left arm   Patient Position: Lying Lying Lying Lying   Pulse: 70 65 68 75   Resp: 16 16 18 18   Temp: 36.7 °C (98.1 °F) 37.3 °C (99.2 °F) 36.8 °C (98.2 °F) 36.5 °C (97.7 °F)   TempSrc: Temporal Temporal Temporal Temporal   SpO2: 94% 94% 96% 95%   Weight:       Height:           Reviewed the following Labs:  Results from last 7 days   Lab Units 06/19/25  0504 06/18/25  0727   WBC AUTO x10*3/uL 11.4* 12.0*   HEMOGLOBIN g/dL 14.1 14.8   HEMATOCRIT % 43.9 44.2   PLATELETS AUTO x10*3/uL 328 332       Results from last 7 days   Lab Units 06/19/25  0504 06/18/25  0727   SODIUM mmol/L 138 136   POTASSIUM mmol/L 3.8 3.8   CHLORIDE mmol/L 107 105   CO2 mmol/L 25 24   BUN mg/dL 16 16   CREATININE mg/dL 0.81 0.86   CALCIUM mg/dL 8.3* 8.5*   PROTEIN TOTAL g/dL  --  6.5   BILIRUBIN TOTAL mg/dL  --  0.7   ALK PHOS U/L  --  84   ALT U/L  --  19   AST U/L  --  14   GLUCOSE mg/dL 136* 177*       Results from last 7 days   Lab Units 06/18/25  0727   MAGNESIUM mg/dL 2.00       No results found for: \"LDLF\"     No results found for: \"BNP\"    Lab Results   Component Value Date    TROPHS 7 06/18/2025    TROPHS 8 06/18/2025        No results found for: \"TSH\", \"FT4\"        Lab Results   Component Value Date    HGBA1C 8.1 (H) 07/20/2024       Intake/Output  No intake or output data in the 24 hours ending 06/19/25 0956   No intake/output data recorded.    Past Medical History:  He has a past medical history of Coronary artery disease, Diabetes (Multi), Hyperlipidemia, Hypertension, Paroxysmal atrial fibrillation (Multi), and SVT (supraventricular tachycardia).    Past Surgical History:  He has a past surgical history that includes Cardiac catheterization (N/A, 08/07/2024); Cardiac electrophysiology procedure (N/A, 10/10/2024); Coronary angioplasty with stent; Coronary artery bypass graft (2013); and Radiofrequency ablation (10/10/2024).      Social History:  He reports that he quit smoking about 13 years " ago. His smoking use included cigarettes. He started smoking about 44 years ago. He has a 31 pack-year smoking history. He has never used smokeless tobacco. He reports that he does not drink alcohol and does not use drugs.    Family History:  Family History[1]    Outpatient Medications  Medications Ordered Prior to Encounter[2]    Scheduled medications  Scheduled Medications[3]  Continuous medications  Continuous Medications[4]  PRN medications  PRN Medications[5]   Prescriptions Prior to Admission[6]    Reviewed the following cardiology tests:    Echo 8/8/24:   1. Left ventricular ejection fraction is low normal, by visual estimate at 50-55%.   2. Spectral Doppler shows an impaired relaxation pattern of left ventricular diastolic filling.   3. There is normal right ventricular global systolic function.   4. Mildly enlarged right ventricle.  EF   Date/Time Value Ref Range Status   08/08/2024 07:45 AM 53 %         [facility] 8/7/24:  1. Double vessel disease.   2. Patent LIMA to lAD and previous Cx stent.   3. Elevated LV filling pressures.   4. Left Ventricular end-diastolic pressure = 17.    Physical Exam:  Vitals reviewed.   Constitutional:       Appearance: Not in distress.   Pulmonary:      Effort: Pulmonary effort is normal.      Breath sounds: Normal breath sounds.   Cardiovascular:      PMI at left midclavicular line. Normal rate. Regular rhythm. S1 with normal intensity. S2 with normal intensity.       Murmurs: There is no murmur.   Edema:     Peripheral edema absent.   Abdominal:      General: Bowel sounds are normal.   Skin:     General: Skin is warm and dry.   Psychiatric:         Attention and Perception: Attention normal.         Mood and Affect: Mood normal.         Behavior: Behavior is cooperative.       Assessment/Plan:   Chest pain  Elevated WBC coung  Abnormal CXR with hazy opacity LLL  H/o ASHD s/p CABG - last cor angio 8/24 with patent L-LAD and patent Lcx stent, RCA was small and diffusely  disease  Afib s/p RFA AVNRT 10/10/24, on apixaban for OAC     Palpitations x 5 days and CP relieved by 2SL Ntg today.  Normal cardiac enzymes, EKG appears grossly unchanged compared with prior.     Recs:  -tele monitoring  -check chemical MPI stress test  -check TTE  -consider PNA, given LLL hazy opacity on CXR and elevated WBC count  -ASA 81mg daily  -[id number] 5 - continue apixaban 5mg bid  -metop XL 50mg daily  -lisinorpil 5mg daiy  -amlodipine 5mg daily    6/19/25: Stress test pending. Order placed for echocardiogram as recommended above.  Continues to report palpitations that feel similar to previous SVT, but are less frequent.  SR on telemetry. Continue metoprolol succinate 50 mg daily.     6/20/25: Stress test with small reversible perfusion defect inferolateral segment consistent with Lcx territory. Echo with LVEF 76%.  Last dose of apixaban yesterday at 0805.  Plan will be for C today via left radial with Dr. Sumeet Patricio. Creatinine 0.85. NS 0.9% at 75 ml/hr x6 hours ordered.      Paroxysmal atrial fibrillation  Hypertension - Yes, 1 point, Vascular Disease - Yes, 1 point, and Age over 75 years - 2 points   [id number] score is at least 4.   Hold apixaban 5 mg BID  d/t plans for C.  Restart once invasive procedures completed.  Continue Metoprolol succinate 50 mg daily     -consider PNA, given LLL hazy opacity on CXR and elevated WBC count  6/20/25: CT chest with on definitive evidence of pneumonia.        Code Status  Full Code      Terri Abad, APRN-CNP          [1]   Family History  Problem Relation Name Age of Onset    Coronary artery disease Father      Other (CABG) Father     [2]   No current facility-administered medications on file prior to encounter.     Current Outpatient Medications on File Prior to Encounter   Medication Sig Dispense Refill    amLODIPine (Norvasc) 5 mg tablet Take 1 tablet (5 mg) by mouth once daily. 90 tablet 3    aspirin 81 mg EC tablet Take 1 tablet (81 mg) by mouth  once daily.      Eliquis 5 mg tablet Take 1 tablet (5 mg) by mouth 2 times a day.      insulin glargine (Lantus U-100 Insulin) 100 unit/mL injection Inject under the skin. 40 UNITS HS AND 15 IN AM      lisinopril 5 mg tablet Take 1 tablet (5 mg) by mouth once daily. 90 tablet 2    methocarbamol (Robaxin) 750 mg tablet Take 1 tablet (750 mg) by mouth 4 times a day.      metoprolol succinate XL (Toprol-XL) 50 mg 24 hr tablet Take 1 tablet (50 mg) by mouth once daily. Do not crush or chew. 90 tablet 2    nitroglycerin (Nitrostat) 0.4 mg SL tablet Place 1 tablet (0.4 mg) under the tongue every 5 minutes if needed for chest pain.      ondansetron (Zofran) 4 mg tablet Take 1 tablet (4 mg) by mouth every 8 hours if needed for nausea or vomiting.      [] oxyCODONE-acetaminophen (Percocet) 5-325 mg tablet Take 1 tablet by mouth every 6 hours if needed for severe pain (7 - 10).      polyethylene glycol (Glycolax, Miralax) 17 gram packet Take 17 g by mouth once daily.      rosuvastatin (Crestor) 20 mg tablet Take 1 tablet (20 mg) by mouth once daily. 30 tablet 11    semaglutide (Ozempic) 1 mg/dose (4 mg/3 mL) pen injector Inject 1 mg under the skin 1 (one) time per week.      tamsulosin (Flomax) 0.4 mg 24 hr capsule Take 2 capsules (0.8 mg) by mouth once daily. Do not crush, chew, or split.      [DISCONTINUED] semaglutide (Ozempic) 0.25 mg or 0.5 mg(2 mg/1.5 mL) pen injector Inject 0.5 mg under the skin 1 (one) time per week.      [DISCONTINUED] tadalafil (Cialis) 10 mg tablet Take 1 tablet (10 mg) by mouth once daily as needed for erectile dysfunction.     [3] amLODIPine, 5 mg, oral, Daily  apixaban, 5 mg, oral, BID  aspirin, 81 mg, oral, Daily  insulin glargine, 15 Units, subcutaneous, q AM  insulin glargine, 40 Units, subcutaneous, Nightly  insulin lispro, 0-5 Units, subcutaneous, TID AC  lisinopril, 5 mg, oral, Daily  metoprolol succinate XL, 50 mg, oral, Daily  regadenoson, 0.4 mg, intravenous, Once in  imaging  rosuvastatin, 20 mg, oral, Nightly  sennosides-docusate sodium, 1 tablet, oral, Nightly  tamsulosin, 0.8 mg, oral, Daily    [4]    [5] PRN medications: acetaminophen, aminophylline, dextrose, dextrose, glucagon, glucagon, nitroglycerin  [6]   Medications Prior to Admission   Medication Sig Dispense Refill Last Dose/Taking    amLODIPine (Norvasc) 5 mg tablet Take 1 tablet (5 mg) by mouth once daily. 90 tablet 3 Unknown    aspirin 81 mg EC tablet Take 1 tablet (81 mg) by mouth once daily.   Unknown    Eliquis 5 mg tablet Take 1 tablet (5 mg) by mouth 2 times a day.   Unknown    insulin glargine (Lantus U-100 Insulin) 100 unit/mL injection Inject under the skin. 40 UNITS HS AND 15 IN AM       lisinopril 5 mg tablet Take 1 tablet (5 mg) by mouth once daily. 90 tablet 2 Unknown    methocarbamol (Robaxin) 750 mg tablet Take 1 tablet (750 mg) by mouth 4 times a day.       metoprolol succinate XL (Toprol-XL) 50 mg 24 hr tablet Take 1 tablet (50 mg) by mouth once daily. Do not crush or chew. 90 tablet 2 Unknown    nitroglycerin (Nitrostat) 0.4 mg SL tablet Place 1 tablet (0.4 mg) under the tongue every 5 minutes if needed for chest pain.   Unknown    ondansetron (Zofran) 4 mg tablet Take 1 tablet (4 mg) by mouth every 8 hours if needed for nausea or vomiting.       [] oxyCODONE-acetaminophen (Percocet) 5-325 mg tablet Take 1 tablet by mouth every 6 hours if needed for severe pain (7 - 10).       polyethylene glycol (Glycolax, Miralax) 17 gram packet Take 17 g by mouth once daily.       rosuvastatin (Crestor) 20 mg tablet Take 1 tablet (20 mg) by mouth once daily. 30 tablet 11 Unknown    semaglutide (Ozempic) 1 mg/dose (4 mg/3 mL) pen injector Inject 1 mg under the skin 1 (one) time per week.       tamsulosin (Flomax) 0.4 mg 24 hr capsule Take 2 capsules (0.8 mg) by mouth once daily. Do not crush, chew, or split.

## 2025-06-20 NOTE — PROGRESS NOTES
Prasanna Torrez is a 74 y.o. male on day 0 of admission presenting with Chest pain.      Subjective   Patient is planned for stress test today.  No significant acute event overnight.    Objective     Last Recorded Vitals  /79   Pulse 64   Temp 36.6 °C (97.9 °F) (Temporal)   Resp 14   Wt 99.8 kg (220 lb)   SpO2 96%   Intake/Output last 3 Shifts:    Intake/Output Summary (Last 24 hours) at 6/20/2025 1138  Last data filed at 6/20/2025 0827  Gross per 24 hour   Intake 520 ml   Output 0 ml   Net 520 ml       Admission Weight  Weight: 99.8 kg (220 lb) (06/18/25 0712)    Daily Weight  06/18/25 : 99.8 kg (220 lb)    Image Results  CT chest wo IV contrast  Narrative: Interpreted By:  Silviano Grullon,   STUDY:  CT CHEST WO IV CONTRAST;  6/19/2025 6:42 pm      INDICATION:  Signs/Symptoms:Possible left lower lobe pneumonia.          COMPARISON:  None.      ACCESSION NUMBER(S):  [id number]      ORDERING CLINICIAN:  PRESLEY KU      TECHNIQUE:  Helical data acquisition of the chest was obtained  without IV  contrast material.  Images were reformatted in axial, coronal, and  sagittal planes.      FINDINGS:  LUNGS AND AIRWAYS:  The trachea and central airways are patent. No endobronchial lesion.      Minimal atelectasis in the left lower lobe. No focal consolidation,  pneumothorax or pleural effusion. Mild volume loss in the left lung  appears chronic.      MEDIASTINUM AND AMARI, LOWER NECK AND AXILLA:  The visualized thyroid gland is within normal limits.      No evidence of thoracic lymphadenopathy by CT criteria.      Esophagus appears within normal limits as seen.      HEART AND VESSELS:      Evidence of prior CABG.  The thoracic aorta is of normal course and caliber without vascular  calcifications.      Main pulmonary artery and its branches are normal in caliber.      Dense coronary artery calcifications are seen of the native vessels.  The study is not optimized for evaluation of coronary arteries.      The  cardiac chambers are not enlarged.      No evidence of pericardial effusion.      UPPER ABDOMEN:  The visualized subdiaphragmatic structures demonstrate no remarkable  findings.      CHEST WALL AND OSSEOUS STRUCTURES:  There are no suspicious osseous lesions. Multilevel degenerative  changes are present. Endplate sclerosis and facet arthropathy with  thoracic ankylosis.      Impression: 1.  Left lower lobe atelectasis. No definitive evidence of pneumonia.      MACRO:  None      Signed by: Silviano Grullon 6/19/2025 7:27 PM  Dictation workstation:   [id number]  Cardiology Interpretation Of Nuclear Stress - See Other Report For Nuclear [place]ion                [place] [address]       Phone [phone] Fax [phone]    Nuclear Treadmill Stress Test    Patient Name:      YOVANI STRICKLAND        Ordering Provider:     [id number] MAURICE LASSITER  Study Date:        6/19/2025           Reading Physician:     Kamille Leonardo MD  MRN/PID:           [id number]            Supervising Physician: Kamille Leonardo MD  Accession#:        [id number]        Fellow:  Date of Birth/Age: 1950 / 74 years Fellow:  Gender:            M                   Nurse:                 Jeanine Diaz  Admission Status:  Inpatient           Sonographer:           ALYSIA  Height:            182.00 cm           Technologist:  Weight:            99.80 kg            Additional Staff:  BSA:               2.21 m2             Encounter#:            [id number]  BMI:               30.13 kg/m2         Patient Location:      [address]    Study Type:    CARDIOLOGY INTERPRETATION OF NUCLEAR STRESS  Diagnosis/ICD: Chest pain, unspecified-R07.9  Indication:    Chest Pain  CPT Codes:     Stress Test  Interpretation-69855; Stress Test Supervision-[id number]    Falls Risk:     Study Details: Correct procedure and correct patient verified verbally and with                 ID Band checked.       Patient History: Atrial fibrillation, previous supraventricular tachycardia, hypertension, hyperlipidemia and coronary artery disease.  Allergies: Dapagliflozin.  Diabetes:  Yes, managed with Insulin.       Medications: Amlodipine, Apixaban, Lisinopril, Metoprolol, Rosuvastatin, Tamsulosin.     Patient Performance: Patient received a total of 0.4 mg of Regadenoson at 11:18:02 AM. The peak heart rate achieved was 83 bpm, which was 58 % of the age predicted target heart rate of 144 bpm. The resting blood pressure was 130/78 mmHg with a heart rate of 70 bpm. The patient developed no symptoms during the stress exam. The blood pressure response was normal. The test was terminated due to: end of vasodilator infusion. Patient has met the discharge criteria and is discharged to their floor.     Baseline ECG: Resting ECG showed Sinus Arrhythmia with first degree AV block, nonspecific ST-T wave changes and early repolarization.     Stress ECG: Stress ECG showed normal sinus rhythm, with no abnormal findings. No ST changes.     Stress Stage Data:  +----------------+--+------+-------+                  HRSys BPDias BP  +----------------+--+------+-------+  Baseline [id number]   78       +----------------+--+------+-------+  Stage I         83uto            +----------------+--+------+-------+  Stage II        80uto            +----------------+--+------+-------+  Stage III       71450   79       +----------------+--+------+-------+  Stage IV        94846   78       +----------------+--+------+-------+  Stage V         86210   79       +----------------+--+------+-------+       Summary:   1. Low level exercise.   2. Correlate with myocardial perfusion imaging results.   3. No clinical or  electrocardiographic evidence for ischemia at maximal infusion.   4. Nuclear image results are reported separately.    [id number] Emelyn Leonardo MD  Electronically signed on 6/19/2025 at 5:46:20 PM            ** Final **  Nuclear Stress Test  Narrative: Interpreted By:  Emelyn Leonardo,   STUDY:  NUCLEAR STRESS TEST;  6/19/2025 1:33 pm      INDICATION:  Signs/Symptoms:chest pain.      COMPARISON:  None.      ACCESSION NUMBER(S):  [id number]      ORDERING CLINICIAN:  MAURICE LASSITER      TECHNIQUE:  DIVISION OF NUCLEAR MEDICINE  PHARMACOLOGIC STRESS MYOCARDIAL PERFUSION SCAN, ONE DAY PROTOCOL      The patient received an intravenous dose of  11.9 mCi of Tc-99m  tetrofosmin and resting emission tomographic (SPECT) images of the  myocardium were acquired. The patient then received an intravenous  infusion of 0.4mg regadenoson (Lexiscan) followed by an additional  dose of  32 mCi of Tc-99m tetrofosmin. Stress phase SPECT images of  the myocardium were then acquired. These included ECG-gated images to  assess and quantify ventricular function. In addition, low-dose non  gated CT attenuation correction was performed.      FINDINGS:  There is a small area of partially reversible perfusion defect of the  inferolateral segment, moderate in intensity.      ECG-gated images demonstrate normal LV size and mildly diminished  myocardial contractility with an LV ejection fraction of 47 % in the  post stress images (normal above 50 percent). Rest gated ejection  fraction is 51%. Summed stress score 4 summed rest score 2 summed  difference score 2.      Impression: Small area of partially reversible perfusion defect of the  inferolateral segment, consistent with ischemia in left circumflex  territory. Mild left ventricular systolic dysfunction on post stress  gated imaging.  1. .  2. Pertinent findings if any on low-dose non gated CT images-severe  coronary artery calcification and/or stents. Pleural thickening  versus small  pleural effusion on the left.              Signed by: Emelyn Leonardo 6/19/2025 4:38 PM  Dictation workstation:   [id number]  Transthoracic [place] [address]       Phone [phone] Fax [phone]    TRANSTHORACIC ECHOCARDIOGRAM REPORT    Patient Name:       YOVANI STRICKLAND         Reading Physician:    [id number] Emelyn Leonardo MD  Study Date:         6/19/2025            Ordering Provider:    [id number] KIRSTIE SIMMS  MRN/PID:            [id number]             Fellow:  Accession#:         [id number]         Nurse:  Date of Birth/Age:  1950 / 74 years  Sonographer:          Meena Purvis RDCS  Gender Assigned at                      Additional Staff:  Birth:  Height:             182.88 cm            Admit Date:           6/18/2025  Weight:             99.79 kg             Admission Status:     Inpatient -                                                                 Routine  BSA / BMI:          2.22 m2 / 29.84      Department Location:  [facility] Echo                      kg/m2                                      Lab  Blood Pressure: 147 /90 mmHg    Study Type:    TRANSTHORACIC ECHO (TTE) COMPLETE  Diagnosis/ICD: Chest pain, unspecified-R07.9  Indication:    Chest Pain  CPT Codes:     Echo Complete w Full Doppler-[id number]    Patient History:  Pertinent History: STEMI, SVT, CAD, PAF, HTN, HLD.    Study Detail: The following Echo studies were performed: 2D, M-Mode, Doppler and                color flow. Technically challenging study due to poor acoustic                windows and body habitus. Optison used as a contrast agent for                endocardial border definition. Total contrast used for this                procedure was 2 mL via IV push. A bubble study was not performed.       PHYSICIAN  INTERPRETATION:  Left Ventricle: Left ventricular ejection fraction is hyperdynamic calculated by Fritz's biplane at 76%. There are no regional wall motion abnormalities. The left ventricular cavity size is normal. There is normal septal and normal posterior left ventricular wall thickness. Spectral Doppler shows a normal pattern of left ventricular diastolic filling.  Left Atrium: The left atrial size is normal. A bubble study using agitated saline was not performed.  Right Ventricle: The right ventricle was not assessed. There is reduced right ventricular systolic function.  Right Atrium: The right atrial size was not assessed.  Aortic Valve: The aortic valve is trileaflet. The aortic valve area by VTI is 2.59 cmï¿½ with a peak velocity of 1.72 m/s. The peak and mean gradients are 12 mmHg and 6 mmHg, respectively, with a dimensionless index of 0.57. There is mild aortic valve thickening. There is no evidence of aortic valve regurgitation.  Mitral Valve: The mitral valve is mildly thickened. There is trace mitral valve regurgitation. The E Vmax is 0.60 m/s.  Tricuspid Valve: The tricuspid valve is structurally normal. No evidence of tricuspid regurgitation.  Pulmonic Valve: The pulmonic valve is structurally normal. There is trace pulmonic valve regurgitation.  Pericardium: No pericardial effusion noted. There is a pericardial fat pad present.  Aorta: The aortic root is normal.  Systemic Veins: The inferior vena cava was not well visualized, IVC inspiratory collapse is not well visualized.       CONCLUSIONS:   1. Left ventricular ejection fraction is hyperdynamic calculated by Fritz's biplane at 76%.   2. There is reduced right ventricular systolic function.    QUANTITATIVE DATA SUMMARY:     2D MEASUREMENTS:             Normal Ranges:  Ao Root d:       3.60 cm     (2.0-3.7cm)  LAs:             4.00 cm     (2.7-4.0cm)  IVSd:            0.90 cm     (0.6-1.1cm)  LVPWd:           0.90 cm     (0.6-1.1cm)  LVIDd:            4.80 cm     (3.9-5.9cm)  LVIDs:           3.40 cm  LV Mass Index:   66.6 g/m2  LVEDV Index:     57.99 ml/m2  LV % FS          29.2 %       LEFT ATRIUM:                  Normal Ranges:  LA Vol A4C:        47.6 ml    (22+/-6mL/m2)  LA Vol A2C:        44.4 ml  LA Vol BP:         49.1 ml  LA Vol Index A4C:  21.4ml/m2  LA Vol Index A2C:  20.0 ml/m2  LA Vol Index BP:   22.1 ml/m2  LA Area A4C:       17.7 cm2  LA Area A2C:       16.0 cm2  LA Major Axis A4C: 5.6 cm  LA Major Axis A2C: 4.9 cm  LA Volume Index:   22.1 ml/m2       AORTA MEASUREMENTS:         Normal Ranges:  Ao Sinus, d:        3.60 cm (2.1-3.5cm)  Ao STJ, d:          3.00 cm (1.7-3.4cm)  Asc Ao, d:          3.60 cm (2.1-3.4cm)       LV SYSTOLIC FUNCTION:                       Normal Ranges:  EF-A4C View:    75 % (>=55%)  EF-A2C View:    76 %  EF-Biplane:     76 %  LV EF Reported: 76 %       LV DIASTOLIC FUNCTION:           Normal Ranges:  MV Peak E:             0.60 m/s  (0.7-1.2 m/s)  MV Peak A:             0.76 m/s  (0.42-0.7 m/s)  E/A Ratio:             0.79      (1.0-2.2)  MV e'                  0.069 m/s (>8.0)  MV lateral e'          0.10 m/s  MV medial e'           0.04 m/s  E/e' Ratio:            8.69      (<8.0)       AORTIC VALVE:                      Normal Ranges:  AoV Vmax:                1.72 m/s  (<=1.7m/s)  AoV Peak P.8 mmHg (<20mmHg)  AoV Mean P.0 mmHg  (1.7-11.5mmHg)  LVOT Max Pravin:            0.90 m/s  (<=1.1m/s)  AoV VTI:                 35.50 cm  (18-25cm)  LVOT VTI:                20.30 cm  LVOT Diameter:           2.40 cm   (1.8-2.4cm)  AoV Area, VTI:           2.59 cm2  (2.5-5.5cm2)  AoV Area,Vmax:           2.38 cm2  (2.5-4.5cm2)  AoV Dimensionless Index: 0.57       RIGHT VENTRICLE:  TAPSE: 16.6 mm  RV s'  0.09 m/s       TRICUSPID VALVE/RVSP:        Normal Ranges:  Est. RA Pressure:     3 mmHg       [id number] Emelyn Leonardo MD  Electronically signed on 2025 at 3:58:51 PM       ** Final **  ECG  12 lead  Sinus rhythm with 1st degree AV block  Possible Inferior infarct (cited on or before 18-JUN-2025)  Abnormal ECG  When compared with ECG of 18-JUN-2025 07:11, (unconfirmed)  Premature ventricular complexes are no longer Present  Premature atrial complexes are no longer Present  ECG 12 lead  Sinus rhythm with occasional Premature ventricular complexes and Premature atrial complexes  Inferior infarct (cited on or before 18-JUN-2025)  Abnormal ECG  When compared with ECG of 18-JUN-2025 07:10, (unconfirmed)  Premature ventricular complexes are now Present  Premature atrial complexes are now Present      Physical Exam  Constitutional:       Appearance: Normal appearance.   HENT:      Head: Normocephalic and atraumatic.      Nose: Nose normal.   Eyes:      Extraocular Movements: Extraocular movements intact.      Pupils: Pupils are equal, round, and reactive to light.   Cardiovascular:      Rate and Rhythm: Normal rate and regular rhythm.      Heart sounds: Normal heart sounds.   Pulmonary:      Effort: Pulmonary effort is normal.      Breath sounds: Normal breath sounds.   Abdominal:      General: Bowel sounds are normal.      Palpations: Abdomen is soft.   Musculoskeletal:      Cervical back: Neck supple.   Skin:     General: Skin is warm and dry.   Neurological:      General: No focal deficit present.      Mental Status: He is alert. Mental status is at baseline.   Psychiatric:         Mood and Affect: Mood normal.         Relevant Results                              Assessment & Plan  Chest pain    Unstable angina (Multi)    Abnormal stress test    Problem list    Chest pain  History of A-fib status post radiofrequency ablation  History of coronary artery disease  Hypertension  Type 2 diabetes  Questionable left lower lobe pneumonia    Treatment plan    Cardiology proceeded with stress test that came back positive for small reversible ischemia in the inferior lateral area.  At this time plan is for heart  catheterization.  Eliquis is on hold.    Leukocytosis is likely reactive.  Patient is extremely anxious.  CT chest was done and did not show any evidence of pneumonia.  Left lower lobe atelectasis was noted on CT chest.    No need for antibiotic at this time    Continue beta-blocker statin and aspirin.  Continue to hold Eliquis for an anticipated left heart cath               Alisson Miguel MD

## 2025-06-20 NOTE — POST-PROCEDURE NOTE
Physician Transition of Care Summary  Invasive Cardiovascular Lab    Procedure Date: 6/20/2025  Attending:    Gomez Patricio - Primary  Resident/Fellow/Other Assistant: Surgeons and Role:  * No surgeons found with a matching role *    Indications:   Pre-op Diagnosis      * Chest pain, unspecified type [R07.9]     * Abnormal stress test [R94.39]    Post-procedure diagnosis:   Post-op Diagnosis     * Chest pain, unspecified type [R07.9]     * Abnormal stress test [R94.39]    Procedure(s):   [facility], No   [id number] - CO CATH PLACEMENT & NJX CORONARY ART ANGIO IMG S&I        Procedure Findings:   Coronary angiogram with grafts    Left dominant system with patent Lcx stents and LIMA - LAD    Description of the Procedure:   LRA    Complications:   Nil     Stents/Implants:   Implants       No implant documentation for this case.            Anticoagulation/Antiplatelet Plan:   To continue on his regular meds    Estimated Blood Loss:   5 mL    Anesthesia: Moderate Sedation Anesthesia Staff: No anesthesia staff entered.    Any Specimen(s) Removed:   No specimens collected during this procedure.    Disposition:   Floor later      Electronically signed by: Norberto Mtz MD, 6/20/2025 1:40 PM

## 2025-06-20 NOTE — CARE PLAN
The patient's goals for the shift include  get heart cath    The clinical goals for the shift include decreased chest pain    Over the shift, the patient did not make progress toward the following goals. Barriers to progression include . Recommendations to address these barriers include .

## 2025-06-20 NOTE — PRE-SEDATION DOCUMENTATION
Cardiology Preprocedure Note    Prasanna Torrez   Indication for procedure: The primary encounter diagnosis was Unstable angina (Multi). Diagnoses of Chest pain, unspecified type and Abnormal stress test were also pertinent to this visit. Patient here for [facility] r/o obstructive CAD. He reports having daily chest pains. Denies current chest pain.         /79   Pulse 64   Temp 36.6 °C (97.9 °F) (Temporal)   Resp 14   Ht 1.829 m (6')   Wt 99.8 kg (220 lb)   SpO2 99%   BMI 29.84 kg/m²    Relevant Labs:   Lab Results   Component Value Date    CREATININE 0.85 06/20/2025    EGFR >90 06/20/2025    INR 1.2 (H) 09/30/2024    PROTIME 13.8 (H) 09/30/2024       Planned Sedation/Anesthesia: Moderate    Airway assessment: normal    Directed physical examination: General: Alert and Oriented, No distress, cooperative. Lungs: Clear to auscultation bilaterally, no wheezes, rhonci, or rales. respirations unlabored Heart: regular rate and rhythm, S1 and S2, no murmur, pulses palpable     Mallampati: II (hard and soft palate, upper portion of tonsils and uvula visible)    ASA Score: ASA 3 - Patient with moderate systemic disease with functional limitations    Benefits, risks and alternatives of procedure and planned sedation have been discussed with the patient and/or their representative. All questions answered and they agree to proceed.     Sabrina Brito, BOWEN-CNP

## 2025-06-21 ENCOUNTER — PHARMACY VISIT (OUTPATIENT)
Dept: PHARMACY | Facility: CLINIC | Age: 75
End: 2025-06-21
Payer: COMMERCIAL

## 2025-06-21 VITALS
TEMPERATURE: 96.6 F | HEIGHT: 72 IN | WEIGHT: 219.36 LBS | OXYGEN SATURATION: 95 % | SYSTOLIC BLOOD PRESSURE: 117 MMHG | RESPIRATION RATE: 16 BRPM | HEART RATE: 71 BPM | DIASTOLIC BLOOD PRESSURE: 60 MMHG | BODY MASS INDEX: 29.71 KG/M2

## 2025-06-21 LAB
ALBUMIN SERPL BCP-MCNC: 3.5 G/DL (ref 3.4–5)
ANION GAP SERPL CALC-SCNC: 11 MMOL/L (ref 10–20)
BUN SERPL-MCNC: 18 MG/DL (ref 6–23)
CALCIUM SERPL-MCNC: 8.1 MG/DL (ref 8.6–10.3)
CHLORIDE SERPL-SCNC: 109 MMOL/L (ref 98–107)
CO2 SERPL-SCNC: 25 MMOL/L (ref 21–32)
CREAT SERPL-MCNC: 0.9 MG/DL (ref 0.5–1.3)
EGFRCR SERPLBLD CKD-EPI 2021: 89 ML/MIN/1.73M*2
ERYTHROCYTE [DISTWIDTH] IN BLOOD BY AUTOMATED COUNT: 13.2 % (ref 11.5–14.5)
GLUCOSE BLD MANUAL STRIP-MCNC: 130 MG/DL (ref 74–99)
GLUCOSE SERPL-MCNC: 108 MG/DL (ref 74–99)
HCT VFR BLD AUTO: 39.8 % (ref 41–52)
HGB BLD-MCNC: 13.2 G/DL (ref 13.5–17.5)
MCH RBC QN AUTO: 28.9 PG (ref 26–34)
MCHC RBC AUTO-ENTMCNC: 33.2 G/DL (ref 32–36)
MCV RBC AUTO: 87 FL (ref 80–100)
NRBC BLD-RTO: 0 /100 WBCS (ref 0–0)
PHOSPHATE SERPL-MCNC: 3.7 MG/DL (ref 2.5–4.9)
PLATELET # BLD AUTO: 319 X10*3/UL (ref 150–450)
POTASSIUM SERPL-SCNC: 4 MMOL/L (ref 3.5–5.3)
RBC # BLD AUTO: 4.57 X10*6/UL (ref 4.5–5.9)
SODIUM SERPL-SCNC: 141 MMOL/L (ref 136–145)
TSH SERPL-ACNC: 2.56 MIU/L (ref 0.44–3.98)
WBC # BLD AUTO: 11.7 X10*3/UL (ref 4.4–11.3)

## 2025-06-21 PROCEDURE — 2500000002 HC RX 250 W HCPCS SELF ADMINISTERED DRUGS (ALT 637 FOR MEDICARE OP, ALT 636 FOR OP/ED): Performed by: NURSE PRACTITIONER

## 2025-06-21 PROCEDURE — 2500000001 HC RX 250 WO HCPCS SELF ADMINISTERED DRUGS (ALT 637 FOR MEDICARE OP): Performed by: NURSE PRACTITIONER

## 2025-06-21 PROCEDURE — 99238 HOSP IP/OBS DSCHRG MGMT 30/<: CPT | Performed by: STUDENT IN AN ORGANIZED HEALTH CARE EDUCATION/TRAINING PROGRAM

## 2025-06-21 PROCEDURE — 84443 ASSAY THYROID STIM HORMONE: CPT | Performed by: NURSE PRACTITIONER

## 2025-06-21 PROCEDURE — 99232 SBSQ HOSP IP/OBS MODERATE 35: CPT | Performed by: NURSE PRACTITIONER

## 2025-06-21 PROCEDURE — 82947 ASSAY GLUCOSE BLOOD QUANT: CPT

## 2025-06-21 PROCEDURE — 80069 RENAL FUNCTION PANEL: CPT | Performed by: NURSE PRACTITIONER

## 2025-06-21 PROCEDURE — 85027 COMPLETE CBC AUTOMATED: CPT | Performed by: STUDENT IN AN ORGANIZED HEALTH CARE EDUCATION/TRAINING PROGRAM

## 2025-06-21 PROCEDURE — RXMED WILLOW AMBULATORY MEDICATION CHARGE

## 2025-06-21 PROCEDURE — 36415 COLL VENOUS BLD VENIPUNCTURE: CPT | Performed by: NURSE PRACTITIONER

## 2025-06-21 RX ORDER — ISOSORBIDE MONONITRATE 30 MG/1
30 TABLET, EXTENDED RELEASE ORAL DAILY
Qty: 30 TABLET | Refills: 0 | OUTPATIENT
Start: 2025-06-21

## 2025-06-21 RX ORDER — ISOSORBIDE MONONITRATE 30 MG/1
30 TABLET, EXTENDED RELEASE ORAL DAILY
Qty: 30 TABLET | Refills: 0 | Status: SHIPPED | OUTPATIENT
Start: 2025-06-22

## 2025-06-21 RX ADMIN — ISOSORBIDE MONONITRATE 30 MG: 30 TABLET, EXTENDED RELEASE ORAL at 08:46

## 2025-06-21 RX ADMIN — APIXABAN 5 MG: 5 TABLET, FILM COATED ORAL at 08:45

## 2025-06-21 RX ADMIN — ASPIRIN 81 MG: 81 TABLET, COATED ORAL at 08:44

## 2025-06-21 RX ADMIN — TAMSULOSIN HYDROCHLORIDE 0.8 MG: 0.4 CAPSULE ORAL at 08:46

## 2025-06-21 RX ADMIN — INSULIN GLARGINE 15 UNITS: 100 INJECTION, SOLUTION SUBCUTANEOUS at 08:44

## 2025-06-21 ASSESSMENT — COGNITIVE AND FUNCTIONAL STATUS - GENERAL
MOBILITY SCORE: 24
DAILY ACTIVITIY SCORE: 24

## 2025-06-21 ASSESSMENT — PAIN SCALES - GENERAL
PAINLEVEL_OUTOF10: 0 - NO PAIN

## 2025-06-21 ASSESSMENT — PAIN - FUNCTIONAL ASSESSMENT
PAIN_FUNCTIONAL_ASSESSMENT: 0-10
PAIN_FUNCTIONAL_ASSESSMENT: 0-10

## 2025-06-21 NOTE — CARE PLAN
The patient's goals for the shift include  pt will be free of falls throughout shift.    The clinical goals for the shift include Patient will remain hemodynamically stable throughout shift.    Over the shift, the patient did make progress toward the following goals. Barriers to progression include understanding. Recommendations to address these barriers include education.

## 2025-06-21 NOTE — PROGRESS NOTES
"[facility] V[facility] Cardiology  Patient Name: Prasanna Torrez  Patient : 1950  Room/Bed: -A    HPI from cardiology consult:  Prasanna Torrez is a 74 y.o. male presenting with chest pain.  Patient of Dr. Kennedy Sandhu.  He has a history of MI and underwent coronary artery bypass surgery  (L-LAD) and NSTEMI 3/1/22 and AF s/p DEMETRICE LCX x 2 - CP and SVT 24 - patent L-LAD and CX stent.  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      The patient has noticed \"abnormal heart rate\" over the last 5 days - concerned he might have recurrent arrhythmia.  This morning he had an episode of \"angina\" for which he took SL Ntg - after first dose he still had pain, so he took another dose and the pain completely resolved.  Tells me he has been compliant with home CV meds.  He takes metoprolol XL 50mg daily, reduced from 75mgt daily in  by Dr. Sandhu (possibly because patient was started on amlodipine).  Of note, he had laparoscopic hernia repair recently on 25 - tells me surgery was uneventful, no cardiac issues.     In the ED:  /78, HR 72.  I personally reviewed the EKG on presentation which demonstrated sinus rhythm, cannot rule out old inferior infarct (this finding seen on prior EKG's).  Tele reviewed - SR, no Afib/pSVT  Labs personally reviewed:  K 3.8, Cr 0.86, Mg 2.00, WBC 12, Hb 14.8  HSTI 8 > 7  CXR - hazy opacity at the L base    Allergies:  Dapagliflozin    Subjective Data:  25: Seen while off unit for stress test.  Denies any chest pain/pressure or SOB.  +palpitations that are brief and were occurring every 3-5 minutes yesterday, but are less frequent today. SR on telemetry.   25:  Wife at bedside. Up ambulating in room.  Continues to report intermittent \"backup feeling\", palpitations and had chest pain at home.  SR with HR 71 bpm.  25: Sitting up in chair. He reports that after first dose if Imdur yesterday he noticed that his symptoms of palpitations/chest pain resolved and " "he is feeling much better.  No complications with left radial cath site.  SR on telemetry with HR 71 bpm.    Overnight Events:  None    Objective Data:  Vitals:    06/20/25 2326 06/21/25 0054 06/21/25 0057 06/21/25 0316   BP: 96/55 (!) 89/42 102/60 101/56   BP Location:   Right arm    Patient Position: Lying  Lying Lying   Pulse: 73   68   Resp: 18   16   Temp: 37.1 °C (98.8 °F)   36.6 °C (97.9 °F)   TempSrc: Temporal   Temporal   SpO2: 94%   93%   Weight:    99.5 kg (219 lb 5.7 oz)   Height:           Reviewed the following Labs:  Results from last 7 days   Lab Units 06/21/25 0413 06/20/25  0421 06/19/25  0504   WBC AUTO x10*3/uL 11.7* 12.9* 11.4*   HEMOGLOBIN g/dL 13.2* 14.1 14.1   HEMATOCRIT % 39.8* 43.5 43.9   PLATELETS AUTO x10*3/uL 319 304 328       Results from last 7 days   Lab Units 06/21/25  0413 06/20/25  0421 06/19/25  0504 06/18/25  0727   SODIUM mmol/L 141 139 138 136   POTASSIUM mmol/L 4.0 3.8 3.8 3.8   CHLORIDE mmol/L 109* 107 107 105   CO2 mmol/L 25 24 25 24   BUN mg/dL 18 19 16 16   CREATININE mg/dL 0.90 0.85 0.81 0.86   CALCIUM mg/dL 8.1* 8.4* 8.3* 8.5*   PROTEIN TOTAL g/dL  --   --   --  6.5   BILIRUBIN TOTAL mg/dL  --   --   --  0.7   ALK PHOS U/L  --   --   --  84   ALT U/L  --   --   --  19   AST U/L  --   --   --  14   GLUCOSE mg/dL 108* 135* 136* 177*       Results from last 7 days   Lab Units 06/18/25  0727   MAGNESIUM mg/dL 2.00       No results found for: \"LDLF\"     No results found for: \"BNP\"    Lab Results   Component Value Date    TROPHS 7 06/18/2025    TROPHS 8 06/18/2025        No results found for: \"TSH\", \"FT4\"        Lab Results   Component Value Date    HGBA1C 8.1 (H) 07/20/2024       Intake/Output    Intake/Output Summary (Last 24 hours) at 6/21/2025 0658  Last data filed at 6/20/2025 2355  Gross per 24 hour   Intake 539.51 ml   Output 5 ml   Net 534.51 ml      I/O last 2 completed shifts:  In: 601.3 (6 mL/kg) [P.O.:100; I.V.:501.3 (5 mL/kg)]  Out: 5 (0.1 mL/kg) " [Blood:5]  Weight: 99.8 kg     Past Medical History:  He has a past medical history of Coronary artery disease, Diabetes (Multi), Hyperlipidemia, Hypertension, Paroxysmal atrial fibrillation (Multi), and SVT (supraventricular tachycardia).    Past Surgical History:  He has a past surgical history that includes Cardiac catheterization (N/A, 08/07/2024); Cardiac electrophysiology procedure (N/A, 10/10/2024); Coronary angioplasty with stent; Coronary artery bypass graft (2013); and Radiofrequency ablation (10/10/2024).      Social History:  He reports that he quit smoking about 13 years ago. His smoking use included cigarettes. He started smoking about 44 years ago. He has a 31 pack-year smoking history. He has never used smokeless tobacco. He reports that he does not drink alcohol and does not use drugs.    Family History:  Family History[1]    Outpatient Medications  Medications Ordered Prior to Encounter[2]    Scheduled medications  Scheduled Medications[3]  Continuous medications  Continuous Medications[4]  PRN medications  PRN Medications[5]   Prescriptions Prior to Admission[6]    Reviewed the following cardiology tests:    Echo 8/8/24:   1. Left ventricular ejection fraction is low normal, by visual estimate at 50-55%.   2. Spectral Doppler shows an impaired relaxation pattern of left ventricular diastolic filling.   3. There is normal right ventricular global systolic function.   4. Mildly enlarged right ventricle.  EF   Date/Time Value Ref Range Status   06/19/2025 12:48 PM 76 %    08/08/2024 07:45 AM 53 %         [facility] 8/7/24:  1. Double vessel disease.   2. Patent LIMA to lAD and previous Cx stent.   3. Elevated LV filling pressures.   4. Left Ventricular end-diastolic pressure = 17.    Physical Exam:  Vitals reviewed.   Constitutional:       Appearance: Not in distress.   Pulmonary:      Effort: Pulmonary effort is normal.      Breath sounds: Normal breath sounds.   Cardiovascular:      PMI at left  midclavicular line. Normal rate. Regular rhythm. S1 with normal intensity. S2 with normal intensity.       Murmurs: There is no murmur.      Comments: Left radial cath site without swelling, hematoma, ecchymosis or bleeding.  Dressing dry and intact.    Edema:     Peripheral edema absent.   Abdominal:      General: Bowel sounds are normal.   Skin:     General: Skin is warm and dry.   Psychiatric:         Attention and Perception: Attention normal.         Mood and Affect: Mood normal.         Behavior: Behavior is cooperative.         Assessment/Plan:   Chest pain  Elevated WBC coung  Abnormal CXR with hazy opacity LLL  H/o ASHD s/p CABG - last cor angio 8/24 with patent L-LAD and patent Lcx stent, RCA was small and diffusely disease  Afib s/p RFA AVNRT 10/10/24, on apixaban for OAC     Palpitations x 5 days and CP relieved by 2SL Ntg today.  Normal cardiac enzymes, EKG appears grossly unchanged compared with prior.     Recs:  -tele monitoring  -check chemical MPI stress test  -check TTE  -consider PNA, given LLL hazy opacity on CXR and elevated WBC count  -ASA 81mg daily  -[id number] 5 - continue apixaban 5mg bid  -metop XL 50mg daily  -lisinorpil 5mg daiy  -amlodipine 5mg daily    6/19/25: Stress test pending. Order placed for echocardiogram as recommended above.  Continues to report palpitations that feel similar to previous SVT, but are less frequent.  SR on telemetry. Continue metoprolol succinate 50 mg daily.     6/20/25: Stress test with small reversible perfusion defect inferolateral segment consistent with Lcx territory. Echo with LVEF 76%.  Last dose of apixaban yesterday at 0805.  Plan will be for C today via left radial with Dr. Sumeet Patricio. Creatinine 0.85. NS 0.9% at 75 ml/hr x6 hours ordered.    6/21/25: s/p LHC yesterday that per post procedure note showed Left dominant system with patent Lcx stents and LIMA - LAD.  No PCI.  Final report pending in EMR. He reports that chest pain and palpitations  resolved after taking Imdur.  Continue aspirin, Imdur, lisinopril, metoprolol succinate and rosuvastatin.     Paroxysmal atrial fibrillation  Hypertension - Yes, 1 point, Vascular Disease - Yes, 1 point, and Age over 75 years - 2 points   [id number] score is at least 4.   Hold apixaban 5 mg BID  d/t plans for LHC.  Restart once invasive procedures completed.  Continue Metoprolol succinate 50 mg daily   6/21/25: Continue apixaban and metoprolol succinate 50 mg daily.  TSH WNL.  Palpitations resolved after starting Imdur    -consider PNA, given LLL hazy opacity on CXR and elevated WBC count  6/20/25: CT chest with on definitive evidence of pneumonia.    Continue isosorbide mononitrate 30 mg daily - he reports his symptoms resolved after first dose yesterday  Recommend f/u with his usual cardiologist, Dr. Sandhu, in 1-2 weeks  Okay from cardiac standpoint to discharge.  Cardiology team is signing off.  Please call for questions or if further follow-up is needed.       Code Status  Full Code      Terri Abad, APRN-CNP          [1]   Family History  Problem Relation Name Age of Onset    Valvular heart disease Mother      Coronary artery disease Father      Other (CABG) Father      Cerebral aneurysm Maternal Grandmother      Heart attack Paternal Grandmother     [2]   No current facility-administered medications on file prior to encounter.     Current Outpatient Medications on File Prior to Encounter   Medication Sig Dispense Refill    amLODIPine (Norvasc) 5 mg tablet Take 1 tablet (5 mg) by mouth once daily. 90 tablet 3    aspirin 81 mg EC tablet Take 1 tablet (81 mg) by mouth once daily.      Eliquis 5 mg tablet Take 1 tablet (5 mg) by mouth 2 times a day.      insulin glargine (Lantus U-100 Insulin) 100 unit/mL injection Inject under the skin. 40 UNITS HS AND 15 IN AM      lisinopril 5 mg tablet Take 1 tablet (5 mg) by mouth once daily. 90 tablet 2    methocarbamol (Robaxin) 750 mg tablet Take 1 tablet (750 mg)  by mouth 4 times a day.      metoprolol succinate XL (Toprol-XL) 50 mg 24 hr tablet Take 1 tablet (50 mg) by mouth once daily. Do not crush or chew. 90 tablet 2    nitroglycerin (Nitrostat) 0.4 mg SL tablet Place 1 tablet (0.4 mg) under the tongue every 5 minutes if needed for chest pain.      ondansetron (Zofran) 4 mg tablet Take 1 tablet (4 mg) by mouth every 8 hours if needed for nausea or vomiting.      [] oxyCODONE-acetaminophen (Percocet) 5-325 mg tablet Take 1 tablet by mouth every 6 hours if needed for severe pain (7 - 10).      polyethylene glycol (Glycolax, Miralax) 17 gram packet Take 17 g by mouth once daily.      rosuvastatin (Crestor) 20 mg tablet Take 1 tablet (20 mg) by mouth once daily. 30 tablet 11    semaglutide (Ozempic) 1 mg/dose (4 mg/3 mL) pen injector Inject 1 mg under the skin 1 (one) time per week.      tamsulosin (Flomax) 0.4 mg 24 hr capsule Take 2 capsules (0.8 mg) by mouth once daily. Do not crush, chew, or split.      [DISCONTINUED] semaglutide (Ozempic) 0.25 mg or 0.5 mg(2 mg/1.5 mL) pen injector Inject 0.5 mg under the skin 1 (one) time per week.      [DISCONTINUED] tadalafil (Cialis) 10 mg tablet Take 1 tablet (10 mg) by mouth once daily as needed for erectile dysfunction.     [3] amLODIPine, 5 mg, oral, Daily  apixaban, 5 mg, oral, BID  aspirin, 81 mg, oral, Daily  insulin glargine, 15 Units, subcutaneous, q AM  insulin glargine, 40 Units, subcutaneous, Nightly  insulin lispro, 0-5 Units, subcutaneous, TID AC  isosorbide mononitrate ER, 30 mg, oral, Daily  lisinopril, 5 mg, oral, Daily  metoprolol succinate XL, 50 mg, oral, Daily  rosuvastatin, 20 mg, oral, Nightly  sennosides-docusate sodium, 1 tablet, oral, Nightly  tamsulosin, 0.8 mg, oral, Daily     [4]    [5] PRN medications: acetaminophen, aminophylline, dextrose, dextrose, glucagon, glucagon, nitroglycerin, oxygen  [6]   Medications Prior to Admission   Medication Sig Dispense Refill Last Dose/Taking    amLODIPine  (Norvasc) 5 mg tablet Take 1 tablet (5 mg) by mouth once daily. 90 tablet 3 Unknown    aspirin 81 mg EC tablet Take 1 tablet (81 mg) by mouth once daily.   Unknown    Eliquis 5 mg tablet Take 1 tablet (5 mg) by mouth 2 times a day.   Unknown    insulin glargine (Lantus U-100 Insulin) 100 unit/mL injection Inject under the skin. 40 UNITS HS AND 15 IN AM       lisinopril 5 mg tablet Take 1 tablet (5 mg) by mouth once daily. 90 tablet 2 Unknown    methocarbamol (Robaxin) 750 mg tablet Take 1 tablet (750 mg) by mouth 4 times a day.       metoprolol succinate XL (Toprol-XL) 50 mg 24 hr tablet Take 1 tablet (50 mg) by mouth once daily. Do not crush or chew. 90 tablet 2 Unknown    nitroglycerin (Nitrostat) 0.4 mg SL tablet Place 1 tablet (0.4 mg) under the tongue every 5 minutes if needed for chest pain.   Unknown    ondansetron (Zofran) 4 mg tablet Take 1 tablet (4 mg) by mouth every 8 hours if needed for nausea or vomiting.       [] oxyCODONE-acetaminophen (Percocet) 5-325 mg tablet Take 1 tablet by mouth every 6 hours if needed for severe pain (7 - 10).       polyethylene glycol (Glycolax, Miralax) 17 gram packet Take 17 g by mouth once daily.       rosuvastatin (Crestor) 20 mg tablet Take 1 tablet (20 mg) by mouth once daily. 30 tablet 11 Unknown    semaglutide (Ozempic) 1 mg/dose (4 mg/3 mL) pen injector Inject 1 mg under the skin 1 (one) time per week.       tamsulosin (Flomax) 0.4 mg 24 hr capsule Take 2 capsules (0.8 mg) by mouth once daily. Do not crush, chew, or split.

## 2025-06-21 NOTE — PROGRESS NOTES
Pt underwent HC yesterday.  Anticipate DC to home today.  Pt was upgraded to IP - Medicare message explained to pt, signed, given to pt, copy placed in chart, and entered in careport.

## 2025-06-21 NOTE — DISCHARGE SUMMARY
Discharge Diagnosis  Chest pain  Questionable left lower lobe pneumonia           Issues Requiring Follow-Up      Discharge Meds     Medication List      START taking these medications     isosorbide mononitrate ER 30 mg 24 hr tablet; Commonly known as: Imdur;   Take 1 tablet (30 mg) by mouth once daily. Do not crush or chew.; Start   taking on: June 22, 2025     CONTINUE taking these medications     amLODIPine 5 mg tablet; Commonly known as: Norvasc; Take 1 tablet (5 mg)   by mouth once daily.   aspirin 81 mg EC tablet   Eliquis 5 mg tablet; Generic drug: apixaban   Lantus U-100 Insulin 100 unit/mL injection; Generic drug: insulin   glargine   lisinopril 5 mg tablet; Take 1 tablet (5 mg) by mouth once daily.   methocarbamol 750 mg tablet; Commonly known as: Robaxin   metoprolol succinate XL 50 mg 24 hr tablet; Commonly known as:   Toprol-XL; Take 1 tablet (50 mg) by mouth once daily. Do not crush or   chew.   nitroglycerin 0.4 mg SL tablet; Commonly known as: Nitrostat   ondansetron 4 mg tablet; Commonly known as: Zofran   Ozempic 1 mg/dose (4 mg/3 mL) pen injector; Generic drug: semaglutide   polyethylene glycol 17 gram packet; Commonly known as: Glycolax, Miralax   rosuvastatin 20 mg tablet; Commonly known as: Crestor; Take 1 tablet (20   mg) by mouth once daily.   tamsulosin 0.4 mg 24 hr capsule; Commonly known as: Flomax     STOP taking these medications     oxyCODONE-acetaminophen 5-325 mg tablet; Commonly known as: Percocet       Test Results Pending At Discharge  Pending Labs       No current pending labs.            Hospital Course   Patient was admitted for chest pain.  Seen by cardiology.  Underwent stress test that came back positive for ischemia.  This was followed by heart cath which came back insignificant.  Patient was given Imdur and after that his pain completely subsided.  Patient was also noted to be anxious.  Patient is cleared from cardiology for discharge.  Patient feels comfortable to go  home.  He wants to go home today.  He is interested in taking Imdur as well.  A prescription will be given to him.  Otherwise stable for discharge    Pertinent Physical Exam At Time of Discharge  Physical Exam  Constitutional:       Appearance: Normal appearance.   HENT:      Head: Normocephalic and atraumatic.      Nose: Nose normal.   Eyes:      Extraocular Movements: Extraocular movements intact.      Pupils: Pupils are equal, round, and reactive to light.   Cardiovascular:      Rate and Rhythm: Normal rate and regular rhythm.      Heart sounds: Normal heart sounds.   Pulmonary:      Effort: Pulmonary effort is normal.      Breath sounds: Normal breath sounds.   Abdominal:      General: Bowel sounds are normal.      Palpations: Abdomen is soft.   Musculoskeletal:      Cervical back: Neck supple.   Skin:     General: Skin is warm and dry.   Neurological:      General: No focal deficit present.      Mental Status: He is alert. Mental status is at baseline.   Psychiatric:         Mood and Affect: Mood normal.         Outpatient Follow-Up  No future appointments.      Alisson Miguel MD

## 2025-06-23 ENCOUNTER — TELEPHONE (OUTPATIENT)
Dept: CARDIOLOGY | Facility: CLINIC | Age: 75
End: 2025-06-23
Payer: MEDICARE

## 2025-06-26 ENCOUNTER — OFFICE VISIT (OUTPATIENT)
Dept: SURGERY | Age: 75
End: 2025-06-26

## 2025-06-26 VITALS
WEIGHT: 224 LBS | HEART RATE: 73 BPM | DIASTOLIC BLOOD PRESSURE: 53 MMHG | SYSTOLIC BLOOD PRESSURE: 94 MMHG | RESPIRATION RATE: 18 BRPM | BODY MASS INDEX: 29.69 KG/M2 | OXYGEN SATURATION: 95 % | HEIGHT: 73 IN

## 2025-06-26 DIAGNOSIS — Z12.11 ENCOUNTER FOR SCREENING COLONOSCOPY: ICD-10-CM

## 2025-06-26 DIAGNOSIS — Z98.890 S/P RIGHT INGUINAL HERNIA REPAIR: Primary | ICD-10-CM

## 2025-06-26 DIAGNOSIS — Z87.19 S/P RIGHT INGUINAL HERNIA REPAIR: Primary | ICD-10-CM

## 2025-06-26 PROCEDURE — 99024 POSTOP FOLLOW-UP VISIT: CPT | Performed by: SURGERY

## 2025-06-30 NOTE — PROGRESS NOTES
General:         Right eye: No discharge.         Left eye: No discharge.   Neck:      Trachea: No tracheal deviation.   Cardiovascular:      Rate and Rhythm: Normal rate.   Pulmonary:      Effort: Pulmonary effort is normal. No respiratory distress.   Abdominal:      General: There is no distension.      Palpations: Abdomen is soft.      Tenderness: There is no guarding or rebound.   Skin:     General: Skin is warm and dry.   Neurological:      Mental Status: She is alert and oriented to person, place, and time.     Physical Exam  Cardiovascular:      Rate and Rhythm: Normal rate.   Abdominal:      Comments: No evidence of hernia recurrence or fluid collection          CBC:   Lab Results   Component Value Date/Time    WBC 10.7 06/09/2025 08:38 AM    RBC 5.28 06/09/2025 08:38 AM    HGB 15.2 06/09/2025 08:38 AM    HCT 47.8 06/09/2025 08:38 AM    MCV 90.5 06/09/2025 08:38 AM    MCH 28.8 06/09/2025 08:38 AM    MCHC 31.8 06/09/2025 08:38 AM    RDW 13.2 06/09/2025 08:38 AM     06/09/2025 08:38 AM    MPV 9.8 06/09/2025 08:38 AM     CMP:    Lab Results   Component Value Date/Time     06/09/2025 08:38 AM    K 4.3 06/09/2025 08:38 AM    K 4.1 03/02/2022 02:08 AM     06/09/2025 08:38 AM    CO2 24 06/09/2025 08:38 AM    BUN 24 06/09/2025 08:38 AM    CREATININE 1.0 06/09/2025 08:38 AM    GFRAA >60 03/02/2022 02:08 AM    LABGLOM 80 06/09/2025 08:38 AM    LABGLOM >60 03/19/2024 09:53 AM    GLUCOSE 167 06/09/2025 08:38 AM    CALCIUM 9.1 06/09/2025 08:38 AM    BILITOT 0.7 03/19/2024 09:53 AM    ALKPHOS 103 03/19/2024 09:53 AM    AST 14 03/19/2024 09:53 AM    ALT 16 03/19/2024 09:53 AM     PT/INR:    Lab Results   Component Value Date/Time    PROTIME 12.5 09/14/2024 05:06 PM    INR 1.2 09/14/2024 05:06 PM     HgBA1c:    Lab Results   Component Value Date/Time    LABA1C 8.9 01/08/2025 10:53 AM     LIPASE:  No results found for: \"LIPASE\"           Brenton Barajas MD      I have examined the patient and performed the

## 2025-07-01 NOTE — DOCUMENTATION CLARIFICATION NOTE
"    PATIENT:               YOVANI STRICKLAND  ACCT #:                  [id number]  MRN:                       [id number]  :                       1950  ADMIT DATE:       2025 7:04 AM  DISCH DATE:        2025 11:47 AM  RESPONDING PROVIDER #:        [id number]          PROVIDER RESPONSE TEXT:    PNA ruled out    CDI QUERY TEXT:    Clarification    Instruction:    Based on your assessment of the patient and the clinical information, please provide the requested documentation by clicking on the appropriate radio button and enter any additional information if prompted.    Question: Based on your medical judgment, can you please clarify which of these conditions is the most clinically supported    When answering this query, please exercise your independent professional judgment. The fact that a question is being asked, does not imply that any particular answer is desired or expected.    The patient's clinical indicators include:  Clinical Information: 73 yo male admitted with unstable angina.      There is conflicting documentation in the medical record which requires clarification.    -The diagnosis of Possible Pneumonia was documented on  DCN Dr. Miguel    -The PM 25 Dr. Miguel states \"CT chest was done and did not show any evidence of pneumonia\"    Clinical Indicators:  Vitals ( 1115) Temp-36.1 HR-80 RR-16 BP-135/64 SPO2- 94 RA    WBC 12.0 ( 0727), 11.4 ( 0504), 12.9 ( 0421), 11.7 ( 0413)  Neutrophils: 7.95 ( 0727)    CXR () IMPRESSION: Hazy opacity at the left base.  Suggestive for left basilar pneumonia.  CT () IMPRESSION: 1.  Left lower lobe atelectasis. No definitive evidence of pneumonia.     HP MICHAEL. Ganquiana NP: \"CXR revealed hazy opacity at the left base, suggestive of left basilar pneumonia, improved from 2024\"     PN DONAL Abad NP: \"consider PNA, given LLL hazy opacity on CXR and elevated WBC count\"    Treatment: no abx given, CT, monitoring, CXR    Risk " Factors: chest pain, imaging  Options provided:  -- PNA ruled in  -- PNA ruled out  -- Other - I will add my own diagnosis  -- Refer to Clinical Documentation Reviewer    Query created by: Geno Ryan on 6/25/2025 12:43 PM      Electronically signed by:  PRESLEY KU MD 7/1/2025 3:18 PM

## 2025-07-02 ENCOUNTER — PREP FOR PROCEDURE (OUTPATIENT)
Dept: SURGERY | Age: 75
End: 2025-07-02

## 2025-07-02 ENCOUNTER — TELEPHONE (OUTPATIENT)
Dept: SURGERY | Age: 75
End: 2025-07-02

## 2025-07-02 DIAGNOSIS — Z12.11 SCREEN FOR COLON CANCER: ICD-10-CM

## 2025-07-02 NOTE — TELEPHONE ENCOUNTER
Bryant Mahmood is scheduled for colonoscopy with Dr Barajas on 10-21-25 at [facility]. Patient needs to be NPO after midnight the night before procedure. All surgery instructions were explained to the patient and a surgery letter was also mailed out. MA informed patient that PAT will also be calling to review pre-op instructions and medications. Patient verbalized understanding.  Electronically signed by Yumiko Mccartney MA on 7/2/2025 at 6:44 AM

## 2025-07-18 LAB
ATRIAL RATE: 65 BPM
ATRIAL RATE: 72 BPM
P AXIS: -2 DEGREES
P AXIS: 60 DEGREES
P OFFSET: 148 MS
P OFFSET: 169 MS
P ONSET: 109 MS
P ONSET: 113 MS
PR INTERVAL: 202 MS
PR INTERVAL: 210 MS
Q ONSET: 214 MS
Q ONSET: 214 MS
QRS COUNT: 11 BEATS
QRS COUNT: 12 BEATS
QRS DURATION: 102 MS
QRS DURATION: 88 MS
QT INTERVAL: 388 MS
QT INTERVAL: 406 MS
QTC CALCULATION(BAZETT): 422 MS
QTC CALCULATION(BAZETT): 424 MS
QTC FREDERICIA: 412 MS
QTC FREDERICIA: 416 MS
R AXIS: 12 DEGREES
R AXIS: 14 DEGREES
T AXIS: 81 DEGREES
T AXIS: 81 DEGREES
T OFFSET: 408 MS
T OFFSET: 417 MS
VENTRICULAR RATE: 65 BPM
VENTRICULAR RATE: 72 BPM

## 2025-07-22 ENCOUNTER — OFFICE VISIT (OUTPATIENT)
Dept: CARDIOLOGY | Facility: CLINIC | Age: 75
End: 2025-07-22
Payer: MEDICARE

## 2025-07-22 VITALS
HEART RATE: 73 BPM | SYSTOLIC BLOOD PRESSURE: 138 MMHG | BODY MASS INDEX: 30.24 KG/M2 | DIASTOLIC BLOOD PRESSURE: 61 MMHG | WEIGHT: 223 LBS

## 2025-07-22 DIAGNOSIS — I10 ESSENTIAL HYPERTENSION: ICD-10-CM

## 2025-07-22 DIAGNOSIS — I25.810 CORONARY ARTERY DISEASE INVOLVING CORONARY BYPASS GRAFT OF NATIVE HEART WITHOUT ANGINA PECTORIS: ICD-10-CM

## 2025-07-22 DIAGNOSIS — R00.2 PALPITATIONS: ICD-10-CM

## 2025-07-22 DIAGNOSIS — I47.10 SVT (SUPRAVENTRICULAR TACHYCARDIA): ICD-10-CM

## 2025-07-22 DIAGNOSIS — E78.2 MIXED HYPERLIPIDEMIA: ICD-10-CM

## 2025-07-22 DIAGNOSIS — R07.9 CHEST PAIN, UNSPECIFIED TYPE: Primary | ICD-10-CM

## 2025-07-22 DIAGNOSIS — I48.0 PAF (PAROXYSMAL ATRIAL FIBRILLATION) (MULTI): ICD-10-CM

## 2025-07-22 PROCEDURE — 1160F RVW MEDS BY RX/DR IN RCRD: CPT | Performed by: NURSE PRACTITIONER

## 2025-07-22 PROCEDURE — 1159F MED LIST DOCD IN RCRD: CPT | Performed by: NURSE PRACTITIONER

## 2025-07-22 PROCEDURE — 3075F SYST BP GE 130 - 139MM HG: CPT | Performed by: NURSE PRACTITIONER

## 2025-07-22 PROCEDURE — 99212 OFFICE O/P EST SF 10 MIN: CPT

## 2025-07-22 PROCEDURE — 99214 OFFICE O/P EST MOD 30 MIN: CPT | Performed by: NURSE PRACTITIONER

## 2025-07-22 PROCEDURE — 4010F ACE/ARB THERAPY RXD/TAKEN: CPT | Performed by: NURSE PRACTITIONER

## 2025-07-22 PROCEDURE — 3078F DIAST BP <80 MM HG: CPT | Performed by: NURSE PRACTITIONER

## 2025-07-22 RX ORDER — ROSUVASTATIN CALCIUM 20 MG/1
20 TABLET, COATED ORAL DAILY
Qty: 90 TABLET | Refills: 3 | Status: SHIPPED | OUTPATIENT
Start: 2025-07-22 | End: 2026-07-22

## 2025-07-22 RX ORDER — METOPROLOL SUCCINATE 50 MG/1
75 TABLET, EXTENDED RELEASE ORAL DAILY
Qty: 135 TABLET | Refills: 3 | Status: SHIPPED | OUTPATIENT
Start: 2025-07-22 | End: 2026-07-22

## 2025-07-22 NOTE — PROGRESS NOTES
Chief Complaint:   Hospital Follow Up     History Of Present Illness:    Prasanna Torrez is a 74 y.o. male here for hospital follow up.     Prasanna presented to ED, 6/18/25 with chest pain and irregular heart beat. For 5 days prior noted irregular heart beat. He then developed mid sternal chest pain that he describes as sharp. Took 2 SL nitroglycerin with resolution of pain. Proceeded to ED where he underwent stress test which was positive for ischemia. LHC showed patent stents in Lcx and patent LIMA to LAD. Suspected microvascular dx as the cause of chest pain. Discharged on imdur. No arrhythmias noted during hospital stay.     After 5 days he self stopped imdur due to dizziness. He self added an additional 25mg of metoprolol.     He underwent hernia surgery on 6/9/25.     Today he reports resolution of irregular heart beats and chest pain. The initial chest pain, that lead to hospitalization, has remained resolved since SL nitroglycerin. He would note some discomfort with the irregular heart beats, this has since resolved.     TTE 6/19/25   1. Left ventricular ejection fraction is hyperdynamic calculated by Fritz's biplane at 76%.   2. There is reduced right ventricular systolic function.    CABG 2013 (L-LAD)   NSTEMI 3/1/22   AF s/p DEMETRICE LCX x 2  CP and SVT 8/7/24 - patent L-LAD and CX stent.   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    Past Surgical History:  He has a past surgical history that includes Cardiac catheterization (N/A, 08/07/2024); Cardiac electrophysiology procedure (N/A, 10/10/2024); Coronary angioplasty with stent; Coronary artery bypass graft (2013); Radiofrequency ablation (10/10/2024); and Cardiac catheterization (N/A, 6/20/2025).      Social History:  He reports that he quit smoking about 13 years ago. His  smoking use included cigarettes. He started smoking about 44 years ago. He has a 31 pack-year smoking history. He has never used smokeless tobacco. He reports that he does not drink alcohol and does not use drugs.    Family History:  Family History[1]     Allergies:  Dapagliflozin    Review of Systems  All pertinent systems have been reviewed and are negative except for what is stated in the history of present illness.    All other systems have been reviewed and are negative and noncontributory to this patient's current ailments.     Visit Vitals  /61 (BP Location: Right arm, Patient Position: Sitting, BP Cuff Size: Adult)   Pulse 73   Wt 101 kg (223 lb)   BMI 30.24 kg/m²   Smoking Status Former   BSA 2.27 m²       Last Labs:  CBC -  Lab Results   Component Value Date    WBC 11.7 (H) 06/21/2025    HGB 13.2 (L) 06/21/2025    HCT 39.8 (L) 06/21/2025    MCV 87 06/21/2025     06/21/2025       CMP -  Lab Results   Component Value Date    CALCIUM 8.1 (L) 06/21/2025    PHOS 3.7 06/21/2025    PROT 6.5 06/18/2025    ALBUMIN 3.5 06/21/2025    AST 14 06/18/2025    ALT 19 06/18/2025    ALKPHOS 84 06/18/2025    BILITOT 0.7 06/18/2025    BUN 18 06/21/2025    CREATININE 0.90 06/21/2025       LIPID PANEL -   Lab Results   Component Value Date    CHOL 127 07/20/2024    TRIG 185 (H) 07/20/2024    HDL 36.8 07/20/2024    CHHDL 3.5 07/20/2024    VLDL 37 07/20/2024    NHDL 90 07/20/2024       RENAL FUNCTION PANEL -   Lab Results   Component Value Date    GLUCOSE 108 (H) 06/21/2025     06/21/2025    K 4.0 06/21/2025     (H) 06/21/2025    CO2 25 06/21/2025    ANIONGAP 11 06/21/2025    BUN 18 06/21/2025    CREATININE 0.90 06/21/2025    CALCIUM 8.1 (L) 06/21/2025    PHOS 3.7 06/21/2025    ALBUMIN 3.5 06/21/2025        Lab Results   Component Value Date    BNP 37 07/20/2024    HGBA1C 8.1 (H) 07/20/2024         Objective   Vitals reviewed.   Constitutional:       Appearance: Healthy appearance. Not in distress.   Eyes:       Conjunctiva/sclera: Conjunctivae normal.   Neck:      Vascular: No JVR. JVD normal.   Pulmonary:      Effort: Pulmonary effort is normal.      Breath sounds: Normal breath sounds. No wheezing. No rhonchi. No rales.   Chest:      Chest wall: Not tender to palpatation.   Cardiovascular:      PMI at left midclavicular line. Normal rate. Regular rhythm. Normal S1. Normal S2.       Murmurs: There is no murmur.      No gallop.  No click. No rub.   Edema:     Peripheral edema absent.   Abdominal:      General: Bowel sounds are normal.      Tenderness: There is no abdominal tenderness.   Musculoskeletal: Normal range of motion.         General: No tenderness. Skin:     General: Skin is warm and dry.   Neurological:      General: No focal deficit present.      Mental Status: Alert and oriented to person, place and time.   Psychiatric:         Attention and Perception: Attention normal.         Mood and Affect: Mood normal.     Assessment/Plan   Diagnoses and all orders for this visit:  Chest pain, unspecified type  - resolved  - s/p LHC, patent stents and LIMA graft, suspected microvascular dx   - patient did not tolerate imdur, self stopped  - continue metoprolol  - denies further chest pain   Palpitations   - resolved  - no arrhythmias noted during hospital stay   - continue metoprolol   Coronary artery disease involving coronary bypass graft of native heart without angina pectoris  - see above  - continue metoprolol, crestor, asa   PAF (paroxysmal atrial fibrillation) (Multi)  - maintaining NSR  - continue eliquis, metoprolol   SVT (supraventricular tachycardia)  - well controlled  - continue metoprolol   Essential hypertension  - well controlled  - continue amlodipine and lisinopril   Mixed hyperlipidemia  - stable  - continue crestor    Follow up with Dr. Sandhu in 1 year or sooner if needed    Outpatient Medications:  Current Outpatient Medications   Medication Instructions    amLODIPine (NORVASC) 5 mg, oral, Daily     aspirin 81 mg EC tablet 1 tablet, Daily    Eliquis 5 mg tablet 1 tablet, 2 times daily    insulin glargine (Lantus U-100 Insulin) 100 unit/mL injection Inject under the skin. 40 UNITS HS AND 15 IN AM    lisinopril 5 mg, oral, Daily    methocarbamol (ROBAXIN) 750 mg, oral, 4 times daily    metoprolol succinate XL (TOPROL-XL) 75 mg, oral, Daily, Do not crush or chew.  Pt taking 75mg    nitroglycerin (NITROSTAT) 0.4 mg, Every 5 min PRN    ondansetron (ZOFRAN) 4 mg, Every 8 hours PRN    Ozempic 1 mg, Weekly    polyethylene glycol (GLYCOLAX, MIRALAX) 17 g, Daily    rosuvastatin (CRESTOR) 20 mg, oral, Daily    tamsulosin (FLOMAX) 0.8 mg, Daily       Exclusive of any other services or procedures performed, IAnna, spent 30 minutes in duration for this visit today.  This time consisted of chart review, obtaining history, and/or performing the exam as documented above, as well as, documenting clinical information for the encounter in the electronic record. In addition to the history, testing, notes, and labs I have noted above; I have reviewed  hospital notes, labs from hospital stay, [facility] and stress test, TTE         [1]   Family History  Problem Relation Name Age of Onset    Valvular heart disease Mother      Coronary artery disease Father      Other (CABG) Father      Cerebral aneurysm Maternal Grandmother      Heart attack Paternal Grandmother

## 2025-08-01 PROBLEM — Z12.11 SCREEN FOR COLON CANCER: Status: RESOLVED | Noted: 2025-07-02 | Resolved: 2025-08-01

## 2025-08-04 ENCOUNTER — TELEPHONE (OUTPATIENT)
Dept: CARDIOLOGY | Facility: CLINIC | Age: 75
End: 2025-08-04
Payer: MEDICARE

## 2025-08-04 DIAGNOSIS — I10 ESSENTIAL HYPERTENSION: ICD-10-CM

## 2025-08-04 RX ORDER — LISINOPRIL 10 MG/1
10 TABLET ORAL DAILY
Qty: 30 TABLET | Refills: 11 | Status: SHIPPED | OUTPATIENT
Start: 2025-08-04 | End: 2026-08-04

## 2025-08-04 NOTE — TELEPHONE ENCOUNTER
Pt called in stated he increased his Lisinopril to 10mg d/t high BP.  He stated he BP pressure is better on the 10's asking for a refill .  Ok to change to 10mg

## 2025-08-08 ENCOUNTER — OFFICE VISIT (OUTPATIENT)
Dept: FAMILY MEDICINE CLINIC | Age: 75
End: 2025-08-08
Payer: MEDICARE

## 2025-08-08 VITALS
HEIGHT: 73 IN | DIASTOLIC BLOOD PRESSURE: 66 MMHG | BODY MASS INDEX: 30.35 KG/M2 | RESPIRATION RATE: 16 BRPM | TEMPERATURE: 97.8 F | WEIGHT: 229 LBS | OXYGEN SATURATION: 96 % | SYSTOLIC BLOOD PRESSURE: 108 MMHG | HEART RATE: 77 BPM

## 2025-08-08 DIAGNOSIS — E66.09 CLASS 1 OBESITY DUE TO EXCESS CALORIES WITH SERIOUS COMORBIDITY AND BODY MASS INDEX (BMI) OF 30.0 TO 30.9 IN ADULT: ICD-10-CM

## 2025-08-08 DIAGNOSIS — K59.1 FUNCTIONAL DIARRHEA: ICD-10-CM

## 2025-08-08 DIAGNOSIS — N52.9 VASCULOGENIC ERECTILE DYSFUNCTION, UNSPECIFIED VASCULOGENIC ERECTILE DYSFUNCTION TYPE: ICD-10-CM

## 2025-08-08 DIAGNOSIS — E66.811 CLASS 1 OBESITY DUE TO EXCESS CALORIES WITH SERIOUS COMORBIDITY AND BODY MASS INDEX (BMI) OF 30.0 TO 30.9 IN ADULT: ICD-10-CM

## 2025-08-08 DIAGNOSIS — E11.9 DIABETES MELLITUS WITHOUT COMPLICATION (HCC): Primary | ICD-10-CM

## 2025-08-08 DIAGNOSIS — I48.0 PAF (PAROXYSMAL ATRIAL FIBRILLATION) (HCC): ICD-10-CM

## 2025-08-08 PROCEDURE — 1160F RVW MEDS BY RX/DR IN RCRD: CPT | Performed by: FAMILY MEDICINE

## 2025-08-08 PROCEDURE — 3052F HG A1C>EQUAL 8.0%<EQUAL 9.0%: CPT | Performed by: FAMILY MEDICINE

## 2025-08-08 PROCEDURE — 1159F MED LIST DOCD IN RCRD: CPT | Performed by: FAMILY MEDICINE

## 2025-08-08 PROCEDURE — 99214 OFFICE O/P EST MOD 30 MIN: CPT | Performed by: FAMILY MEDICINE

## 2025-08-08 PROCEDURE — 1123F ACP DISCUSS/DSCN MKR DOCD: CPT | Performed by: FAMILY MEDICINE

## 2025-08-08 RX ORDER — TADALAFIL 10 MG
10 TABLET ORAL PRN
Qty: 20 TABLET | Refills: 2 | Status: CANCELLED | OUTPATIENT
Start: 2025-08-08

## 2025-08-08 RX ORDER — TADALAFIL 20 MG/1
20 TABLET ORAL DAILY PRN
Qty: 30 TABLET | Refills: 1 | Status: SHIPPED | OUTPATIENT
Start: 2025-08-08

## 2025-08-08 RX ORDER — NITROGLYCERIN 0.4 MG/1
0.4 TABLET SUBLINGUAL EVERY 5 MIN PRN
Qty: 25 TABLET | Refills: 1 | Status: SHIPPED | OUTPATIENT
Start: 2025-08-08

## 2025-08-08 RX ORDER — SEMAGLUTIDE 1.34 MG/ML
INJECTION, SOLUTION SUBCUTANEOUS
Qty: 3 ML | Refills: 3 | Status: CANCELLED | OUTPATIENT
Start: 2025-08-08

## 2025-08-11 ENCOUNTER — TELEPHONE (OUTPATIENT)
Dept: FAMILY MEDICINE CLINIC | Age: 75
End: 2025-08-11

## 2025-08-28 RX ORDER — ROSUVASTATIN CALCIUM 20 MG/1
20 TABLET, COATED ORAL DAILY
Qty: 90 TABLET | Refills: 3 | Status: SHIPPED | OUTPATIENT
Start: 2025-08-28

## (undated) DEVICE — DOUBLE BASIN SET: Brand: MEDLINE INDUSTRIES, INC.

## (undated) DEVICE — TIP COVER ACCESSORY

## (undated) DEVICE — ELECTRODE PT RET AD L9FT HI MOIST COND ADH HYDRGEL CORDED

## (undated) DEVICE — ACCESS KIT, S-MAK MINI, 4FR 10CM 0.018IN 40CM, NT/PT, ECHO ENHANCE NEEDLE

## (undated) DEVICE — CATHETER, NAVISTAR, DEF 7F NS,4P,F,1-7-4MM,HYP,115

## (undated) DEVICE — SOLUTION IRRIG 1000ML 0.9% SOD CHL USP POUR PLAS BTL

## (undated) DEVICE — KIT,ANTI FOG,W/SPONGE & FLUID,SOFT PACK: Brand: MEDLINE

## (undated) DEVICE — GUIDEWIRE, UNIVERSAL BALANCE MID WEIGHT

## (undated) DEVICE — SYRINGE 20ML LL S/C 50

## (undated) DEVICE — GUIDEWIRE, INQWIRE, 3MM J, .035 X 210CM, FIXED

## (undated) DEVICE — Device

## (undated) DEVICE — APPLICATOR MEDICATED 26 CC SOLUTION HI LT ORNG CHLORAPREP

## (undated) DEVICE — COLUMN DRAPE

## (undated) DEVICE — INTRODUCER, HEMOSTASIS, STR/J .038 IN, 8.5FR 12CM

## (undated) DEVICE — MEGA SUTURECUT ND: Brand: ENDOWRIST

## (undated) DEVICE — CATHETER, DIAGNOSTIC, DXTERITY, 6FR JR 4.0, 100CM

## (undated) DEVICE — BLADELESS OBTURATOR: Brand: WECK VISTA

## (undated) DEVICE — COVER,MAYO STAND,STERILE: Brand: MEDLINE

## (undated) DEVICE — CATHETER, DIAGNOSTIC, DXTERITY, 6 FR, JL 4.0, 100C

## (undated) DEVICE — GOWN,SIRUS,FABRNF,XL,20/CS: Brand: MEDLINE

## (undated) DEVICE — TR BAND, RADIAL COMPRESSION, STANDARD, 24CM

## (undated) DEVICE — NEEDLE HYPO 25GA L1.5IN BLU POLYPR HUB S STL REG BVL STR

## (undated) DEVICE — INTRODUCER, SHEATH, FAST-CATH, 8FR X 12CM, C-LOCK

## (undated) DEVICE — CLOSURE DEVICE, VASCULAR, ANGIO-SEAL, VIP, 6FR, LF

## (undated) DEVICE — GLOVE,SURG,SIGNATURE LTX MICR,LTX,PF,7.0: Brand: MEDLINE

## (undated) DEVICE — SHEATH, GLIDESHEATH, SLENDER, 6FR 10CM

## (undated) DEVICE — SEAL

## (undated) DEVICE — ARM DRAPE

## (undated) DEVICE — DRAPE,LAP,CHOLE,W/TROUGHS,STERILE: Brand: MEDLINE

## (undated) DEVICE — CATHETER, QUADRAPOLAR, 2-5-2MM, 115CM, YELLOW, W/ REDEL

## (undated) DEVICE — LIQUIBAND RAPID ADHESIVE 36/CS 0.8ML: Brand: MEDLINE

## (undated) DEVICE — INSUFFLATION NEEDLE TO ESTABLISH PNEUMOPERITONEUM.: Brand: INSUFFLATION NEEDLE

## (undated) DEVICE — CATHETER, GUIDING, LAUNCHER, 6FR EBU 3.5

## (undated) DEVICE — WARMER SCP 2 ANTIFOG LAP DISP

## (undated) DEVICE — FORCE BIPOLAR: Brand: ENDOWRIST

## (undated) DEVICE — MONOPOLAR CURVED SCISSORS: Brand: ENDOWRIST

## (undated) DEVICE — CATHETER, DIAGNOSTIC, DXTERITY, 6FR 100CM, JL35

## (undated) DEVICE — 1LYRTR 16FR10ML 100%SILI SNAP: Brand: MEDLINE INDUSTRIES, INC.

## (undated) DEVICE — ANCHOR TISSUE RETRIEVAL SYSTEM, BAG SIZE 125 ML, PORT SIZE 8 MM: Brand: ANCHOR TISSUE RETRIEVAL SYSTEM

## (undated) DEVICE — CATHETER, DEFLECTABLE, 7FR, 4P, D-CURVE, SD, 10MM, 10RDL, 115C

## (undated) DEVICE — SHEATH, GLIDESHEATH, SLENDER, 6FR 16CM

## (undated) DEVICE — SHEATH, PINNACLE, 10 CM,  6FR INTRODUCER, 6FR DIA, 2.5 CM DIALATOR

## (undated) DEVICE — INTRODUCER, HEMO, FAST-CATH, 8.5 FR X 63 CM, SR0 038GW, G2

## (undated) DEVICE — CATHETER, GUIDING, LAUNCHER, 6FR, JR 4.0

## (undated) DEVICE — DEVICE, INFLATION, PRESTO ID40ATM 30CC

## (undated) DEVICE — CATHETHER, CS, BI-DIRECTIONAL, 10 POLES, D-F TYPE

## (undated) DEVICE — PATCHES, EXTERNAL REFERENCE, CARTO3

## (undated) DEVICE — INSUFFLATION TUBING SET WITH FILTER, FUNNEL CONNECTOR AND LUER LOCK: Brand: JOSNOE MEDICAL INC

## (undated) DEVICE — BLADE,STAINLESS-STEEL,11,STRL,DISPOSABLE: Brand: MEDLINE

## (undated) DEVICE — GUIDE WIRE, 260CM, HI-TORQUE, VERSACORE, MODIFIED J

## (undated) DEVICE — INTRODUCER, SHEATH, FAST-CATH, 7FR X 12CM, C-LOCK